# Patient Record
Sex: MALE | Race: WHITE | Employment: FULL TIME | URBAN - NONMETROPOLITAN AREA
[De-identification: names, ages, dates, MRNs, and addresses within clinical notes are randomized per-mention and may not be internally consistent; named-entity substitution may affect disease eponyms.]

---

## 2017-01-03 ENCOUNTER — OFFICE VISIT (OUTPATIENT)
Dept: CARDIOLOGY | Age: 57
End: 2017-01-03
Payer: COMMERCIAL

## 2017-01-03 VITALS
DIASTOLIC BLOOD PRESSURE: 82 MMHG | HEIGHT: 73 IN | RESPIRATION RATE: 20 BRPM | HEART RATE: 65 BPM | BODY MASS INDEX: 31.01 KG/M2 | SYSTOLIC BLOOD PRESSURE: 130 MMHG | WEIGHT: 234 LBS

## 2017-01-03 DIAGNOSIS — I10 ESSENTIAL HYPERTENSION: ICD-10-CM

## 2017-01-03 DIAGNOSIS — R07.9 CHEST PAIN, UNSPECIFIED TYPE: Primary | ICD-10-CM

## 2017-01-03 DIAGNOSIS — I48.0 PAF (PAROXYSMAL ATRIAL FIBRILLATION) (HCC): ICD-10-CM

## 2017-01-03 PROCEDURE — 99203 OFFICE O/P NEW LOW 30 MIN: CPT | Performed by: NURSE PRACTITIONER

## 2017-01-03 RX ORDER — FLUTICASONE PROPIONATE 50 MCG
2 SPRAY, SUSPENSION (ML) NASAL DAILY
COMMUNITY
Start: 2016-10-21 | End: 2018-04-09 | Stop reason: ALTCHOICE

## 2017-01-12 ENCOUNTER — HOSPITAL ENCOUNTER (OUTPATIENT)
Dept: NON INVASIVE DIAGNOSTICS | Age: 57
Discharge: HOME | End: 2017-01-12
Payer: COMMERCIAL

## 2017-01-12 DIAGNOSIS — R07.9 CHEST PAIN, UNSPECIFIED TYPE: ICD-10-CM

## 2017-01-12 DIAGNOSIS — I48.0 PAF (PAROXYSMAL ATRIAL FIBRILLATION) (HCC): ICD-10-CM

## 2017-01-12 DIAGNOSIS — I10 ESSENTIAL HYPERTENSION: ICD-10-CM

## 2017-01-12 LAB
LV EF: 50 %
LV EF: 58 %
LVEF MODALITY: NORMAL
LVEF MODALITY: NORMAL

## 2017-01-12 PROCEDURE — 93350 STRESS TTE ONLY: CPT

## 2017-01-12 PROCEDURE — 93306 TTE W/DOPPLER COMPLETE: CPT

## 2017-01-19 ENCOUNTER — TELEPHONE (OUTPATIENT)
Dept: CARDIOLOGY | Age: 57
End: 2017-01-19

## 2017-01-20 ENCOUNTER — TELEPHONE (OUTPATIENT)
Dept: CARDIOLOGY | Age: 57
End: 2017-01-20

## 2017-02-08 ENCOUNTER — OFFICE VISIT (OUTPATIENT)
Dept: CARDIOLOGY | Age: 57
End: 2017-02-08
Payer: COMMERCIAL

## 2017-02-08 VITALS
WEIGHT: 237 LBS | SYSTOLIC BLOOD PRESSURE: 110 MMHG | BODY MASS INDEX: 30.42 KG/M2 | HEIGHT: 74 IN | HEART RATE: 52 BPM | DIASTOLIC BLOOD PRESSURE: 80 MMHG

## 2017-02-08 DIAGNOSIS — I10 ESSENTIAL HYPERTENSION: ICD-10-CM

## 2017-02-08 DIAGNOSIS — I34.0 MILD MITRAL REGURGITATION BY PRIOR ECHOCARDIOGRAM: ICD-10-CM

## 2017-02-08 DIAGNOSIS — I48.91 LONE ATRIAL FIBRILLATION (HCC): Primary | ICD-10-CM

## 2017-02-08 PROCEDURE — 99213 OFFICE O/P EST LOW 20 MIN: CPT | Performed by: NURSE PRACTITIONER

## 2017-02-08 RX ORDER — OMEGA-3 FATTY ACIDS CAP DELAYED RELEASE 1000 MG 1000 MG
3000 CAPSULE DELAYED RELEASE ORAL DAILY
COMMUNITY
End: 2017-05-26

## 2017-05-16 ENCOUNTER — OFFICE VISIT (OUTPATIENT)
Dept: CARDIOLOGY | Age: 57
End: 2017-05-16
Payer: COMMERCIAL

## 2017-05-16 VITALS
WEIGHT: 242 LBS | BODY MASS INDEX: 31.06 KG/M2 | SYSTOLIC BLOOD PRESSURE: 132 MMHG | HEIGHT: 74 IN | HEART RATE: 70 BPM | DIASTOLIC BLOOD PRESSURE: 76 MMHG

## 2017-05-16 DIAGNOSIS — I10 ESSENTIAL HYPERTENSION: Primary | ICD-10-CM

## 2017-05-16 DIAGNOSIS — I48.0 PAF (PAROXYSMAL ATRIAL FIBRILLATION) (HCC): ICD-10-CM

## 2017-05-16 PROCEDURE — 99212 OFFICE O/P EST SF 10 MIN: CPT | Performed by: INTERNAL MEDICINE

## 2017-05-16 RX ORDER — OMEPRAZOLE 20 MG/1
20 CAPSULE, DELAYED RELEASE ORAL 2 TIMES DAILY
COMMUNITY
End: 2018-03-08 | Stop reason: SDUPTHER

## 2017-05-16 RX ORDER — VITAMIN B COMPLEX
1 CAPSULE ORAL DAILY
COMMUNITY
End: 2017-11-10 | Stop reason: CLARIF

## 2017-05-26 ENCOUNTER — OFFICE VISIT (OUTPATIENT)
Dept: NEUROLOGY | Age: 57
End: 2017-05-26
Payer: COMMERCIAL

## 2017-05-26 VITALS
OXYGEN SATURATION: 96 % | BODY MASS INDEX: 31.32 KG/M2 | HEIGHT: 74 IN | SYSTOLIC BLOOD PRESSURE: 128 MMHG | WEIGHT: 244 LBS | HEART RATE: 64 BPM | DIASTOLIC BLOOD PRESSURE: 77 MMHG

## 2017-05-26 DIAGNOSIS — G47.33 OBSTRUCTIVE SLEEP APNEA: Primary | ICD-10-CM

## 2017-05-26 DIAGNOSIS — Z99.89 BIPAP (BIPHASIC POSITIVE AIRWAY PRESSURE) DEPENDENCE: ICD-10-CM

## 2017-05-26 PROCEDURE — 99213 OFFICE O/P EST LOW 20 MIN: CPT | Performed by: PHYSICIAN ASSISTANT

## 2017-06-26 ENCOUNTER — APPOINTMENT (OUTPATIENT)
Dept: CT IMAGING | Age: 57
DRG: 309 | End: 2017-06-26
Payer: COMMERCIAL

## 2017-06-26 ENCOUNTER — HOSPITAL ENCOUNTER (INPATIENT)
Age: 57
LOS: 2 days | Discharge: HOME | DRG: 309 | End: 2017-06-28
Attending: EMERGENCY MEDICINE | Admitting: INTERNAL MEDICINE
Payer: COMMERCIAL

## 2017-06-26 ENCOUNTER — TELEPHONE (OUTPATIENT)
Dept: CARDIOLOGY | Age: 57
End: 2017-06-26

## 2017-06-26 ENCOUNTER — APPOINTMENT (OUTPATIENT)
Dept: GENERAL RADIOLOGY | Age: 57
DRG: 309 | End: 2017-06-26
Payer: COMMERCIAL

## 2017-06-26 DIAGNOSIS — J18.9 PNEUMONIA DUE TO ORGANISM: Primary | ICD-10-CM

## 2017-06-26 DIAGNOSIS — R00.0 TACHYCARDIA: ICD-10-CM

## 2017-06-26 PROBLEM — Z86.711 HX PULMONARY EMBOLISM: Status: ACTIVE | Noted: 2017-06-26

## 2017-06-26 PROBLEM — J98.11 ATELECTASIS OF RIGHT LUNG: Status: ACTIVE | Noted: 2017-06-26

## 2017-06-26 PROBLEM — I48.0 PAROXYSMAL ATRIAL FIBRILLATION WITH RVR (HCC): Status: ACTIVE | Noted: 2017-06-26

## 2017-06-26 PROBLEM — N40.1 BENIGN PROSTATIC HYPERPLASIA WITH LOWER URINARY TRACT SYMPTOMS: Status: ACTIVE | Noted: 2017-06-26

## 2017-06-26 PROBLEM — E78.2 MIXED HYPERLIPIDEMIA: Status: ACTIVE | Noted: 2017-06-26

## 2017-06-26 LAB
ALBUMIN SERPL-MCNC: 3.7 G/DL (ref 3.5–5.2)
ALP BLD-CCNC: 57 U/L (ref 40–130)
ALT SERPL-CCNC: 14 U/L (ref 5–41)
ANION GAP SERPL CALCULATED.3IONS-SCNC: 13 MMOL/L (ref 7–19)
AST SERPL-CCNC: 38 U/L (ref 5–40)
BASOPHILS ABSOLUTE: 0.1 K/UL (ref 0–0.2)
BASOPHILS RELATIVE PERCENT: 0.9 % (ref 0–1)
BILIRUB SERPL-MCNC: 0.7 MG/DL (ref 0.2–1.2)
BILIRUBIN URINE: NEGATIVE
BLOOD, URINE: NEGATIVE
BUN BLDV-MCNC: 14 MG/DL (ref 6–20)
CALCIUM SERPL-MCNC: 9 MG/DL (ref 8.6–10)
CHLORIDE BLD-SCNC: 106 MMOL/L (ref 98–111)
CLARITY: CLEAR
CO2: 23 MMOL/L (ref 22–29)
COLOR: YELLOW
CREAT SERPL-MCNC: 0.7 MG/DL (ref 0.5–1.2)
D DIMER: 1.53 NG/ML DDU (ref 0–0.48)
EOSINOPHILS ABSOLUTE: 0.4 K/UL (ref 0–0.6)
EOSINOPHILS RELATIVE PERCENT: 5.1 % (ref 0–5)
GFR NON-AFRICAN AMERICAN: >60
GLUCOSE BLD-MCNC: 92 MG/DL (ref 74–109)
GLUCOSE URINE: NEGATIVE MG/DL
HCT VFR BLD CALC: 42.8 % (ref 42–52)
HEMOGLOBIN: 14.4 G/DL (ref 14–18)
KETONES, URINE: ABNORMAL MG/DL
LEUKOCYTE ESTERASE, URINE: NEGATIVE
LYMPHOCYTES ABSOLUTE: 1.7 K/UL (ref 1.1–4.5)
LYMPHOCYTES RELATIVE PERCENT: 24.2 % (ref 20–40)
MAGNESIUM: 1.9 MG/DL (ref 1.6–2.6)
MCH RBC QN AUTO: 32.7 PG (ref 27–31)
MCHC RBC AUTO-ENTMCNC: 33.6 G/DL (ref 33–37)
MCV RBC AUTO: 97.3 FL (ref 80–94)
MONOCYTES ABSOLUTE: 0.6 K/UL (ref 0–0.9)
MONOCYTES RELATIVE PERCENT: 9.1 % (ref 0–10)
NEUTROPHILS ABSOLUTE: 4.2 K/UL (ref 1.5–7.5)
NEUTROPHILS RELATIVE PERCENT: 60.6 % (ref 50–65)
NITRITE, URINE: NEGATIVE
PDW BLD-RTO: 13.4 % (ref 11.5–14.5)
PERFORMED ON: NORMAL
PH UA: 6.5
PLATELET # BLD: 199 K/UL (ref 130–400)
PMV BLD AUTO: 9.2 FL (ref 9.4–12.4)
POC TROPONIN I: 0.01 NG/ML (ref 0–0.08)
POTASSIUM SERPL-SCNC: 4.5 MMOL/L (ref 3.5–5)
PROTEIN UA: NEGATIVE MG/DL
RBC # BLD: 4.4 M/UL (ref 4.7–6.1)
SODIUM BLD-SCNC: 142 MMOL/L (ref 136–145)
SPECIFIC GRAVITY UA: 1.04
TOTAL PROTEIN: 6.7 G/DL (ref 6.6–8.7)
TSH SERPL DL<=0.05 MIU/L-ACNC: 2.98 UIU/ML (ref 0.27–4.2)
UROBILINOGEN, URINE: 0.2 E.U./DL
WBC # BLD: 6.9 K/UL (ref 4.8–10.8)

## 2017-06-26 PROCEDURE — 2580000003 HC RX 258: Performed by: EMERGENCY MEDICINE

## 2017-06-26 PROCEDURE — 71010 XR CHEST PORTABLE: CPT

## 2017-06-26 PROCEDURE — 80053 COMPREHEN METABOLIC PANEL: CPT

## 2017-06-26 PROCEDURE — 99254 IP/OBS CNSLTJ NEW/EST MOD 60: CPT | Performed by: INTERNAL MEDICINE

## 2017-06-26 PROCEDURE — 84484 ASSAY OF TROPONIN QUANT: CPT

## 2017-06-26 PROCEDURE — 99285 EMERGENCY DEPT VISIT HI MDM: CPT

## 2017-06-26 PROCEDURE — 6370000000 HC RX 637 (ALT 250 FOR IP): Performed by: INTERNAL MEDICINE

## 2017-06-26 PROCEDURE — 85379 FIBRIN DEGRADATION QUANT: CPT

## 2017-06-26 PROCEDURE — 2140000000 HC CCU INTERMEDIATE R&B

## 2017-06-26 PROCEDURE — 6360000004 HC RX CONTRAST MEDICATION: Performed by: EMERGENCY MEDICINE

## 2017-06-26 PROCEDURE — 85025 COMPLETE CBC W/AUTO DIFF WBC: CPT

## 2017-06-26 PROCEDURE — 6360000002 HC RX W HCPCS: Performed by: NURSE PRACTITIONER

## 2017-06-26 PROCEDURE — 36415 COLL VENOUS BLD VENIPUNCTURE: CPT

## 2017-06-26 PROCEDURE — 93005 ELECTROCARDIOGRAM TRACING: CPT

## 2017-06-26 PROCEDURE — 99223 1ST HOSP IP/OBS HIGH 75: CPT | Performed by: INTERNAL MEDICINE

## 2017-06-26 PROCEDURE — 6360000002 HC RX W HCPCS: Performed by: EMERGENCY MEDICINE

## 2017-06-26 PROCEDURE — 99285 EMERGENCY DEPT VISIT HI MDM: CPT | Performed by: EMERGENCY MEDICINE

## 2017-06-26 PROCEDURE — 84443 ASSAY THYROID STIM HORMONE: CPT

## 2017-06-26 PROCEDURE — 71275 CT ANGIOGRAPHY CHEST: CPT

## 2017-06-26 PROCEDURE — 87040 BLOOD CULTURE FOR BACTERIA: CPT

## 2017-06-26 PROCEDURE — 83735 ASSAY OF MAGNESIUM: CPT

## 2017-06-26 PROCEDURE — 2500000003 HC RX 250 WO HCPCS: Performed by: EMERGENCY MEDICINE

## 2017-06-26 PROCEDURE — 81003 URINALYSIS AUTO W/O SCOPE: CPT

## 2017-06-26 PROCEDURE — 6370000000 HC RX 637 (ALT 250 FOR IP): Performed by: EMERGENCY MEDICINE

## 2017-06-26 RX ORDER — METOPROLOL TARTRATE 5 MG/5ML
5 INJECTION INTRAVENOUS ONCE
Status: COMPLETED | OUTPATIENT
Start: 2017-06-26 | End: 2017-06-26

## 2017-06-26 RX ORDER — VITAMIN C
1 TAB ORAL DAILY
Status: DISCONTINUED | OUTPATIENT
Start: 2017-06-27 | End: 2017-06-28 | Stop reason: HOSPADM

## 2017-06-26 RX ORDER — SODIUM CHLORIDE 0.9 % (FLUSH) 0.9 %
10 SYRINGE (ML) INJECTION EVERY 12 HOURS SCHEDULED
Status: DISCONTINUED | OUTPATIENT
Start: 2017-06-26 | End: 2017-06-28 | Stop reason: HOSPADM

## 2017-06-26 RX ORDER — ACETAMINOPHEN 325 MG/1
650 TABLET ORAL EVERY 4 HOURS PRN
Status: DISCONTINUED | OUTPATIENT
Start: 2017-06-26 | End: 2017-06-28 | Stop reason: HOSPADM

## 2017-06-26 RX ORDER — ASPIRIN 81 MG/1
324 TABLET, CHEWABLE ORAL ONCE
Status: COMPLETED | OUTPATIENT
Start: 2017-06-26 | End: 2017-06-26

## 2017-06-26 RX ORDER — SODIUM CHLORIDE 0.9 % (FLUSH) 0.9 %
10 SYRINGE (ML) INJECTION PRN
Status: DISCONTINUED | OUTPATIENT
Start: 2017-06-26 | End: 2017-06-28 | Stop reason: HOSPADM

## 2017-06-26 RX ORDER — PANTOPRAZOLE SODIUM 40 MG/1
40 TABLET, DELAYED RELEASE ORAL
Status: DISCONTINUED | OUTPATIENT
Start: 2017-06-27 | End: 2017-06-28 | Stop reason: HOSPADM

## 2017-06-26 RX ORDER — ASCORBIC ACID 500 MG
500 TABLET ORAL 2 TIMES DAILY
Status: DISCONTINUED | OUTPATIENT
Start: 2017-06-26 | End: 2017-06-28 | Stop reason: HOSPADM

## 2017-06-26 RX ORDER — VITAMIN E 268 MG
400 CAPSULE ORAL DAILY
Status: DISCONTINUED | OUTPATIENT
Start: 2017-06-27 | End: 2017-06-28 | Stop reason: HOSPADM

## 2017-06-26 RX ORDER — 0.9 % SODIUM CHLORIDE 0.9 %
1000 INTRAVENOUS SOLUTION INTRAVENOUS ONCE
Status: COMPLETED | OUTPATIENT
Start: 2017-06-26 | End: 2017-06-26

## 2017-06-26 RX ORDER — DOCUSATE SODIUM 100 MG/1
100 CAPSULE, LIQUID FILLED ORAL DAILY
Status: DISCONTINUED | OUTPATIENT
Start: 2017-06-26 | End: 2017-06-28 | Stop reason: HOSPADM

## 2017-06-26 RX ORDER — METOPROLOL SUCCINATE 50 MG/1
50 TABLET, EXTENDED RELEASE ORAL 2 TIMES DAILY
Status: DISCONTINUED | OUTPATIENT
Start: 2017-06-26 | End: 2017-06-26 | Stop reason: ALTCHOICE

## 2017-06-26 RX ORDER — ADENOSINE 3 MG/ML
6 INJECTION, SOLUTION INTRAVENOUS ONCE
Status: COMPLETED | OUTPATIENT
Start: 2017-06-26 | End: 2017-06-26

## 2017-06-26 RX ORDER — ONDANSETRON 2 MG/ML
4 INJECTION INTRAMUSCULAR; INTRAVENOUS EVERY 6 HOURS PRN
Status: DISCONTINUED | OUTPATIENT
Start: 2017-06-26 | End: 2017-06-26 | Stop reason: SDUPTHER

## 2017-06-26 RX ORDER — LISINOPRIL 20 MG/1
20 TABLET ORAL DAILY
Status: DISCONTINUED | OUTPATIENT
Start: 2017-06-27 | End: 2017-06-28 | Stop reason: HOSPADM

## 2017-06-26 RX ORDER — OMEGA-3S/DHA/EPA/FISH OIL/D3 300MG-1000
400 CAPSULE ORAL DAILY
Status: DISCONTINUED | OUTPATIENT
Start: 2017-06-27 | End: 2017-06-28 | Stop reason: HOSPADM

## 2017-06-26 RX ORDER — DIAZEPAM 5 MG/1
5 TABLET ORAL NIGHTLY PRN
Status: DISCONTINUED | OUTPATIENT
Start: 2017-06-26 | End: 2017-06-28 | Stop reason: HOSPADM

## 2017-06-26 RX ORDER — ACETAMINOPHEN 500 MG
500 TABLET ORAL EVERY 4 HOURS PRN
Status: DISCONTINUED | OUTPATIENT
Start: 2017-06-26 | End: 2017-06-28 | Stop reason: HOSPADM

## 2017-06-26 RX ORDER — ONDANSETRON 2 MG/ML
4 INJECTION INTRAMUSCULAR; INTRAVENOUS EVERY 6 HOURS PRN
Status: DISCONTINUED | OUTPATIENT
Start: 2017-06-26 | End: 2017-06-28 | Stop reason: HOSPADM

## 2017-06-26 RX ORDER — NIACIN 100 MG
1000 TABLET ORAL 4 TIMES DAILY
Status: DISCONTINUED | OUTPATIENT
Start: 2017-06-26 | End: 2017-06-28 | Stop reason: HOSPADM

## 2017-06-26 RX ORDER — TAMSULOSIN HYDROCHLORIDE 0.4 MG/1
0.4 CAPSULE ORAL DAILY
COMMUNITY
End: 2017-11-30 | Stop reason: ALTCHOICE

## 2017-06-26 RX ORDER — FLUTICASONE PROPIONATE 50 MCG
1 SPRAY, SUSPENSION (ML) NASAL DAILY
Status: DISCONTINUED | OUTPATIENT
Start: 2017-06-27 | End: 2017-06-28 | Stop reason: HOSPADM

## 2017-06-26 RX ORDER — SOTALOL HYDROCHLORIDE 80 MG/1
80 TABLET ORAL 2 TIMES DAILY
Status: DISCONTINUED | OUTPATIENT
Start: 2017-06-26 | End: 2017-06-28 | Stop reason: HOSPADM

## 2017-06-26 RX ORDER — NIACIN 1000 MG
1000 TABLET, EXTENDED RELEASE ORAL 4 TIMES DAILY
Status: DISCONTINUED | OUTPATIENT
Start: 2017-06-26 | End: 2017-06-26

## 2017-06-26 RX ORDER — NIACIN 500 MG
1000 TABLET ORAL 4 TIMES DAILY
COMMUNITY
End: 2018-05-11 | Stop reason: DRUGHIGH

## 2017-06-26 RX ORDER — OMEGA-3 FATTY ACIDS CAP DELAYED RELEASE 1000 MG 1000 MG
2150 CAPSULE DELAYED RELEASE ORAL 2 TIMES DAILY
Status: DISCONTINUED | OUTPATIENT
Start: 2017-06-26 | End: 2017-06-28 | Stop reason: SDUPTHER

## 2017-06-26 RX ORDER — ASPIRIN 81 MG/1
81 TABLET, CHEWABLE ORAL DAILY
Status: DISCONTINUED | OUTPATIENT
Start: 2017-06-27 | End: 2017-06-28 | Stop reason: HOSPADM

## 2017-06-26 RX ADMIN — METOPROLOL TARTRATE 5 MG: 5 INJECTION INTRAVENOUS at 15:05

## 2017-06-26 RX ADMIN — OXYCODONE HYDROCHLORIDE AND ACETAMINOPHEN 500 MG: 500 TABLET ORAL at 21:40

## 2017-06-26 RX ADMIN — SODIUM CHLORIDE 1000 ML: 9 INJECTION, SOLUTION INTRAVENOUS at 15:06

## 2017-06-26 RX ADMIN — SOTALOL HYDROCHLORIDE 80 MG: 80 TABLET ORAL at 21:40

## 2017-06-26 RX ADMIN — DILTIAZEM HYDROCHLORIDE 5 MG/HR: 5 INJECTION INTRAVENOUS at 18:15

## 2017-06-26 RX ADMIN — OMEGA-3 FATTY ACIDS CAP DELAYED RELEASE 1000 MG 2000 MG: 1000 CAPSULE DELAYED RELEASE at 21:39

## 2017-06-26 RX ADMIN — ADENOSINE 6 MG: 3 INJECTION, SOLUTION INTRAVENOUS at 15:57

## 2017-06-26 RX ADMIN — METOPROLOL TARTRATE 5 MG: 5 INJECTION INTRAVENOUS at 16:24

## 2017-06-26 RX ADMIN — ENOXAPARIN SODIUM 105 MG: 120 INJECTION SUBCUTANEOUS at 21:40

## 2017-06-26 RX ADMIN — IOVERSOL 90 ML: 741 INJECTION INTRA-ARTERIAL; INTRAVENOUS at 16:15

## 2017-06-26 RX ADMIN — AZITHROMYCIN MONOHYDRATE 500 MG: 500 INJECTION, POWDER, LYOPHILIZED, FOR SOLUTION INTRAVENOUS at 17:44

## 2017-06-26 RX ADMIN — DOCUSATE SODIUM 100 MG: 100 CAPSULE, LIQUID FILLED ORAL at 21:39

## 2017-06-26 RX ADMIN — CEFTRIAXONE 1 G: 1 INJECTION, POWDER, FOR SOLUTION INTRAMUSCULAR; INTRAVENOUS at 17:15

## 2017-06-26 RX ADMIN — ASPIRIN 81 MG CHEWABLE TABLET 324 MG: 81 TABLET CHEWABLE at 14:49

## 2017-06-26 RX ADMIN — Medication 1000 MG: at 21:41

## 2017-06-26 ASSESSMENT — ENCOUNTER SYMPTOMS
DIARRHEA: 0
TROUBLE SWALLOWING: 0
BACK PAIN: 0
BLOOD IN STOOL: 0
COUGH: 0
ABDOMINAL PAIN: 0
ABDOMINAL DISTENTION: 0
SHORTNESS OF BREATH: 0
NAUSEA: 0
CHEST TIGHTNESS: 0
VOMITING: 0
CONSTIPATION: 0
SORE THROAT: 0
RHINORRHEA: 0

## 2017-06-26 ASSESSMENT — PAIN SCALES - GENERAL: PAINLEVEL_OUTOF10: 0

## 2017-06-27 ENCOUNTER — APPOINTMENT (OUTPATIENT)
Dept: GENERAL RADIOLOGY | Age: 57
DRG: 309 | End: 2017-06-27
Payer: COMMERCIAL

## 2017-06-27 LAB
ANION GAP SERPL CALCULATED.3IONS-SCNC: 13 MMOL/L (ref 7–19)
BUN BLDV-MCNC: 15 MG/DL (ref 6–20)
CALCIUM SERPL-MCNC: 8.4 MG/DL (ref 8.6–10)
CHLORIDE BLD-SCNC: 106 MMOL/L (ref 98–111)
CHOLESTEROL, TOTAL: 168 MG/DL (ref 160–199)
CO2: 25 MMOL/L (ref 22–29)
CREAT SERPL-MCNC: 0.9 MG/DL (ref 0.5–1.2)
GFR NON-AFRICAN AMERICAN: >60
GLUCOSE BLD-MCNC: 121 MG/DL (ref 74–109)
HCT VFR BLD CALC: 38.8 % (ref 42–52)
HDLC SERPL-MCNC: 41 MG/DL (ref 55–121)
HEMOGLOBIN: 13 G/DL (ref 14–18)
LDL CHOLESTEROL CALCULATED: 108 MG/DL
LV EF: 58 %
LVEF MODALITY: NORMAL
MCH RBC QN AUTO: 33.1 PG (ref 27–31)
MCHC RBC AUTO-ENTMCNC: 33.5 G/DL (ref 33–37)
MCV RBC AUTO: 98.7 FL (ref 80–94)
PDW BLD-RTO: 13.3 % (ref 11.5–14.5)
PLATELET # BLD: 201 K/UL (ref 130–400)
PMV BLD AUTO: 9.7 FL (ref 9.4–12.4)
POTASSIUM SERPL-SCNC: 3.5 MMOL/L (ref 3.5–5)
RBC # BLD: 3.93 M/UL (ref 4.7–6.1)
SODIUM BLD-SCNC: 144 MMOL/L (ref 136–145)
TRIGL SERPL-MCNC: 94 MG/DL (ref 150–199)
TROPONIN: <0.01 NG/ML (ref 0–0.03)
WBC # BLD: 5.7 K/UL (ref 4.8–10.8)

## 2017-06-27 PROCEDURE — 6370000000 HC RX 637 (ALT 250 FOR IP): Performed by: INTERNAL MEDICINE

## 2017-06-27 PROCEDURE — 84484 ASSAY OF TROPONIN QUANT: CPT

## 2017-06-27 PROCEDURE — 2580000003 HC RX 258: Performed by: NURSE PRACTITIONER

## 2017-06-27 PROCEDURE — 93005 ELECTROCARDIOGRAM TRACING: CPT

## 2017-06-27 PROCEDURE — 99232 SBSQ HOSP IP/OBS MODERATE 35: CPT | Performed by: INTERNAL MEDICINE

## 2017-06-27 PROCEDURE — 36415 COLL VENOUS BLD VENIPUNCTURE: CPT

## 2017-06-27 PROCEDURE — 2140000000 HC CCU INTERMEDIATE R&B

## 2017-06-27 PROCEDURE — 85027 COMPLETE CBC AUTOMATED: CPT

## 2017-06-27 PROCEDURE — 6360000002 HC RX W HCPCS: Performed by: NURSE PRACTITIONER

## 2017-06-27 PROCEDURE — 80061 LIPID PANEL: CPT

## 2017-06-27 PROCEDURE — 99233 SBSQ HOSP IP/OBS HIGH 50: CPT | Performed by: HOSPITALIST

## 2017-06-27 PROCEDURE — 71020 XR CHEST STANDARD TWO VW: CPT

## 2017-06-27 PROCEDURE — 6370000000 HC RX 637 (ALT 250 FOR IP): Performed by: HOSPITALIST

## 2017-06-27 PROCEDURE — 80048 BASIC METABOLIC PNL TOTAL CA: CPT

## 2017-06-27 PROCEDURE — 93306 TTE W/DOPPLER COMPLETE: CPT

## 2017-06-27 RX ORDER — TAMSULOSIN HYDROCHLORIDE 0.4 MG/1
0.4 CAPSULE ORAL NIGHTLY
Status: DISCONTINUED | OUTPATIENT
Start: 2017-06-27 | End: 2017-06-28 | Stop reason: HOSPADM

## 2017-06-27 RX ORDER — ASPIRIN 81 MG/1
81 TABLET ORAL DAILY
COMMUNITY

## 2017-06-27 RX ADMIN — OXYCODONE HYDROCHLORIDE AND ACETAMINOPHEN 500 MG: 500 TABLET ORAL at 08:30

## 2017-06-27 RX ADMIN — ENOXAPARIN SODIUM 105 MG: 120 INJECTION SUBCUTANEOUS at 08:30

## 2017-06-27 RX ADMIN — SOTALOL HYDROCHLORIDE 80 MG: 80 TABLET ORAL at 08:30

## 2017-06-27 RX ADMIN — OXYCODONE HYDROCHLORIDE AND ACETAMINOPHEN 500 MG: 500 TABLET ORAL at 21:35

## 2017-06-27 RX ADMIN — ASPIRIN 81 MG CHEWABLE TABLET 81 MG: 81 TABLET CHEWABLE at 08:30

## 2017-06-27 RX ADMIN — OMEGA-3 FATTY ACIDS CAP DELAYED RELEASE 1000 MG 2000 MG: 1000 CAPSULE DELAYED RELEASE at 08:29

## 2017-06-27 RX ADMIN — TAMSULOSIN HYDROCHLORIDE 0.4 MG: 0.4 CAPSULE ORAL at 21:35

## 2017-06-27 RX ADMIN — Medication 10 ML: at 08:31

## 2017-06-27 RX ADMIN — LISINOPRIL 20 MG: 20 TABLET ORAL at 08:30

## 2017-06-27 RX ADMIN — VITAMIN C 1 TABLET: TAB at 08:30

## 2017-06-27 RX ADMIN — PANTOPRAZOLE SODIUM 40 MG: 40 TABLET, DELAYED RELEASE ORAL at 06:00

## 2017-06-27 RX ADMIN — Medication 1000 MG: at 14:18

## 2017-06-27 RX ADMIN — Medication 1000 MG: at 08:28

## 2017-06-27 RX ADMIN — OMEGA-3 FATTY ACIDS CAP DELAYED RELEASE 1000 MG 2000 MG: 1000 CAPSULE DELAYED RELEASE at 21:35

## 2017-06-27 RX ADMIN — Medication 1000 MG: at 21:35

## 2017-06-27 RX ADMIN — FLUTICASONE PROPIONATE 1 SPRAY: 50 SPRAY, METERED NASAL at 14:18

## 2017-06-27 RX ADMIN — Medication 1000 MG: at 17:16

## 2017-06-27 RX ADMIN — SOTALOL HYDROCHLORIDE 80 MG: 80 TABLET ORAL at 21:34

## 2017-06-27 RX ADMIN — Medication 10 ML: at 21:39

## 2017-06-27 RX ADMIN — DOCUSATE SODIUM 100 MG: 100 CAPSULE, LIQUID FILLED ORAL at 08:29

## 2017-06-27 RX ADMIN — VITAMIN E CAP 400 UNIT 400 UNITS: 400 CAP at 08:30

## 2017-06-27 RX ADMIN — CHOLECALCIFEROL TAB 10 MCG (400 UNIT) 400 UNITS: 10 TAB at 08:30

## 2017-06-27 ASSESSMENT — PAIN SCALES - GENERAL
PAINLEVEL_OUTOF10: 0

## 2017-06-28 VITALS
RESPIRATION RATE: 18 BRPM | TEMPERATURE: 96.8 F | HEART RATE: 57 BPM | BODY MASS INDEX: 31.16 KG/M2 | DIASTOLIC BLOOD PRESSURE: 72 MMHG | SYSTOLIC BLOOD PRESSURE: 124 MMHG | OXYGEN SATURATION: 95 % | HEIGHT: 74 IN | WEIGHT: 242.8 LBS

## 2017-06-28 LAB
EKG P AXIS: 67 DEGREES
EKG P AXIS: 73 DEGREES
EKG P-R INTERVAL: 148 MS
EKG P-R INTERVAL: 162 MS
EKG Q-T INTERVAL: 298 MS
EKG Q-T INTERVAL: 476 MS
EKG QRS DURATION: 86 MS
EKG QRS DURATION: 88 MS
EKG QTC CALCULATION (BAZETT): 431 MS
EKG QTC CALCULATION (BAZETT): 455 MS
EKG T AXIS: 40 DEGREES
EKG T AXIS: 46 DEGREES

## 2017-06-28 PROCEDURE — 93005 ELECTROCARDIOGRAM TRACING: CPT

## 2017-06-28 PROCEDURE — 99239 HOSP IP/OBS DSCHRG MGMT >30: CPT | Performed by: HOSPITALIST

## 2017-06-28 PROCEDURE — 6360000002 HC RX W HCPCS: Performed by: HOSPITALIST

## 2017-06-28 PROCEDURE — 6370000000 HC RX 637 (ALT 250 FOR IP): Performed by: INTERNAL MEDICINE

## 2017-06-28 PROCEDURE — 94762 N-INVAS EAR/PLS OXIMTRY CONT: CPT

## 2017-06-28 PROCEDURE — 99231 SBSQ HOSP IP/OBS SF/LOW 25: CPT | Performed by: INTERNAL MEDICINE

## 2017-06-28 PROCEDURE — 2580000003 HC RX 258: Performed by: NURSE PRACTITIONER

## 2017-06-28 RX ORDER — OMEGA-3 FATTY ACIDS CAP DELAYED RELEASE 1000 MG 1000 MG
2000 CAPSULE DELAYED RELEASE ORAL 2 TIMES DAILY
Status: DISCONTINUED | OUTPATIENT
Start: 2017-06-28 | End: 2017-06-28 | Stop reason: HOSPADM

## 2017-06-28 RX ORDER — SOTALOL HYDROCHLORIDE 80 MG/1
80 TABLET ORAL 2 TIMES DAILY
Qty: 60 TABLET | Refills: 0 | Status: SHIPPED | OUTPATIENT
Start: 2017-06-28 | End: 2017-06-28

## 2017-06-28 RX ORDER — SOTALOL HYDROCHLORIDE 80 MG/1
80 TABLET ORAL 2 TIMES DAILY
Qty: 60 TABLET | Refills: 3 | Status: CANCELLED | OUTPATIENT
Start: 2017-06-28

## 2017-06-28 RX ORDER — SOTALOL HYDROCHLORIDE 80 MG/1
80 TABLET ORAL 2 TIMES DAILY
Qty: 180 TABLET | Refills: 0 | Status: SHIPPED | OUTPATIENT
Start: 2017-06-28 | End: 2017-06-28

## 2017-06-28 RX ORDER — SOTALOL HYDROCHLORIDE 80 MG/1
80 TABLET ORAL 2 TIMES DAILY
Qty: 60 TABLET | Refills: 0
Start: 2017-06-28 | End: 2017-07-18 | Stop reason: SDUPTHER

## 2017-06-28 RX ADMIN — Medication 1000 MG: at 09:09

## 2017-06-28 RX ADMIN — Medication 10 ML: at 09:08

## 2017-06-28 RX ADMIN — LISINOPRIL 20 MG: 20 TABLET ORAL at 09:09

## 2017-06-28 RX ADMIN — CHOLECALCIFEROL TAB 10 MCG (400 UNIT) 400 UNITS: 10 TAB at 09:09

## 2017-06-28 RX ADMIN — FLUTICASONE PROPIONATE 1 SPRAY: 50 SPRAY, METERED NASAL at 09:08

## 2017-06-28 RX ADMIN — OXYCODONE HYDROCHLORIDE AND ACETAMINOPHEN 500 MG: 500 TABLET ORAL at 09:09

## 2017-06-28 RX ADMIN — ASPIRIN 81 MG CHEWABLE TABLET 81 MG: 81 TABLET CHEWABLE at 09:08

## 2017-06-28 RX ADMIN — ENOXAPARIN SODIUM 40 MG: 40 INJECTION SUBCUTANEOUS at 09:08

## 2017-06-28 RX ADMIN — VITAMIN C 1 TABLET: TAB at 09:09

## 2017-06-28 RX ADMIN — PANTOPRAZOLE SODIUM 40 MG: 40 TABLET, DELAYED RELEASE ORAL at 05:30

## 2017-06-28 RX ADMIN — SOTALOL HYDROCHLORIDE 80 MG: 80 TABLET ORAL at 09:09

## 2017-06-28 RX ADMIN — VITAMIN E CAP 400 UNIT 400 UNITS: 400 CAP at 09:08

## 2017-06-28 RX ADMIN — DOCUSATE SODIUM 100 MG: 100 CAPSULE, LIQUID FILLED ORAL at 09:09

## 2017-06-28 RX ADMIN — OMEGA-3 FATTY ACIDS CAP DELAYED RELEASE 1000 MG 2000 MG: 1000 CAPSULE DELAYED RELEASE at 09:09

## 2017-06-28 ASSESSMENT — PAIN SCALES - GENERAL
PAINLEVEL_OUTOF10: 0
PAINLEVEL_OUTOF10: 0

## 2017-06-29 ENCOUNTER — TELEPHONE (OUTPATIENT)
Dept: INTERNAL MEDICINE | Age: 57
End: 2017-06-29

## 2017-06-29 DIAGNOSIS — I48.0 PAROXYSMAL ATRIAL FIBRILLATION (HCC): Primary | ICD-10-CM

## 2017-06-29 DIAGNOSIS — J18.9 PNEUMONIA DUE TO ORGANISM: ICD-10-CM

## 2017-06-29 DIAGNOSIS — Z86.711 HX PULMONARY EMBOLISM: ICD-10-CM

## 2017-06-29 DIAGNOSIS — I48.0 PAROXYSMAL ATRIAL FIBRILLATION WITH RVR (HCC): ICD-10-CM

## 2017-06-29 DIAGNOSIS — J98.11 ATELECTASIS OF RIGHT LUNG: ICD-10-CM

## 2017-06-29 DIAGNOSIS — N40.1 BENIGN NON-NODULAR PROSTATIC HYPERPLASIA WITH LOWER URINARY TRACT SYMPTOMS: ICD-10-CM

## 2017-06-29 DIAGNOSIS — G47.33 OBSTRUCTIVE SLEEP APNEA: ICD-10-CM

## 2017-06-29 DIAGNOSIS — I77.810 AORTIC ROOT DILATION (HCC): ICD-10-CM

## 2017-06-29 DIAGNOSIS — I34.0 MILD MITRAL REGURGITATION BY PRIOR ECHOCARDIOGRAM: ICD-10-CM

## 2017-06-29 DIAGNOSIS — E78.2 MIXED HYPERLIPIDEMIA: ICD-10-CM

## 2017-07-01 LAB
BLOOD CULTURE, ROUTINE: NORMAL
CULTURE, BLOOD 2: NORMAL

## 2017-07-05 ENCOUNTER — OFFICE VISIT (OUTPATIENT)
Dept: INTERNAL MEDICINE | Age: 57
End: 2017-07-05
Payer: COMMERCIAL

## 2017-07-05 VITALS
RESPIRATION RATE: 18 BRPM | DIASTOLIC BLOOD PRESSURE: 78 MMHG | BODY MASS INDEX: 31.32 KG/M2 | SYSTOLIC BLOOD PRESSURE: 132 MMHG | HEART RATE: 57 BPM | TEMPERATURE: 97.5 F | OXYGEN SATURATION: 95 % | HEIGHT: 74 IN | WEIGHT: 244 LBS

## 2017-07-05 DIAGNOSIS — Z86.711 HX PULMONARY EMBOLISM: Primary | ICD-10-CM

## 2017-07-05 DIAGNOSIS — I10 ESSENTIAL HYPERTENSION: ICD-10-CM

## 2017-07-05 DIAGNOSIS — I48.0 PAF (PAROXYSMAL ATRIAL FIBRILLATION) (HCC): ICD-10-CM

## 2017-07-05 DIAGNOSIS — Z00.00 HEALTHCARE MAINTENANCE: ICD-10-CM

## 2017-07-05 PROCEDURE — 99214 OFFICE O/P EST MOD 30 MIN: CPT | Performed by: NURSE PRACTITIONER

## 2017-07-05 RX ORDER — NAPROXEN 500 MG/1
TABLET ORAL PRN
Refills: 2 | COMMUNITY
Start: 2017-04-28 | End: 2018-05-11

## 2017-07-18 DIAGNOSIS — I48.91 LONE ATRIAL FIBRILLATION (HCC): Primary | ICD-10-CM

## 2017-07-18 DIAGNOSIS — I48.0 PAROXYSMAL ATRIAL FIBRILLATION WITH RVR (HCC): ICD-10-CM

## 2017-07-18 RX ORDER — SOTALOL HYDROCHLORIDE 80 MG/1
80 TABLET ORAL 2 TIMES DAILY
Qty: 60 TABLET | Refills: 1 | Status: SHIPPED | OUTPATIENT
Start: 2017-07-18 | End: 2017-12-11 | Stop reason: SDUPTHER

## 2017-08-09 ENCOUNTER — OFFICE VISIT (OUTPATIENT)
Dept: CARDIOLOGY | Age: 57
End: 2017-08-09
Payer: COMMERCIAL

## 2017-08-09 VITALS
DIASTOLIC BLOOD PRESSURE: 80 MMHG | HEIGHT: 74 IN | SYSTOLIC BLOOD PRESSURE: 138 MMHG | BODY MASS INDEX: 31.44 KG/M2 | HEART RATE: 56 BPM | WEIGHT: 245 LBS

## 2017-08-09 DIAGNOSIS — G47.33 OBSTRUCTIVE SLEEP APNEA: ICD-10-CM

## 2017-08-09 DIAGNOSIS — Z86.711 HX PULMONARY EMBOLISM: ICD-10-CM

## 2017-08-09 DIAGNOSIS — I34.0 MILD MITRAL REGURGITATION BY PRIOR ECHOCARDIOGRAM: ICD-10-CM

## 2017-08-09 DIAGNOSIS — I48.0 PAF (PAROXYSMAL ATRIAL FIBRILLATION) (HCC): Primary | ICD-10-CM

## 2017-08-09 PROBLEM — I48.91 LONE ATRIAL FIBRILLATION (HCC): Status: RESOLVED | Noted: 2017-02-08 | Resolved: 2017-08-09

## 2017-08-09 PROCEDURE — 99213 OFFICE O/P EST LOW 20 MIN: CPT | Performed by: CLINICAL NURSE SPECIALIST

## 2017-08-09 PROCEDURE — 93000 ELECTROCARDIOGRAM COMPLETE: CPT | Performed by: CLINICAL NURSE SPECIALIST

## 2017-08-09 ASSESSMENT — ENCOUNTER SYMPTOMS
COUGH: 0
BLURRED VISION: 0
HEARTBURN: 0
ORTHOPNEA: 0
NAUSEA: 0
VOMITING: 0
SHORTNESS OF BREATH: 0

## 2017-10-26 PROBLEM — Z12.11 SCREENING FOR COLORECTAL CANCER: Status: ACTIVE | Noted: 2017-10-26

## 2017-10-26 PROBLEM — F51.01 PRIMARY INSOMNIA: Status: ACTIVE | Noted: 2017-10-26

## 2017-10-26 PROBLEM — J98.11 ATELECTASIS OF RIGHT LUNG: Status: RESOLVED | Noted: 2017-06-26 | Resolved: 2017-10-26

## 2017-10-26 PROBLEM — M51.36 DEGENERATIVE LUMBAR DISC: Status: ACTIVE | Noted: 2017-10-26

## 2017-10-26 PROBLEM — Z12.12 SCREENING FOR COLORECTAL CANCER: Status: ACTIVE | Noted: 2017-10-26

## 2017-10-26 PROBLEM — I48.0 PAROXYSMAL ATRIAL FIBRILLATION WITH RVR (HCC): Status: RESOLVED | Noted: 2017-06-26 | Resolved: 2017-10-26

## 2017-10-26 PROBLEM — K21.9 GASTROESOPHAGEAL REFLUX DISEASE WITHOUT ESOPHAGITIS: Status: ACTIVE | Noted: 2017-10-26

## 2017-10-26 PROBLEM — M51.369 DEGENERATIVE LUMBAR DISC: Status: ACTIVE | Noted: 2017-10-26

## 2017-11-03 DIAGNOSIS — Z00.00 HEALTHCARE MAINTENANCE: ICD-10-CM

## 2017-11-03 LAB
ALBUMIN SERPL-MCNC: 4 G/DL (ref 3.5–5.2)
ALP BLD-CCNC: 66 U/L (ref 40–130)
ALT SERPL-CCNC: 14 U/L (ref 5–41)
ANION GAP SERPL CALCULATED.3IONS-SCNC: 15 MMOL/L (ref 7–19)
AST SERPL-CCNC: 22 U/L (ref 5–40)
BASOPHILS ABSOLUTE: 0.1 K/UL (ref 0–0.2)
BASOPHILS RELATIVE PERCENT: 1 % (ref 0–1)
BILIRUB SERPL-MCNC: 0.7 MG/DL (ref 0.2–1.2)
BUN BLDV-MCNC: 8 MG/DL (ref 6–20)
CALCIUM SERPL-MCNC: 9.5 MG/DL (ref 8.6–10)
CHLORIDE BLD-SCNC: 104 MMOL/L (ref 98–111)
CHOLESTEROL, TOTAL: 238 MG/DL (ref 160–199)
CO2: 25 MMOL/L (ref 22–29)
CREAT SERPL-MCNC: 0.7 MG/DL (ref 0.5–1.2)
EOSINOPHILS ABSOLUTE: 0.4 K/UL (ref 0–0.6)
EOSINOPHILS RELATIVE PERCENT: 6.5 % (ref 0–5)
GFR NON-AFRICAN AMERICAN: >60
GLUCOSE BLD-MCNC: 95 MG/DL (ref 74–109)
HCT VFR BLD CALC: 44.1 % (ref 42–52)
HDLC SERPL-MCNC: 47 MG/DL (ref 55–121)
HEMOGLOBIN: 15 G/DL (ref 14–18)
LDL CHOLESTEROL CALCULATED: 174 MG/DL
LYMPHOCYTES ABSOLUTE: 1.8 K/UL (ref 1.1–4.5)
LYMPHOCYTES RELATIVE PERCENT: 28.7 % (ref 20–40)
MCH RBC QN AUTO: 32.6 PG (ref 27–31)
MCHC RBC AUTO-ENTMCNC: 34 G/DL (ref 33–37)
MCV RBC AUTO: 95.9 FL (ref 80–94)
MONOCYTES ABSOLUTE: 0.6 K/UL (ref 0–0.9)
MONOCYTES RELATIVE PERCENT: 9.8 % (ref 0–10)
NEUTROPHILS ABSOLUTE: 3.4 K/UL (ref 1.5–7.5)
NEUTROPHILS RELATIVE PERCENT: 53.8 % (ref 50–65)
PDW BLD-RTO: 12.7 % (ref 11.5–14.5)
PLATELET # BLD: 230 K/UL (ref 130–400)
PMV BLD AUTO: 8.9 FL (ref 9.4–12.4)
POTASSIUM SERPL-SCNC: 4.1 MMOL/L (ref 3.5–5)
PROSTATE SPECIFIC ANTIGEN: 4.63 NG/ML (ref 0–4)
RBC # BLD: 4.6 M/UL (ref 4.7–6.1)
SODIUM BLD-SCNC: 144 MMOL/L (ref 136–145)
TOTAL PROTEIN: 7 G/DL (ref 6.6–8.7)
TRIGL SERPL-MCNC: 83 MG/DL (ref 0–149)
WBC # BLD: 6.3 K/UL (ref 4.8–10.8)

## 2017-11-08 DIAGNOSIS — E78.2 MIXED HYPERLIPIDEMIA: ICD-10-CM

## 2017-11-08 DIAGNOSIS — I10 ESSENTIAL HYPERTENSION: Primary | ICD-10-CM

## 2017-11-10 ENCOUNTER — OFFICE VISIT (OUTPATIENT)
Dept: INTERNAL MEDICINE | Age: 57
End: 2017-11-10
Payer: COMMERCIAL

## 2017-11-10 VITALS
HEIGHT: 74 IN | DIASTOLIC BLOOD PRESSURE: 88 MMHG | SYSTOLIC BLOOD PRESSURE: 122 MMHG | OXYGEN SATURATION: 94 % | BODY MASS INDEX: 32.47 KG/M2 | RESPIRATION RATE: 18 BRPM | HEART RATE: 132 BPM | WEIGHT: 253 LBS

## 2017-11-10 DIAGNOSIS — I48.0 PAF (PAROXYSMAL ATRIAL FIBRILLATION) (HCC): ICD-10-CM

## 2017-11-10 DIAGNOSIS — I10 ESSENTIAL HYPERTENSION: Primary | ICD-10-CM

## 2017-11-10 DIAGNOSIS — E78.2 MIXED HYPERLIPIDEMIA: ICD-10-CM

## 2017-11-10 DIAGNOSIS — G47.33 OBSTRUCTIVE SLEEP APNEA: ICD-10-CM

## 2017-11-10 DIAGNOSIS — R97.20 ELEVATED PSA: ICD-10-CM

## 2017-11-10 DIAGNOSIS — R11.0 POSTPRANDIAL NAUSEA: ICD-10-CM

## 2017-11-10 PROCEDURE — 99215 OFFICE O/P EST HI 40 MIN: CPT | Performed by: INTERNAL MEDICINE

## 2017-11-10 RX ORDER — ROSUVASTATIN CALCIUM 10 MG/1
10 TABLET, COATED ORAL NIGHTLY
Qty: 30 TABLET | Refills: 5 | Status: SHIPPED | OUTPATIENT
Start: 2017-11-10 | End: 2018-03-12 | Stop reason: SDUPTHER

## 2017-11-10 ASSESSMENT — ENCOUNTER SYMPTOMS
CONSTIPATION: 0
COUGH: 0
WHEEZING: 0
NAUSEA: 1
SORE THROAT: 0
ABDOMINAL PAIN: 1
CHEST TIGHTNESS: 0

## 2017-11-10 NOTE — PROGRESS NOTES
Chief Complaint   Patient presents with    Follow-up     4 month     History of presenting illness:  Yeni Rhodes is a 59 y.o. male who presents today for follow up on his chronic medical conditions as noted below. Essential hypertension-Patient reports her Bp has been well controlled ( systolic below 827; diastolic below 90) at home when checked with home/ store equipment.  No side effects related to blood pressure medications were reported by patient    Obstructive sleep apnea- using CPAP/ Sleeping better    Mixed hyperlipidemia- admits that has not been on strict diet/ gained 10 lbs    PAF (paroxysmal atrial fibrillation) ([facility] [address]- he follows with dr alexander/ no further episdoes    New issues  Postprandial stomach dyscomfort and nausea, it lasts about 1-2 hours and progressively more frequent over the last 3-4 months  Flat raised skin area left posterior upper arm      Patient Active Problem List    Diagnosis Date Noted    Primary insomnia 10/26/2017    Gastroesophageal reflux disease without esophagitis 10/26/2017    Degenerative lumbar disc 10/26/2017    Screening for colorectal cancer 10/26/2017     Overview Note:     2012 nl/ 10 yr      PAF (paroxysmal atrial fibrillation) ([facility] [address]     Hx pulmonary embolism 06/26/2017    Benign prostatic hyperplasia with lower urinary tract symptoms 06/26/2017    Mixed hyperlipidemia 06/26/2017    BiPAP (biphasic positive airway pressure) dependence      Overview Note:     12cm to 22cm      Mild mitral regurgitation by prior echocardiogram 02/08/2017    Essential hypertension 02/08/2017    Obstructive sleep apnea      Past Medical History:   Diagnosis Date    A-fib [facility])     Atelectasis of right lung 6/26/2017    BiPAP (biphasic positive airway pressure) dependence     12cm to 22cm    Clotting disorder (HCC)     Heart murmur     Hyperlipidemia     Hypertension     Obstructive sleep apnea     AHI:  36.5 per PSG, 9/2015    Paroxysmal atrial fibrillation with RVR ([facility] 6/26/2017    Pneumonia due to organism     Pulmonary embolism (HCC)       Past Surgical History:   Procedure Laterality Date    ANKLE FRACTURE SURGERY      left ankle    TESTICLE SURGERY      undecended testicle done when 1years old     Current Outpatient Prescriptions   Medication Sig Dispense Refill    rosuvastatin (CRESTOR) 10 MG tablet Take 1 tablet by mouth nightly 30 tablet 5    sotalol (BETAPACE) 80 MG tablet Take 1 tablet by mouth 2 times daily 60 tablet 1    naproxen (NAPROSYN) 500 MG tablet   2    aspirin 81 MG EC tablet Take 81 mg by mouth daily      Omega-3 Fatty Acids (FISH OIL PO) Take 2,150 mg by mouth 2 times daily      tamsulosin (FLOMAX) 0.4 MG capsule Take 0.4 mg by mouth daily      niacin 500 MG tablet Take 1,000 mg by mouth 4 times daily      omeprazole (PRILOSEC) 20 MG delayed release capsule Take 20 mg by mouth 2 times daily      fluticasone (FLONASE) 50 MCG/ACT nasal spray 2 sprays by Nasal route daily       lisinopril (PRINIVIL;ZESTRIL) 20 MG tablet Take 20 mg by mouth daily      Ascorbic Acid (VITAMIN C) 500 MG tablet Take 500 mg by mouth 2 times daily       vitamin E 400 UNIT capsule Take 400 Units by mouth daily       No current facility-administered medications for this visit. No Known Allergies  Social History   Substance Use Topics    Smoking status: Never Smoker    Smokeless tobacco: Never Used    Alcohol use 0.6 - 1.2 oz/week     1 - 2 Cans of beer per week      Comment: few times a week      Family History   Problem Relation Age of Onset    Heart Disease Mother     High Blood Pressure Mother     Heart Disease Father 54     mi    High Blood Pressure Father     High Cholesterol Father     Lung Cancer Father     Diabetes Paternal Aunt        Review of Systems   Constitutional: Positive for fatigue. Negative for chills and fever. HENT: Negative for congestion, ear pain, nosebleeds, postnasal drip and sore throat.     Respiratory: Negative for cough, chest tightness and wheezing. Cardiovascular: Negative for chest pain, palpitations and leg swelling. Gastrointestinal: Positive for abdominal pain and nausea. Negative for constipation. Genitourinary: Negative for dysuria and urgency. Musculoskeletal: Negative. Negative for arthralgias. Skin: Negative for rash. Neurological: Negative for dizziness and headaches. Psychiatric/Behavioral: Negative. Vitals:    11/10/17 1312   BP: 122/88   Site: Left Arm   Position: Sitting   Cuff Size: Medium Adult   Pulse: 132   Resp: 18   SpO2: 94%   Weight: 253 lb (114.8 kg)   Height: 6' 2\" (1.88 m)     Body mass index is 32.48 kg/m². Physical Exam   Constitutional: He is oriented to person, place, and time. He appears well-developed and well-nourished. HENT:   Head: Normocephalic and atraumatic. Right Ear: External ear normal.   Left Ear: External ear normal.   Mouth/Throat: Oropharynx is clear and moist.   Eyes: Conjunctivae are normal. Pupils are equal, round, and reactive to light. No scleral icterus. Neck: Normal range of motion. Neck supple. No JVD present. No thyromegaly present. Cardiovascular: Normal rate, regular rhythm and normal heart sounds. No murmur heard. Pulmonary/Chest: Effort normal and breath sounds normal. No respiratory distress. He has no wheezes. He exhibits no tenderness. Abdominal: Soft. Bowel sounds are normal. He exhibits no mass. There is no tenderness. Musculoskeletal: Normal range of motion. He exhibits no edema or tenderness. Lymphadenopathy:     He has no cervical adenopathy. Neurological: He is alert and oriented to person, place, and time. He has normal reflexes. No cranial nerve deficit. Coordination normal.   Skin: Skin is warm and dry. No rash noted. No erythema. Left posterior upper arm tan-appearing slightly raised flat lesion consistent with seborrheic keratosis   Psychiatric: He has a normal mood and affect.  His behavior is normal. Lab Review   Orders Only on 11/03/2017   Component Date Value    WBC 11/03/2017 6.3     RBC 11/03/2017 4.60*    Hemoglobin 11/03/2017 15.0     Hematocrit 11/03/2017 44.1     MCV 11/03/2017 95.9*    MCH 11/03/2017 32.6*    MCHC 11/03/2017 34.0     RDW 11/03/2017 12.7     Platelets 86/76/4356 230     MPV 11/03/2017 8.9*    Neutrophils % 11/03/2017 53.8     Lymphocytes % 11/03/2017 28.7     Monocytes % 11/03/2017 9.8     Eosinophils % 11/03/2017 6.5*    Basophils % 11/03/2017 1.0     Neutrophils # 11/03/2017 3.4     Lymphocytes # 11/03/2017 1.8     Monocytes # 11/03/2017 0.60     Eosinophils # 11/03/2017 0.40     Basophils # 11/03/2017 0.10     Sodium 11/03/2017 144     Potassium 11/03/2017 4.1     Chloride 11/03/2017 104     CO2 11/03/2017 25     Anion Gap 11/03/2017 15     Glucose 11/03/2017 95     BUN 11/03/2017 8     CREATININE 11/03/2017 0.7     GFR Non- 11/03/2017 >60     Calcium 11/03/2017 9.5     Total Protein 11/03/2017 7.0     Alb 11/03/2017 4.0     Total Bilirubin 11/03/2017 0.7     Alkaline Phosphatase 11/03/2017 66     ALT 11/03/2017 14     AST 11/03/2017 22     Cholesterol, Total 11/03/2017 238*    Triglycerides 11/03/2017 83     HDL 11/03/2017 47*    LDL Calculated 11/03/2017 174     PSA 11/03/2017 4.63*           ASSESSMENT/PLAN:  Essential hypertension= the pressure gradient is well controlled, they shouldn't will continue his lisinopril 20 mg daily    Obstructive sleep apnea- doing better, sleeping better continue current CPAP settings    Mixed hyperlipidemia  LDL elevated 172 ( 154, 152)  Trial small dose crestor 10 mg daily  Repeat testing in 6 months    PAF (paroxysmal atrial fibrillation) (HCC)= patient will continue sotalol 80 mg twice daily, plans as per cardiology    Elevated psa- it is mildly elevated 4.63/previous reading from this spring was 3.3. Patient is taking Flomax, previously was taking Cialis daily 5 mg.  Since insurance healthcare professional. [address] any warranty or liability for your use of this information. EMR Dragon/transcription disclaimer:Significant part of this  encounter note is electronic transcription/translation of spoken language to printed text. The electronic translation of spoken language may be erroneous, or at times, nonsensical words or phrases may be inadvertently transcribed.  Although I have reviewed the note for such errors, some may still exist.

## 2017-11-10 NOTE — PATIENT INSTRUCTIONS
Patient Education        Learning About High Cholesterol  What is high cholesterol? Cholesterol is a type of fat in your blood. It is needed for many body functions, such as making new cells. Cholesterol is made by your body. It also comes from food you eat. If you have too much cholesterol, it starts to build up in your arteries. This is called hardening of the arteries, or atherosclerosis. High cholesterol raises your risk of a heart attack and stroke. There are different types of cholesterol. LDL is the \"bad\" cholesterol. High LDL can raise your risk for heart disease, heart attack, and stroke. HDL is the \"good\" cholesterol. High HDL is linked with a lower risk for heart disease, heart attack, and stroke. Your cholesterol levels help your doctor find out your risk for having a heart attack or stroke. How can you prevent high cholesterol? A heart-healthy lifestyle can help you prevent high cholesterol. This lifestyle helps lower your risk for a heart attack and stroke. · Eat heart-healthy foods. ¨ Eat fruits, vegetables, whole grains (like oatmeal), dried beans and peas, nuts and seeds, soy products (like tofu), and fat-free or low-fat dairy products. ¨ Replace butter, margarine, and hydrogenated or partially hydrogenated oils with olive and canola oils. (Canola oil margarine without trans fat is fine.)  ¨ Replace red meat with fish, poultry, and soy protein (like tofu). ¨ Limit processed and packaged foods like chips, crackers, and cookies. · Be active. Exercise can improve your cholesterol level. Get at least 30 minutes of exercise on most days of the week. Walking is a good choice. You also may want to do other activities, such as running, swimming, cycling, or playing tennis or team sports. · Stay at a healthy weight. Lose weight if you need to. · Don't smoke. If you need help quitting, talk to your doctor about stop-smoking programs and medicines.  These can increase your chances of quitting for

## 2017-11-14 ENCOUNTER — OFFICE VISIT (OUTPATIENT)
Dept: CARDIOLOGY | Age: 57
End: 2017-11-14
Payer: COMMERCIAL

## 2017-11-14 VITALS
BODY MASS INDEX: 32.08 KG/M2 | WEIGHT: 250 LBS | HEIGHT: 74 IN | DIASTOLIC BLOOD PRESSURE: 88 MMHG | SYSTOLIC BLOOD PRESSURE: 130 MMHG | HEART RATE: 60 BPM

## 2017-11-14 DIAGNOSIS — E78.2 MIXED HYPERLIPIDEMIA: ICD-10-CM

## 2017-11-14 DIAGNOSIS — I48.0 PAF (PAROXYSMAL ATRIAL FIBRILLATION) (HCC): Primary | ICD-10-CM

## 2017-11-14 DIAGNOSIS — I10 ESSENTIAL HYPERTENSION: ICD-10-CM

## 2017-11-14 PROCEDURE — 99213 OFFICE O/P EST LOW 20 MIN: CPT | Performed by: INTERNAL MEDICINE

## 2017-11-14 NOTE — PROGRESS NOTES
[facility] Patient Office Visit    [address] [id number]  Phone: [phone]  Fax: [phone]        11/14/2017    Chief Complaint / Reason for the Visit   Follow up of:  Atrial Fibrillation and HTN and Hyperlipidemia      Specialty Problems        Cardiology Problems    Essential hypertension        Mild mitral regurgitation by prior echocardiogram        PAF (paroxysmal atrial fibrillation) ([address]              Current Status Today According to the patient:  \"I\"m doing great, have had no problems\"    Subjective:  Mr. Robin Khan is generally feeling stable. Mr. Robin Khan has the following cardiac complaints / symptoms today: 1. Atrial Fibrillation, Is stable on current medications    1. HTN, Is stable on current medications    2.  Hyperlipidemia, Is stable on current medications        Robin Khan is a 59 y.o. male with the following history as recorded in [facility]:    Patient Active Problem List    Diagnosis Date Noted    Primary insomnia 10/26/2017    Gastroesophageal reflux disease without esophagitis 10/26/2017    Degenerative lumbar disc 10/26/2017    Screening for colorectal cancer 10/26/2017    PAF (paroxysmal atrial fibrillation) (HCC)     Hx pulmonary embolism 06/26/2017    Benign prostatic hyperplasia with lower urinary tract symptoms 06/26/2017    Mixed hyperlipidemia 06/26/2017    BiPAP (biphasic positive airway pressure) dependence     Mild mitral regurgitation by prior echocardiogram 02/08/2017    Essential hypertension 02/08/2017    Obstructive sleep apnea      Current Outpatient Prescriptions   Medication Sig Dispense Refill    rosuvastatin (CRESTOR) 10 MG tablet Take 1 tablet by mouth nightly 30 tablet 5    sotalol (BETAPACE) 80 MG tablet Take 1 tablet by mouth 2 times daily 60 tablet 1    naproxen (NAPROSYN) 500 MG tablet   2    aspirin 81 MG EC tablet Take 81 mg by mouth daily      Omega-3 Fatty Acids Complaint / Symptom Yes / No / Description if Yes       Cough No   Horseness No       Cardiovascular:    Complaint / Symptom Yes / No / Description if Yes       Chest Pain No   Shortness of Air / Orthopnea No   Presyncope / Syncope No   Palpitations No         Objective:    /88   Pulse 60   Ht 6' 2\" (1.88 m)   Wt 250 lb (113.4 kg)   BMI 32.10 kg/m²     GENERAL - well developed and well nourished, conversant  HEENT -  PERRLA, Hearing appears normal  NECK - no thyromegaly, no JVD, trachea is in the midline  CARDIOVASCULAR - PMI is in the mid line clavicular position, Normal S1 and S2 with a grade 1/6 systolic murmur. No S3 or S4    PULMONARY - no respiratory distress. No wheezes or rales. Lungs are clear to ausculation   ABDOMEN  - soft, non tender, no rebound  MUSCULOSKELETAL  - range of motion of the upper and lower extermites appears normal and equal and is without pain   EXTREMITIES - no significant edema   NEUROLOGIC - gait and station are normal  SKIN - turgor is normal  PSYCHIATRIC - normal mood and affect, alert and orientated x 3,      ASSESSMENT:    ALL THE CARDIOLOGY PROBLEMS ARE LISTED ABOVE; HOWEVER, THE FOLLOWING SPECIFIC CARDIAC PROBLEMS / CONDITIONS WERE ADDRESSED AND TREATED DURING THE OFFICE VISIT TODAY:                                                                                            MEDICAL DECISION MAKING             Cardiac Specific Problem / Diagnosis  Discussion and Data Reviewed Diagnostic Procedures Ordered Management Options Selected           1. Atrial Fibrillation  show no change   Review and summation of old records:    Patient has a history of these risk factors, which are managed medically, and are on current oral therapy I personally addressed, counselled and educated the patient on this problem / risk factor. I will personally continue and manage prescribed medications and monitor the course of the therapy. No Continue current medications:      Yes 2. HTN  show no change   Patient has a history of these risk factors, which are managed medically, and are on current oral therapy I personally addressed, counselled and educated the patient on this problem / risk factor. I will personally continue and manage prescribed medications and monitor the course of the therapy. No Continue current medications:    {Yes           3. Hyperlipidemia  show no change   Patient has a history of these risk factors, which are managed medically, and are on current oral therapy I personally addressed, counselled and educated the patient on this problem / risk factor. I will personally continue and manage prescribed medications and monitor the course of the therapy. No Continue current medications:       Yes         Discussed with patient. Follow Up Visit Scheduled for:  6 month(s)    I greatly appreciate the opportunity to meet Macy Joseph and your confidence in allowing me to participate in his cardiovascular care. [facility] [address] [place] [address] am scribing for and in the presence of J. Sherryle Bame, MD,[facility]. Eleni Walls MA     3:42 PM  I, J. Sherryle Bame, MD, [facility], personally performed the services described in this documentation as scribed by Eleni Walls in my presence, and it is both accurate and complete. Electronically signed by Rebecca Nazario.  Sherryle Bame, MD, [facility]    11/14/17 3:51 PM

## 2017-11-17 ENCOUNTER — HOSPITAL ENCOUNTER (OUTPATIENT)
Dept: ULTRASOUND IMAGING | Age: 57
Discharge: HOME | End: 2017-11-17
Payer: COMMERCIAL

## 2017-11-17 DIAGNOSIS — R11.0 POSTPRANDIAL NAUSEA: ICD-10-CM

## 2017-11-29 ENCOUNTER — HOSPITAL ENCOUNTER (OUTPATIENT)
Dept: ULTRASOUND IMAGING | Age: 57
Discharge: HOME | End: 2017-11-29
Payer: COMMERCIAL

## 2017-11-29 ENCOUNTER — TELEPHONE (OUTPATIENT)
Dept: INTERNAL MEDICINE | Age: 57
End: 2017-11-29

## 2017-11-29 DIAGNOSIS — R11.0 POSTPRANDIAL NAUSEA: ICD-10-CM

## 2017-11-29 DIAGNOSIS — K80.20 GALL STONES: Primary | ICD-10-CM

## 2017-11-29 PROCEDURE — 76705 ECHO EXAM OF ABDOMEN: CPT

## 2017-11-29 NOTE — TELEPHONE ENCOUNTER
----- Message from Gilma Bustillo MD sent at 11/29/2017 12:10 PM CST -----  History gallbladder ultrasound shows stone is about 2 cm  A she was complaining symptoms that could be related to gallbladder stones  Suggested referral to surgeon for opinion- Dr Melanie Wolfe?

## 2017-11-30 ENCOUNTER — OFFICE VISIT (OUTPATIENT)
Dept: UROLOGY | Age: 57
End: 2017-11-30
Payer: COMMERCIAL

## 2017-11-30 VITALS
SYSTOLIC BLOOD PRESSURE: 132 MMHG | HEIGHT: 74 IN | HEART RATE: 68 BPM | DIASTOLIC BLOOD PRESSURE: 78 MMHG | TEMPERATURE: 98 F | BODY MASS INDEX: 33.5 KG/M2 | WEIGHT: 261 LBS

## 2017-11-30 DIAGNOSIS — N40.1 BENIGN PROSTATIC HYPERPLASIA WITH URINARY FREQUENCY: ICD-10-CM

## 2017-11-30 DIAGNOSIS — R35.0 BENIGN PROSTATIC HYPERPLASIA WITH URINARY FREQUENCY: ICD-10-CM

## 2017-11-30 DIAGNOSIS — R97.20 ELEVATED PSA: Primary | ICD-10-CM

## 2017-11-30 LAB
APPEARANCE FLUID: NORMAL
BILIRUBIN, POC: NORMAL
BLOOD URINE, POC: NORMAL
CLARITY, POC: CLEAR
COLOR, POC: YELLOW
GLUCOSE URINE, POC: NORMAL
KETONES, POC: NORMAL
LEUKOCYTE EST, POC: NORMAL
NITRITE, POC: NORMAL
PH, POC: 7.5
PROTEIN, POC: NORMAL
SPECIFIC GRAVITY, POC: 1.01
UROBILINOGEN, POC: 1

## 2017-11-30 PROCEDURE — 99244 OFF/OP CNSLTJ NEW/EST MOD 40: CPT | Performed by: UROLOGY

## 2017-11-30 RX ORDER — TADALAFIL 5 MG/1
5 TABLET ORAL PRN
Qty: 30 TABLET | Refills: 11 | Status: SHIPPED | OUTPATIENT
Start: 2017-11-30 | End: 2018-01-26 | Stop reason: SDUPTHER

## 2017-11-30 ASSESSMENT — ENCOUNTER SYMPTOMS
HEARTBURN: 0
NAUSEA: 0
EYE DISCHARGE: 0
EYE REDNESS: 0
WHEEZING: 0
SHORTNESS OF BREATH: 0

## 2017-11-30 NOTE — LETTER
[facility]  [address] [place] [address] Drive, [address] [address]  Phone: [phone]  Fax: [phone]    Amie Gonzalez MD        December 3, 2017     Brennon Gabriel MD  [facility] [address] [facility] [address]      Patient: Janell Hansen   MR Number: [id number]   YOB: 1960   Date of Visit: 11/30/2017       Dear Provider: Thank you for referring Janell Hansen to me for evaluation. Below are the relevant portions of my assessment and plan of care. If you have questions, please do not hesitate to call me. I look forward to following Perfecto Moore along with you.     Sincerely,        Amie Gonzalez MD

## 2017-11-30 NOTE — PROGRESS NOTES
Mother     High Blood Pressure Mother     Heart Disease Father 54     mi    High Blood Pressure Father     High Cholesterol Father     Lung Cancer Father     Diabetes Paternal Aunt        REVIEW OF SYSTEMS:  Review of Systems   Constitutional: Negative for chills and fever. HENT: Negative for hearing loss. Eyes: Negative for discharge and redness. Respiratory: Negative for shortness of breath and wheezing. Cardiovascular: Negative for leg swelling and PND. Gastrointestinal: Negative for heartburn and nausea. Genitourinary: Negative for dysuria, flank pain, frequency, hematuria and urgency. Musculoskeletal: Negative for myalgias and neck pain. Skin: Negative for itching and rash. Neurological: Negative for seizures, loss of consciousness and headaches. Endo/Heme/Allergies: Negative for environmental allergies and polydipsia. Psychiatric/Behavioral: Negative for depression and suicidal ideas. PHYSICAL EXAM:  /78   Pulse 68   Temp 98 °F (36.7 °C) (Temporal)   Ht 6' 2\" (1.88 m)   Wt 261 lb (118.4 kg)   BMI 33.51 kg/m²   Physical Exam   Constitutional: He is oriented to person, place, and time. He appears well-developed and well-nourished. HENT:   Head: Normocephalic and atraumatic. Eyes: Conjunctivae are normal. No scleral icterus. Neck: Normal range of motion. Cardiovascular: Normal rate, regular rhythm and intact distal pulses. Pulmonary/Chest: Effort normal and breath sounds normal. No respiratory distress. Abdominal: Soft. Bowel sounds are normal. He exhibits no distension and no mass. There is no tenderness. Hernia confirmed negative in the right inguinal area and confirmed negative in the left inguinal area. Genitourinary: Rectum normal, testes normal and penis normal. Prostate is enlarged (35 g smooth without nodularity). Prostate is not tender. Right testis shows no mass, no swelling and no tenderness.  Left testis shows no mass, no swelling and no tenderness. Circumcised. No phimosis. No discharge found. Musculoskeletal: Normal range of motion. He exhibits no edema or tenderness. Lymphadenopathy:     He has no cervical adenopathy. Right: No inguinal adenopathy present. Left: No inguinal adenopathy present. Neurological: He is alert and oriented to person, place, and time. Skin: Skin is warm and dry. Psychiatric: He has a normal mood and affect. His behavior is normal.   Nursing note and vitals reviewed. DATA:  CBC:   Lab Results   Component Value Date    WBC 6.3 11/03/2017    RBC 4.60 11/03/2017    HGB 15.0 11/03/2017    HCT 44.1 11/03/2017    MCV 95.9 11/03/2017    MCH 32.6 11/03/2017    MCHC 34.0 11/03/2017    RDW 12.7 11/03/2017     11/03/2017    MPV 8.9 11/03/2017     CMP:    Lab Results   Component Value Date     11/03/2017    K 4.1 11/03/2017     11/03/2017    CO2 25 11/03/2017    BUN 8 11/03/2017    CREATININE 0.7 11/03/2017    LABGLOM >60 11/03/2017    GLUCOSE 95 11/03/2017    PROT 7.0 11/03/2017    LABALBU 4.0 11/03/2017    CALCIUM 9.5 11/03/2017    BILITOT 0.7 11/03/2017    ALKPHOS 66 11/03/2017    AST 22 11/03/2017    ALT 14 11/03/2017     Results for orders placed or performed in visit on 11/30/17   POCT Urinalysis no Micro   Result Value Ref Range    Color, UA yellow     Clarity, UA clear     Glucose, UA POC neg     Bilirubin, UA neg     Ketones, UA neg     Spec Grav, UA 1.015     Blood, UA POC neg     pH, UA 7.5     Protein, UA POC neg     Urobilinogen, UA 1.0     Leukocytes, UA neg     Nitrite, UA neg     Appearance, Fluid  Clear, Slightly Cloudy     Lab Results   Component Value Date    PSA 4.63 (H) 11/03/2017     1. Elevated PSA  Benign JULIO C we'll repeat the PSA still elevated he'll need a biopsy.  - POCT Urinalysis no Micro  - PSA, Diagnostic; Future    2. Benign prostatic hyperplasia with urinary frequency  He could not tolerate alpha blockers and had side effects.  I feel that since he did respond previously to daily Cialis, and his symptoms are mostly irritative in nature think he would benefit and is medically indicated to take daily Cialis for BPH symptoms. - tadalafil (CIALIS) 5 MG tablet; Take 1 tablet by mouth as needed for Erectile Dysfunction  Dispense: 30 tablet; Refill: 11      Orders Placed This Encounter   Procedures    PSA, Diagnostic     PSA prior to next visit in 6 wks     Standing Status:   Future     Standing Expiration Date:   11/30/2018    POCT Urinalysis no Micro        Return in about 6 weeks (around 1/11/2018) for PSA prior to vext visit.

## 2017-12-07 ENCOUNTER — OFFICE VISIT (OUTPATIENT)
Dept: SURGERY | Age: 57
End: 2017-12-07
Payer: COMMERCIAL

## 2017-12-07 VITALS
SYSTOLIC BLOOD PRESSURE: 130 MMHG | BODY MASS INDEX: 32.08 KG/M2 | WEIGHT: 250 LBS | TEMPERATURE: 97.1 F | DIASTOLIC BLOOD PRESSURE: 82 MMHG | HEIGHT: 74 IN

## 2017-12-07 DIAGNOSIS — K80.10 CHRONIC CHOLECYSTITIS WITH CALCULUS: Primary | ICD-10-CM

## 2017-12-07 PROCEDURE — 99203 OFFICE O/P NEW LOW 30 MIN: CPT | Performed by: PHYSICIAN ASSISTANT

## 2017-12-07 ASSESSMENT — ENCOUNTER SYMPTOMS
ANAL BLEEDING: 0
FACIAL SWELLING: 0
SHORTNESS OF BREATH: 0
SORE THROAT: 0
RECTAL PAIN: 0
TROUBLE SWALLOWING: 0
APNEA: 1
ABDOMINAL DISTENTION: 0
BACK PAIN: 1
COUGH: 0
SINUS PAIN: 0
WHEEZING: 0
EYE PAIN: 0
BLOOD IN STOOL: 0
EYE DISCHARGE: 0
ABDOMINAL PAIN: 1
COLOR CHANGE: 0
CONSTIPATION: 0
DIARRHEA: 1
NAUSEA: 1
VOMITING: 0

## 2017-12-07 NOTE — PROGRESS NOTES
Hyperlipidemia     Hypertension     Obstructive sleep apnea     AHI:  36.5 per PSG, 9/2015 - BiPAP    Paroxysmal atrial fibrillation with RVR ([facility] [address] 6/26/2017    Pneumonia due to organism     Pulmonary embolism (HCC)        Family History   Problem Relation Age of Onset    Heart Disease Mother     High Blood Pressure Mother     Heart Disease Father 54     mi    High Blood Pressure Father     High Cholesterol Father     Lung Cancer Father     Cancer Father      Lung CA - Oat cell    Stroke Father     Diabetes Paternal Aunt        Social History   Substance Use Topics    Smoking status: Never Smoker    Smokeless tobacco: Never Used      Comment:  at [employer] Alcohol use 0.6 - 1.2 oz/week     1 - 2 Cans of beer per week      Comment: few times a week        Review of Systems   Constitutional: Positive for fatigue. Negative for activity change, appetite change, chills, diaphoresis, fever and unexpected weight change. HENT: Negative for congestion, facial swelling, hearing loss, sinus pain, sore throat and trouble swallowing. Eyes: Negative for pain, discharge and visual disturbance. Respiratory: Positive for apnea (Wears Bi-pap). Negative for cough, shortness of breath and wheezing. Cardiovascular: Positive for palpitations (H/o Afib; last epsiode in June2017). Negative for chest pain and leg swelling. Gastrointestinal: Positive for abdominal pain, diarrhea (soft, nonbloody) and nausea. Negative for abdominal distention, anal bleeding, blood in stool, constipation, rectal pain and vomiting. Endocrine: Negative for cold intolerance, heat intolerance, polydipsia, polyphagia and polyuria. Genitourinary: Positive for frequency and urgency. Negative for difficulty urinating, dysuria, enuresis, flank pain and hematuria. Musculoskeletal: Positive for back pain. Negative for joint swelling, neck pain and neck stiffness. Skin: Negative for color change, pallor, rash and wound. Allergic/Immunologic: Negative for environmental allergies and food allergies. Neurological: Positive for dizziness (Dizziness with quick position change +room spinning). Negative for tremors, seizures, facial asymmetry, speech difficulty, weakness and headaches. Hematological: Negative for adenopathy. Does not bruise/bleed easily. Psychiatric/Behavioral: Negative for agitation, behavioral problems, confusion and suicidal ideas. Objective:   Physical Exam   Constitutional: He is oriented to person, place, and time. He appears well-developed and well-nourished. No distress. HENT:   Head: Normocephalic and atraumatic. Mouth/Throat: No oropharyngeal exudate. Eyes: Conjunctivae are normal. Pupils are equal, round, and reactive to light. Right eye exhibits no discharge. No scleral icterus. Neck: Normal range of motion. No JVD present. No tracheal deviation present. No thyromegaly present. Cardiovascular: Normal rate, regular rhythm, normal heart sounds and intact distal pulses. Exam reveals no gallop and no friction rub. No murmur heard. Pulmonary/Chest: Effort normal and breath sounds normal. No respiratory distress. He has no wheezes. He has no rales. He exhibits no tenderness. Abdominal: Soft. Bowel sounds are normal. He exhibits no distension and no mass. There is no tenderness. There is no rebound and no guarding. Musculoskeletal: He exhibits no edema or tenderness. Neurological: He is alert and oriented to person, place, and time. Skin: Skin is warm and dry. No rash noted. He is not diaphoretic. No erythema. No pallor. Psychiatric: He has a normal mood and affect. His behavior is normal.       Assessment:    1. Cholelithiasis without cholecystitis or obstruction      Plan:       The risks, benefits, and options were discussed with the pt. Heis willing to accept all risks and proceed with a laparoscopic Cholecystectomy.  The surgical risks included but not limited to bleeding,

## 2017-12-07 NOTE — PATIENT INSTRUCTIONS
LOW FAT DIET: Regular    FOODS ALLOWED FOODS TO AVOID   BEVERAGES: skim milk, buttermilk made from skim   milk, coffee, tea, carbonated beverages BEVERAGES:  Whole milk, evaporated milk, buttermilk made from whole milk, chocolate beverages. BREAD: White, whole wheat, rye, Icelandic or [place], rolls BREAD:  Biscuits, muffins, cornbread, waffles, pancakes, sweet rolls - any made with large quantities of fat. CEREAL: All except those containing chocolate CEREAL:  None except those containing chocolate. DESSERTS: Fruits allowed, sherbets and puddings   made with, skim milk, gelatin desserts, chinyere food   cake, sugar wafers, popsicles, hard candy DESSERTS:  Ice cream, pies, butter cakes, sponge cakes, rich cookies, doughnuts, any others not listed under foods allowed. FATS:  Little or none FATS:  More than days allowance, others not listed, all fried foods. FRUIT: All except avocado and coconut, all fruit juices FRUIT:  Avocado and coconut; melons if not tolerated by patient, pineapple if not tolerated by patient. MEAT & MEAT SUBSTITUTE:  Lean meat, fish &   fowl, dry cottage cheese, 3 egg yolks a week. MEAT AND MEAT SUBSTITUTE:  All meat high in fat such as pork, diaz, ham, goose, duck, fatty fish, sausage, bologna, ham-cheese loaf, etc.  cream and hard cheese, peanut butter, more egg yolks than allowance. POTATO OR SUBSTITUTE:  Baked, mashed or   boiled white potato, macaroni, rice, spaghetti, noodles. POTATO OR SUBSTITUTE:  Fried potatoes, creamed or escalloped potatoes, potato chips, corn chips; sweet potatoes if not tolerated by patient. SOUPS:  Fat free broths, cream soups made with   skim milk, vegetable soup made with vegetables   tolerated by patient. SOUPS:  Fatty broths, cream soups made with whole milk. VEGETABLES:  All vegetables, raw or cooked,   except gas forming vegetables not tolerated by patient   listed under Foods to Avoid.   (Green beans, green peas,  carrots, beets, squash, spinach, asparagus, tomatoes, lettuce) VEGETABLES:  Gas forming vegetables - if not tolerated by patient - such as broccoli, brussels sprouts, cabbage, cauliflower, green peppers, dried beans, dried peas, lima beans, turnips, radishes, onions, cucumbers, etc.    MISCELLANEOUS:  Sugar, jelly, honey, syrup,   mild tomato catsup, herbs, salt, vinegar-if tolerated. MISCELLANEOUS:  Pepper, spices, horseradish, mustard, hot catsup, olives, nuts, gravies, chocolate; highly seasoned foods (pizza and chili).

## 2017-12-11 ENCOUNTER — TELEPHONE (OUTPATIENT)
Dept: SURGERY | Age: 57
End: 2017-12-11

## 2017-12-11 DIAGNOSIS — I48.0 PAROXYSMAL ATRIAL FIBRILLATION WITH RVR (HCC): ICD-10-CM

## 2017-12-11 RX ORDER — SOTALOL HYDROCHLORIDE 80 MG/1
80 TABLET ORAL 2 TIMES DAILY
Qty: 60 TABLET | Refills: 5 | Status: SHIPPED | OUTPATIENT
Start: 2017-12-11 | End: 2018-03-08 | Stop reason: SDUPTHER

## 2017-12-11 NOTE — TELEPHONE ENCOUNTER
Patient has changed his mind and wants to schedule surgery now instead of January - Need an order - Thanks    Cc: Ania Haskins PA-C

## 2017-12-14 ENCOUNTER — PREP FOR PROCEDURE (OUTPATIENT)
Dept: SURGERY | Age: 57
End: 2017-12-14

## 2017-12-14 ENCOUNTER — HOSPITAL ENCOUNTER (OUTPATIENT)
Dept: PREADMISSION TESTING | Age: 57
Discharge: HOME | End: 2017-12-14
Payer: COMMERCIAL

## 2017-12-14 VITALS — HEIGHT: 74 IN | BODY MASS INDEX: 31.95 KG/M2 | WEIGHT: 249 LBS

## 2017-12-14 RX ORDER — SODIUM CHLORIDE, SODIUM LACTATE, POTASSIUM CHLORIDE, CALCIUM CHLORIDE 600; 310; 30; 20 MG/100ML; MG/100ML; MG/100ML; MG/100ML
INJECTION, SOLUTION INTRAVENOUS CONTINUOUS
Status: CANCELLED | OUTPATIENT
Start: 2017-12-14

## 2017-12-14 RX ORDER — SODIUM CHLORIDE 0.9 % (FLUSH) 0.9 %
10 SYRINGE (ML) INJECTION EVERY 12 HOURS SCHEDULED
Status: CANCELLED | OUTPATIENT
Start: 2017-12-14

## 2017-12-14 RX ORDER — SODIUM CHLORIDE 0.9 % (FLUSH) 0.9 %
10 SYRINGE (ML) INJECTION PRN
Status: CANCELLED | OUTPATIENT
Start: 2017-12-14

## 2017-12-15 ENCOUNTER — ANESTHESIA EVENT (OUTPATIENT)
Dept: OPERATING ROOM | Age: 57
End: 2017-12-15
Payer: COMMERCIAL

## 2017-12-15 ENCOUNTER — ANESTHESIA (OUTPATIENT)
Dept: OPERATING ROOM | Age: 57
End: 2017-12-15
Payer: COMMERCIAL

## 2017-12-15 ENCOUNTER — HOSPITAL ENCOUNTER (OUTPATIENT)
Age: 57
Setting detail: OUTPATIENT SURGERY
Discharge: HOME | End: 2017-12-15
Attending: SURGERY | Admitting: SURGERY
Payer: COMMERCIAL

## 2017-12-15 VITALS
RESPIRATION RATE: 16 BRPM | HEIGHT: 74 IN | BODY MASS INDEX: 31.95 KG/M2 | OXYGEN SATURATION: 92 % | HEART RATE: 72 BPM | WEIGHT: 249 LBS | DIASTOLIC BLOOD PRESSURE: 88 MMHG | TEMPERATURE: 98 F | SYSTOLIC BLOOD PRESSURE: 138 MMHG

## 2017-12-15 VITALS
TEMPERATURE: 96.6 F | OXYGEN SATURATION: 79 % | RESPIRATION RATE: 1 BRPM | DIASTOLIC BLOOD PRESSURE: 82 MMHG | SYSTOLIC BLOOD PRESSURE: 136 MMHG

## 2017-12-15 PROCEDURE — 2580000003 HC RX 258: Performed by: PHYSICIAN ASSISTANT

## 2017-12-15 PROCEDURE — 2720000010 HC SURG SUPPLY STERILE: Performed by: SURGERY

## 2017-12-15 PROCEDURE — 7100000001 HC PACU RECOVERY - ADDTL 15 MIN: Performed by: SURGERY

## 2017-12-15 PROCEDURE — 7100000010 HC PHASE II RECOVERY - FIRST 15 MIN: Performed by: SURGERY

## 2017-12-15 PROCEDURE — 3600000004 HC SURGERY LEVEL 4 BASE: Performed by: SURGERY

## 2017-12-15 PROCEDURE — 7100000000 HC PACU RECOVERY - FIRST 15 MIN: Performed by: SURGERY

## 2017-12-15 PROCEDURE — 2720000001 HC MISC SURG SUPPLY STERILE $51-500: Performed by: SURGERY

## 2017-12-15 PROCEDURE — 6360000002 HC RX W HCPCS: Performed by: PHYSICIAN ASSISTANT

## 2017-12-15 PROCEDURE — 93005 ELECTROCARDIOGRAM TRACING: CPT

## 2017-12-15 PROCEDURE — 47562 LAPAROSCOPIC CHOLECYSTECTOMY: CPT | Performed by: SURGERY

## 2017-12-15 PROCEDURE — 2500000003 HC RX 250 WO HCPCS: Performed by: SURGERY

## 2017-12-15 PROCEDURE — 3700000000 HC ANESTHESIA ATTENDED CARE: Performed by: SURGERY

## 2017-12-15 PROCEDURE — 88304 TISSUE EXAM BY PATHOLOGIST: CPT

## 2017-12-15 PROCEDURE — 3600000014 HC SURGERY LEVEL 4 ADDTL 15MIN: Performed by: SURGERY

## 2017-12-15 PROCEDURE — 3700000001 HC ADD 15 MINUTES (ANESTHESIA): Performed by: SURGERY

## 2017-12-15 PROCEDURE — 7100000011 HC PHASE II RECOVERY - ADDTL 15 MIN: Performed by: SURGERY

## 2017-12-15 PROCEDURE — A4364 ADHESIVE, LIQUID OR EQUAL: HCPCS | Performed by: SURGERY

## 2017-12-15 PROCEDURE — 2500000003 HC RX 250 WO HCPCS

## 2017-12-15 PROCEDURE — 6360000002 HC RX W HCPCS

## 2017-12-15 RX ORDER — SODIUM CHLORIDE 0.9 % (FLUSH) 0.9 %
10 SYRINGE (ML) INJECTION PRN
Status: DISCONTINUED | OUTPATIENT
Start: 2017-12-15 | End: 2017-12-15 | Stop reason: HOSPADM

## 2017-12-15 RX ORDER — ACETAMINOPHEN 325 MG/1
650 TABLET ORAL EVERY 4 HOURS PRN
Status: DISCONTINUED | OUTPATIENT
Start: 2017-12-15 | End: 2017-12-15 | Stop reason: HOSPADM

## 2017-12-15 RX ORDER — MIDAZOLAM HYDROCHLORIDE 1 MG/ML
2 INJECTION INTRAMUSCULAR; INTRAVENOUS
Status: DISCONTINUED | OUTPATIENT
Start: 2017-12-15 | End: 2017-12-15 | Stop reason: HOSPADM

## 2017-12-15 RX ORDER — SUCCINYLCHOLINE CHLORIDE 20 MG/ML
INJECTION INTRAMUSCULAR; INTRAVENOUS PRN
Status: DISCONTINUED | OUTPATIENT
Start: 2017-12-15 | End: 2017-12-15 | Stop reason: SDUPTHER

## 2017-12-15 RX ORDER — LIDOCAINE HYDROCHLORIDE 10 MG/ML
INJECTION, SOLUTION EPIDURAL; INFILTRATION; INTRACAUDAL; PERINEURAL PRN
Status: DISCONTINUED | OUTPATIENT
Start: 2017-12-15 | End: 2017-12-15 | Stop reason: SDUPTHER

## 2017-12-15 RX ORDER — FENTANYL CITRATE 50 UG/ML
25 INJECTION, SOLUTION INTRAMUSCULAR; INTRAVENOUS
Status: DISCONTINUED | OUTPATIENT
Start: 2017-12-15 | End: 2017-12-15 | Stop reason: HOSPADM

## 2017-12-15 RX ORDER — LABETALOL HYDROCHLORIDE 5 MG/ML
5 INJECTION, SOLUTION INTRAVENOUS EVERY 10 MIN PRN
Status: DISCONTINUED | OUTPATIENT
Start: 2017-12-15 | End: 2017-12-15 | Stop reason: HOSPADM

## 2017-12-15 RX ORDER — HYDRALAZINE HYDROCHLORIDE 20 MG/ML
5 INJECTION INTRAMUSCULAR; INTRAVENOUS EVERY 10 MIN PRN
Status: DISCONTINUED | OUTPATIENT
Start: 2017-12-15 | End: 2017-12-15 | Stop reason: HOSPADM

## 2017-12-15 RX ORDER — SODIUM CHLORIDE, SODIUM LACTATE, POTASSIUM CHLORIDE, CALCIUM CHLORIDE 600; 310; 30; 20 MG/100ML; MG/100ML; MG/100ML; MG/100ML
INJECTION, SOLUTION INTRAVENOUS CONTINUOUS
Status: DISCONTINUED | OUTPATIENT
Start: 2017-12-15 | End: 2017-12-15 | Stop reason: HOSPADM

## 2017-12-15 RX ORDER — SCOLOPAMINE TRANSDERMAL SYSTEM 1 MG/1
1 PATCH, EXTENDED RELEASE TRANSDERMAL ONCE
Status: DISCONTINUED | OUTPATIENT
Start: 2017-12-15 | End: 2017-12-15 | Stop reason: HOSPADM

## 2017-12-15 RX ORDER — MORPHINE SULFATE 4 MG/ML
4 INJECTION, SOLUTION INTRAMUSCULAR; INTRAVENOUS
Status: DISCONTINUED | OUTPATIENT
Start: 2017-12-15 | End: 2017-12-15 | Stop reason: HOSPADM

## 2017-12-15 RX ORDER — PROPOFOL 10 MG/ML
INJECTION, EMULSION INTRAVENOUS PRN
Status: DISCONTINUED | OUTPATIENT
Start: 2017-12-15 | End: 2017-12-15 | Stop reason: SDUPTHER

## 2017-12-15 RX ORDER — DIPHENHYDRAMINE HYDROCHLORIDE 50 MG/ML
12.5 INJECTION INTRAMUSCULAR; INTRAVENOUS
Status: DISCONTINUED | OUTPATIENT
Start: 2017-12-15 | End: 2017-12-15 | Stop reason: HOSPADM

## 2017-12-15 RX ORDER — ENALAPRILAT 2.5 MG/2ML
1.25 INJECTION INTRAVENOUS
Status: DISCONTINUED | OUTPATIENT
Start: 2017-12-15 | End: 2017-12-15 | Stop reason: HOSPADM

## 2017-12-15 RX ORDER — MIDAZOLAM HYDROCHLORIDE 1 MG/ML
INJECTION INTRAMUSCULAR; INTRAVENOUS PRN
Status: DISCONTINUED | OUTPATIENT
Start: 2017-12-15 | End: 2017-12-15 | Stop reason: SDUPTHER

## 2017-12-15 RX ORDER — APREPITANT 40 MG/1
40 CAPSULE ORAL ONCE
Status: DISCONTINUED | OUTPATIENT
Start: 2017-12-15 | End: 2017-12-15 | Stop reason: HOSPADM

## 2017-12-15 RX ORDER — LIDOCAINE HYDROCHLORIDE 10 MG/ML
1 INJECTION, SOLUTION EPIDURAL; INFILTRATION; INTRACAUDAL; PERINEURAL
Status: DISCONTINUED | OUTPATIENT
Start: 2017-12-15 | End: 2017-12-15 | Stop reason: HOSPADM

## 2017-12-15 RX ORDER — GLYCOPYRROLATE 0.2 MG/ML
INJECTION INTRAMUSCULAR; INTRAVENOUS PRN
Status: DISCONTINUED | OUTPATIENT
Start: 2017-12-15 | End: 2017-12-15 | Stop reason: SDUPTHER

## 2017-12-15 RX ORDER — DEXAMETHASONE SODIUM PHOSPHATE 10 MG/ML
INJECTION INTRAMUSCULAR; INTRAVENOUS PRN
Status: DISCONTINUED | OUTPATIENT
Start: 2017-12-15 | End: 2017-12-15 | Stop reason: SDUPTHER

## 2017-12-15 RX ORDER — SODIUM CHLORIDE 9 MG/ML
INJECTION, SOLUTION INTRAVENOUS CONTINUOUS
Status: DISCONTINUED | OUTPATIENT
Start: 2017-12-15 | End: 2017-12-15 | Stop reason: HOSPADM

## 2017-12-15 RX ORDER — HYDROCODONE BITARTRATE AND ACETAMINOPHEN 5; 325 MG/1; MG/1
TABLET ORAL
Qty: 30 TABLET | Refills: 0 | Status: SHIPPED | OUTPATIENT
Start: 2017-12-15 | End: 2017-12-18 | Stop reason: ALTCHOICE

## 2017-12-15 RX ORDER — FENTANYL CITRATE 50 UG/ML
50 INJECTION, SOLUTION INTRAMUSCULAR; INTRAVENOUS
Status: DISCONTINUED | OUTPATIENT
Start: 2017-12-15 | End: 2017-12-15 | Stop reason: HOSPADM

## 2017-12-15 RX ORDER — MEPERIDINE HYDROCHLORIDE 50 MG/ML
12.5 INJECTION INTRAMUSCULAR; INTRAVENOUS; SUBCUTANEOUS EVERY 5 MIN PRN
Status: DISCONTINUED | OUTPATIENT
Start: 2017-12-15 | End: 2017-12-15 | Stop reason: HOSPADM

## 2017-12-15 RX ORDER — MORPHINE SULFATE 4 MG/ML
2 INJECTION, SOLUTION INTRAMUSCULAR; INTRAVENOUS
Status: DISCONTINUED | OUTPATIENT
Start: 2017-12-15 | End: 2017-12-15 | Stop reason: HOSPADM

## 2017-12-15 RX ORDER — PROMETHAZINE HYDROCHLORIDE 25 MG/ML
6.25 INJECTION, SOLUTION INTRAMUSCULAR; INTRAVENOUS
Status: DISCONTINUED | OUTPATIENT
Start: 2017-12-15 | End: 2017-12-15 | Stop reason: HOSPADM

## 2017-12-15 RX ORDER — ROCURONIUM BROMIDE 10 MG/ML
INJECTION, SOLUTION INTRAVENOUS PRN
Status: DISCONTINUED | OUTPATIENT
Start: 2017-12-15 | End: 2017-12-15 | Stop reason: SDUPTHER

## 2017-12-15 RX ORDER — LABETALOL HYDROCHLORIDE 5 MG/ML
INJECTION, SOLUTION INTRAVENOUS PRN
Status: DISCONTINUED | OUTPATIENT
Start: 2017-12-15 | End: 2017-12-15 | Stop reason: SDUPTHER

## 2017-12-15 RX ORDER — SODIUM CHLORIDE 0.9 % (FLUSH) 0.9 %
10 SYRINGE (ML) INJECTION EVERY 12 HOURS SCHEDULED
Status: DISCONTINUED | OUTPATIENT
Start: 2017-12-15 | End: 2017-12-15 | Stop reason: HOSPADM

## 2017-12-15 RX ORDER — METOCLOPRAMIDE HYDROCHLORIDE 5 MG/ML
10 INJECTION INTRAMUSCULAR; INTRAVENOUS
Status: DISCONTINUED | OUTPATIENT
Start: 2017-12-15 | End: 2017-12-15 | Stop reason: HOSPADM

## 2017-12-15 RX ORDER — ONDANSETRON 2 MG/ML
4 INJECTION INTRAMUSCULAR; INTRAVENOUS EVERY 6 HOURS PRN
Status: DISCONTINUED | OUTPATIENT
Start: 2017-12-15 | End: 2017-12-15 | Stop reason: HOSPADM

## 2017-12-15 RX ORDER — HYDROCODONE BITARTRATE AND ACETAMINOPHEN 5; 325 MG/1; MG/1
1 TABLET ORAL EVERY 4 HOURS PRN
Status: DISCONTINUED | OUTPATIENT
Start: 2017-12-15 | End: 2017-12-15 | Stop reason: HOSPADM

## 2017-12-15 RX ORDER — BUPIVACAINE HYDROCHLORIDE AND EPINEPHRINE 2.5; 5 MG/ML; UG/ML
INJECTION, SOLUTION INFILTRATION; PERINEURAL PRN
Status: DISCONTINUED | OUTPATIENT
Start: 2017-12-15 | End: 2017-12-15 | Stop reason: HOSPADM

## 2017-12-15 RX ORDER — FENTANYL CITRATE 50 UG/ML
INJECTION, SOLUTION INTRAMUSCULAR; INTRAVENOUS PRN
Status: DISCONTINUED | OUTPATIENT
Start: 2017-12-15 | End: 2017-12-15 | Stop reason: SDUPTHER

## 2017-12-15 RX ORDER — MORPHINE SULFATE 1 MG/ML
4 INJECTION, SOLUTION EPIDURAL; INTRATHECAL; INTRAVENOUS EVERY 5 MIN PRN
Status: DISCONTINUED | OUTPATIENT
Start: 2017-12-15 | End: 2017-12-15 | Stop reason: HOSPADM

## 2017-12-15 RX ORDER — HYDROCODONE BITARTRATE AND ACETAMINOPHEN 5; 325 MG/1; MG/1
2 TABLET ORAL EVERY 4 HOURS PRN
Status: DISCONTINUED | OUTPATIENT
Start: 2017-12-15 | End: 2017-12-15 | Stop reason: HOSPADM

## 2017-12-15 RX ORDER — MORPHINE SULFATE 1 MG/ML
2 INJECTION, SOLUTION EPIDURAL; INTRATHECAL; INTRAVENOUS EVERY 5 MIN PRN
Status: DISCONTINUED | OUTPATIENT
Start: 2017-12-15 | End: 2017-12-15 | Stop reason: HOSPADM

## 2017-12-15 RX ORDER — ONDANSETRON 2 MG/ML
INJECTION INTRAMUSCULAR; INTRAVENOUS PRN
Status: DISCONTINUED | OUTPATIENT
Start: 2017-12-15 | End: 2017-12-15 | Stop reason: SDUPTHER

## 2017-12-15 RX ADMIN — GLYCOPYRROLATE 0.3 MG: 0.2 INJECTION, SOLUTION INTRAMUSCULAR; INTRAVENOUS at 13:25

## 2017-12-15 RX ADMIN — ROCURONIUM BROMIDE 40 MG: 10 INJECTION INTRAVENOUS at 13:01

## 2017-12-15 RX ADMIN — LABETALOL HYDROCHLORIDE 10 MG: 5 INJECTION, SOLUTION INTRAVENOUS at 13:45

## 2017-12-15 RX ADMIN — MIDAZOLAM HYDROCHLORIDE 2 MG: 1 INJECTION, SOLUTION INTRAMUSCULAR; INTRAVENOUS at 12:38

## 2017-12-15 RX ADMIN — SODIUM CHLORIDE, SODIUM LACTATE, POTASSIUM CHLORIDE, AND CALCIUM CHLORIDE: 600; 310; 30; 20 INJECTION, SOLUTION INTRAVENOUS at 13:12

## 2017-12-15 RX ADMIN — SUGAMMADEX 500 MG: 100 INJECTION, SOLUTION INTRAVENOUS at 13:48

## 2017-12-15 RX ADMIN — SUCCINYLCHOLINE CHLORIDE 140 MG: 20 INJECTION, SOLUTION INTRAMUSCULAR; INTRAVENOUS; PARENTERAL at 12:50

## 2017-12-15 RX ADMIN — SODIUM CHLORIDE, SODIUM LACTATE, POTASSIUM CHLORIDE, AND CALCIUM CHLORIDE: 600; 310; 30; 20 INJECTION, SOLUTION INTRAVENOUS at 11:08

## 2017-12-15 RX ADMIN — FENTANYL CITRATE 50 MCG: 50 INJECTION, SOLUTION INTRAMUSCULAR; INTRAVENOUS at 14:07

## 2017-12-15 RX ADMIN — FENTANYL CITRATE 50 MCG: 50 INJECTION, SOLUTION INTRAMUSCULAR; INTRAVENOUS at 13:39

## 2017-12-15 RX ADMIN — LIDOCAINE HYDROCHLORIDE 50 MG: 10 INJECTION, SOLUTION EPIDURAL; INFILTRATION; INTRACAUDAL; PERINEURAL at 12:50

## 2017-12-15 RX ADMIN — CEFAZOLIN 2 G: 1 INJECTION, POWDER, FOR SOLUTION INTRAMUSCULAR; INTRAVENOUS; PARENTERAL at 12:59

## 2017-12-15 RX ADMIN — ONDANSETRON HYDROCHLORIDE 4 MG: 2 SOLUTION INTRAMUSCULAR; INTRAVENOUS at 12:55

## 2017-12-15 RX ADMIN — PROPOFOL 160 MG: 10 INJECTION, EMULSION INTRAVENOUS at 12:50

## 2017-12-15 RX ADMIN — FENTANYL CITRATE 100 MCG: 50 INJECTION, SOLUTION INTRAMUSCULAR; INTRAVENOUS at 12:46

## 2017-12-15 RX ADMIN — ROCURONIUM BROMIDE 30 MG: 10 INJECTION INTRAVENOUS at 13:45

## 2017-12-15 RX ADMIN — FENTANYL CITRATE 50 MCG: 50 INJECTION, SOLUTION INTRAMUSCULAR; INTRAVENOUS at 13:30

## 2017-12-15 RX ADMIN — DEXAMETHASONE SODIUM PHOSPHATE 10 MG: 10 INJECTION INTRAMUSCULAR; INTRAVENOUS at 12:55

## 2017-12-15 RX ADMIN — GLYCOPYRROLATE 0.3 MG: 0.2 INJECTION, SOLUTION INTRAMUSCULAR; INTRAVENOUS at 13:23

## 2017-12-15 ASSESSMENT — ENCOUNTER SYMPTOMS: SHORTNESS OF BREATH: 0

## 2017-12-15 ASSESSMENT — PAIN DESCRIPTION - PAIN TYPE: TYPE: SURGICAL PAIN

## 2017-12-15 ASSESSMENT — PAIN DESCRIPTION - DESCRIPTORS: DESCRIPTORS: SORE

## 2017-12-15 ASSESSMENT — PAIN - FUNCTIONAL ASSESSMENT: PAIN_FUNCTIONAL_ASSESSMENT: 0-10

## 2017-12-15 ASSESSMENT — PAIN SCALES - GENERAL: PAINLEVEL_OUTOF10: 1

## 2017-12-15 ASSESSMENT — LIFESTYLE VARIABLES: SMOKING_STATUS: 0

## 2017-12-15 ASSESSMENT — PAIN DESCRIPTION - LOCATION: LOCATION: ABDOMEN

## 2017-12-15 NOTE — ANESTHESIA POSTPROCEDURE EVALUATION
Department of Anesthesiology  Postprocedure Note    Patient: Axel Olguin  MRN: [id number]  YOB: 1960  Date of evaluation: 12/15/2017  Time:  2:08 PM     Procedure Summary     Date:  12/15/17 Room / Location:  [facility] OR  / [facility] OR    Anesthesia Start:  1245 Anesthesia Stop:  5147    Procedure:  CHOLECYSTECTOMY LAPAROSCOPIC (N/A Abdomen) Diagnosis:  (CHRONIC CHOLECYSTITIS CHOLELITIAISIS)    Surgeon:  Maribell Tejeda MD Responsible Provider:  Megan Hennessy CRNA    Anesthesia Type:  general ASA Status:  3          Anesthesia Type: general    Sunil Phase I: Sunil Score: 6    Sunil Phase II:      Last vitals: Reviewed and per EMR flowsheets.        Anesthesia Post Evaluation

## 2017-12-15 NOTE — H&P
Subjective:      Patient ID: Meryle Loupe is a 59 y.o. male. HPI   Mr. Alix Hernandez is a 58 y/o [place] male who presents with 3-4 months of insidious RUQ \"rolling\" pain that is a 2/10, transient and worse with greasy foods. His pain does not radiate. He also admits some nausea and soft diarrhea that has also been present for about 3 months but denies visible blood or dark tarry stools. He also denies fever, chills, or jaundice. Allergies: Review of patient's allergies indicates no known allergies. Current Outpatient Prescriptions   Medication Sig Dispense Refill    rosuvastatin (CRESTOR) 10 MG tablet Take 1 tablet by mouth nightly 30 tablet 5    sotalol (BETAPACE) 80 MG tablet Take 1 tablet by mouth 2 times daily 60 tablet 1    aspirin 81 MG EC tablet Take 81 mg by mouth daily      Omega-3 Fatty Acids (FISH OIL PO) Take 2,150 mg by mouth 2 times daily      niacin 500 MG tablet Take 1,000 mg by mouth 4 times daily      omeprazole (PRILOSEC) 20 MG delayed release capsule Take 20 mg by mouth 2 times daily      fluticasone (FLONASE) 50 MCG/ACT nasal spray 2 sprays by Nasal route daily       lisinopril (PRINIVIL;ZESTRIL) 20 MG tablet Take 20 mg by mouth daily      Ascorbic Acid (VITAMIN C) 500 MG tablet Take 500 mg by mouth 2 times daily       vitamin E 400 UNIT capsule Take 400 Units by mouth daily      tadalafil (CIALIS) 5 MG tablet Take 1 tablet by mouth as needed for Erectile Dysfunction 30 tablet 11    naproxen (NAPROSYN) 500 MG tablet   2     No current facility-administered medications for this visit.         Past Surgical History:   Procedure Laterality Date    ANKLE FRACTURE SURGERY      left ankle    TESTICLE SURGERY      undecended testicle done when 1years old       Past Medical History:   Diagnosis Date    A-fib ([address]     Atelectasis of right lung 6/26/2017    BiPAP (biphasic positive airway pressure) dependence     12cm to 22cm    Clotting disorder (HCC)     Heart murmur     Hyperlipidemia     Hypertension     Obstructive sleep apnea     AHI:  36.5 per PSG, 9/2015 - BiPAP    Paroxysmal atrial fibrillation with RVR ([facility] [address] 6/26/2017    Pneumonia due to organism     Pulmonary embolism (HCC)        Family History   Problem Relation Age of Onset    Heart Disease Mother     High Blood Pressure Mother     Heart Disease Father 54     mi    High Blood Pressure Father     High Cholesterol Father     Lung Cancer Father     Cancer Father      Lung CA - Oat cell    Stroke Father     Diabetes Paternal Aunt        Social History   Substance Use Topics    Smoking status: Never Smoker    Smokeless tobacco: Never Used      Comment:  at [employer] Alcohol use 0.6 - 1.2 oz/week     1 - 2 Cans of beer per week      Comment: few times a week        Review of Systems   Constitutional: Positive for fatigue. Negative for activity change, appetite change, chills, diaphoresis, fever and unexpected weight change. HENT: Negative for congestion, facial swelling, hearing loss, sinus pain, sore throat and trouble swallowing. Eyes: Negative for pain, discharge and visual disturbance. Respiratory: Positive for apnea (Wears Bi-pap). Negative for cough, shortness of breath and wheezing. Cardiovascular: Positive for palpitations (H/o Afib; last epsiode in June2017). Negative for chest pain and leg swelling. Gastrointestinal: Positive for abdominal pain, diarrhea (soft, nonbloody) and nausea. Negative for abdominal distention, anal bleeding, blood in stool, constipation, rectal pain and vomiting. Endocrine: Negative for cold intolerance, heat intolerance, polydipsia, polyphagia and polyuria. Genitourinary: Positive for frequency and urgency. Negative for difficulty urinating, dysuria, enuresis, flank pain and hematuria. Musculoskeletal: Positive for back pain. Negative for joint swelling, neck pain and neck stiffness. Skin: Negative for color change, pallor, rash and wound. infection, perforation, risk of common duct injury, risk of needing an open surgery, and etc. were explained to the patient as well as anesthetic risks included heart attacks, strokes, pneumonia, phlebitis. He is willing to accept all risks and proceed with surgery. No guarantees have been given.     Susan ALBERT      Electronically signed by Medardo Rangel PA-C on 12/14/17 at 6:15 PM

## 2017-12-15 NOTE — H&P (VIEW-ONLY)
Subjective:      Patient ID: Nimisha Curry is a 59 y.o. male. HPI   Mr. Claus Lomeli is a 58 y/o [place] male who presents with 3-4 months of insidious RUQ \"rolling\" pain that is a 2/10, transient and worse with greasy foods. His pain does not radiate. He also admits some nausea and soft diarrhea that has also been present for about 3 months but denies visible blood or dark tarry stools. He also denies fever, chills, or jaundice. Allergies: Review of patient's allergies indicates no known allergies. Current Outpatient Prescriptions   Medication Sig Dispense Refill    rosuvastatin (CRESTOR) 10 MG tablet Take 1 tablet by mouth nightly 30 tablet 5    sotalol (BETAPACE) 80 MG tablet Take 1 tablet by mouth 2 times daily 60 tablet 1    aspirin 81 MG EC tablet Take 81 mg by mouth daily      Omega-3 Fatty Acids (FISH OIL PO) Take 2,150 mg by mouth 2 times daily      niacin 500 MG tablet Take 1,000 mg by mouth 4 times daily      omeprazole (PRILOSEC) 20 MG delayed release capsule Take 20 mg by mouth 2 times daily      fluticasone (FLONASE) 50 MCG/ACT nasal spray 2 sprays by Nasal route daily       lisinopril (PRINIVIL;ZESTRIL) 20 MG tablet Take 20 mg by mouth daily      Ascorbic Acid (VITAMIN C) 500 MG tablet Take 500 mg by mouth 2 times daily       vitamin E 400 UNIT capsule Take 400 Units by mouth daily      tadalafil (CIALIS) 5 MG tablet Take 1 tablet by mouth as needed for Erectile Dysfunction 30 tablet 11    naproxen (NAPROSYN) 500 MG tablet   2     No current facility-administered medications for this visit.         Past Surgical History:   Procedure Laterality Date    ANKLE FRACTURE SURGERY      left ankle    TESTICLE SURGERY      undecended testicle done when 1years old       Past Medical History:   Diagnosis Date    A-fib ([facility] [address]     Atelectasis of right lung 6/26/2017    BiPAP (biphasic positive airway pressure) dependence     12cm to 22cm    Clotting disorder (HCC)     Heart murmur     Hyperlipidemia     Hypertension     Obstructive sleep apnea     AHI:  36.5 per PSG, 9/2015 - BiPAP    Paroxysmal atrial fibrillation with RVR ([facility] [address] 6/26/2017    Pneumonia due to organism     Pulmonary embolism (HCC)        Family History   Problem Relation Age of Onset    Heart Disease Mother     High Blood Pressure Mother     Heart Disease Father 54     mi    High Blood Pressure Father     High Cholesterol Father     Lung Cancer Father     Cancer Father      Lung CA - Oat cell    Stroke Father     Diabetes Paternal Aunt        Social History   Substance Use Topics    Smoking status: Never Smoker    Smokeless tobacco: Never Used      Comment:  at [employer] Alcohol use 0.6 - 1.2 oz/week     1 - 2 Cans of beer per week      Comment: few times a week        Review of Systems   Constitutional: Positive for fatigue. Negative for activity change, appetite change, chills, diaphoresis, fever and unexpected weight change. HENT: Negative for congestion, facial swelling, hearing loss, sinus pain, sore throat and trouble swallowing. Eyes: Negative for pain, discharge and visual disturbance. Respiratory: Positive for apnea (Wears Bi-pap). Negative for cough, shortness of breath and wheezing. Cardiovascular: Positive for palpitations (H/o Afib; last epsiode in June2017). Negative for chest pain and leg swelling. Gastrointestinal: Positive for abdominal pain, diarrhea (soft, nonbloody) and nausea. Negative for abdominal distention, anal bleeding, blood in stool, constipation, rectal pain and vomiting. Endocrine: Negative for cold intolerance, heat intolerance, polydipsia, polyphagia and polyuria. Genitourinary: Positive for frequency and urgency. Negative for difficulty urinating, dysuria, enuresis, flank pain and hematuria. Musculoskeletal: Positive for back pain. Negative for joint swelling, neck pain and neck stiffness. Skin: Negative for color change, pallor, rash and wound. Allergic/Immunologic: Negative for environmental allergies and food allergies. Neurological: Positive for dizziness (Dizziness with quick position change +room spinning). Negative for tremors, seizures, facial asymmetry, speech difficulty, weakness and headaches. Hematological: Negative for adenopathy. Does not bruise/bleed easily. Psychiatric/Behavioral: Negative for agitation, behavioral problems, confusion and suicidal ideas. Objective:   Physical Exam   Constitutional: He is oriented to person, place, and time. He appears well-developed and well-nourished. No distress. HENT:   Head: Normocephalic and atraumatic. Mouth/Throat: No oropharyngeal exudate. Eyes: Conjunctivae are normal. Pupils are equal, round, and reactive to light. Right eye exhibits no discharge. No scleral icterus. Neck: Normal range of motion. No JVD present. No tracheal deviation present. No thyromegaly present. Cardiovascular: Normal rate, regular rhythm, normal heart sounds and intact distal pulses. Exam reveals no gallop and no friction rub. No murmur heard. Pulmonary/Chest: Effort normal and breath sounds normal. No respiratory distress. He has no wheezes. He has no rales. He exhibits no tenderness. Abdominal: Soft. Bowel sounds are normal. He exhibits no distension and no mass. There is no tenderness. There is no rebound and no guarding. Musculoskeletal: He exhibits no edema or tenderness. Neurological: He is alert and oriented to person, place, and time. Skin: Skin is warm and dry. No rash noted. He is not diaphoretic. No erythema. No pallor. Psychiatric: He has a normal mood and affect. His behavior is normal.       Assessment:    1. Cholelithiasis without cholecystitis or obstruction      Plan:       The risks, benefits, and options were discussed with the pt. Heis willing to accept all risks and proceed with a laparoscopic Cholecystectomy.  The surgical risks included but not limited to bleeding,

## 2017-12-15 NOTE — DISCHARGE INSTR - DIET

## 2017-12-15 NOTE — ANESTHESIA PRE PROCEDURE
Department of Anesthesiology  Preprocedure Note       Name:  Robin Khan   Age:  59 y.o.  :  1960                                          MRN:  [id number]         Date:  12/15/2017      Surgeon: August Larson):  Tim Albrecht MD    Procedure: Procedure(s):  CHOLECYSTECTOMY LAPAROSCOPIC    Medications prior to admission:   Prior to Admission medications    Medication Sig Start Date End Date Taking?  Authorizing Provider   tadalafil (CIALIS) 5 MG tablet Take 1 tablet by mouth as needed for Erectile Dysfunction 17  Yes Dari Mccarthy MD   rosuvastatin (CRESTOR) 10 MG tablet Take 1 tablet by mouth nightly 11/10/17  Yes Manette Habermann, MD   Omega-3 Fatty Acids (FISH OIL PO) Take 2,150 mg by mouth 2 times daily   Yes Historical Provider, MD   niacin 500 MG tablet Take 1,000 mg by mouth 4 times daily   Yes Historical Provider, MD   omeprazole (PRILOSEC) 20 MG delayed release capsule Take 20 mg by mouth 2 times daily   Yes Historical Provider, MD   fluticasone (FLONASE) 50 MCG/ACT nasal spray 2 sprays by Nasal route daily  10/21/16  Yes Historical Provider, MD   lisinopril (PRINIVIL;ZESTRIL) 20 MG tablet Take 20 mg by mouth daily   Yes Historical Provider, MD   Ascorbic Acid (VITAMIN C) 500 MG tablet Take 500 mg by mouth 2 times daily    Yes Historical Provider, MD   vitamin E 400 UNIT capsule Take 400 Units by mouth daily   Yes Historical Provider, MD   sotalol (BETAPACE) 80 MG tablet Take 1 tablet by mouth 2 times daily 17   Sandra Chino MD   naproxen (NAPROSYN) 500 MG tablet as needed  17   Historical Provider, MD   aspirin 81 MG EC tablet Take 81 mg by mouth daily    Historical Provider, MD       Current medications:    Current Facility-Administered Medications   Medication Dose Route Frequency Provider Last Rate Last Dose    HYDROmorphone (DILAUDID) injection 0.25 mg  0.25 mg Intravenous Q5 Min PRN Ksenia Ramirez CRNA        HYDROmorphone (DILAUDID) injection 0.5 mg  0.5 mg Intravenous Q5 Min PRN 11/03/2017     11/03/2017       CMP:   Lab Results   Component Value Date     11/03/2017    K 4.1 11/03/2017     11/03/2017    CO2 25 11/03/2017    BUN 8 11/03/2017    CREATININE 0.7 11/03/2017    LABGLOM >60 11/03/2017    GLUCOSE 95 11/03/2017    PROT 7.0 11/03/2017    CALCIUM 9.5 11/03/2017    BILITOT 0.7 11/03/2017    ALKPHOS 66 11/03/2017    AST 22 11/03/2017    ALT 14 11/03/2017       POC Tests: No results for input(s): POCGLU, POCNA, POCK, POCCL, POCBUN, POCHEMO, POCHCT in the last 72 hours.     Coags:   Lab Results   Component Value Date    PROTIME 12.8 12/22/2016    INR 0.96 12/22/2016    APTT 24.1 12/22/2016       HCG (If Applicable): No results found for: PREGTESTUR, PREGSERUM, HCG, HCGQUANT     ABGs: No results found for: PHART, PO2ART, HIX5YAP, FNF2KSA, BEART, X1ZRPAIO     Type & Screen (If Applicable):  No results found for: LABABO, [address]    Anesthesia Evaluation  Patient summary reviewed and Nursing notes reviewed no history of anesthetic complications:   Airway: Mallampati: I  TM distance: >3 FB   Neck ROM: full  Mouth opening: > = 3 FB Dental:          Pulmonary: breath sounds clear to auscultation  (+) sleep apnea (wears bipap at HS):      (-) shortness of breath and not a current smoker                           Cardiovascular:  Exercise tolerance: good (>4 METS),   (+) hypertension:, dysrhythmias (rate controlled with betapace, last episode of  being in A-fib was 6/2017, since stable on betapace): atrial fibrillation,     (-) pacemaker, valvular problems/murmurs, past MI, CAD, CABG/stent and  angina    ECG reviewed  Rhythm: regular    Echocardiogram reviewed         Beta Blocker:  Dose within 24 Hrs      ROS comment: Summary   1. Mildly dilated left atrium   2. Mild concentric LVH   3. Normal LV systolic function (estimated EF 55 -60 %)   4. Grade I diastolic dysfunction (impaired relaxation) with normal left   atrial pressure   5. Mild mitral regurgitation   6. Mitral annular

## 2017-12-17 LAB
EKG P AXIS: 54 DEGREES
EKG P-R INTERVAL: 164 MS
EKG Q-T INTERVAL: 490 MS
EKG QRS DURATION: 88 MS
EKG QTC CALCULATION (BAZETT): 466 MS
EKG T AXIS: 28 DEGREES

## 2017-12-18 ENCOUNTER — OFFICE VISIT (OUTPATIENT)
Dept: URGENT CARE | Age: 57
End: 2017-12-18
Payer: COMMERCIAL

## 2017-12-18 ENCOUNTER — HOSPITAL ENCOUNTER (OUTPATIENT)
Dept: GENERAL RADIOLOGY | Age: 57
Discharge: HOME | End: 2017-12-18
Payer: COMMERCIAL

## 2017-12-18 VITALS
RESPIRATION RATE: 20 BRPM | WEIGHT: 260.38 LBS | OXYGEN SATURATION: 96 % | TEMPERATURE: 99 F | HEART RATE: 74 BPM | BODY MASS INDEX: 33.42 KG/M2 | SYSTOLIC BLOOD PRESSURE: 175 MMHG | DIASTOLIC BLOOD PRESSURE: 80 MMHG | HEIGHT: 74 IN

## 2017-12-18 DIAGNOSIS — S62.102A CLOSED FRACTURE OF LEFT WRIST, INITIAL ENCOUNTER: Primary | ICD-10-CM

## 2017-12-18 DIAGNOSIS — M25.532 LEFT WRIST PAIN: ICD-10-CM

## 2017-12-18 PROCEDURE — 29075 APPL CST ELBW FNGR SHORT ARM: CPT | Performed by: NURSE PRACTITIONER

## 2017-12-18 PROCEDURE — 73110 X-RAY EXAM OF WRIST: CPT

## 2017-12-18 PROCEDURE — 99213 OFFICE O/P EST LOW 20 MIN: CPT | Performed by: NURSE PRACTITIONER

## 2017-12-18 RX ORDER — HYDROCODONE BITARTRATE AND ACETAMINOPHEN 7.5; 325 MG/1; MG/1
1 TABLET ORAL EVERY 6 HOURS PRN
Qty: 15 TABLET | Refills: 0 | Status: SHIPPED | OUTPATIENT
Start: 2017-12-18 | End: 2017-12-25

## 2017-12-18 ASSESSMENT — ENCOUNTER SYMPTOMS: RESPIRATORY NEGATIVE: 1

## 2017-12-18 NOTE — PROGRESS NOTES
APPLIED SUGAR TONG SPLINT TO LEFT FOREARM. MEASURED,PREPARED AND APPLIED ORTHO GLASS TO LEFT FOREARM AND WRAPPED WITH 2 LARGE KERLEX ROLLS. APPLIED AN ARM SLING TO HELP WITH PROPER HARDENING OF SPLINT. CAPILLARY REFILL WAS WNL AFTER APPLICATION.  PT GIVEN THE X RAY DISC AND CALLED AND MADE APPT TO SEE  [facility] 12/28/17 FOR FOLLOW UP.

## 2017-12-18 NOTE — PATIENT INSTRUCTIONS
Patient Education        Broken Wrist: Care Instructions  Your Care Instructions    Your wrist can break, or fracture, during sports, a fall, or other accidents. The break may happen when your wrist is hit or is used to protect you in a fall. Fractures can range from a small, hairline crack, to a bone or bones broken into two or more pieces. Your treatment depends on how bad the break is. Your doctor may have put your wrist in a cast or splint. This will help keep your wrist stable until your follow-up appointment. It may take weeks or months for your wrist to heal. You can help it heal with care at home. You heal best when you take good care of yourself. Eat a variety of healthy foods, and don't smoke. Follow-up care is a key part of your treatment and safety. Be sure to make and go to all appointments, and call your doctor if you are having problems. It's also a good idea to know your test results and keep a list of the medicines you take. How can you care for yourself at home? · Put ice or a cold pack on your wrist for 10 to 20 minutes at a time. Try to do this every 1 to 2 hours for the next 3 days (when you are awake). Put a thin cloth between the ice and your cast or splint. Keep your cast or splint dry. · Follow the splint or cast care instructions your doctor gives you. If you have a splint, do not take it off unless your doctor tells you to. Be careful not to put the splint on too tight. · Be safe with medicines. Take pain medicines exactly as directed. ¨ If the doctor gave you a prescription medicine for pain, take it as prescribed. ¨ If you are not taking a prescription pain medicine, ask your doctor if you can take an over-the-counter medicine. · Prop up your wrist on pillows when you sit or lie down in the first few days after the injury. Keep your wrist higher than the level of your heart. This will help reduce swelling.   · Move your fingers often to reduce swelling and stiffness, but do

## 2017-12-20 ENCOUNTER — TELEPHONE (OUTPATIENT)
Dept: SURGERY | Age: 57
End: 2017-12-20

## 2018-01-04 ENCOUNTER — APPOINTMENT (OUTPATIENT)
Dept: PREADMISSION TESTING | Facility: HOSPITAL | Age: 58
End: 2018-01-04

## 2018-01-04 VITALS
WEIGHT: 256 LBS | DIASTOLIC BLOOD PRESSURE: 86 MMHG | BODY MASS INDEX: 32.85 KG/M2 | RESPIRATION RATE: 18 BRPM | HEART RATE: 55 BPM | OXYGEN SATURATION: 99 % | SYSTOLIC BLOOD PRESSURE: 144 MMHG | HEIGHT: 74 IN

## 2018-01-04 LAB
ANION GAP SERPL CALCULATED.3IONS-SCNC: 12 MMOL/L (ref 4–13)
BUN BLD-MCNC: 11 MG/DL (ref 5–21)
BUN/CREAT SERPL: 14.7 (ref 7–25)
CALCIUM SPEC-SCNC: 9.5 MG/DL (ref 8.4–10.4)
CHLORIDE SERPL-SCNC: 103 MMOL/L (ref 98–110)
CO2 SERPL-SCNC: 28 MMOL/L (ref 24–31)
CREAT BLD-MCNC: 0.75 MG/DL (ref 0.5–1.4)
DEPRECATED RDW RBC AUTO: 41.5 FL (ref 40–54)
ERYTHROCYTE [DISTWIDTH] IN BLOOD BY AUTOMATED COUNT: 12.4 % (ref 12–15)
GFR SERPL CREATININE-BSD FRML MDRD: 107 ML/MIN/1.73
GLUCOSE BLD-MCNC: 83 MG/DL (ref 70–100)
HCT VFR BLD AUTO: 38.7 % (ref 40–52)
HGB BLD-MCNC: 13.5 G/DL (ref 14–18)
MCH RBC QN AUTO: 32 PG (ref 28–32)
MCHC RBC AUTO-ENTMCNC: 34.9 G/DL (ref 33–36)
MCV RBC AUTO: 91.7 FL (ref 82–95)
PLATELET # BLD AUTO: 220 10*3/MM3 (ref 130–400)
PMV BLD AUTO: 9.1 FL (ref 6–12)
POTASSIUM BLD-SCNC: 3.7 MMOL/L (ref 3.5–5.3)
RBC # BLD AUTO: 4.22 10*6/MM3 (ref 4.8–5.9)
SODIUM BLD-SCNC: 143 MMOL/L (ref 135–145)
WBC NRBC COR # BLD: 5.34 10*3/MM3 (ref 4.8–10.8)

## 2018-01-04 PROCEDURE — 93010 ELECTROCARDIOGRAM REPORT: CPT | Performed by: INTERNAL MEDICINE

## 2018-01-04 PROCEDURE — 85027 COMPLETE CBC AUTOMATED: CPT | Performed by: ORTHOPAEDIC SURGERY

## 2018-01-04 PROCEDURE — 80048 BASIC METABOLIC PNL TOTAL CA: CPT | Performed by: ORTHOPAEDIC SURGERY

## 2018-01-04 PROCEDURE — 93005 ELECTROCARDIOGRAM TRACING: CPT

## 2018-01-04 RX ORDER — LISINOPRIL 20 MG/1
20 TABLET ORAL DAILY
COMMUNITY

## 2018-01-04 RX ORDER — NIACIN 1000 MG/1
1000 TABLET, EXTENDED RELEASE ORAL 4 TIMES DAILY
COMMUNITY

## 2018-01-04 RX ORDER — ROSUVASTATIN CALCIUM 10 MG/1
10 TABLET, COATED ORAL NIGHTLY
COMMUNITY

## 2018-01-04 RX ORDER — NAPROXEN 500 MG/1
500 TABLET ORAL 2 TIMES DAILY PRN
COMMUNITY
End: 2018-01-05 | Stop reason: HOSPADM

## 2018-01-04 RX ORDER — OMEPRAZOLE 20 MG/1
20 CAPSULE, DELAYED RELEASE ORAL 2 TIMES DAILY
COMMUNITY

## 2018-01-04 RX ORDER — CHLORAL HYDRATE 500 MG
2000 CAPSULE ORAL 2 TIMES DAILY
COMMUNITY

## 2018-01-04 RX ORDER — SOTALOL HYDROCHLORIDE 80 MG/1
80 TABLET ORAL 2 TIMES DAILY
COMMUNITY

## 2018-01-04 RX ORDER — ASPIRIN 81 MG/1
81 TABLET ORAL DAILY
COMMUNITY

## 2018-01-04 RX ORDER — ASCORBIC ACID 500 MG
500 TABLET ORAL 2 TIMES DAILY
COMMUNITY

## 2018-01-04 RX ORDER — VITAMIN E 268 MG
400 CAPSULE ORAL DAILY
COMMUNITY

## 2018-01-04 NOTE — DISCHARGE INSTRUCTIONS
DAY OF SURGERY INSTRUCTIONS        YOUR SURGEON: dr flowers    PROCEDURE: ***OPEN REDUCTION INTERNAL FIXATION LEFT DISTAL RADIUS     DATE OF SURGERY: ***1/5/2018    ARRIVAL TIME: AS DIRECTED BY OFFICE    DAY OF SURGERY TAKE ONLY THESE MEDICATIONS: ***betapace            BEFORE YOU COME TO THE HOSPITAL  (Pre-op instructions)  • Do not eat, drink, smoke or chew gum after midnight the night before surgery.  This also includes no mints.  • Morning of surgery take only the medicines you have been instructed with a sip of water unless otherwise instructed  by your physician.  • Do not shave, wear makeup or dark nail polish.  • Remove all jewelry including rings.  • Leave anything you consider valuable at home.  • Leave your suitcase in the car until after your surgery.  • Bring the following with you if applicable:  o Picture ID and insurance, Medicare or Medicaid cards  o Co-pay/deductible required by insurance (cash, check, credit card)  o Copy of advance directive, living will or power-of- documents if not brought to PAT  o CPAP or BIPAP mask and tubing  o Relaxation aids (MP3 player, book, magazine)  • On the day of surgery check in at registration located at the main entrance of the hospital.       Outpatient Surgery Guidelines, Adult  Outpatient procedures are those for which the person having the procedure is allowed to go home the same day as the procedure. Various procedures are done on an outpatient basis. You should follow some general guidelines if you will be having an outpatient procedure.  LET YOUR HEALTH CARE PROVIDER KNOW ABOUT:  · Any allergies you have.  · All medicines you are taking, including vitamins, herbs, eye drops, creams, and over-the-counter medicines.  · Previous problems you or members of your family have had with the use of anesthetics.  · Any blood disorders you have.  · Previous surgeries you have had.  · Medical conditions you have.  RISKS AND COMPLICATIONS  Your health care  provider will discuss possible risks and complications with you before surgery. Common risks and complications include:    · Problems due to the use of anesthetics.  · Blood loss and replacement (does not apply to minor surgical procedures).  · Temporary increase in pain due to surgery.  · Uncorrected pain or problems that the surgery was meant to correct.  · Infection.  · New damage.  BEFORE THE PROCEDURE  · Ask your health care provider about changing or stopping your regular medicines. You may need to stop taking certain medicines in the days or weeks before the procedure.  · Stop smoking at least 2 weeks before surgery. This lowers your risk for complications during and after surgery. Ask your health care provider for help with this if needed.  · Eat your usual meals and a light supper the day before surgery. Continue fluid intake. Do not drink alcohol.  · Do not eat or drink after midnight the night before your surgery.   · Arrange for someone to take you home and to stay with you for 24 hours after the procedure. Medicine given for your procedure may affect your ability to drive or to care for yourself.  · Call your health care provider's office if you develop an illness or problem that may prevent you from safely having your procedure.  AFTER THE PROCEDURE  After surgery, you will be taken to a recovery area, where your progress will be monitored. If there are no complications, you will be allowed to go home when you are awake, stable, and taking fluids well. You may have numbness around the surgical site. Healing will take some time. You will have tenderness at the surgical site and may have some swelling and bruising. You may also have some nausea.  HOME CARE INSTRUCTIONS  · Do not drive for 24 hours, or as directed by your health care provider. Do not drive while taking prescription pain medicines.  · Do not drink alcohol for 24 hours.  · Do not make important decisions or sign legal documents for 24  hours.  · You may resume a normal diet and activities as directed.  · Do not lift anything heavier than 10 pounds (4.5 kg) or play contact sports until your health care provider says it is okay.  · Change your bandages (dressings) as directed.  · Only take over-the-counter or prescription medicines as directed by your health care provider.  · Follow up with your health care provider as directed.  SEEK MEDICAL CARE IF:  · You have increased bleeding (more than a small spot) from the surgical site.  · You have redness, swelling, or increasing pain in the wound.  · You see pus coming from the wound.  · You have a fever.  · You notice a bad smell coming from the wound or dressing.  · You feel lightheaded or faint.  · You develop a rash.  · You have trouble breathing.  · You develop allergies.  MAKE SURE YOU:  · Understand these instructions.  · Will watch your condition.  · Will get help right away if you are not doing well or get worse.     This information is not intended to replace advice given to you by your health care provider. Make sure you discuss any questions you have with your health care provider.     Document Released: 09/12/2002 Document Revised: 05/03/2016 Document Reviewed: 05/22/2014  [employer] Interactive Patient Education ©2016 [employer]       Fall Prevention in Hospitals, Adult  As a hospital patient, your condition and the treatments you receive can increase your risk for falls. Some additional risk factors for falls in a hospital include:  · Being in an unfamiliar environment.  · Being on bed rest.  · Your surgery.  · Taking certain medicines.  · Your tubing requirements, such as intravenous (IV) therapy or catheters.  It is important that you learn how to decrease fall risks while at the hospital. Below are important tips that can help prevent falls.  SAFETY TIPS FOR PREVENTING FALLS  Talk about your risk of falling.  · Ask your health care provider why you are at risk for falling. Is it your  medicine, illness, tubing placement, or something else?  · Make a plan with your health care provider to keep you safe from falls.  · Ask your health care provider or pharmacist about side effects of your medicines. Some medicines can make you dizzy or affect your coordination.  Ask for help.  · Ask for help before getting out of bed. You may need to press your call button.  · Ask for assistance in getting safely to the toilet.  · Ask for a walker or cane to be put at your bedside. Ask that most of the side rails on your bed be placed up before your health care provider leaves the room.  · Ask family or friends to sit with you.  · Ask for things that are out of your reach, such as your glasses, hearing aids, telephone, bedside table, or call button.  Follow these tips to avoid falling:  · Stay lying or seated, rather than standing, while waiting for help.  · Wear rubber-soled slippers or shoes whenever you walk in the hospital.  · Avoid quick, sudden movements.  ¨ Change positions slowly.  ¨ Sit on the side of your bed before standing.  ¨ Stand up slowly and wait before you start to walk.  · Let your health care provider know if there is a spill on the floor.  · Pay careful attention to the medical equipment, electrical cords, and tubes around you.  · When you need help, use your call button by your bed or in the bathroom. Wait for one of your health care providers to help you.  · If you feel dizzy or unsure of your footing, return to bed and wait for assistance.  · Avoid being distracted by the TV, telephone, or another person in your room.  · Do not lean or support yourself on rolling objects, such as IV poles or bedside tables.     This information is not intended to replace advice given to you by your health care provider. Make sure you discuss any questions you have with your health care provider.     Document Released: 12/15/2001 Document Revised: 01/08/2016 Document Reviewed: 08/25/2013  [employer]  Patient Education ©2016 [employer]       Surgical Site Infections FAQs  What is a Surgical Site Infection (SSI)?  A surgical site infection is an infection that occurs after surgery in the part of the body where the surgery took place. Most patients who have surgery do not develop an infection. However, infections develop in about 1 to 3 out of every 100 patients who have surgery.  Some of the common symptoms of a surgical site infection are:  · Redness and pain around the area where you had surgery  · Drainage of cloudy fluid from your surgical wound  · Fever  Can SSIs be treated?  Yes. Most surgical site infections can be treated with antibiotics. The antibiotic given to you depends on the bacteria (germs) causing the infection. Sometimes patients with SSIs also need another surgery to treat the infection.  What are some of the things that hospitals are doing to prevent SSIs?  To prevent SSIs, doctors, nurses, and other healthcare providers:  · Clean their hands and arms up to their elbows with an antiseptic agent just before the surgery.  · Clean their hands with soap and water or an alcohol-based hand rub before and after caring for each patient.  · May remove some of your hair immediately before your surgery using electric clippers if the hair is in the same area where the procedure will occur. They should not shave you with a razor.  · Wear special hair covers, masks, gowns, and gloves during surgery to keep the surgery area clean.  · Give you antibiotics before your surgery starts. In most cases, you should get antibiotics within 60 minutes before the surgery starts and the antibiotics should be stopped within 24 hours after surgery.  · Clean the skin at the site of your surgery with a special soap that kills germs.  What can I do to help prevent SSIs?  Before your surgery:  · Tell your doctor about other medical problems you may have. Health problems such as allergies, diabetes, and obesity could affect  your surgery and your treatment.  · Quit smoking. Patients who smoke get more infections. Talk to your doctor about how you can quit before your surgery.  · Do not shave near where you will have surgery. Shaving with a razor can irritate your skin and make it easier to develop an infection.  At the time of your surgery:  · Speak up if someone tries to shave you with a razor before surgery. Ask why you need to be shaved and talk with your surgeon if you have any concerns.  · Ask if you will get antibiotics before surgery.  After your surgery:  · Make sure that your healthcare providers clean their hands before examining you, either with soap and water or an alcohol-based hand rub.  · If you do not see your providers clean their hands, please ask them to do so.  · Family and friends who visit you should not touch the surgical wound or dressings.  · Family and friends should clean their hands with soap and water or an alcohol-based hand rub before and after visiting you. If you do not see them clean their hands, ask them to clean their hands.  What do I need to do when I go home from the hospital?  · Before you go home, your doctor or nurse should explain everything you need to know about taking care of your wound. Make sure you understand how to care for your wound before you leave the hospital.  · Always clean your hands before and after caring for your wound.  · Before you go home, make sure you know who to contact if you have questions or problems after you get home.  · If you have any symptoms of an infection, such as redness and pain at the surgery site, drainage, or fever, call your doctor immediately.  If you have additional questions, please ask your doctor or nurse.  Developed and co-sponsored by The Society for Healthcare Epidemiology of [place] (SHEA); Infectious Diseases Society of [place] (IDSA); American Hospital Association; Association for Professionals in Infection Control and Epidemiology (APIC);  Centers for Disease Control and Prevention (CDC); and The Joint Commission.     This information is not intended to replace advice given to you by your health care provider. Make sure you discuss any questions you have with your health care provider.     Document Released: 12/23/2014 Document Revised: 01/08/2016 Document Reviewed: 03/02/2016  [employer] Interactive Patient Education ©2016 [employer]     PATIENT/FAMILY/RESPONSIBLE PARTY VERBALIZES UNDERSTANDING OF ABOVE EDUCATION.  COPY OF PAIN SCALE GIVEN AND REVIEWED WITH VERBALIZED UNDERSTANDING.

## 2018-01-05 ENCOUNTER — HOSPITAL ENCOUNTER (OUTPATIENT)
Facility: HOSPITAL | Age: 58
Setting detail: HOSPITAL OUTPATIENT SURGERY
Discharge: HOME | End: 2018-01-05
Attending: ORTHOPAEDIC SURGERY | Admitting: ORTHOPAEDIC SURGERY

## 2018-01-05 ENCOUNTER — ANESTHESIA EVENT (OUTPATIENT)
Dept: PERIOP | Facility: HOSPITAL | Age: 58
End: 2018-01-05

## 2018-01-05 ENCOUNTER — APPOINTMENT (OUTPATIENT)
Dept: GENERAL RADIOLOGY | Facility: HOSPITAL | Age: 58
End: 2018-01-05

## 2018-01-05 ENCOUNTER — ANESTHESIA (OUTPATIENT)
Dept: PERIOP | Facility: HOSPITAL | Age: 58
End: 2018-01-05

## 2018-01-05 VITALS
SYSTOLIC BLOOD PRESSURE: 135 MMHG | RESPIRATION RATE: 16 BRPM | HEART RATE: 76 BPM | DIASTOLIC BLOOD PRESSURE: 91 MMHG | OXYGEN SATURATION: 98 % | TEMPERATURE: 97.6 F

## 2018-01-05 PROBLEM — S52.572A OTHER INTRAARTICULAR FRACTURE OF LOWER END OF LEFT RADIUS, INITIAL ENCOUNTER FOR CLOSED FRACTURE: Status: ACTIVE | Noted: 2018-01-05

## 2018-01-05 PROCEDURE — C1713 ANCHOR/SCREW BN/BN,TIS/BN: HCPCS | Performed by: ORTHOPAEDIC SURGERY

## 2018-01-05 PROCEDURE — 76000 FLUOROSCOPY <1 HR PHYS/QHP: CPT

## 2018-01-05 PROCEDURE — 25010000002 FENTANYL CITRATE (PF) 100 MCG/2ML SOLUTION: Performed by: NURSE ANESTHETIST, CERTIFIED REGISTERED

## 2018-01-05 PROCEDURE — 25010000003 CEFAZOLIN PER 500 MG: Performed by: ORTHOPAEDIC SURGERY

## 2018-01-05 PROCEDURE — 25010000002 PROPOFOL 10 MG/ML EMULSION: Performed by: NURSE ANESTHETIST, CERTIFIED REGISTERED

## 2018-01-05 PROCEDURE — 25010000002 ROPIVACAINE PER 1 MG: Performed by: ANESTHESIOLOGY

## 2018-01-05 PROCEDURE — 25010000002 MIDAZOLAM PER 1 MG: Performed by: ANESTHESIOLOGY

## 2018-01-05 PROCEDURE — 25010000002 ONDANSETRON PER 1 MG: Performed by: NURSE ANESTHETIST, CERTIFIED REGISTERED

## 2018-01-05 PROCEDURE — 25010000002 DEXAMETHASONE PER 1 MG: Performed by: ANESTHESIOLOGY

## 2018-01-05 PROCEDURE — 73100 X-RAY EXAM OF WRIST: CPT

## 2018-01-05 DEVICE — SCRW LK VA W STRDRV 2.4X16MM: Type: IMPLANTABLE DEVICE | Status: FUNCTIONAL

## 2018-01-05 DEVICE — SCRW CORT S/TAP STRDRV 2.4X14MM: Type: IMPLANTABLE DEVICE | Status: FUNCTIONAL

## 2018-01-05 DEVICE — SCRW LK VA W STRDRV 2.4X18MM: Type: IMPLANTABLE DEVICE | Status: FUNCTIONAL

## 2018-01-05 DEVICE — SCRW LK VA W STRDRV 2.4X12MM: Type: IMPLANTABLE DEVICE | Status: FUNCTIONAL

## 2018-01-05 DEVICE — IMPLANTABLE DEVICE: Type: IMPLANTABLE DEVICE | Status: FUNCTIONAL

## 2018-01-05 RX ORDER — MEPERIDINE HYDROCHLORIDE 25 MG/ML
12.5 INJECTION INTRAMUSCULAR; INTRAVENOUS; SUBCUTANEOUS
Status: DISCONTINUED | OUTPATIENT
Start: 2018-01-05 | End: 2018-01-05 | Stop reason: HOSPADM

## 2018-01-05 RX ORDER — LABETALOL HYDROCHLORIDE 5 MG/ML
5 INJECTION, SOLUTION INTRAVENOUS
Status: DISCONTINUED | OUTPATIENT
Start: 2018-01-05 | End: 2018-01-05 | Stop reason: HOSPADM

## 2018-01-05 RX ORDER — MIDAZOLAM HYDROCHLORIDE 1 MG/ML
1 INJECTION INTRAMUSCULAR; INTRAVENOUS
Status: DISCONTINUED | OUTPATIENT
Start: 2018-01-05 | End: 2018-01-05 | Stop reason: HOSPADM

## 2018-01-05 RX ORDER — FLUMAZENIL 0.1 MG/ML
0.2 INJECTION INTRAVENOUS AS NEEDED
Status: DISCONTINUED | OUTPATIENT
Start: 2018-01-05 | End: 2018-01-05 | Stop reason: HOSPADM

## 2018-01-05 RX ORDER — MIDAZOLAM HYDROCHLORIDE 1 MG/ML
2 INJECTION INTRAMUSCULAR; INTRAVENOUS
Status: DISCONTINUED | OUTPATIENT
Start: 2018-01-05 | End: 2018-01-05 | Stop reason: HOSPADM

## 2018-01-05 RX ORDER — NALOXONE HCL 0.4 MG/ML
0.04 VIAL (ML) INJECTION AS NEEDED
Status: DISCONTINUED | OUTPATIENT
Start: 2018-01-05 | End: 2018-01-05 | Stop reason: HOSPADM

## 2018-01-05 RX ORDER — LIDOCAINE HYDROCHLORIDE 20 MG/ML
INJECTION, SOLUTION INFILTRATION; PERINEURAL AS NEEDED
Status: DISCONTINUED | OUTPATIENT
Start: 2018-01-05 | End: 2018-01-05 | Stop reason: SURG

## 2018-01-05 RX ORDER — MAGNESIUM HYDROXIDE 1200 MG/15ML
LIQUID ORAL AS NEEDED
Status: DISCONTINUED | OUTPATIENT
Start: 2018-01-05 | End: 2018-01-05 | Stop reason: HOSPADM

## 2018-01-05 RX ORDER — FENTANYL CITRATE 50 UG/ML
INJECTION, SOLUTION INTRAMUSCULAR; INTRAVENOUS AS NEEDED
Status: DISCONTINUED | OUTPATIENT
Start: 2018-01-05 | End: 2018-01-05 | Stop reason: SURG

## 2018-01-05 RX ORDER — FAMOTIDINE 10 MG/ML
20 INJECTION, SOLUTION INTRAVENOUS
Status: DISCONTINUED | OUTPATIENT
Start: 2018-01-05 | End: 2018-01-05 | Stop reason: HOSPADM

## 2018-01-05 RX ORDER — SODIUM CHLORIDE, SODIUM LACTATE, POTASSIUM CHLORIDE, CALCIUM CHLORIDE 600; 310; 30; 20 MG/100ML; MG/100ML; MG/100ML; MG/100ML
100 INJECTION, SOLUTION INTRAVENOUS CONTINUOUS
Status: DISCONTINUED | OUTPATIENT
Start: 2018-01-05 | End: 2018-01-05 | Stop reason: HOSPADM

## 2018-01-05 RX ORDER — HYDRALAZINE HYDROCHLORIDE 20 MG/ML
5 INJECTION INTRAMUSCULAR; INTRAVENOUS
Status: DISCONTINUED | OUTPATIENT
Start: 2018-01-05 | End: 2018-01-05 | Stop reason: HOSPADM

## 2018-01-05 RX ORDER — DEXAMETHASONE SODIUM PHOSPHATE 4 MG/ML
4 INJECTION, SOLUTION INTRA-ARTICULAR; INTRALESIONAL; INTRAMUSCULAR; INTRAVENOUS; SOFT TISSUE ONCE AS NEEDED
Status: COMPLETED | OUTPATIENT
Start: 2018-01-05 | End: 2018-01-05

## 2018-01-05 RX ORDER — IPRATROPIUM BROMIDE AND ALBUTEROL SULFATE 2.5; .5 MG/3ML; MG/3ML
3 SOLUTION RESPIRATORY (INHALATION) ONCE AS NEEDED
Status: DISCONTINUED | OUTPATIENT
Start: 2018-01-05 | End: 2018-01-05 | Stop reason: HOSPADM

## 2018-01-05 RX ORDER — OXYCODONE AND ACETAMINOPHEN 7.5; 325 MG/1; MG/1
TABLET ORAL
Qty: 35 TABLET | Refills: 0 | Status: SHIPPED | OUTPATIENT
Start: 2018-01-05

## 2018-01-05 RX ORDER — ONDANSETRON 2 MG/ML
4 INJECTION INTRAMUSCULAR; INTRAVENOUS ONCE AS NEEDED
Status: DISCONTINUED | OUTPATIENT
Start: 2018-01-05 | End: 2018-01-05 | Stop reason: HOSPADM

## 2018-01-05 RX ORDER — SODIUM CHLORIDE 0.9 % (FLUSH) 0.9 %
1-10 SYRINGE (ML) INJECTION AS NEEDED
Status: DISCONTINUED | OUTPATIENT
Start: 2018-01-05 | End: 2018-01-05 | Stop reason: HOSPADM

## 2018-01-05 RX ORDER — HYDROCODONE BITARTRATE AND ACETAMINOPHEN 5; 325 MG/1; MG/1
1 TABLET ORAL ONCE AS NEEDED
Status: DISCONTINUED | OUTPATIENT
Start: 2018-01-05 | End: 2018-01-05 | Stop reason: HOSPADM

## 2018-01-05 RX ORDER — SODIUM CHLORIDE, SODIUM LACTATE, POTASSIUM CHLORIDE, CALCIUM CHLORIDE 600; 310; 30; 20 MG/100ML; MG/100ML; MG/100ML; MG/100ML
1000 INJECTION, SOLUTION INTRAVENOUS CONTINUOUS
Status: DISCONTINUED | OUTPATIENT
Start: 2018-01-05 | End: 2018-01-05 | Stop reason: HOSPADM

## 2018-01-05 RX ORDER — MORPHINE SULFATE 2 MG/ML
2 INJECTION, SOLUTION INTRAMUSCULAR; INTRAVENOUS AS NEEDED
Status: DISCONTINUED | OUTPATIENT
Start: 2018-01-05 | End: 2018-01-05 | Stop reason: HOSPADM

## 2018-01-05 RX ORDER — PROPOFOL 10 MG/ML
VIAL (ML) INTRAVENOUS AS NEEDED
Status: DISCONTINUED | OUTPATIENT
Start: 2018-01-05 | End: 2018-01-05 | Stop reason: SURG

## 2018-01-05 RX ORDER — SODIUM CHLORIDE 0.9 % (FLUSH) 0.9 %
3 SYRINGE (ML) INJECTION AS NEEDED
Status: DISCONTINUED | OUTPATIENT
Start: 2018-01-05 | End: 2018-01-05 | Stop reason: HOSPADM

## 2018-01-05 RX ORDER — ROPIVACAINE HYDROCHLORIDE 5 MG/ML
INJECTION, SOLUTION EPIDURAL; INFILTRATION; PERINEURAL AS NEEDED
Status: DISCONTINUED | OUTPATIENT
Start: 2018-01-05 | End: 2018-01-05 | Stop reason: SURG

## 2018-01-05 RX ORDER — ONDANSETRON 2 MG/ML
INJECTION INTRAMUSCULAR; INTRAVENOUS AS NEEDED
Status: DISCONTINUED | OUTPATIENT
Start: 2018-01-05 | End: 2018-01-05 | Stop reason: SURG

## 2018-01-05 RX ORDER — ONDANSETRON 2 MG/ML
4 INJECTION INTRAMUSCULAR; INTRAVENOUS AS NEEDED
Status: DISCONTINUED | OUTPATIENT
Start: 2018-01-05 | End: 2018-01-05 | Stop reason: HOSPADM

## 2018-01-05 RX ORDER — METOCLOPRAMIDE HYDROCHLORIDE 5 MG/ML
5 INJECTION INTRAMUSCULAR; INTRAVENOUS
Status: DISCONTINUED | OUTPATIENT
Start: 2018-01-05 | End: 2018-01-05 | Stop reason: HOSPADM

## 2018-01-05 RX ORDER — ONDANSETRON 4 MG/1
4 TABLET, FILM COATED ORAL EVERY 8 HOURS PRN
Qty: 10 TABLET | Refills: 0 | Status: SHIPPED | OUTPATIENT
Start: 2018-01-05

## 2018-01-05 RX ADMIN — LIDOCAINE HYDROCHLORIDE 60 MG: 20 INJECTION, SOLUTION INFILTRATION; PERINEURAL at 14:49

## 2018-01-05 RX ADMIN — ROPIVACAINE HYDROCHLORIDE 30 ML: 5 INJECTION, SOLUTION EPIDURAL; INFILTRATION; PERINEURAL at 13:30

## 2018-01-05 RX ADMIN — LIDOCAINE HYDROCHLORIDE 0.5 ML: 10 INJECTION, SOLUTION EPIDURAL; INFILTRATION; INTRACAUDAL; PERINEURAL at 12:36

## 2018-01-05 RX ADMIN — ONDANSETRON HYDROCHLORIDE 4 MG: 2 SOLUTION INTRAMUSCULAR; INTRAVENOUS at 15:13

## 2018-01-05 RX ADMIN — MIDAZOLAM HYDROCHLORIDE 2 MG: 1 INJECTION, SOLUTION INTRAMUSCULAR; INTRAVENOUS at 13:29

## 2018-01-05 RX ADMIN — FENTANYL CITRATE 50 MCG: 50 INJECTION, SOLUTION INTRAMUSCULAR; INTRAVENOUS at 14:55

## 2018-01-05 RX ADMIN — FENTANYL CITRATE 50 MCG: 50 INJECTION, SOLUTION INTRAMUSCULAR; INTRAVENOUS at 14:49

## 2018-01-05 RX ADMIN — PROPOFOL 200 MG: 10 INJECTION, EMULSION INTRAVENOUS at 14:49

## 2018-01-05 RX ADMIN — CEFAZOLIN 3 G: 1 INJECTION, POWDER, FOR SOLUTION INTRAVENOUS at 14:55

## 2018-01-05 RX ADMIN — FAMOTIDINE 20 MG: 10 INJECTION, SOLUTION INTRAVENOUS at 13:29

## 2018-01-05 RX ADMIN — DEXAMETHASONE SODIUM PHOSPHATE 4 MG: 4 INJECTION, SOLUTION INTRAMUSCULAR; INTRAVENOUS at 13:29

## 2018-01-05 RX ADMIN — SODIUM CHLORIDE, POTASSIUM CHLORIDE, SODIUM LACTATE AND CALCIUM CHLORIDE 1000 ML: 600; 310; 30; 20 INJECTION, SOLUTION INTRAVENOUS at 12:36

## 2018-01-05 NOTE — BRIEF OP NOTE
ULNA/RADIUS OPEN REDUCTION INTERNAL FIXATION  Progress Note    Gabriel Borjagabriela  1/5/2018    Pre-op Diagnosis:   INTRAARTICULAR FRACTURE LEFT DISTAL RADIUS        Post-Op Diagnosis Codes:     * Other intraarticular fracture of lower end of left radius, initial encounter for closed fracture [S52.572A]    Procedure/CPT® Codes:  TX OPEN RX DISTAL RADIUS FX, INTRA-ARTICULAR, 2 FRAG [18120]    Procedure(s):  OPEN REDUCTION INTERNAL FIXATION LEFT DISTAL RADIUS     Surgeon(s):  Kanu Fitzgerald MD    Anesthesia: General with Block    Staff:   Circulator: Lacy Ignacio RN  Scrub Person: Joel Guevara  Other: Marlen Patten    Estimated Blood Loss: minimal    Urine Voided: * No values recorded between 1/5/2018 12:00 AM and 1/5/2018  4:04 PM *    Specimens:                None      Drains:           Findings: see op note    Complications: none      Kanu Fitzgerald MD     Date: 1/5/2018  Time: 4:09 PM

## 2018-01-05 NOTE — OP NOTE
Patient Name: Syed  MRN: [id number]  : 1960    DATE of SURGERY: 2018    SURGEON: Kanu Fitzgerald MD    ASSISTANT: NONE    PREOPERATIVE DIAGNOSIS:    Acute traumatic displaced intraarticular fracture of the lower end of the left radius (with 2 fragments), initial encounter for closed fracture    POSTOPERATIVE DIAGNOSIS:  Acute traumatic displaced intraarticular fracture of the lower end of the left radius (with 2 fragments), initial encounter for closed fracture    PROCEDURE PERFORMED: Open reduction internal fixation of left intraarticular distal radius fracture (with 2 fragments)    IMPLANTS: Synthes distal radial locking plate    ANESTHESIA USED: General endotrachial anesthesia    OPERATIVE INDICATIONS: 57 y.o. YO male status post fall at home over 2 weeks ago with a displaced distal radius fracture.  Surgical indications include fracture displacement and stabilization of fracture to prevent future deformity, pain, and loss of function.  Risks include, but are not limited to, anesthesia, bleeding, infection, pain, damage to local structures (radial artery), need for further surgery, malunion, nonunion, fracture displacement, failure of hardware, intraoperative death.  Risks, benefits, and alternative were discussed and the patient wishes to proceed with surgery.    ESTIMATED BLOOD LOSS: <10 mL    DRAINS: none     COMPLICATIONS: none    SPECIMENS: none    FINDINGS: see op note    PROCEDURE in DETAIL:  The patient was seen in the preoperative holding room, the informed consent was reviewed and signed, and the correct operative extremity marked with the patient’s agreement.  The patient was transported to the operating room, where a timeout was performed identifying the correct patient and operative site.  Perioperative antibiotics were administered prior to incision.    The upper extremity was prepped and draped in the usual sterile fashion after a tourniquet was placed on the upper brachium and  inflated to 200 mm of mercury.  Total tourniquet time was <30 minutes.    A modified Akash approach to the distal radius was utilized as an incision was made in line with the FCR tendon.  The palmar and dorsal sheaths were open, the tendon was retracted ulnarly along with the FPL musculature, revealing the underlying pronator quadratus which was incised along the radial border of the distal radius in an L-shaped fashion while protecting the radial artery.      The fracture site was identified, clear of soft tissue interposition.  There was a significant amount of callus noted.  The lunate fossa fragment was difficult to mobilize but was adequately reduced.  A Synthes distal radial locking plate was then applied to the volar surface, its position maintained with a cortical screw in the oblong hole of the plate.  Several locking screws were placed distally and the construct was finalized with additional cortical screws proximally.  C-arm images were obtained in multiple planes showing adequate alignment without hardware complication.    The tourniquet was let down, hemostasis achieved and the incision closed in layers.  The skin was closed with 3-0 nylon, a sterile dressing applied followed by a well-padded short arm splint.    The patient was awakened from anesthesia, transported to the recovery room in stable condition.    POSTOPERATIVE PLAN:  1) d/c home with family  2) RTC 2 wks for wound check    Electronically signed by Kanu Fitzgerald MD on 1/5/2018 at 4:10 PM

## 2018-01-05 NOTE — ANESTHESIA PROCEDURE NOTES
Peripheral Block    Patient location during procedure: holding area  Start time: 1/5/2018 1:29 PM  Stop time: 1/5/2018 1:32 PM  Reason for block: post-op pain management  Performed by  Anesthesiologist: DAWIT NOBLE  Preanesthetic Checklist  Completed: patient identified, site marked, surgical consent, pre-op evaluation, timeout performed, IV checked, risks and benefits discussed and monitors and equipment checked  Prep:  Pt Position: supine  Sterile barriers:gloves  Prep: ChloraPrep  Patient monitoring: blood pressure monitoring, continuous pulse oximetry and EKG  Procedure  Sedation:yes  Performed under: local infiltration  Guidance:ultrasound guided  ULTRASOUND INTERPRETATION. Using ultrasound guidance a 20 G gauge needle was placed in close proximity to the nerve, at which point, under ultrasound guidance anesthetic was injected in the area of the nerve and spread of the anesthesia was seen on ultrasound in close proximity thereto.  There were no abnormalities seen on ultrasound; a digital image was taken; and the patient tolerated the procedure with no complications. Images:still images obtained    Laterality:left  Block Type:axillary  Injection Technique:single-shot  Needle Type:echogenic  Needle Gauge:20 G  Resistance on Injection: none  Medications  Local Injected:ropivacaine 0.5% Local Amount Injected:30mL  Post Assessment  Injection Assessment: negative aspiration for heme, no paresthesia on injection and incremental injection  Patient Tolerance:comfortable throughout block  Complications:no

## 2018-01-05 NOTE — PLAN OF CARE
Problem: Patient Care Overview (Adult)  Goal: Plan of Care Review  Outcome: Ongoing (interventions implemented as appropriate)   01/05/18 1311   Coping/Psychosocial Response Interventions   Plan Of Care Reviewed With patient   Patient Care Overview   Progress no change       Problem: Perioperative Period (Adult)  Goal: Signs and Symptoms of Listed Potential Problems Will be Absent or Manageable (Perioperative Period)  Outcome: Ongoing (interventions implemented as appropriate)   01/05/18 1311   Perioperative Period   Problems Assessed (Perioperative Period) pain;infection   Problems Present (Perioperative Period) none

## 2018-01-05 NOTE — ANESTHESIA PROCEDURE NOTES
Airway  Urgency: elective    Airway not difficult    General Information and Staff    Patient location during procedure: OR  CRNA: AYANA VERDE    Indications and Patient Condition  Indications for airway management: airway protection    Preoxygenated: yes  Mask difficulty assessment: 0 - not attempted    Final Airway Details  Final airway type: supraglottic airway      Successful airway: I-gel  Size 5    Number of attempts at approach: 1    Additional Comments  Easy, atraumatic LMA placement.

## 2018-01-05 NOTE — DISCHARGE INSTRUCTIONS
UPPER EXTREMITY POST-OP INSTRUCTIONS - DR. CASTELAN    IMPORTANT PHONE NUMBERS:  • For emergencies, please call [phone]  • You may reach Dr. Castelan and clinical staff at [phone] M-F 8:00 am-5:00 pm  • After 5pm or on the weekends, please call [phone]  • Call immediately if you have any of the following symptoms:     Elevated temperature above 101.5 degrees for more than 48 hours after surgery     Persistent drainage from wound     Severe pain around surgical site    Sling use: The sling is provided for your comfort and to ensure proper healing of your repair following surgery. Please place the abduction pillow with the curved side against your side and the sling on the side of the pillow. Your surgery requires that you wear the sling if noted below.  ____ For comfort. Remove sling 24 hours and begin range of motion exercises  ____ At all times except bathing, dressing, and therapy. Also wear the sling during sleep.  _X___ No sling required    Bathing:  ___No bandages, no restrictions!!  ___You may remove you dressing and shower on the 3rd day after surgery (Ex. Tues surgery, shower on Friday)  ** if you are told to it is ok to remove your dressing and shower, DO NOT SOAK your incisions in a tub.  _X__Keep splint clean, dry, and intact. DO NOT place foreign objects into your splint.      Dressings: Keep dressing/splint intact unless instructed otherwise below. SOME DRAINAGE IS NORMAL!    • DO NOT touch or apply ointment to the incision.    • DO NOT remove the steri-strips over the incisions (if you have steri-strips). They will         generally fall off on their own or can be removed 1 weeksafter surgery.    • If you have yellow gauze and it comes off, do not worry about it. Leave them off.   • Signs of infection that warrant a phone call to our clinical line:     o Excessive drainage or redness     o Red streaking coming away from the incision  o Increased pain  o Increased temperature  above 101 degrees      Physical Therapy:        *  Your physical therapy status will be discussed with you postoperatively and at your first post-op appointment. Some injuries will not require physical therapy.      *  If you have a shoulder manipulation, please schedule therapy for the next day      Medications: You will be discharged with the appropriate medications following your surgery. Fill these at the pharmacy and take them as directed on the label. Not all of the medications below may be prescribed. Occasionally, other medications may be prescribed with specific instructions.    Percocet/Lortab (oxycodone/hydrocodone with tylenol) - Pain Medication, will cause drowsiness, possibly itchiness (this is NOT an allergy - use benadryl or an over the counter allergy medication such as Claritin or Zyrtec)     o Take 1-2 tablets every 4-6 hours. DO NOT EXCEED 4,000mg of Tylenol in 24 hours.  **DO NOT MIX WITH ALCOHOL, DRIVE WHILE TAKING, OR TAKE with extra TYLENOL**    Colace (Docusate) - stool softener, used for constipation. Take this only if you feel constipated.      Zofran (Ondansetron) or Phenergan - Anti-nausea medication, will cause drowsiness      **If you are running low on pain medications, please notify us if you need a refill 24-48 hours prior to when you run out, so we can make arrangements to refill the prescription for you if we determine is necessary      General Anesthesia, Adult, Care After  Refer to this sheet in the next few weeks. These instructions provide you with information on caring for yourself after your procedure. Your health care provider may also give you more specific instructions. Your treatment has been planned according to current medical practices, but problems sometimes occur. Call your health care provider if you have any problems or questions after your procedure.  WHAT TO EXPECT AFTER THE PROCEDURE  After the procedure, it is typical to experience:  · Sleepiness.  · Nausea  and vomiting.  HOME CARE INSTRUCTIONS  · For the first 24 hours after general anesthesia:  ¨ Have a responsible person with you.  ¨ Do not drive a car. If you are alone, do not take public transportation.  ¨ Do not drink alcohol.  ¨ Do not take medicine that has not been prescribed by your health care provider.  ¨ Do not sign important papers or make important decisions.  ¨ You may resume a normal diet and activities as directed by your health care provider.  · Change bandages (dressings) as directed.  · If you have questions or problems that seem related to general anesthesia, call the hospital and ask for the anesthetist or anesthesiologist on call.  SEEK MEDICAL CARE IF:  · You have nausea and vomiting that continue the day after anesthesia.  · You develop a rash.  SEEK IMMEDIATE MEDICAL CARE IF:    · You have difficulty breathing.  · You have chest pain.  · You have any allergic problems.     This information is not intended to replace advice given to you by your health care provider. Make sure you discuss any questions you have with your health care provider.     Document Released: 03/26/2002 Document Revised: 01/08/2016 Document Reviewed: 07/03/2014  [employer] Interactive Patient Education ©2016 [employer]    CALL YOUR PHYSICIAN IF YOU EXPERIENCE  INCREASED PAIN NOT HELPED BY YOUR PAIN MEDICATION.    .                                              Fall Prevention in the Home      Falls can cause injuries. They can happen to people of all ages. There are many things you can do to make your home safe and to help prevent falls.    WHAT CAN I DO ON THE OUTSIDE OF MY HOME?  · Regularly fix the edges of walkways and driveways and fix any cracks.  · Remove anything that might make you trip as you walk through a door, such as a raised step or threshold.  · Trim any bushes or trees on the path to your home.  · Use bright outdoor lighting.  · Clear any walking paths of anything that might make someone trip, such as  rocks or tools.  · Regularly check to see if handrails are loose or broken. Make sure that both sides of any steps have handrails.  · Any raised decks and porches should have guardrails on the edges.  · Have any leaves, snow, or ice cleared regularly.  · Use sand or salt on walking paths during winter.  · Clean up any spills in your garage right away. This includes oil or grease spills.  WHAT CAN I DO IN THE BATHROOM?    · Use night lights.  · Install grab bars by the toilet and in the tub and shower. Do not use towel bars as grab bars.  · Use non-skid mats or decals in the tub or shower.  · If you need to sit down in the shower, use a plastic, non-slip stool.  · Keep the floor dry. Clean up any water that spills on the floor as soon as it happens.  · Remove soap buildup in the tub or shower regularly.  · Attach bath mats securely with double-sided non-slip rug tape.  · Do not have throw rugs and other things on the floor that can make you trip.  WHAT CAN I DO IN THE BEDROOM?  · Use night lights.  · Make sure that you have a light by your bed that is easy to reach.  · Do not use any sheets or blankets that are too big for your bed. They should not hang down onto the floor.  · Have a firm chair that has side arms. You can use this for support while you get dressed.  · Do not have throw rugs and other things on the floor that can make you trip.  WHAT CAN I DO IN THE KITCHEN?  · Clean up any spills right away.  · Avoid walking on wet floors.  · Keep items that you use a lot in easy-to-reach places.  · If you need to reach something above you, use a strong step stool that has a grab bar.  · Keep electrical cords out of the way.  · Do not use floor polish or wax that makes floors slippery. If you must use wax, use non-skid floor wax.  · Do not have throw rugs and other things on the floor that can make you trip.  WHAT CAN I DO WITH MY STAIRS?  · Do not leave any items on the stairs.  · Make sure that there are handrails  on both sides of the stairs and use them. Fix handrails that are broken or loose. Make sure that handrails are as long as the stairways.  · Check any carpeting to make sure that it is firmly attached to the stairs. Fix any carpet that is loose or worn.  · Avoid having throw rugs at the top or bottom of the stairs. If you do have throw rugs, attach them to the floor with carpet tape.  · Make sure that you have a light switch at the top of the stairs and the bottom of the stairs. If you do not have them, ask someone to add them for you.  WHAT ELSE CAN I DO TO HELP PREVENT FALLS?  · Wear shoes that:  ¨ Do not have high heels.  ¨ Have rubber bottoms.  ¨ Are comfortable and fit you well.  ¨ Are closed at the toe. Do not wear sandals.  · If you use a stepladder:  ¨ Make sure that it is fully opened. Do not climb a closed stepladder.  ¨ Make sure that both sides of the stepladder are locked into place.  ¨ Ask someone to hold it for you, if possible.  · Clearly geni and make sure that you can see:  ¨ Any grab bars or handrails.  ¨ First and last steps.  ¨ Where the edge of each step is.  · Use tools that help you move around (mobility aids) if they are needed. These include:  ¨ Canes.  ¨ Walkers.  ¨ Scooters.  ¨ Crutches.  · Turn on the lights when you go into a dark area. Replace any light bulbs as soon as they burn out.  · Set up your furniture so you have a clear path. Avoid moving your furniture around.  · If any of your floors are uneven, fix them.  · If there are any pets around you, be aware of where they are.  · Review your medicines with your doctor. Some medicines can make you feel dizzy. This can increase your chance of falling.  Ask your doctor what other things that you can do to help prevent falls.     This information is not intended to replace advice given to you by your health care provider. Make sure you discuss any questions you have with your health care provider.     Document Released: 10/14/2010  Document Revised: 05/03/2016 Document Reviewed: 01/22/2016  [employer] Interactive Patient Education ©2016 [employer]     What to expect after a Nerve Block  Nerve blocks administered to block pain affect many types of nerves, including those nerves that control movement, pain, and normal sensation.  Following a nerve block, you may notice some bruising at the site where the block was given.  You may experience sensations such as:  numbness of the affected area or limb, tingling, heaviness (that is the limb feels heavy to you), weakness or inability to move the affected arm or leg, or a feeling as if your arm or leg has “fallen asleep”.    A nerve block can last from 9-18 hours depending on the medications used.  Usually the weakness wears off first followed by the tingling and heaviness.  As the block wears off, you may begin to notice pain; however, this sequence of events may occur in any order.  Typically, you will be able to move your limb before you will feel it.  Once a nerve block begins to wear off, the effects are usually completely gone within 60 minutes.    If you experience continued side effects that you believe are block related for longer than 48 hours, please call your healthcare provider.  Please see block-specific instructions below.    Instructions for any block involving the shoulder or arm  • If you have had any kind of shoulder/arm block, you will go home with your arm in a sling.  Wear the sling until the block has completely worn off.  You may be required to wear it for a longer period of time per your surgeon’s recommendations.  • I you have had a shoulder/arm block; it is a good idea to sleep on a recliner with pillows under your arm.    Note:  If you have severe or prolonged shortness of breath, please seek medical assistance as soon as possible.    Protection of a “blocked” arm (limb)  • After a nerve block, you cannot feel pain, pressure, or extremes of temperature in the affected  limb.  And because of this, your blocked limb is at more risk for injury.  For example, it is possible to burn your limb on an extremely hot surface without feeling it.  • When resting, it is important to reposition your limb periodically to avoid prolonged pressure on it.  This may require the use of pillows and padding.  • While sleeping, you should avoid rolling onto the affected limb or putting too much pressure on it.  • If you have a cast or tight dressing, check the color of your fingers of the affected limb.  Call your surgeon if they look discolored (that is, dusky, dark colored)  • Use caution in cold weather.  Cover your limb appropriately to protect it from the cold.  Pain Management  Your surgeon will give you a prescription for pain medication.  Begin taking this before the nerve block wears off.  Bear in mind that sometimes the block can wear off in the middle of the night.    PATIENT/FAMILY/RESPONSIBLE PARTY VERBALIZES UNDERSTANDING OF ABOVE EDUCATION.  COPY OF PAIN SCALE GIVEN AND REVIEWED WITH VERBALIZED UNDERSTANDING.

## 2018-01-05 NOTE — H&P
Pt Name: Gabriel Solano  MRN: [id number]  YOB: 1960  Date of evaluation: 1/5/2018    H&P including current review of systems was updated in the paper chart and/or the document previously scanned into the record.  There have been no significant changes or new problems since the original evaluation.  The patient's problems continue and indications for contemplated procedure have not changed.    Electronically signed by Kanu Fitzgerald MD on 1/5/2018 at 4:12 PM

## 2018-01-05 NOTE — PLAN OF CARE
Problem: Patient Care Overview (Adult)  Goal: Plan of Care Review  Outcome: Ongoing (interventions implemented as appropriate)   01/05/18 3868   Coping/Psychosocial Response Interventions   Plan Of Care Reviewed With patient   Patient Care Overview   Progress improving   Outcome Evaluation   Outcome Summary/Follow up Plan denies pain, meets criteria for discharge from PACU       Problem: Perioperative Period (Adult)  Goal: Signs and Symptoms of Listed Potential Problems Will be Absent or Manageable (Perioperative Period)  Outcome: Ongoing (interventions implemented as appropriate)

## 2018-01-05 NOTE — PLAN OF CARE
Problem: Patient Care Overview (Adult)  Goal: Plan of Care Review  Outcome: Outcome(s) achieved Date Met: 01/05/18 01/05/18 1734   Coping/Psychosocial Response Interventions   Plan Of Care Reviewed With patient;spouse   Patient Care Overview   Progress improving   Outcome Evaluation   Outcome Summary/Follow up Plan pt meets d/c criteria       Problem: Perioperative Period (Adult)  Goal: Signs and Symptoms of Listed Potential Problems Will be Absent or Manageable (Perioperative Period)  Outcome: Outcome(s) achieved Date Met: 01/05/18

## 2018-01-05 NOTE — ANESTHESIA PREPROCEDURE EVALUATION
Anesthesia Evaluation     Patient summary reviewed and Nursing notes reviewed   no history of anesthetic complications:  NPO Solid Status: > 8 hours  NPO Liquid Status: > 8 hours     Airway   Mallampati: I  TM distance: <3 FB  Neck ROM: full  no difficulty expected  Dental - normal exam     Pulmonary - normal exam   (+) pulmonary embolism (1984), sleep apnea,   (-) asthma, not a smoker  Cardiovascular - normal exam  Exercise tolerance: good (4-7 METS)    ECG reviewed  Patient on routine beta blocker and Beta blocker given within 24 hours of surgery    (+) hypertension, dysrhythmias Atrial Fib,   (-) CAD, angina      Neuro/Psych  (-) seizures, TIA, CVA  GI/Hepatic/Renal/Endo    (+) obesity,  GERD,   (-) liver disease, no renal disease, diabetes    Musculoskeletal     Abdominal  - normal exam    Bowel sounds: normal.   Substance History      OB/GYN          Other                                                Anesthesia Plan    ASA 2     general     intravenous induction   Anesthetic plan and risks discussed with patient.

## 2018-01-06 NOTE — ANESTHESIA POSTPROCEDURE EVALUATION
Patient: Gabriel Solano    Procedure Summary     Date Anesthesia Start Anesthesia Stop Room / Location    01/05/18 9865 1607 [facility] / [facility]       Procedure Diagnosis Surgeon Provider    OPEN REDUCTION INTERNAL FIXATION LEFT DISTAL RADIUS  (Left Hand) Other intraarticular fracture of lower end of left radius, initial encounter for closed fracture  (INTRAARTICULAR FRACTURE LEFT DISTAL RADIUS ) MD Jody Perez CRNA          Anesthesia Type: general  Last vitals  BP   135/91 (01/05/18 1730)   Temp   97.6 °F (36.4 °C) (01/05/18 1646)   Pulse   76 (01/05/18 1730)   Resp   16 (01/05/18 1730)     SpO2   98 % (01/05/18 1730)     Post Anesthesia Care and Evaluation    Anesthetic complications: No anesthetic complications    Comments: Pt discharged per PACU protocol

## 2018-01-11 ENCOUNTER — OFFICE VISIT (OUTPATIENT)
Dept: SURGERY | Age: 58
End: 2018-01-11

## 2018-01-11 VITALS — WEIGHT: 262 LBS | HEIGHT: 74 IN | BODY MASS INDEX: 33.62 KG/M2 | TEMPERATURE: 98 F

## 2018-01-11 DIAGNOSIS — Z90.49 S/P LAPAROSCOPIC CHOLECYSTECTOMY: Primary | ICD-10-CM

## 2018-01-11 DIAGNOSIS — R97.20 ELEVATED PSA: ICD-10-CM

## 2018-01-11 LAB — PROSTATE SPECIFIC ANTIGEN: 3.57 NG/ML (ref 0–4)

## 2018-01-11 PROCEDURE — 99024 POSTOP FOLLOW-UP VISIT: CPT | Performed by: PHYSICIAN ASSISTANT

## 2018-01-11 RX ORDER — OXYCODONE AND ACETAMINOPHEN 7.5; 325 MG/1; MG/1
TABLET ORAL
COMMUNITY
Start: 2018-01-05 | End: 2018-04-09 | Stop reason: ALTCHOICE

## 2018-01-26 ENCOUNTER — OFFICE VISIT (OUTPATIENT)
Dept: UROLOGY | Age: 58
End: 2018-01-26
Payer: COMMERCIAL

## 2018-01-26 VITALS
DIASTOLIC BLOOD PRESSURE: 84 MMHG | BODY MASS INDEX: 39.71 KG/M2 | TEMPERATURE: 97.2 F | SYSTOLIC BLOOD PRESSURE: 132 MMHG | WEIGHT: 262 LBS | HEART RATE: 96 BPM | HEIGHT: 68 IN

## 2018-01-26 DIAGNOSIS — R35.0 BENIGN PROSTATIC HYPERPLASIA WITH URINARY FREQUENCY: ICD-10-CM

## 2018-01-26 DIAGNOSIS — N40.1 BENIGN PROSTATIC HYPERPLASIA WITH URINARY FREQUENCY: ICD-10-CM

## 2018-01-26 DIAGNOSIS — R97.20 ELEVATED PSA: Primary | ICD-10-CM

## 2018-01-26 LAB
APPEARANCE FLUID: CLEAR
BILIRUBIN, POC: NORMAL
BLOOD URINE, POC: NORMAL
CLARITY, POC: NORMAL
COLOR, POC: NORMAL
GLUCOSE URINE, POC: NORMAL
KETONES, POC: NORMAL
LEUKOCYTE EST, POC: NORMAL
NITRITE, POC: NORMAL
PH, POC: 8
PROTEIN, POC: 30
SPECIFIC GRAVITY, POC: 1.01
UROBILINOGEN, POC: 1

## 2018-01-26 PROCEDURE — 81003 URINALYSIS AUTO W/O SCOPE: CPT | Performed by: UROLOGY

## 2018-01-26 PROCEDURE — 99214 OFFICE O/P EST MOD 30 MIN: CPT | Performed by: UROLOGY

## 2018-01-26 RX ORDER — TADALAFIL 5 MG/1
5 TABLET ORAL PRN
Qty: 90 TABLET | Refills: 3 | Status: SHIPPED | OUTPATIENT
Start: 2018-01-26 | End: 2018-08-10 | Stop reason: SDUPTHER

## 2018-01-26 ASSESSMENT — ENCOUNTER SYMPTOMS
NAUSEA: 0
EYE DISCHARGE: 0
HEARTBURN: 0
WHEEZING: 0
EYE REDNESS: 0
SHORTNESS OF BREATH: 0

## 2018-01-26 NOTE — PROGRESS NOTES
Genevieve Joe is a 59 y.o. male who presents today   Chief Complaint   Patient presents with    Elevated PSA     I am here today for my follow up on elevated PSA level.  Benign Prostatic Hypertrophy     Elevated PSA:  Patient is here today for history of an elevated PSA. His last several PSA values are as follows:  Lab Results   Component Value Date    PSA 3.57 01/11/2018    PSA 4.63 (H) 11/03/2017     Overall the PSA level is: decreased  Recent history of urinary tract infection/prostatitis? no  Previous prostate biopsy? no  Lower urinary tract symptoms: urgency and frequency      Benign Prostatic Hypertrophy  Patient complains of lower urinary tract symptoms. He reports frequency, incomplete emptying, nocturia two times a night, urgency and weak stream. He denies intermittency and straining. Patient states symptoms are of moderate severity. Onset of symptoms was a few years ago and was gradual in onset. His AUA Symptom Score is, 15/35 manifested as irritative symptoms including frequency, urgency, nocturia and obstructive symptoms including weak stream. He has no personal history and no family history of prostate cancer. He reports a history of no complicating symptoms. He denies flank pain, gross hematuria, kidney stones and recurrent UTI he is currently on no medications. He did tell me that he took daily Cialis in the past and this actually helped his BPH symptoms significantly. I gave him prescription last time that he is not at this field because of change in his insurance. He has tried alpha blockers in the past that were not effective and caused dizziness.         Past Medical History:   Diagnosis Date    A-fib [facility])     Atelectasis of right lung 6/26/2017    BiPAP (biphasic positive airway pressure) dependence     12cm to 22cm    Clotting disorder (HCC)     GERD (gastroesophageal reflux disease)     Heart murmur     Hx of blood clots     Hyperlipidemia     Hypertension     Obstructive sleep

## 2018-03-08 DIAGNOSIS — I48.0 PAROXYSMAL ATRIAL FIBRILLATION WITH RVR (HCC): ICD-10-CM

## 2018-03-08 RX ORDER — SOTALOL HYDROCHLORIDE 80 MG/1
80 TABLET ORAL 2 TIMES DAILY
Qty: 180 TABLET | Refills: 3 | Status: SHIPPED | OUTPATIENT
Start: 2018-03-08 | End: 2018-11-14 | Stop reason: SDUPTHER

## 2018-03-09 RX ORDER — OMEPRAZOLE 20 MG/1
20 CAPSULE, DELAYED RELEASE ORAL 2 TIMES DAILY
Qty: 180 CAPSULE | Refills: 2 | Status: SHIPPED | OUTPATIENT
Start: 2018-03-09 | End: 2018-08-19 | Stop reason: SDUPTHER

## 2018-03-12 RX ORDER — ROSUVASTATIN CALCIUM 10 MG/1
10 TABLET, COATED ORAL NIGHTLY
Qty: 90 TABLET | Refills: 3 | Status: SHIPPED | OUTPATIENT
Start: 2018-03-12 | End: 2018-11-14 | Stop reason: SDUPTHER

## 2018-04-09 ENCOUNTER — APPOINTMENT (OUTPATIENT)
Dept: CT IMAGING | Age: 58
End: 2018-04-09
Payer: COMMERCIAL

## 2018-04-09 ENCOUNTER — HOSPITAL ENCOUNTER (EMERGENCY)
Age: 58
Discharge: HOME | End: 2018-04-09
Payer: COMMERCIAL

## 2018-04-09 ENCOUNTER — APPOINTMENT (OUTPATIENT)
Dept: GENERAL RADIOLOGY | Age: 58
End: 2018-04-09
Payer: COMMERCIAL

## 2018-04-09 ENCOUNTER — OFFICE VISIT (OUTPATIENT)
Dept: URGENT CARE | Age: 58
End: 2018-04-09

## 2018-04-09 VITALS
SYSTOLIC BLOOD PRESSURE: 147 MMHG | TEMPERATURE: 97.5 F | OXYGEN SATURATION: 92 % | WEIGHT: 250 LBS | BODY MASS INDEX: 32.08 KG/M2 | RESPIRATION RATE: 16 BRPM | HEIGHT: 74 IN | DIASTOLIC BLOOD PRESSURE: 99 MMHG | HEART RATE: 60 BPM

## 2018-04-09 VITALS
HEIGHT: 74 IN | SYSTOLIC BLOOD PRESSURE: 210 MMHG | BODY MASS INDEX: 32.47 KG/M2 | WEIGHT: 253 LBS | DIASTOLIC BLOOD PRESSURE: 120 MMHG

## 2018-04-09 DIAGNOSIS — R03.0 ELEVATED BLOOD PRESSURE READING: Primary | ICD-10-CM

## 2018-04-09 DIAGNOSIS — I10 HYPERTENSION, UNSPECIFIED TYPE: Primary | ICD-10-CM

## 2018-04-09 LAB
ALBUMIN SERPL-MCNC: 4.6 G/DL (ref 3.5–5.2)
ALP BLD-CCNC: 63 U/L (ref 40–130)
ALT SERPL-CCNC: 17 U/L (ref 5–41)
ANION GAP SERPL CALCULATED.3IONS-SCNC: 10 MMOL/L (ref 7–19)
AST SERPL-CCNC: 22 U/L (ref 5–40)
BASOPHILS ABSOLUTE: 0 K/UL (ref 0–0.2)
BASOPHILS RELATIVE PERCENT: 0.6 % (ref 0–1)
BILIRUB SERPL-MCNC: 0.7 MG/DL (ref 0.2–1.2)
BUN BLDV-MCNC: 9 MG/DL (ref 6–20)
CALCIUM SERPL-MCNC: 9.6 MG/DL (ref 8.6–10)
CHLORIDE BLD-SCNC: 101 MMOL/L (ref 98–111)
CO2: 30 MMOL/L (ref 22–29)
CREAT SERPL-MCNC: 0.6 MG/DL (ref 0.5–1.2)
EOSINOPHILS ABSOLUTE: 0.3 K/UL (ref 0–0.6)
EOSINOPHILS RELATIVE PERCENT: 5.3 % (ref 0–5)
GFR NON-AFRICAN AMERICAN: >60
GLUCOSE BLD-MCNC: 83 MG/DL (ref 74–109)
HCT VFR BLD CALC: 45.3 % (ref 42–52)
HEMOGLOBIN: 14.9 G/DL (ref 14–18)
INR BLD: 1.01 (ref 0.88–1.18)
LYMPHOCYTES ABSOLUTE: 1.6 K/UL (ref 1.1–4.5)
LYMPHOCYTES RELATIVE PERCENT: 29.1 % (ref 20–40)
MCH RBC QN AUTO: 31.6 PG (ref 27–31)
MCHC RBC AUTO-ENTMCNC: 32.9 G/DL (ref 33–37)
MCV RBC AUTO: 96 FL (ref 80–94)
MONOCYTES ABSOLUTE: 0.4 K/UL (ref 0–0.9)
MONOCYTES RELATIVE PERCENT: 8.3 % (ref 0–10)
NEUTROPHILS ABSOLUTE: 3 K/UL (ref 1.5–7.5)
NEUTROPHILS RELATIVE PERCENT: 56.5 % (ref 50–65)
PDW BLD-RTO: 13.6 % (ref 11.5–14.5)
PERFORMED ON: NORMAL
PLATELET # BLD: 205 K/UL (ref 130–400)
PMV BLD AUTO: 9.4 FL (ref 9.4–12.4)
POC TROPONIN I: 0 NG/ML (ref 0–0.08)
POTASSIUM SERPL-SCNC: 3.8 MMOL/L (ref 3.5–5)
PROTHROMBIN TIME: 13.2 SEC (ref 12–14.6)
RBC # BLD: 4.72 M/UL (ref 4.7–6.1)
SODIUM BLD-SCNC: 141 MMOL/L (ref 136–145)
TOTAL PROTEIN: 7.4 G/DL (ref 6.6–8.7)
WBC # BLD: 5.3 K/UL (ref 4.8–10.8)

## 2018-04-09 PROCEDURE — 99284 EMERGENCY DEPT VISIT MOD MDM: CPT | Performed by: NURSE PRACTITIONER

## 2018-04-09 PROCEDURE — 84484 ASSAY OF TROPONIN QUANT: CPT

## 2018-04-09 PROCEDURE — 85610 PROTHROMBIN TIME: CPT

## 2018-04-09 PROCEDURE — 36415 COLL VENOUS BLD VENIPUNCTURE: CPT

## 2018-04-09 PROCEDURE — 93005 ELECTROCARDIOGRAM TRACING: CPT

## 2018-04-09 PROCEDURE — 85025 COMPLETE CBC W/AUTO DIFF WBC: CPT

## 2018-04-09 PROCEDURE — 71046 X-RAY EXAM CHEST 2 VIEWS: CPT

## 2018-04-09 PROCEDURE — 99999 PR OFFICE/OUTPT VISIT,PROCEDURE ONLY: CPT | Performed by: NURSE PRACTITIONER

## 2018-04-09 PROCEDURE — 70450 CT HEAD/BRAIN W/O DYE: CPT

## 2018-04-09 PROCEDURE — 80053 COMPREHEN METABOLIC PANEL: CPT

## 2018-04-09 PROCEDURE — 99284 EMERGENCY DEPT VISIT MOD MDM: CPT

## 2018-04-09 RX ORDER — CLONIDINE HYDROCHLORIDE 0.1 MG/1
0.1 TABLET ORAL ONCE
Status: DISCONTINUED | OUTPATIENT
Start: 2018-04-09 | End: 2018-04-09

## 2018-04-09 ASSESSMENT — ENCOUNTER SYMPTOMS
COUGH: 0
DIARRHEA: 0
NAUSEA: 0
VOMITING: 0
SHORTNESS OF BREATH: 0
ABDOMINAL PAIN: 0
CONSTIPATION: 0
WHEEZING: 0
PHOTOPHOBIA: 0
CHEST TIGHTNESS: 0
EYE PAIN: 0

## 2018-04-11 LAB
EKG P AXIS: 61 DEGREES
EKG P-R INTERVAL: 164 MS
EKG Q-T INTERVAL: 464 MS
EKG QRS DURATION: 88 MS
EKG QTC CALCULATION (BAZETT): 443 MS
EKG T AXIS: 26 DEGREES

## 2018-04-12 PROBLEM — Z12.12 SCREENING FOR COLORECTAL CANCER: Status: RESOLVED | Noted: 2017-10-26 | Resolved: 2018-04-12

## 2018-04-12 PROBLEM — Z12.11 SCREENING FOR COLORECTAL CANCER: Status: RESOLVED | Noted: 2017-10-26 | Resolved: 2018-04-12

## 2018-04-13 ENCOUNTER — OFFICE VISIT (OUTPATIENT)
Dept: INTERNAL MEDICINE | Age: 58
End: 2018-04-13
Payer: COMMERCIAL

## 2018-04-13 VITALS
WEIGHT: 241 LBS | OXYGEN SATURATION: 96 % | BODY MASS INDEX: 30.93 KG/M2 | DIASTOLIC BLOOD PRESSURE: 80 MMHG | HEIGHT: 74 IN | HEART RATE: 76 BPM | RESPIRATION RATE: 18 BRPM | SYSTOLIC BLOOD PRESSURE: 121 MMHG

## 2018-04-13 DIAGNOSIS — R03.0 ELEVATED BLOOD PRESSURE READING: ICD-10-CM

## 2018-04-13 DIAGNOSIS — R42 VERTIGO: ICD-10-CM

## 2018-04-13 DIAGNOSIS — I10 ESSENTIAL HYPERTENSION: Primary | ICD-10-CM

## 2018-04-13 DIAGNOSIS — Z23 NEED FOR SHINGLES VACCINE: ICD-10-CM

## 2018-04-13 DIAGNOSIS — I48.0 PAF (PAROXYSMAL ATRIAL FIBRILLATION) (HCC): ICD-10-CM

## 2018-04-13 PROCEDURE — 99214 OFFICE O/P EST MOD 30 MIN: CPT | Performed by: INTERNAL MEDICINE

## 2018-04-13 RX ORDER — MECLIZINE HYDROCHLORIDE 25 MG/1
25 TABLET ORAL 3 TIMES DAILY PRN
Qty: 30 TABLET | Refills: 0 | Status: SHIPPED | OUTPATIENT
Start: 2018-04-13 | End: 2018-04-23

## 2018-04-13 RX ORDER — MECLIZINE HYDROCHLORIDE 25 MG/1
25 TABLET ORAL 3 TIMES DAILY PRN
Qty: 30 TABLET | Refills: 0 | Status: SHIPPED | OUTPATIENT
Start: 2018-04-13 | End: 2018-04-13 | Stop reason: SDUPTHER

## 2018-04-13 RX ORDER — LISINOPRIL 40 MG/1
40 TABLET ORAL DAILY
Qty: 30 TABLET | Refills: 3 | Status: SHIPPED | OUTPATIENT
Start: 2018-04-13 | End: 2018-05-11 | Stop reason: DRUGHIGH

## 2018-04-13 RX ORDER — FLUTICASONE PROPIONATE 50 MCG
1 SPRAY, SUSPENSION (ML) NASAL DAILY
Qty: 3 BOTTLE | Refills: 3 | Status: SHIPPED | OUTPATIENT
Start: 2018-04-13 | End: 2019-01-03 | Stop reason: SDUPTHER

## 2018-04-13 RX ORDER — FLUTICASONE PROPIONATE 50 MCG
1 SPRAY, SUSPENSION (ML) NASAL DAILY
COMMUNITY
End: 2018-04-13 | Stop reason: SDUPTHER

## 2018-04-13 ASSESSMENT — ENCOUNTER SYMPTOMS
ABDOMINAL PAIN: 0
WHEEZING: 0
SORE THROAT: 0
COUGH: 0
CHEST TIGHTNESS: 0
CONSTIPATION: 0

## 2018-04-13 ASSESSMENT — PATIENT HEALTH QUESTIONNAIRE - PHQ9
2. FEELING DOWN, DEPRESSED OR HOPELESS: 0
SUM OF ALL RESPONSES TO PHQ QUESTIONS 1-9: 0
1. LITTLE INTEREST OR PLEASURE IN DOING THINGS: 0
SUM OF ALL RESPONSES TO PHQ9 QUESTIONS 1 & 2: 0

## 2018-04-20 ENCOUNTER — TELEPHONE (OUTPATIENT)
Dept: INTERNAL MEDICINE | Age: 58
End: 2018-04-20

## 2018-04-27 ENCOUNTER — TELEPHONE (OUTPATIENT)
Dept: CARDIOLOGY | Age: 58
End: 2018-04-27

## 2018-04-27 ENCOUNTER — OFFICE VISIT (OUTPATIENT)
Dept: INTERNAL MEDICINE | Age: 58
End: 2018-04-27
Payer: COMMERCIAL

## 2018-04-27 VITALS
DIASTOLIC BLOOD PRESSURE: 100 MMHG | HEIGHT: 74 IN | OXYGEN SATURATION: 99 % | SYSTOLIC BLOOD PRESSURE: 162 MMHG | HEART RATE: 61 BPM

## 2018-04-27 DIAGNOSIS — I10 ESSENTIAL HYPERTENSION: Primary | ICD-10-CM

## 2018-04-27 DIAGNOSIS — R03.0 ELEVATED BLOOD PRESSURE READING: ICD-10-CM

## 2018-04-27 PROCEDURE — 99213 OFFICE O/P EST LOW 20 MIN: CPT | Performed by: INTERNAL MEDICINE

## 2018-04-27 RX ORDER — HYDROCHLOROTHIAZIDE 25 MG/1
TABLET ORAL
Qty: 30 TABLET | Refills: 1 | Status: SHIPPED | OUTPATIENT
Start: 2018-04-27 | End: 2018-05-11 | Stop reason: SDUPTHER

## 2018-04-27 RX ORDER — AMLODIPINE BESYLATE 5 MG/1
TABLET ORAL
Qty: 30 TABLET | Refills: 0 | Status: SHIPPED | OUTPATIENT
Start: 2018-04-27 | End: 2018-05-11 | Stop reason: SDUPTHER

## 2018-04-27 ASSESSMENT — ENCOUNTER SYMPTOMS
BACK PAIN: 0
EYE DISCHARGE: 0
SHORTNESS OF BREATH: 0
WHEEZING: 0
COUGH: 0
DIARRHEA: 0
ABDOMINAL PAIN: 0
SPUTUM PRODUCTION: 0
EYE PAIN: 0
NAUSEA: 0
EYES NEGATIVE: 1
HEMOPTYSIS: 0
VOMITING: 0

## 2018-05-10 DIAGNOSIS — I48.0 PAF (PAROXYSMAL ATRIAL FIBRILLATION) (HCC): ICD-10-CM

## 2018-05-10 DIAGNOSIS — E78.2 MIXED HYPERLIPIDEMIA: ICD-10-CM

## 2018-05-10 DIAGNOSIS — I10 ESSENTIAL HYPERTENSION: ICD-10-CM

## 2018-05-10 LAB
ALBUMIN SERPL-MCNC: 4.2 G/DL (ref 3.5–5.2)
ALP BLD-CCNC: 60 U/L (ref 40–130)
ALT SERPL-CCNC: 17 U/L (ref 5–41)
ANION GAP SERPL CALCULATED.3IONS-SCNC: 14 MMOL/L (ref 7–19)
AST SERPL-CCNC: 20 U/L (ref 5–40)
BASOPHILS ABSOLUTE: 0 K/UL (ref 0–0.2)
BASOPHILS RELATIVE PERCENT: 0.8 % (ref 0–1)
BILIRUB SERPL-MCNC: 0.8 MG/DL (ref 0.2–1.2)
BILIRUBIN URINE: NEGATIVE
BLOOD, URINE: NEGATIVE
BUN BLDV-MCNC: 14 MG/DL (ref 8–23)
CALCIUM SERPL-MCNC: 9.5 MG/DL (ref 8.8–10.2)
CHLORIDE BLD-SCNC: 101 MMOL/L (ref 98–111)
CHOLESTEROL, TOTAL: 143 MG/DL (ref 160–199)
CLARITY: CLEAR
CO2: 26 MMOL/L (ref 22–29)
COLOR: NORMAL
CREAT SERPL-MCNC: 0.6 MG/DL (ref 0.5–1.2)
EOSINOPHILS ABSOLUTE: 0.2 K/UL (ref 0–0.6)
EOSINOPHILS RELATIVE PERCENT: 4 % (ref 0–5)
GFR NON-AFRICAN AMERICAN: >60
GLUCOSE BLD-MCNC: 105 MG/DL (ref 74–109)
GLUCOSE URINE: NEGATIVE MG/DL
HCT VFR BLD CALC: 43.1 % (ref 42–52)
HDLC SERPL-MCNC: 48 MG/DL (ref 55–121)
HEMOGLOBIN: 14.6 G/DL (ref 14–18)
KETONES, URINE: NEGATIVE MG/DL
LDL CHOLESTEROL CALCULATED: 73 MG/DL
LEUKOCYTE ESTERASE, URINE: NEGATIVE
LYMPHOCYTES ABSOLUTE: 1.6 K/UL (ref 1.1–4.5)
LYMPHOCYTES RELATIVE PERCENT: 32.1 % (ref 20–40)
MCH RBC QN AUTO: 32.2 PG (ref 27–31)
MCHC RBC AUTO-ENTMCNC: 33.9 G/DL (ref 33–37)
MCV RBC AUTO: 95.1 FL (ref 80–94)
MONOCYTES ABSOLUTE: 0.5 K/UL (ref 0–0.9)
MONOCYTES RELATIVE PERCENT: 9 % (ref 0–10)
NEUTROPHILS ABSOLUTE: 2.7 K/UL (ref 1.5–7.5)
NEUTROPHILS RELATIVE PERCENT: 53.9 % (ref 50–65)
NITRITE, URINE: NEGATIVE
PDW BLD-RTO: 13.2 % (ref 11.5–14.5)
PH UA: 6.5
PLATELET # BLD: 225 K/UL (ref 130–400)
PMV BLD AUTO: 9.7 FL (ref 9.4–12.4)
POTASSIUM SERPL-SCNC: 3.6 MMOL/L (ref 3.5–5)
PROTEIN UA: NEGATIVE MG/DL
RBC # BLD: 4.53 M/UL (ref 4.7–6.1)
SODIUM BLD-SCNC: 141 MMOL/L (ref 136–145)
SPECIFIC GRAVITY UA: 1.02
TOTAL PROTEIN: 6.7 G/DL (ref 6.6–8.7)
TRIGL SERPL-MCNC: 108 MG/DL (ref 0–149)
TSH SERPL DL<=0.05 MIU/L-ACNC: 3.02 UIU/ML (ref 0.27–4.2)
UROBILINOGEN, URINE: 1 E.U./DL
WBC # BLD: 5 K/UL (ref 4.8–10.8)

## 2018-05-11 ENCOUNTER — OFFICE VISIT (OUTPATIENT)
Dept: INTERNAL MEDICINE | Age: 58
End: 2018-05-11
Payer: COMMERCIAL

## 2018-05-11 ENCOUNTER — OFFICE VISIT (OUTPATIENT)
Dept: CARDIOLOGY | Age: 58
End: 2018-05-11
Payer: COMMERCIAL

## 2018-05-11 VITALS
DIASTOLIC BLOOD PRESSURE: 94 MMHG | HEIGHT: 74 IN | BODY MASS INDEX: 32.21 KG/M2 | SYSTOLIC BLOOD PRESSURE: 132 MMHG | WEIGHT: 251 LBS | HEART RATE: 54 BPM

## 2018-05-11 VITALS
HEART RATE: 84 BPM | HEIGHT: 74 IN | BODY MASS INDEX: 32.08 KG/M2 | DIASTOLIC BLOOD PRESSURE: 88 MMHG | SYSTOLIC BLOOD PRESSURE: 138 MMHG | WEIGHT: 250 LBS | OXYGEN SATURATION: 98 %

## 2018-05-11 DIAGNOSIS — E78.2 MIXED HYPERLIPIDEMIA: ICD-10-CM

## 2018-05-11 DIAGNOSIS — I10 ESSENTIAL HYPERTENSION: ICD-10-CM

## 2018-05-11 DIAGNOSIS — I48.0 PAF (PAROXYSMAL ATRIAL FIBRILLATION) (HCC): ICD-10-CM

## 2018-05-11 DIAGNOSIS — R07.89 OTHER CHEST PAIN: ICD-10-CM

## 2018-05-11 DIAGNOSIS — R73.01 IFG (IMPAIRED FASTING GLUCOSE): ICD-10-CM

## 2018-05-11 DIAGNOSIS — I48.0 PAF (PAROXYSMAL ATRIAL FIBRILLATION) (HCC): Primary | ICD-10-CM

## 2018-05-11 DIAGNOSIS — G47.33 OBSTRUCTIVE SLEEP APNEA: ICD-10-CM

## 2018-05-11 DIAGNOSIS — Z00.00 ANNUAL PHYSICAL EXAM: Primary | ICD-10-CM

## 2018-05-11 DIAGNOSIS — R00.2 PALPITATIONS: ICD-10-CM

## 2018-05-11 PROCEDURE — 99214 OFFICE O/P EST MOD 30 MIN: CPT | Performed by: CLINICAL NURSE SPECIALIST

## 2018-05-11 PROCEDURE — 99396 PREV VISIT EST AGE 40-64: CPT | Performed by: INTERNAL MEDICINE

## 2018-05-11 PROCEDURE — 93000 ELECTROCARDIOGRAM COMPLETE: CPT | Performed by: CLINICAL NURSE SPECIALIST

## 2018-05-11 RX ORDER — AMLODIPINE BESYLATE 5 MG/1
TABLET ORAL
Qty: 30 TABLET | Refills: 0
Start: 2018-05-11 | End: 2018-05-11 | Stop reason: SDUPTHER

## 2018-05-11 RX ORDER — NIACIN 1000 MG/1
1000 TABLET, EXTENDED RELEASE ORAL 4 TIMES DAILY
COMMUNITY

## 2018-05-11 RX ORDER — AMLODIPINE BESYLATE 5 MG/1
5 TABLET ORAL DAILY
Qty: 90 TABLET | Refills: 3 | Status: SHIPPED | OUTPATIENT
Start: 2018-05-11 | End: 2018-05-18 | Stop reason: SDUPTHER

## 2018-05-11 RX ORDER — HYDROCHLOROTHIAZIDE 25 MG/1
TABLET ORAL
Qty: 45 TABLET | Refills: 3 | Status: SHIPPED | OUTPATIENT
Start: 2018-05-11 | End: 2019-04-28 | Stop reason: SDUPTHER

## 2018-05-11 RX ORDER — LISINOPRIL 40 MG/1
40 TABLET ORAL DAILY
COMMUNITY
End: 2018-05-14 | Stop reason: SDUPTHER

## 2018-05-11 ASSESSMENT — ENCOUNTER SYMPTOMS
EYE PAIN: 0
VOMITING: 0
BACK PAIN: 1
BLOOD IN STOOL: 0
SHORTNESS OF BREATH: 0
CHEST TIGHTNESS: 0
SINUS PRESSURE: 0
VOICE CHANGE: 0
WHEEZING: 0
CONSTIPATION: 0
COLOR CHANGE: 0
EYE REDNESS: 0
COUGH: 0
DIARRHEA: 0
SORE THROAT: 0
NAUSEA: 0
ABDOMINAL PAIN: 0
TROUBLE SWALLOWING: 0

## 2018-05-13 ENCOUNTER — PATIENT MESSAGE (OUTPATIENT)
Dept: INTERNAL MEDICINE | Age: 58
End: 2018-05-13

## 2018-05-14 RX ORDER — LISINOPRIL 40 MG/1
40 TABLET ORAL DAILY
Qty: 90 TABLET | Refills: 3 | Status: SHIPPED | OUTPATIENT
Start: 2018-05-14 | End: 2019-04-28 | Stop reason: SDUPTHER

## 2018-05-14 ASSESSMENT — ENCOUNTER SYMPTOMS
ORTHOPNEA: 0
HEARTBURN: 0
BLURRED VISION: 0
BLOOD IN STOOL: 0
NAUSEA: 0
COUGH: 0
VOMITING: 0

## 2018-05-15 ENCOUNTER — TELEPHONE (OUTPATIENT)
Dept: CARDIOLOGY | Age: 58
End: 2018-05-15

## 2018-05-18 RX ORDER — AMLODIPINE BESYLATE 5 MG/1
5 TABLET ORAL DAILY
Qty: 30 TABLET | Refills: 0 | Status: SHIPPED | OUTPATIENT
Start: 2018-05-18 | End: 2018-06-14 | Stop reason: SDUPTHER

## 2018-05-18 RX ORDER — AMLODIPINE BESYLATE 5 MG/1
5 TABLET ORAL DAILY
Qty: 90 TABLET | Refills: 3 | Status: SHIPPED | OUTPATIENT
Start: 2018-05-18 | End: 2018-05-18 | Stop reason: SDUPTHER

## 2018-05-24 ENCOUNTER — TELEPHONE (OUTPATIENT)
Dept: NEUROLOGY | Age: 58
End: 2018-05-24

## 2018-06-01 ENCOUNTER — TELEPHONE (OUTPATIENT)
Dept: CARDIOLOGY | Age: 58
End: 2018-06-01

## 2018-06-06 ENCOUNTER — HOSPITAL ENCOUNTER (OUTPATIENT)
Dept: NUCLEAR MEDICINE | Age: 58
Discharge: HOME | End: 2018-06-08
Payer: COMMERCIAL

## 2018-06-06 ENCOUNTER — HOSPITAL ENCOUNTER (OUTPATIENT)
Dept: NON INVASIVE DIAGNOSTICS | Age: 58
Discharge: HOME | End: 2018-06-06
Payer: COMMERCIAL

## 2018-06-06 DIAGNOSIS — I48.0 PAF (PAROXYSMAL ATRIAL FIBRILLATION) (HCC): ICD-10-CM

## 2018-06-06 DIAGNOSIS — R07.89 OTHER CHEST PAIN: ICD-10-CM

## 2018-06-06 PROCEDURE — 3430000000 HC RX DIAGNOSTIC RADIOPHARMACEUTICAL: Performed by: INTERNAL MEDICINE

## 2018-06-06 PROCEDURE — 6360000002 HC RX W HCPCS: Performed by: INTERNAL MEDICINE

## 2018-06-06 PROCEDURE — 93017 CV STRESS TEST TRACING ONLY: CPT

## 2018-06-06 PROCEDURE — 78452 HT MUSCLE IMAGE SPECT MULT: CPT

## 2018-06-06 PROCEDURE — A9500 TC99M SESTAMIBI: HCPCS | Performed by: INTERNAL MEDICINE

## 2018-06-06 RX ADMIN — TETRAKIS(2-METHOXYISOBUTYLISOCYANIDE)COPPER(I) TETRAFLUOROBORATE 30 MILLICURIE: 1 INJECTION, POWDER, LYOPHILIZED, FOR SOLUTION INTRAVENOUS at 13:45

## 2018-06-06 RX ADMIN — REGADENOSON 0.4 MG: 0.08 INJECTION, SOLUTION INTRAVENOUS at 13:54

## 2018-06-06 RX ADMIN — TETRAKIS(2-METHOXYISOBUTYLISOCYANIDE)COPPER(I) TETRAFLUOROBORATE 10 MILLICURIE: 1 INJECTION, POWDER, LYOPHILIZED, FOR SOLUTION INTRAVENOUS at 13:46

## 2018-06-07 ENCOUNTER — TELEPHONE (OUTPATIENT)
Dept: CARDIOLOGY | Age: 58
End: 2018-06-07

## 2018-06-07 LAB
LV EF: 58 %
LVEF MODALITY: NORMAL

## 2018-06-08 ENCOUNTER — OFFICE VISIT (OUTPATIENT)
Dept: CARDIOLOGY | Age: 58
End: 2018-06-08
Payer: COMMERCIAL

## 2018-06-08 VITALS
DIASTOLIC BLOOD PRESSURE: 80 MMHG | BODY MASS INDEX: 32.34 KG/M2 | SYSTOLIC BLOOD PRESSURE: 124 MMHG | WEIGHT: 252 LBS | HEART RATE: 62 BPM | HEIGHT: 74 IN

## 2018-06-08 DIAGNOSIS — R07.89 OTHER CHEST PAIN: ICD-10-CM

## 2018-06-08 DIAGNOSIS — I48.0 PAF (PAROXYSMAL ATRIAL FIBRILLATION) (HCC): Primary | ICD-10-CM

## 2018-06-08 PROCEDURE — 99213 OFFICE O/P EST LOW 20 MIN: CPT | Performed by: CLINICAL NURSE SPECIALIST

## 2018-06-08 ASSESSMENT — ENCOUNTER SYMPTOMS
VOMITING: 0
SHORTNESS OF BREATH: 0
BLURRED VISION: 0
NAUSEA: 0
HEARTBURN: 0
ORTHOPNEA: 0
COUGH: 0
BLOOD IN STOOL: 0

## 2018-06-10 PROBLEM — Z00.00 ANNUAL PHYSICAL EXAM: Status: RESOLVED | Noted: 2018-05-11 | Resolved: 2018-06-10

## 2018-06-11 ENCOUNTER — TELEPHONE (OUTPATIENT)
Dept: CARDIOLOGY | Age: 58
End: 2018-06-11

## 2018-06-12 ENCOUNTER — TELEPHONE (OUTPATIENT)
Dept: CARDIOLOGY | Age: 58
End: 2018-06-12

## 2018-06-14 RX ORDER — AMLODIPINE BESYLATE 5 MG/1
TABLET ORAL
Qty: 30 TABLET | Refills: 3 | Status: SHIPPED | OUTPATIENT
Start: 2018-06-14 | End: 2019-05-26 | Stop reason: SDUPTHER

## 2018-06-15 ENCOUNTER — APPOINTMENT (OUTPATIENT)
Dept: GENERAL RADIOLOGY | Age: 58
End: 2018-06-15
Attending: INTERNAL MEDICINE
Payer: COMMERCIAL

## 2018-06-15 ENCOUNTER — HOSPITAL ENCOUNTER (OUTPATIENT)
Dept: CARDIAC CATH/INVASIVE PROCEDURES | Age: 58
Setting detail: OUTPATIENT SURGERY
Discharge: HOME | End: 2018-06-15
Attending: INTERNAL MEDICINE | Admitting: INTERNAL MEDICINE
Payer: COMMERCIAL

## 2018-06-15 VITALS
WEIGHT: 250 LBS | HEIGHT: 74 IN | HEART RATE: 74 BPM | DIASTOLIC BLOOD PRESSURE: 47 MMHG | SYSTOLIC BLOOD PRESSURE: 102 MMHG | TEMPERATURE: 98.7 F | OXYGEN SATURATION: 95 % | BODY MASS INDEX: 32.08 KG/M2 | RESPIRATION RATE: 18 BRPM

## 2018-06-15 PROBLEM — R07.89 CHEST PRESSURE: Status: ACTIVE | Noted: 2018-06-15

## 2018-06-15 LAB
ANION GAP SERPL CALCULATED.3IONS-SCNC: 11 MMOL/L (ref 7–19)
BUN BLDV-MCNC: 11 MG/DL (ref 8–23)
CALCIUM SERPL-MCNC: 9.3 MG/DL (ref 8.8–10.2)
CHLORIDE BLD-SCNC: 103 MMOL/L (ref 98–111)
CO2: 28 MMOL/L (ref 22–29)
CREAT SERPL-MCNC: 0.7 MG/DL (ref 0.5–1.2)
GFR NON-AFRICAN AMERICAN: >60
GLUCOSE BLD-MCNC: 100 MG/DL (ref 74–109)
HCT VFR BLD CALC: 42.2 % (ref 42–52)
HEMOGLOBIN: 14 G/DL (ref 14–18)
MCH RBC QN AUTO: 32 PG (ref 27–31)
MCHC RBC AUTO-ENTMCNC: 33.2 G/DL (ref 33–37)
MCV RBC AUTO: 96.6 FL (ref 80–94)
PDW BLD-RTO: 12.7 % (ref 11.5–14.5)
PLATELET # BLD: 174 K/UL (ref 130–400)
PMV BLD AUTO: 8.7 FL (ref 9.4–12.4)
POTASSIUM SERPL-SCNC: 4 MMOL/L (ref 3.5–5)
RBC # BLD: 4.37 M/UL (ref 4.7–6.1)
SODIUM BLD-SCNC: 142 MMOL/L (ref 136–145)
WBC # BLD: 5.1 K/UL (ref 4.8–10.8)

## 2018-06-15 PROCEDURE — 99024 POSTOP FOLLOW-UP VISIT: CPT | Performed by: INTERNAL MEDICINE

## 2018-06-15 PROCEDURE — 2580000003 HC RX 258: Performed by: INTERNAL MEDICINE

## 2018-06-15 PROCEDURE — 93005 ELECTROCARDIOGRAM TRACING: CPT

## 2018-06-15 PROCEDURE — C1760 CLOSURE DEV, VASC: HCPCS

## 2018-06-15 PROCEDURE — 80048 BASIC METABOLIC PNL TOTAL CA: CPT

## 2018-06-15 PROCEDURE — 2500000003 HC RX 250 WO HCPCS

## 2018-06-15 PROCEDURE — 71046 X-RAY EXAM CHEST 2 VIEWS: CPT

## 2018-06-15 PROCEDURE — 2709999900 HC NON-CHARGEABLE SUPPLY

## 2018-06-15 PROCEDURE — 36415 COLL VENOUS BLD VENIPUNCTURE: CPT

## 2018-06-15 PROCEDURE — 93458 L HRT ARTERY/VENTRICLE ANGIO: CPT | Performed by: INTERNAL MEDICINE

## 2018-06-15 PROCEDURE — C1894 INTRO/SHEATH, NON-LASER: HCPCS

## 2018-06-15 PROCEDURE — 85027 COMPLETE CBC AUTOMATED: CPT

## 2018-06-15 PROCEDURE — 6360000002 HC RX W HCPCS

## 2018-06-15 RX ORDER — SODIUM CHLORIDE 0.9 % (FLUSH) 0.9 %
10 SYRINGE (ML) INJECTION EVERY 12 HOURS SCHEDULED
Status: DISCONTINUED | OUTPATIENT
Start: 2018-06-15 | End: 2018-06-15 | Stop reason: HOSPADM

## 2018-06-15 RX ORDER — SODIUM CHLORIDE 0.9 % (FLUSH) 0.9 %
10 SYRINGE (ML) INJECTION PRN
Status: DISCONTINUED | OUTPATIENT
Start: 2018-06-15 | End: 2018-06-15 | Stop reason: HOSPADM

## 2018-06-15 RX ORDER — SODIUM CHLORIDE 9 MG/ML
INJECTION, SOLUTION INTRAVENOUS CONTINUOUS
Status: DISCONTINUED | OUTPATIENT
Start: 2018-06-15 | End: 2018-06-15 | Stop reason: HOSPADM

## 2018-06-15 RX ADMIN — SODIUM CHLORIDE: 9 INJECTION, SOLUTION INTRAVENOUS at 11:35

## 2018-06-17 LAB
EKG P AXIS: 68 DEGREES
EKG P-R INTERVAL: 168 MS
EKG Q-T INTERVAL: 476 MS
EKG QRS DURATION: 94 MS
EKG QTC CALCULATION (BAZETT): 459 MS
EKG T AXIS: 22 DEGREES

## 2018-06-18 LAB
LV EF: 58 %
LVEF MODALITY: NORMAL

## 2018-06-29 ENCOUNTER — OFFICE VISIT (OUTPATIENT)
Dept: NEUROLOGY | Age: 58
End: 2018-06-29
Payer: COMMERCIAL

## 2018-06-29 VITALS
OXYGEN SATURATION: 96 % | HEIGHT: 74 IN | DIASTOLIC BLOOD PRESSURE: 71 MMHG | HEART RATE: 67 BPM | WEIGHT: 250 LBS | SYSTOLIC BLOOD PRESSURE: 116 MMHG | BODY MASS INDEX: 32.08 KG/M2

## 2018-06-29 DIAGNOSIS — Z99.89 BIPAP (BIPHASIC POSITIVE AIRWAY PRESSURE) DEPENDENCE: ICD-10-CM

## 2018-06-29 DIAGNOSIS — G47.33 OBSTRUCTIVE SLEEP APNEA: Primary | ICD-10-CM

## 2018-06-29 PROCEDURE — 99213 OFFICE O/P EST LOW 20 MIN: CPT | Performed by: PHYSICIAN ASSISTANT

## 2018-08-03 DIAGNOSIS — R97.20 ELEVATED PSA: ICD-10-CM

## 2018-08-03 LAB — PROSTATE SPECIFIC ANTIGEN: 6.61 NG/ML (ref 0–4)

## 2018-08-10 ENCOUNTER — OFFICE VISIT (OUTPATIENT)
Dept: UROLOGY | Age: 58
End: 2018-08-10
Payer: COMMERCIAL

## 2018-08-10 VITALS
SYSTOLIC BLOOD PRESSURE: 104 MMHG | BODY MASS INDEX: 32.08 KG/M2 | HEIGHT: 74 IN | TEMPERATURE: 98.2 F | HEART RATE: 77 BPM | DIASTOLIC BLOOD PRESSURE: 64 MMHG | WEIGHT: 250 LBS

## 2018-08-10 DIAGNOSIS — N40.1 BENIGN PROSTATIC HYPERPLASIA WITH URINARY FREQUENCY: ICD-10-CM

## 2018-08-10 DIAGNOSIS — R35.0 BENIGN PROSTATIC HYPERPLASIA WITH URINARY FREQUENCY: ICD-10-CM

## 2018-08-10 DIAGNOSIS — R97.20 ELEVATED PSA: Primary | ICD-10-CM

## 2018-08-10 LAB
BILIRUBIN, POC: NORMAL
BLOOD URINE, POC: NORMAL
CLARITY, POC: CLEAR
COLOR, POC: YELLOW
GLUCOSE URINE, POC: NORMAL
KETONES, POC: NORMAL
LEUKOCYTE EST, POC: NORMAL
NITRITE, POC: NORMAL
PH, POC: 8.5
PROTEIN, POC: NORMAL
SPECIFIC GRAVITY, POC: 1.01
UROBILINOGEN, POC: 2

## 2018-08-10 PROCEDURE — 99214 OFFICE O/P EST MOD 30 MIN: CPT | Performed by: UROLOGY

## 2018-08-10 PROCEDURE — 81003 URINALYSIS AUTO W/O SCOPE: CPT | Performed by: UROLOGY

## 2018-08-10 RX ORDER — TADALAFIL 5 MG/1
5 TABLET ORAL DAILY
Qty: 90 TABLET | Refills: 3 | Status: SHIPPED | OUTPATIENT
Start: 2018-08-10 | End: 2019-01-21 | Stop reason: SDUPTHER

## 2018-08-10 ASSESSMENT — ENCOUNTER SYMPTOMS
SHORTNESS OF BREATH: 0
NAUSEA: 0
BACK PAIN: 1
DOUBLE VISION: 0
WHEEZING: 0
SORE THROAT: 0
BLURRED VISION: 0
HEARTBURN: 0

## 2018-08-10 NOTE — PROGRESS NOTES
Sriram Cook is a 59 y.o. male who presents today   Chief Complaint   Patient presents with    6 Month Follow-Up     I'm here for elevated PSA with lab drawn    Elevated PSA     Elevated PSA:  Patient is here today for history of an elevated PSA. His last several PSA values are as follows:  Lab Results   Component Value Date    PSA 6.61 (H) 08/03/2018    PSA 3.57 01/11/2018    PSA 4.63 (H) 11/03/2017     Overall the PSA level is: increased  Recent history of urinary tract infection/prostatitis? no  Previous prostate biopsy? no  Lower urinary tract symptoms: urgency and frequency    Benign Prostatic Hypertrophy  Patient complains of lower urinary tract symptoms. He reports frequency and urgency. He denies straining and weak stream. Patient states symptoms are of mild severity. Onset of symptoms was a few years ago and was gradual in onset. His AUA Symptom Score is, 6/35 manifested as irritative symptoms including frequency, urgency. He has no personal history and no family history of prostate cancer. He reports a history of no complicating symptoms and Elevated PSA. He denies flank pain, gross hematuria, kidney stones and recurrent UTI.   He takes daily Cialis 5 mg for his irritative symptoms secondary to BPH with improvement    Past Medical History:   Diagnosis Date    [facility]fib [facility]     Atelectasis of right lung 6/26/2017    Back pain, lumbosacral     BiPAP (biphasic positive airway pressure) dependence     12cm to 22cm    Clotting disorder (HCC)     Cyst near coccyx     removed    GERD (gastroesophageal reflux disease)     Heart murmur     Hx of blood clots     Hyperlipidemia     Hypertension     Obstructive sleep apnea     AHI:  36.5 per PSG, 9/2015 - BiPAP    Osteoarthritis     Paroxysmal atrial fibrillation with RVR ([facility] [address] 6/26/2017    Pneumonia due to organism     Pulmonary embolism [facility])        Past Surgical History:   Procedure Laterality Date    ANKLE FRACTURE SURGERY      left ankle    ARM SURGERY Left 01/04/2018    fracture     CARDIAC CATHETERIZATION      CHOLECYSTECTOMY, LAPAROSCOPIC N/A 12/15/2017    CHOLECYSTECTOMY LAPAROSCOPIC performed by Joseph Rao MD at [facility]      undecended testicle done when 1years old       Current Outpatient Prescriptions   Medication Sig Dispense Refill    tadalafil (CIALIS) 5 MG tablet Take 1 tablet by mouth daily 90 tablet 3    Probiotic Product (PROBIOTIC ADVANCED PO) Take 1 tablet by mouth nightly      amLODIPine (NORVASC) 5 MG tablet TAKE 1 TABLET BY MOUTH EVERY DAY 30 tablet 3    Potassium 99 MG TABS Take 99 mg by mouth daily       lisinopril (PRINIVIL;ZESTRIL) 40 MG tablet Take 1 tablet by mouth daily 90 tablet 3    niacin (NIASPAN) 1000 MG extended release tablet Take 1,000 mg by mouth 4 times daily      hydrochlorothiazide (HYDRODIURIL) 25 MG tablet Take 1/2 tablet in am 45 tablet 3    fluticasone (FLONASE) 50 MCG/ACT nasal spray 1 spray by Nasal route daily 3 Bottle 3    rosuvastatin (CRESTOR) 10 MG tablet Take 1 tablet by mouth nightly 90 tablet 3    omeprazole (PRILOSEC) 20 MG delayed release capsule Take 1 capsule by mouth 2 times daily 180 capsule 2    sotalol (BETAPACE) 80 MG tablet Take 1 tablet by mouth 2 times daily 180 tablet 3    aspirin 81 MG EC tablet Take 81 mg by mouth daily      Omega-3 Fatty Acids (FISH OIL PO) Take 2,150 mg by mouth 2 times daily      Ascorbic Acid (VITAMIN C) 500 MG tablet Take 500 mg by mouth 2 times daily       vitamin E 400 UNIT capsule Take 400 Units by mouth daily       No current facility-administered medications for this visit.         No Known Allergies    Social History     Social History    Marital status:      Spouse name: N/A    Number of children: N/A    Years of education: N/A     Social History Main Topics    Smoking status: Never Smoker    Smokeless tobacco: Former User     Types: Chew    Alcohol use Yes      Comment: occ    Drug use: No    Sexual activity: Yes     Partners: Female     Other Topics Concern    None     Social History Narrative    None       Family History   Problem Relation Age of Onset    Heart Disease Mother     High Blood Pressure Mother     Heart Disease Father 54        mi    High Blood Pressure Father     High Cholesterol Father     Lung Cancer Father     Cancer Father         Lung CA - Oat cell    Stroke Father     Diabetes Paternal Aunt        REVIEW OF SYSTEMS:  Review of Systems   Constitutional: Negative for chills and fever. HENT: Negative for congestion and sore throat. Eyes: Negative for blurred vision and double vision. Respiratory: Negative for shortness of breath and wheezing. Cardiovascular: Negative for chest pain and palpitations. Gastrointestinal: Negative for heartburn and nausea. Genitourinary: Negative for dysuria, flank pain, frequency, hematuria and urgency. Musculoskeletal: Positive for back pain. Negative for falls and neck pain. Skin: Negative for itching and rash. Neurological: Negative for dizziness and tingling. Endo/Heme/Allergies: Does not bruise/bleed easily. Psychiatric/Behavioral: The patient does not have insomnia. PHYSICAL EXAM:  /64   Pulse 77   Temp 98.2 °F (36.8 °C) (Temporal)   Ht 6' 2\" (1.88 m)   Wt 250 lb (113.4 kg)   BMI 32.10 kg/m²   Physical Exam   Constitutional: He is oriented to person, place, and time. He appears well-developed and well-nourished. HENT:   Head: Normocephalic and atraumatic. Eyes: Conjunctivae are normal. No scleral icterus. Neck: Normal range of motion. Cardiovascular: Normal rate, regular rhythm and intact distal pulses. Pulmonary/Chest: Effort normal and breath sounds normal. No respiratory distress. Abdominal: Soft. Bowel sounds are normal. He exhibits no distension and no mass. There is no tenderness.  Hernia confirmed negative in the right inguinal area and confirmed negative in the

## 2018-08-20 RX ORDER — OMEPRAZOLE 20 MG/1
CAPSULE, DELAYED RELEASE ORAL
Qty: 180 CAPSULE | Refills: 2 | Status: SHIPPED | OUTPATIENT
Start: 2018-08-20 | End: 2019-09-08 | Stop reason: SDUPTHER

## 2018-08-20 NOTE — TELEPHONE ENCOUNTER
Requested Prescriptions     Pending Prescriptions Disp Refills    omeprazole (PRILOSEC) 20 MG delayed release capsule [Pharmacy Med Name: OMEPRAZOLE DR CAPS 20MG] 180 capsule 2     Sig: TAKE 1 CAPSULE TWICE A DAY

## 2018-09-20 DIAGNOSIS — R97.20 ELEVATED PSA: ICD-10-CM

## 2018-09-20 LAB — PROSTATE SPECIFIC ANTIGEN: 8.49 NG/ML (ref 0–4)

## 2018-09-24 ENCOUNTER — OFFICE VISIT (OUTPATIENT)
Dept: UROLOGY | Age: 58
End: 2018-09-24
Payer: COMMERCIAL

## 2018-09-24 VITALS — HEIGHT: 74 IN | WEIGHT: 260 LBS | BODY MASS INDEX: 33.37 KG/M2 | TEMPERATURE: 97.4 F

## 2018-09-24 DIAGNOSIS — R97.20 ELEVATED PSA: Primary | ICD-10-CM

## 2018-09-24 LAB
BILIRUBIN, POC: 0
BLOOD URINE, POC: NORMAL
CLARITY, POC: CLEAR
COLOR, POC: YELLOW
GLUCOSE URINE, POC: NORMAL
KETONES, POC: NORMAL
LEUKOCYTE EST, POC: NORMAL
NITRITE, POC: NORMAL
PH, POC: 7.5
PROTEIN, POC: NORMAL
SPECIFIC GRAVITY, POC: 1.01
UROBILINOGEN, POC: 0.2

## 2018-09-24 PROCEDURE — 81003 URINALYSIS AUTO W/O SCOPE: CPT | Performed by: UROLOGY

## 2018-09-24 PROCEDURE — 99214 OFFICE O/P EST MOD 30 MIN: CPT | Performed by: UROLOGY

## 2018-09-24 PROCEDURE — 99999 PR ECHO,TRANSRECTAL: CPT | Performed by: UROLOGY

## 2018-09-24 RX ORDER — SULFAMETHOXAZOLE AND TRIMETHOPRIM 800; 160 MG/1; MG/1
1 TABLET ORAL 2 TIMES DAILY
Qty: 8 TABLET | Refills: 0 | Status: SHIPPED | OUTPATIENT
Start: 2018-09-24 | End: 2018-09-28

## 2018-09-24 ASSESSMENT — ENCOUNTER SYMPTOMS
SINUS PAIN: 0
ABDOMINAL PAIN: 0
BACK PAIN: 1
BLURRED VISION: 0
VOMITING: 0
SHORTNESS OF BREATH: 0
EYE PAIN: 0
WHEEZING: 0

## 2018-09-24 NOTE — LETTER
[facility]  [address] [place] [address] Drive, [address]  [address] [address]  Phone: [phone]  Fax: [phone]    Ebonie Govea MD        October 1, 2018     Sharlene Mallory MD  [address] [place] [address] Dr Xavier [address] [place] [address]      Patient: Cricket Welch   MR Number: [id number]   YOB: 1960   Date of Visit: 9/24/2018       Dear Provider: Thank you for referring Cricket Welch to me for evaluation. Below are the relevant portions of my assessment and plan of care. If you have questions, please do not hesitate to call me. I look forward to following Jessica Lee along with you.     Sincerely,        Ebonie Govea MD

## 2018-10-05 ENCOUNTER — PROCEDURE VISIT (OUTPATIENT)
Dept: UROLOGY | Age: 58
End: 2018-10-05
Payer: COMMERCIAL

## 2018-10-05 ENCOUNTER — HOSPITAL ENCOUNTER (OUTPATIENT)
Age: 58
Setting detail: SPECIMEN
Discharge: HOME | End: 2018-10-05
Payer: COMMERCIAL

## 2018-10-05 VITALS — BODY MASS INDEX: 33.37 KG/M2 | WEIGHT: 260 LBS | TEMPERATURE: 96.3 F | HEIGHT: 74 IN

## 2018-10-05 DIAGNOSIS — R97.20 ELEVATED PSA: Primary | ICD-10-CM

## 2018-10-05 PROCEDURE — 88305 TISSUE EXAM BY PATHOLOGIST: CPT

## 2018-10-05 PROCEDURE — 96372 THER/PROPH/DIAG INJ SC/IM: CPT | Performed by: UROLOGY

## 2018-10-05 PROCEDURE — 76872 US TRANSRECTAL: CPT | Performed by: UROLOGY

## 2018-10-05 PROCEDURE — 99999 PR OFFICE/OUTPT VISIT,PROCEDURE ONLY: CPT | Performed by: UROLOGY

## 2018-10-05 PROCEDURE — 99999 PR SONO GUIDE NEEDLE BIOPSY: CPT | Performed by: UROLOGY

## 2018-10-05 PROCEDURE — 55700 PR BIOPSY OF PROSTATE,NEEDLE/PUNCH: CPT | Performed by: UROLOGY

## 2018-10-05 RX ORDER — GENTAMICIN SULFATE 40 MG/ML
80 INJECTION, SOLUTION INTRAMUSCULAR; INTRAVENOUS ONCE
Status: COMPLETED | OUTPATIENT
Start: 2018-10-05 | End: 2018-10-05

## 2018-10-05 RX ADMIN — GENTAMICIN SULFATE 80 MG: 40 INJECTION, SOLUTION INTRAMUSCULAR; INTRAVENOUS at 07:58

## 2018-10-05 NOTE — PROGRESS NOTES
Shawna Mancini is a 59 y.o. male who presents today   Chief Complaint   Patient presents with    Elevated PSA     im here today for my prostate bx elevated psa          Elevated PSA:  Patient is here today for history of an elevated PSA. HPI    Elevated PSA:  Patient is here today for history of an elevated PSA. His last several PSA values are as follows:  Lab Results   Component Value Date    PSA 8.49 (H) 09/20/2018    PSA 6.61 (H) 08/03/2018    PSA 3.57 01/11/2018     Overall the PSA level is: increased  Recent history of urinary tract infection/prostatitis? no  Previous prostate biopsy? no  Lower urinary tract symptoms: urgency and frequency      PROSTATE U/S AND BIOPSY  As per my instructions, the patient took an antibiotic Beginning 1 day prior to this procedure. To be continued daily until 2 days post biopsy. Gentamicin 80mg IM was given today. He also had a Fleets enema. Surgeon: Dilcia Brown MD  10/5/18  Indications for exam: Elevated PSA  Anesthesia: 1% Lidocaine periprostatic block bilaterally at junction of base of prostate and seminal vesicle, and apex   Ultrasound Findings:  Seminal Vesicles: intact bilaterally  Prostate Measurement: 53.7 grams  Transitional Zone: Normal echogenic structure  Peripheral Zone: Normal echogenic structure  There appeared to be a prostatic cyst in the peripheral zone left apex  Bladder: Minimal postvoid residual  Biopsy: Trans Rectal Ultra Sound was used for Needle Guidance to direct the location of the needle for biopsy. Biopsy cores were taken from the left and right  lobe in a sequential fashion using the standard 12 core template (lateral base; base; lateral mid; mid; lateral apex; apex). Six Biopsy were taken from the right and 6  were taken from the left. All specimens were sent for pathological examination. Biopsy done transrectal w/ transrectal U/S done. Postop medications: Cipro 500 mg po bid for 2 more days. Recommendations:  Will call patient

## 2018-10-12 ENCOUNTER — TELEPHONE (OUTPATIENT)
Dept: UROLOGY | Age: 58
End: 2018-10-12

## 2018-10-12 DIAGNOSIS — R97.20 ELEVATED PSA: Primary | ICD-10-CM

## 2018-10-12 NOTE — TELEPHONE ENCOUNTER
Patient was called with the results of his prostate biopsy pathology report which showed no evidence of malignancy.  He'll need a follow-up to see me in 3 months with a PSA prior

## 2018-11-09 DIAGNOSIS — R73.01 IFG (IMPAIRED FASTING GLUCOSE): ICD-10-CM

## 2018-11-09 DIAGNOSIS — I48.0 PAF (PAROXYSMAL ATRIAL FIBRILLATION) (HCC): ICD-10-CM

## 2018-11-09 DIAGNOSIS — I10 ESSENTIAL HYPERTENSION: ICD-10-CM

## 2018-11-09 LAB
ALBUMIN SERPL-MCNC: 3.9 G/DL (ref 3.5–5.2)
ALP BLD-CCNC: 55 U/L (ref 40–130)
ALT SERPL-CCNC: 18 U/L (ref 5–41)
ANION GAP SERPL CALCULATED.3IONS-SCNC: 12 MMOL/L (ref 7–19)
AST SERPL-CCNC: 22 U/L (ref 5–40)
BILIRUB SERPL-MCNC: 0.8 MG/DL (ref 0.2–1.2)
BUN BLDV-MCNC: 12 MG/DL (ref 8–23)
CALCIUM SERPL-MCNC: 9.6 MG/DL (ref 8.8–10.2)
CHLORIDE BLD-SCNC: 104 MMOL/L (ref 98–111)
CHOLESTEROL, TOTAL: 142 MG/DL (ref 160–199)
CO2: 28 MMOL/L (ref 22–29)
CREAT SERPL-MCNC: 0.7 MG/DL (ref 0.5–1.2)
GFR NON-AFRICAN AMERICAN: >60
GLUCOSE BLD-MCNC: 95 MG/DL (ref 74–109)
HBA1C MFR BLD: 5.2 % (ref 4–6)
HDLC SERPL-MCNC: 44 MG/DL (ref 55–121)
LDL CHOLESTEROL CALCULATED: 79 MG/DL
POTASSIUM SERPL-SCNC: 4.2 MMOL/L (ref 3.5–5)
SODIUM BLD-SCNC: 144 MMOL/L (ref 136–145)
TOTAL PROTEIN: 6.8 G/DL (ref 6.6–8.7)
TRIGL SERPL-MCNC: 95 MG/DL (ref 0–149)

## 2018-11-12 ENCOUNTER — OFFICE VISIT (OUTPATIENT)
Dept: INTERNAL MEDICINE | Age: 58
End: 2018-11-12
Payer: COMMERCIAL

## 2018-11-12 VITALS
OXYGEN SATURATION: 98 % | SYSTOLIC BLOOD PRESSURE: 120 MMHG | BODY MASS INDEX: 33.11 KG/M2 | DIASTOLIC BLOOD PRESSURE: 82 MMHG | HEIGHT: 74 IN | HEART RATE: 58 BPM | WEIGHT: 258 LBS

## 2018-11-12 DIAGNOSIS — R61 NIGHT SWEATS: ICD-10-CM

## 2018-11-12 DIAGNOSIS — G47.33 OBSTRUCTIVE SLEEP APNEA: Primary | ICD-10-CM

## 2018-11-12 DIAGNOSIS — I48.0 PAF (PAROXYSMAL ATRIAL FIBRILLATION) (HCC): ICD-10-CM

## 2018-11-12 DIAGNOSIS — R73.01 IFG (IMPAIRED FASTING GLUCOSE): ICD-10-CM

## 2018-11-12 DIAGNOSIS — R10.84 GENERALIZED POSTPRANDIAL ABDOMINAL PAIN: ICD-10-CM

## 2018-11-12 DIAGNOSIS — R11.0 NAUSEA: ICD-10-CM

## 2018-11-12 DIAGNOSIS — I10 ESSENTIAL HYPERTENSION: ICD-10-CM

## 2018-11-12 DIAGNOSIS — E78.2 MIXED HYPERLIPIDEMIA: ICD-10-CM

## 2018-11-12 PROBLEM — R07.89 CHEST PRESSURE: Status: RESOLVED | Noted: 2018-06-15 | Resolved: 2018-11-12

## 2018-11-12 LAB
BASOPHILS ABSOLUTE: 0 K/UL (ref 0–0.2)
BASOPHILS RELATIVE PERCENT: 0.6 % (ref 0–1)
EOSINOPHILS ABSOLUTE: 0.3 K/UL (ref 0–0.6)
EOSINOPHILS RELATIVE PERCENT: 4 % (ref 0–5)
HCT VFR BLD CALC: 43 % (ref 42–52)
HEMOGLOBIN: 13.9 G/DL (ref 14–18)
LYMPHOCYTES ABSOLUTE: 2 K/UL (ref 1.1–4.5)
LYMPHOCYTES RELATIVE PERCENT: 31.1 % (ref 20–40)
MCH RBC QN AUTO: 32.2 PG (ref 27–31)
MCHC RBC AUTO-ENTMCNC: 32.3 G/DL (ref 33–37)
MCV RBC AUTO: 99.5 FL (ref 80–94)
MONOCYTES ABSOLUTE: 0.6 K/UL (ref 0–0.9)
MONOCYTES RELATIVE PERCENT: 10 % (ref 0–10)
NEUTROPHILS ABSOLUTE: 3.5 K/UL (ref 1.5–7.5)
NEUTROPHILS RELATIVE PERCENT: 54.1 % (ref 50–65)
PDW BLD-RTO: 12.9 % (ref 11.5–14.5)
PLATELET # BLD: 223 K/UL (ref 130–400)
PMV BLD AUTO: 9.7 FL (ref 9.4–12.4)
RBC # BLD: 4.32 M/UL (ref 4.7–6.1)
TSH SERPL DL<=0.05 MIU/L-ACNC: 3.96 UIU/ML (ref 0.27–4.2)
WBC # BLD: 6.4 K/UL (ref 4.8–10.8)

## 2018-11-12 PROCEDURE — 99214 OFFICE O/P EST MOD 30 MIN: CPT | Performed by: INTERNAL MEDICINE

## 2018-11-12 ASSESSMENT — ENCOUNTER SYMPTOMS
CONSTIPATION: 0
SORE THROAT: 0
NAUSEA: 1
ABDOMINAL PAIN: 1
CHEST TIGHTNESS: 0
WHEEZING: 0
COUGH: 0

## 2018-11-12 NOTE — PROGRESS NOTES
wheezes. He has no rales. He exhibits no tenderness. Abdominal: Soft. Bowel sounds are normal.   Musculoskeletal: Normal range of motion. Lymphadenopathy:     He has no cervical adenopathy. Neurological: He is oriented to person, place, and time. Skin: Skin is warm. No rash noted.        Lab Review   Orders Only on 11/09/2018   Component Date Value    Cholesterol, Total 11/09/2018 142*    Triglycerides 11/09/2018 95     HDL 11/09/2018 44*    LDL Calculated 11/09/2018 79     Hemoglobin A1C 11/09/2018 5.2     Sodium 11/09/2018 144     Potassium 11/09/2018 4.2     Chloride 11/09/2018 104     CO2 11/09/2018 28     Anion Gap 11/09/2018 12     Glucose 11/09/2018 95     BUN 11/09/2018 12     CREATININE 11/09/2018 0.7     GFR Non- 11/09/2018 >60     Calcium 11/09/2018 9.6     Total Protein 11/09/2018 6.8     Alb 11/09/2018 3.9     Total Bilirubin 11/09/2018 0.8     Alkaline Phosphatase 11/09/2018 55     ALT 11/09/2018 18     AST 11/09/2018 22    Office Visit on 09/24/2018   Component Date Value    Color, UA 09/24/2018 yellow     Clarity, UA 09/24/2018 clear     Glucose, UA POC 09/24/2018 neg     Bilirubin, UA 09/24/2018 0     Ketones, UA 09/24/2018 neg     Spec Grav, UA 09/24/2018 1.015     Blood, UA POC 09/24/2018 neg     pH, UA 09/24/2018 7.5     Protein, UA POC 09/24/2018 neg     Urobilinogen, UA 09/24/2018 0.2     Leukocytes, UA 09/24/2018 neg     Nitrite, UA 09/24/2018 neg    Orders Only on 09/20/2018   Component Date Value    PSA 09/20/2018 8.49*           ASSESSMENT/PLAN:    Obstructive sleep apnea- doing better, sleeping better continue current CPAP settings     Mixed hyperlipidemia  LDL now good 79 (73 )( was  elevated (172 )( 154)  Cont small dose crestor 10 mg daily   continue niacin 3 times a day versus 4 times a day ( DC hs dose)  Repeat testing in 6 months     PAF (paroxysmal atrial fibrillation) (HCC)= patient will continue sotalol 80 mg twice daily,

## 2018-11-13 ENCOUNTER — OFFICE VISIT (OUTPATIENT)
Dept: CARDIOLOGY | Age: 58
End: 2018-11-13
Payer: COMMERCIAL

## 2018-11-13 VITALS
WEIGHT: 261 LBS | DIASTOLIC BLOOD PRESSURE: 76 MMHG | BODY MASS INDEX: 33.5 KG/M2 | HEIGHT: 74 IN | SYSTOLIC BLOOD PRESSURE: 120 MMHG | HEART RATE: 67 BPM

## 2018-11-13 DIAGNOSIS — I10 ESSENTIAL HYPERTENSION: ICD-10-CM

## 2018-11-13 DIAGNOSIS — I34.0 MILD MITRAL REGURGITATION BY PRIOR ECHOCARDIOGRAM: ICD-10-CM

## 2018-11-13 DIAGNOSIS — I48.0 PAF (PAROXYSMAL ATRIAL FIBRILLATION) (HCC): Primary | ICD-10-CM

## 2018-11-13 DIAGNOSIS — I25.10 MILD CAD: ICD-10-CM

## 2018-11-13 DIAGNOSIS — G47.33 OBSTRUCTIVE SLEEP APNEA: ICD-10-CM

## 2018-11-13 PROCEDURE — 99213 OFFICE O/P EST LOW 20 MIN: CPT | Performed by: CLINICAL NURSE SPECIALIST

## 2018-11-13 PROCEDURE — 93000 ELECTROCARDIOGRAM COMPLETE: CPT | Performed by: CLINICAL NURSE SPECIALIST

## 2018-11-13 ASSESSMENT — ENCOUNTER SYMPTOMS
BLOOD IN STOOL: 0
VOMITING: 0
HEARTBURN: 0
COUGH: 0
NAUSEA: 0

## 2018-11-13 NOTE — PATIENT INSTRUCTIONS
Seek medical attention for A fib lasting 12hrs or more. Call for increasing frequency      Patient Education        Atrial Fibrillation: Care Instructions  Your Care Instructions    Atrial fibrillation is an irregular and often fast heartbeat. Treating this condition is important for several reasons. It can cause blood clots, which can travel from your heart to your brain and cause a stroke. If you have a fast heartbeat, you may feel lightheaded, dizzy, and weak. An irregular heartbeat can also increase your risk for heart failure. Atrial fibrillation is often the result of another heart condition, such as high blood pressure or coronary artery disease. Making changes to improve your heart condition will help you stay healthy and active. Follow-up care is a key part of your treatment and safety. Be sure to make and go to all appointments, and call your doctor if you are having problems. It's also a good idea to know your test results and keep a list of the medicines you take. How can you care for yourself at home? Medicines    · Take your medicines exactly as prescribed. Call your doctor if you think you are having a problem with your medicine. You will get more details on the specific medicines your doctor prescribes.     · If your doctor has given you a blood thinner to prevent a stroke, be sure you get instructions about how to take your medicine safely. Blood thinners can cause serious bleeding problems.     · Do not take any vitamins, over-the-counter drugs, or herbal products without talking to your doctor first.    Lifestyle changes    · Do not smoke. Smoking can increase your chance of a stroke and heart attack. If you need help quitting, talk to your doctor about stop-smoking programs and medicines. These can increase your chances of quitting for good.     · Eat a heart-healthy diet.     · Stay at a healthy weight.  Lose weight if you need to.     · Limit alcohol to 2 drinks a day for men and 1 drink a day for women. Too much alcohol can cause health problems.     · Avoid colds and flu. Get a pneumococcal vaccine shot. If you have had one before, ask your doctor whether you need another dose. Get a flu shot every year. If you must be around people with colds or flu, wash your hands often. Activity    · If your doctor recommends it, get more exercise. Walking is a good choice. Bit by bit, increase the amount you walk every day. Try for at least 30 minutes on most days of the week. You also may want to swim, bike, or do other activities. Your doctor may suggest that you join a cardiac rehabilitation program so that you can have help increasing your physical activity safely.     · Start light exercise if your doctor says it is okay. Even a small amount will help you get stronger, have more energy, and manage stress. Walking is an easy way to get exercise. Start out by walking a little more than you did in the hospital. Gradually increase the amount you walk.     · When you exercise, watch for signs that your heart is working too hard. You are pushing too hard if you cannot talk while you are exercising. If you become short of breath or dizzy or have chest pain, sit down and rest immediately.     · Check your pulse regularly. Place two fingers on the artery at the palm side of your wrist, in line with your thumb. If your heartbeat seems uneven or fast, talk to your doctor. When should you call for help? Call 911 anytime you think you may need emergency care. For example, call if:    · You have symptoms of a heart attack. These may include:  ? Chest pain or pressure, or a strange feeling in the chest.  ? Sweating. ? Shortness of breath. ? Nausea or vomiting. ? Pain, pressure, or a strange feeling in the back, neck, jaw, or upper belly or in one or both shoulders or arms. ? Lightheadedness or sudden weakness. ? A fast or irregular heartbeat.   After you call 911, the  may tell you to chew 1

## 2018-11-13 NOTE — PROGRESS NOTES
apnea     AHI:  36.5 per PSG, 9/2015 - BiPAP    Osteoarthritis     Paroxysmal atrial fibrillation with RVR ([address] 6/26/2017    Pneumonia due to organism     Pulmonary embolism [facility])      Past Surgical History:   Procedure Laterality Date    ANKLE FRACTURE SURGERY      left ankle    ARM SURGERY Left 01/04/2018    fracture     CARDIAC CATHETERIZATION  06/2018    Mild CAD    CHOLECYSTECTOMY, LAPAROSCOPIC N/A 12/15/2017    CHOLECYSTECTOMY LAPAROSCOPIC performed by Billy Otto MD at [facility]      undecended testicle done when 1years old     Family History   Problem Relation Age of Onset    Heart Disease Mother     High Blood Pressure Mother     Heart Disease Father 54        mi    High Blood Pressure Father     High Cholesterol Father     Lung Cancer Father     Cancer Father         Lung CA - Oat cell    Stroke Father     Diabetes Paternal Aunt      Social History   Substance Use Topics    Smoking status: Never Smoker    Smokeless tobacco: Former User     Types: Chew    Alcohol use Yes      Comment: occ      Current Outpatient Prescriptions   Medication Sig Dispense Refill    omeprazole (PRILOSEC) 20 MG delayed release capsule TAKE 1 CAPSULE TWICE A  capsule 2    tadalafil (CIALIS) 5 MG tablet Take 1 tablet by mouth daily 90 tablet 3    Probiotic Product (PROBIOTIC ADVANCED PO) Take 1 tablet by mouth nightly      amLODIPine (NORVASC) 5 MG tablet TAKE 1 TABLET BY MOUTH EVERY DAY 30 tablet 3    Potassium 99 MG TABS Take 99 mg by mouth daily       lisinopril (PRINIVIL;ZESTRIL) 40 MG tablet Take 1 tablet by mouth daily 90 tablet 3    niacin (NIASPAN) 1000 MG extended release tablet Take 1,000 mg by mouth 4 times daily      hydrochlorothiazide (HYDRODIURIL) 25 MG tablet Take 1/2 tablet in am 45 tablet 3    fluticasone (FLONASE) 50 MCG/ACT nasal spray 1 spray by Nasal route daily 3 Bottle 3    rosuvastatin (CRESTOR) 10 MG tablet Take 1 tablet

## 2018-11-14 ENCOUNTER — OFFICE VISIT (OUTPATIENT)
Dept: GASTROENTEROLOGY | Age: 58
End: 2018-11-14
Payer: COMMERCIAL

## 2018-11-14 VITALS
OXYGEN SATURATION: 98 % | WEIGHT: 265.4 LBS | HEART RATE: 53 BPM | BODY MASS INDEX: 34.06 KG/M2 | HEIGHT: 74 IN | SYSTOLIC BLOOD PRESSURE: 120 MMHG | DIASTOLIC BLOOD PRESSURE: 70 MMHG

## 2018-11-14 DIAGNOSIS — K21.9 CHRONIC GERD: ICD-10-CM

## 2018-11-14 DIAGNOSIS — R10.13 EPIGASTRIC DISCOMFORT: Primary | ICD-10-CM

## 2018-11-14 DIAGNOSIS — I48.0 PAROXYSMAL ATRIAL FIBRILLATION WITH RVR (HCC): ICD-10-CM

## 2018-11-14 DIAGNOSIS — R11.0 CHRONIC NAUSEA: ICD-10-CM

## 2018-11-14 PROCEDURE — 99204 OFFICE O/P NEW MOD 45 MIN: CPT | Performed by: NURSE PRACTITIONER

## 2018-11-14 RX ORDER — ROSUVASTATIN CALCIUM 10 MG/1
10 TABLET, COATED ORAL NIGHTLY
Qty: 90 TABLET | Refills: 3 | Status: SHIPPED | OUTPATIENT
Start: 2018-11-14 | End: 2019-12-01 | Stop reason: SDUPTHER

## 2018-11-14 RX ORDER — SOTALOL HYDROCHLORIDE 80 MG/1
80 TABLET ORAL 2 TIMES DAILY
Qty: 180 TABLET | Refills: 3 | Status: SHIPPED | OUTPATIENT
Start: 2018-11-14 | End: 2019-12-01 | Stop reason: SDUPTHER

## 2018-11-14 ASSESSMENT — ENCOUNTER SYMPTOMS
CONSTIPATION: 0
DIARRHEA: 0
NAUSEA: 1
CHEST TIGHTNESS: 0
SHORTNESS OF BREATH: 0
BACK PAIN: 0
VOMITING: 1
RECTAL PAIN: 0
VOICE CHANGE: 0
COUGH: 0
ABDOMINAL DISTENTION: 0
ABDOMINAL PAIN: 1
SORE THROAT: 0
BLOOD IN STOOL: 0

## 2018-11-14 NOTE — PROGRESS NOTES
Psychiatric: He has a normal mood and affect. His behavior is normal. Thought content normal. Cognition and memory are normal.       Assessment:      1. Epigastric discomfort    2. Chronic nausea    3. Chronic GERD          Plan:      Continue omeprazole    Schedule endoscopy  Nothing to eat or drink after midnight. You will not be able to drive for 24 hours after the procedure due to sedation. Bring a  with you the day of the procedure. No aspirin, ibuprofen, naproxen, fish oil or vitamin E for 5 days before procedure. Continue current medications. If you are on blood thinners, clearance from the prescribing physician will be obtained before your procedure is scheduled. If biopsies are taken during the procedure they will be sent to a pathologist for analysis. You will be notified by mail of the pathology results in 2-3 weeks. Your physician may also schedule a follow up appointment with the nurse practitioner to discuss pathology, symptoms or to check if you have had any problems related to your procedure. If you prefer not to return to the office after your procedure please discuss this with your physician on the day of your procedure. Will place in colon recall for 2022.

## 2018-11-15 ENCOUNTER — HOSPITAL ENCOUNTER (OUTPATIENT)
Age: 58
Setting detail: OUTPATIENT SURGERY
Discharge: HOME | End: 2018-11-15
Attending: INTERNAL MEDICINE | Admitting: INTERNAL MEDICINE
Payer: COMMERCIAL

## 2018-11-15 ENCOUNTER — HOSPITAL ENCOUNTER (OUTPATIENT)
Age: 58
Setting detail: SPECIMEN
Discharge: HOME | End: 2018-11-15
Payer: COMMERCIAL

## 2018-11-15 ENCOUNTER — ANESTHESIA EVENT (OUTPATIENT)
Dept: OPERATING ROOM | Age: 58
End: 2018-11-15

## 2018-11-15 ENCOUNTER — ANESTHESIA (OUTPATIENT)
Dept: OPERATING ROOM | Age: 58
End: 2018-11-15

## 2018-11-15 VITALS — DIASTOLIC BLOOD PRESSURE: 73 MMHG | SYSTOLIC BLOOD PRESSURE: 99 MMHG | OXYGEN SATURATION: 97 %

## 2018-11-15 VITALS
WEIGHT: 260 LBS | OXYGEN SATURATION: 98 % | BODY MASS INDEX: 33.37 KG/M2 | DIASTOLIC BLOOD PRESSURE: 79 MMHG | HEART RATE: 50 BPM | SYSTOLIC BLOOD PRESSURE: 117 MMHG | HEIGHT: 74 IN | TEMPERATURE: 98 F | RESPIRATION RATE: 18 BRPM

## 2018-11-15 PROCEDURE — 43239 EGD BIOPSY SINGLE/MULTIPLE: CPT | Performed by: INTERNAL MEDICINE

## 2018-11-15 PROCEDURE — 43239 EGD BIOPSY SINGLE/MULTIPLE: CPT

## 2018-11-15 PROCEDURE — G8918 PT W/O PREOP ORDER IV AB PRO: HCPCS

## 2018-11-15 PROCEDURE — 88312 SPECIAL STAINS GROUP 1: CPT

## 2018-11-15 PROCEDURE — 88305 TISSUE EXAM BY PATHOLOGIST: CPT

## 2018-11-15 PROCEDURE — G8907 PT DOC NO EVENTS ON DISCHARG: HCPCS

## 2018-11-15 RX ORDER — LIDOCAINE HYDROCHLORIDE 10 MG/ML
INJECTION, SOLUTION INFILTRATION; PERINEURAL PRN
Status: DISCONTINUED | OUTPATIENT
Start: 2018-11-15 | End: 2018-11-15 | Stop reason: SDUPTHER

## 2018-11-15 RX ORDER — PROPOFOL 10 MG/ML
INJECTION, EMULSION INTRAVENOUS PRN
Status: DISCONTINUED | OUTPATIENT
Start: 2018-11-15 | End: 2018-11-15 | Stop reason: SDUPTHER

## 2018-11-15 RX ORDER — SODIUM CHLORIDE 9 MG/ML
INJECTION, SOLUTION INTRAVENOUS CONTINUOUS
Status: DISCONTINUED | OUTPATIENT
Start: 2018-11-15 | End: 2018-11-15 | Stop reason: HOSPADM

## 2018-11-15 RX ORDER — SODIUM CHLORIDE, SODIUM LACTATE, POTASSIUM CHLORIDE, CALCIUM CHLORIDE 600; 310; 30; 20 MG/100ML; MG/100ML; MG/100ML; MG/100ML
INJECTION, SOLUTION INTRAVENOUS CONTINUOUS
Status: DISCONTINUED | OUTPATIENT
Start: 2018-11-15 | End: 2018-11-15

## 2018-11-15 RX ADMIN — PROPOFOL 150 MG: 10 INJECTION, EMULSION INTRAVENOUS at 09:25

## 2018-11-15 RX ADMIN — LIDOCAINE HYDROCHLORIDE 40 MG: 10 INJECTION, SOLUTION INFILTRATION; PERINEURAL at 09:25

## 2018-11-15 RX ADMIN — SODIUM CHLORIDE: 9 INJECTION, SOLUTION INTRAVENOUS at 08:07

## 2018-11-15 ASSESSMENT — PAIN - FUNCTIONAL ASSESSMENT: PAIN_FUNCTIONAL_ASSESSMENT: 0-10

## 2018-11-15 NOTE — ANESTHESIA PRE PROCEDURE
Facility-Administered Medications   Medication Dose Route Frequency Provider Last Rate Last Dose    0.9 % sodium chloride infusion   Intravenous Continuous Leobardo Zhang MD           Allergies:  No Known Allergies    Problem List:    Patient Active Problem List   Diagnosis Code    Obstructive sleep apnea G47.33    Mild mitral regurgitation by prior echocardiogram I34.0    Essential hypertension I10    BiPAP (biphasic positive airway pressure) dependence Z99.89    Hx pulmonary embolism Z86.711    Benign prostatic hyperplasia with lower urinary tract symptoms N40.1    Mixed hyperlipidemia E78.2    PAF (paroxysmal atrial fibrillation) (HCC) I48.0    Primary insomnia F51.01    Chronic GERD K21.9    Degenerative lumbar disc M51.36    IFG (impaired fasting glucose) R73.01    Mild CAD I25.10    Epigastric discomfort R10.13    Chronic nausea R11.0       Past Medical History:        Diagnosis Date    A-fib ([facility] [address]     Atelectasis of right lung 6/26/2017    Back pain, lumbosacral     BiPAP (biphasic positive airway pressure) dependence     12cm to 22cm    Clotting disorder (HCC)     Cyst near coccyx     removed    GERD (gastroesophageal reflux disease)     Heart murmur     Hx of blood clots     Hyperlipidemia     Hypertension     Mild CAD 11/13/2018    Obstructive sleep apnea     AHI:  36.5 per PSG, 9/2015 - BiPAP    Osteoarthritis     Paroxysmal atrial fibrillation with RVR ([facility] [address] 6/26/2017    Pneumonia due to organism     Pulmonary embolism [facility])        Past Surgical History:        Procedure Laterality Date    ANKLE FRACTURE SURGERY      left ankle    ARM SURGERY Left 01/04/2018    fracture     CARDIAC CATHETERIZATION  06/2018    Mild CAD    CHOLECYSTECTOMY, LAPAROSCOPIC N/A 12/15/2017    CHOLECYSTECTOMY LAPAROSCOPIC performed by Kristyn Peres MD at [address] [place] [address] COLONOSCOPY  2012    GALLBLADDER SURGERY      TESTICLE SURGERY      undecended testicle done when 1years old       Social for: PHART, PO2ART, XLS8CDI, LTZ0PPV, BEART, [id number]     Type & Screen (If Applicable):  No results found for: LABABO, [address]    Anesthesia Evaluation  Patient summary reviewed and Nursing notes reviewed no history of anesthetic complications:   Airway: Mallampati: II  TM distance: >3 FB   Neck ROM: full  Mouth opening: > = 3 FB Dental: normal exam         Pulmonary:normal exam  breath sounds clear to auscultation  (+) sleep apnea (BiPAP 12cm to 22cm):                             Cardiovascular:  Exercise tolerance: good (>4 METS),   (+) hypertension:, valvular problems/murmurs: MR, CAD:, dysrhythmias (PAF): atrial fibrillation,       NYHA Classification: II  ECG reviewed  Rhythm: regular  Rate: normal           Beta Blocker:  Dose within 24 Hrs         Neuro/Psych:                ROS comment: Back pain, lumbosacral GI/Hepatic/Renal:   (+) GERD:,           Endo/Other:    (+) Diabetes (IFG (impaired fasting glucose)Type II DM, , .                 Abdominal:   (+) obese,         Vascular:   + DVT, PE.        ROS comment: Clotting disorder. Anesthesia Plan      MAC     ASA 3       Induction: intravenous. Anesthetic plan and risks discussed with patient.                       Tadeo Galvan, DL - CRNA   11/15/2018

## 2018-11-15 NOTE — OP NOTE
Endoscopic Procedure Note    Patient: Evelyn Lobo : 1960  Med Rec#: [id number] Acc#: [id number]     Primary Care Provider Donny Trinh MD  Referring Provider:     Endoscopist: Leora Bolden MD    Date of Procedure:  11/15/2018    Procedure:   1. EGD with biopsy    Indications:   1. dyspepsia  2. Abdominal pain    Anesthesia:  Sedation was administered by anesthesia who monitored the patient during the procedure. Estimated Blood Loss: minimal    Procedure:   After reviewing the patient's chart and obtaining informed consent, the patient was placed in the left lateral decubitus position. A forward-viewing Olympus endoscope was lubricated and inserted through the mouth into the oropharynx. Under direct visualization, the upper esophagus was intubated. The scope was advanced to the level of the third portion of duodenum. Scope was slowly withdrawn with careful inspection of the mucosal surfaces. The scope was retroflexed for inspection of the gastric fundus and incisura. Findings and maneuvers are listed in impression below. The patient tolerated the procedure well. The scope was removed. There were no immediate complications. Findings:   Esophagus: normal  There is no hiatal hernia present. Stomach:  abnormal: mild mucosal changes suggestive of gastritis noted -  Gastric biopsies were taken from the antrum and body to rule out Helicobacter pylori infection. Duodenum: normal      IMPRESSION:  1. S/p gastric biopsies as above       RECOMMENDATIONS:    1. Await path results, the patient will be contacted in 7-10 days with biopsy results. 2.  Continue current meds  3. Schedule f/u OV with GI APRN as needed. The results were discussed with the patient and family. A copy of the images obtained were given to the patient.      Tae Ferrara MD  11/15/2018  9:41 AM

## 2018-12-26 ENCOUNTER — OFFICE VISIT (OUTPATIENT)
Dept: URGENT CARE | Age: 58
End: 2018-12-26
Payer: COMMERCIAL

## 2018-12-26 ENCOUNTER — HOSPITAL ENCOUNTER (OUTPATIENT)
Dept: GENERAL RADIOLOGY | Age: 58
Discharge: HOME | End: 2018-12-26
Payer: COMMERCIAL

## 2018-12-26 VITALS
HEART RATE: 53 BPM | RESPIRATION RATE: 20 BRPM | TEMPERATURE: 97.3 F | WEIGHT: 257.2 LBS | OXYGEN SATURATION: 98 % | HEIGHT: 74 IN | SYSTOLIC BLOOD PRESSURE: 122 MMHG | DIASTOLIC BLOOD PRESSURE: 79 MMHG | BODY MASS INDEX: 33.01 KG/M2

## 2018-12-26 DIAGNOSIS — R07.81 RIB PAIN ON LEFT SIDE: ICD-10-CM

## 2018-12-26 DIAGNOSIS — R07.81 RIB PAIN ON LEFT SIDE: Primary | ICD-10-CM

## 2018-12-26 PROCEDURE — 71100 X-RAY EXAM RIBS UNI 2 VIEWS: CPT

## 2018-12-26 PROCEDURE — 99213 OFFICE O/P EST LOW 20 MIN: CPT | Performed by: NURSE PRACTITIONER

## 2018-12-26 ASSESSMENT — ENCOUNTER SYMPTOMS
COUGH: 0
CHEST TIGHTNESS: 0
SHORTNESS OF BREATH: 0

## 2018-12-26 NOTE — PROGRESS NOTES
performed by Khoi Rose MD at [address] [facility] COLONOSCOPY  2012    GALLBLADDER SURGERY      MA EGD TRANSORAL BIOPSY SINGLE/MULTIPLE N/A 11/15/2018    Dr Karyna Hope-Gastropathy    TESTICLE SURGERY      undecended testicle done when 1years old       Family History   Problem Relation Age of Onset    Heart Disease Mother     High Blood Pressure Mother     Heart Disease Father 54        mi    High Blood Pressure Father     High Cholesterol Father     Lung Cancer Father     Cancer Father         Lung CA - Oat cell    Stroke Father     Diabetes Paternal Aunt     Colon Cancer Neg Hx     Colon Polyps Neg Hx     Esophageal Cancer Neg Hx     Liver Cancer Neg Hx     Liver Disease Neg Hx     Rectal Cancer Neg Hx     Stomach Cancer Neg Hx        Social History   Substance Use Topics    Smoking status: Never Smoker    Smokeless tobacco: Former User     Types: Chew    Alcohol use Yes      Comment: occ      Current Outpatient Prescriptions   Medication Sig Dispense Refill    rosuvastatin (CRESTOR) 10 MG tablet Take 1 tablet by mouth nightly 90 tablet 3    sotalol (BETAPACE) 80 MG tablet Take 1 tablet by mouth 2 times daily 180 tablet 3    omeprazole (PRILOSEC) 20 MG delayed release capsule TAKE 1 CAPSULE TWICE A  capsule 2    tadalafil (CIALIS) 5 MG tablet Take 1 tablet by mouth daily 90 tablet 3    Probiotic Product (PROBIOTIC ADVANCED PO) Take 1 tablet by mouth nightly      amLODIPine (NORVASC) 5 MG tablet TAKE 1 TABLET BY MOUTH EVERY DAY 30 tablet 3    Potassium 99 MG TABS Take 99 mg by mouth daily       lisinopril (PRINIVIL;ZESTRIL) 40 MG tablet Take 1 tablet by mouth daily 90 tablet 3    niacin (NIASPAN) 1000 MG extended release tablet Take 1,000 mg by mouth 4 times daily      hydrochlorothiazide (HYDRODIURIL) 25 MG tablet Take 1/2 tablet in am 45 tablet 3    fluticasone (FLONASE) 50 MCG/ACT nasal spray 1 spray by Nasal route daily 3 Bottle 3    aspirin 81 MG EC tablet Take 81 mg by mouth

## 2019-01-03 RX ORDER — FLUTICASONE PROPIONATE 50 MCG
SPRAY, SUSPENSION (ML) NASAL
Qty: 48 G | Refills: 3 | Status: SHIPPED | OUTPATIENT
Start: 2019-01-03 | End: 2019-01-04 | Stop reason: SDUPTHER

## 2019-01-04 RX ORDER — FLUTICASONE PROPIONATE 50 MCG
1 SPRAY, SUSPENSION (ML) NASAL DAILY
Qty: 48 G | Refills: 3 | Status: SHIPPED | OUTPATIENT
Start: 2019-01-04 | End: 2019-11-10 | Stop reason: SDUPTHER

## 2019-01-11 DIAGNOSIS — R97.20 ELEVATED PSA: ICD-10-CM

## 2019-01-11 LAB — PROSTATE SPECIFIC ANTIGEN: 8.14 NG/ML (ref 0–4)

## 2019-01-21 ENCOUNTER — OFFICE VISIT (OUTPATIENT)
Dept: UROLOGY | Age: 59
End: 2019-01-21
Payer: COMMERCIAL

## 2019-01-21 VITALS — WEIGHT: 255 LBS | BODY MASS INDEX: 32.73 KG/M2 | HEIGHT: 74 IN | TEMPERATURE: 97.2 F

## 2019-01-21 DIAGNOSIS — R35.0 BENIGN PROSTATIC HYPERPLASIA WITH URINARY FREQUENCY: ICD-10-CM

## 2019-01-21 DIAGNOSIS — R97.20 ELEVATED PSA: Primary | ICD-10-CM

## 2019-01-21 DIAGNOSIS — N40.1 BENIGN PROSTATIC HYPERPLASIA WITH URINARY FREQUENCY: ICD-10-CM

## 2019-01-21 LAB
BILIRUBIN, POC: 0
BLOOD URINE, POC: NORMAL
CLARITY, POC: CLEAR
COLOR, POC: YELLOW
GLUCOSE URINE, POC: NORMAL
KETONES, POC: NORMAL
LEUKOCYTE EST, POC: NORMAL
NITRITE, POC: NORMAL
PH, POC: 7
PROTEIN, POC: NORMAL
SPECIFIC GRAVITY, POC: 1.02
UROBILINOGEN, POC: 0.2

## 2019-01-21 PROCEDURE — 81003 URINALYSIS AUTO W/O SCOPE: CPT | Performed by: UROLOGY

## 2019-01-21 PROCEDURE — 99214 OFFICE O/P EST MOD 30 MIN: CPT | Performed by: UROLOGY

## 2019-01-21 RX ORDER — TADALAFIL 5 MG/1
5 TABLET ORAL DAILY
Qty: 90 TABLET | Refills: 3 | Status: SHIPPED | OUTPATIENT
Start: 2019-01-21 | End: 2020-01-27

## 2019-01-21 ASSESSMENT — ENCOUNTER SYMPTOMS
BACK PAIN: 0
SINUS PRESSURE: 0
SORE THROAT: 0
SHORTNESS OF BREATH: 0
WHEEZING: 0
CONSTIPATION: 0
EYE DISCHARGE: 0
ABDOMINAL PAIN: 0
EYE REDNESS: 0
ABDOMINAL DISTENTION: 0
DIARRHEA: 0
BLOOD IN STOOL: 0
COUGH: 0
EYE PAIN: 0
NAUSEA: 0
VOMITING: 0
FACIAL SWELLING: 0
RHINORRHEA: 0
COLOR CHANGE: 0

## 2019-03-07 ENCOUNTER — TELEPHONE (OUTPATIENT)
Dept: UROLOGY | Age: 59
End: 2019-03-07

## 2019-04-29 RX ORDER — LISINOPRIL 40 MG/1
TABLET ORAL
Qty: 90 TABLET | Refills: 3 | Status: SHIPPED | OUTPATIENT
Start: 2019-04-29 | End: 2020-03-11

## 2019-04-29 RX ORDER — HYDROCHLOROTHIAZIDE 25 MG/1
TABLET ORAL
Qty: 45 TABLET | Refills: 3 | Status: SHIPPED | OUTPATIENT
Start: 2019-04-29 | End: 2020-03-11

## 2019-04-29 NOTE — TELEPHONE ENCOUNTER
Katie Benoit called requesting a refill of the below medication which has been pended for you:     Requested Prescriptions     Pending Prescriptions Disp Refills    hydrochlorothiazide (HYDRODIURIL) 25 MG tablet [Pharmacy Med Name: HYDROCHLOROTHIAZIDE TAB 25MG] 45 tablet 3     Sig: TAKE ONE-HALF (1/2) TABLET IN THE MORNING    lisinopril (PRINIVIL;ZESTRIL) 40 MG tablet [Pharmacy Med Name: LISINOPRIL TABS 40MG] 90 tablet 3     Sig: TAKE 1 TABLET DAILY       Last Appointment Date: 11/12/2018  Next Appointment Date: 5/17/2019    No Known Allergies

## 2019-05-10 DIAGNOSIS — E78.2 MIXED HYPERLIPIDEMIA: ICD-10-CM

## 2019-05-10 DIAGNOSIS — I10 ESSENTIAL HYPERTENSION: ICD-10-CM

## 2019-05-10 DIAGNOSIS — R73.01 IFG (IMPAIRED FASTING GLUCOSE): ICD-10-CM

## 2019-05-10 DIAGNOSIS — G47.33 OBSTRUCTIVE SLEEP APNEA: ICD-10-CM

## 2019-05-10 DIAGNOSIS — I48.0 PAF (PAROXYSMAL ATRIAL FIBRILLATION) (HCC): ICD-10-CM

## 2019-05-10 LAB
ALBUMIN SERPL-MCNC: 4.3 G/DL (ref 3.5–5.2)
ALP BLD-CCNC: 61 U/L (ref 40–130)
ALT SERPL-CCNC: 19 U/L (ref 5–41)
ANION GAP SERPL CALCULATED.3IONS-SCNC: 13 MMOL/L (ref 7–19)
AST SERPL-CCNC: 20 U/L (ref 5–40)
BASOPHILS ABSOLUTE: 0 K/UL (ref 0–0.2)
BASOPHILS RELATIVE PERCENT: 0.8 % (ref 0–1)
BILIRUB SERPL-MCNC: 0.8 MG/DL (ref 0.2–1.2)
BILIRUBIN URINE: NEGATIVE
BLOOD, URINE: NEGATIVE
BUN BLDV-MCNC: 13 MG/DL (ref 8–23)
CALCIUM SERPL-MCNC: 9.6 MG/DL (ref 8.8–10.2)
CHLORIDE BLD-SCNC: 104 MMOL/L (ref 98–111)
CHOLESTEROL, TOTAL: 148 MG/DL (ref 160–199)
CLARITY: CLEAR
CO2: 27 MMOL/L (ref 22–29)
COLOR: YELLOW
CREAT SERPL-MCNC: 0.7 MG/DL (ref 0.5–1.2)
EOSINOPHILS ABSOLUTE: 0.2 K/UL (ref 0–0.6)
EOSINOPHILS RELATIVE PERCENT: 3.9 % (ref 0–5)
GFR NON-AFRICAN AMERICAN: >60
GLUCOSE BLD-MCNC: 102 MG/DL (ref 74–109)
GLUCOSE URINE: NEGATIVE MG/DL
HCT VFR BLD CALC: 44.1 % (ref 42–52)
HDLC SERPL-MCNC: 45 MG/DL (ref 55–121)
HEMOGLOBIN: 14.5 G/DL (ref 14–18)
KETONES, URINE: NEGATIVE MG/DL
LDL CHOLESTEROL CALCULATED: 80 MG/DL
LEUKOCYTE ESTERASE, URINE: NEGATIVE
LYMPHOCYTES ABSOLUTE: 1.6 K/UL (ref 1.1–4.5)
LYMPHOCYTES RELATIVE PERCENT: 33.3 % (ref 20–40)
MCH RBC QN AUTO: 32.4 PG (ref 27–31)
MCHC RBC AUTO-ENTMCNC: 32.9 G/DL (ref 33–37)
MCV RBC AUTO: 98.4 FL (ref 80–94)
MONOCYTES ABSOLUTE: 0.5 K/UL (ref 0–0.9)
MONOCYTES RELATIVE PERCENT: 9.3 % (ref 0–10)
NEUTROPHILS ABSOLUTE: 2.5 K/UL (ref 1.5–7.5)
NEUTROPHILS RELATIVE PERCENT: 52.5 % (ref 50–65)
NITRITE, URINE: NEGATIVE
PDW BLD-RTO: 13.2 % (ref 11.5–14.5)
PH UA: 7 (ref 5–8)
PLATELET # BLD: 208 K/UL (ref 130–400)
PMV BLD AUTO: 9.5 FL (ref 9.4–12.4)
POTASSIUM SERPL-SCNC: 4.1 MMOL/L (ref 3.5–5)
PROTEIN UA: NEGATIVE MG/DL
RBC # BLD: 4.48 M/UL (ref 4.7–6.1)
SODIUM BLD-SCNC: 144 MMOL/L (ref 136–145)
SPECIFIC GRAVITY UA: 1.02 (ref 1–1.03)
TOTAL PROTEIN: 7 G/DL (ref 6.6–8.7)
TRIGL SERPL-MCNC: 115 MG/DL (ref 0–149)
TSH SERPL DL<=0.05 MIU/L-ACNC: 3.18 UIU/ML (ref 0.27–4.2)
UROBILINOGEN, URINE: 1 E.U./DL
WBC # BLD: 4.8 K/UL (ref 4.8–10.8)

## 2019-05-17 ENCOUNTER — OFFICE VISIT (OUTPATIENT)
Dept: INTERNAL MEDICINE | Age: 59
End: 2019-05-17
Payer: COMMERCIAL

## 2019-05-17 VITALS
SYSTOLIC BLOOD PRESSURE: 110 MMHG | HEART RATE: 58 BPM | WEIGHT: 258.4 LBS | DIASTOLIC BLOOD PRESSURE: 70 MMHG | HEIGHT: 74 IN | OXYGEN SATURATION: 97 % | BODY MASS INDEX: 33.16 KG/M2

## 2019-05-17 DIAGNOSIS — I10 ESSENTIAL HYPERTENSION: ICD-10-CM

## 2019-05-17 DIAGNOSIS — Z00.00 ANNUAL PHYSICAL EXAM: Primary | ICD-10-CM

## 2019-05-17 DIAGNOSIS — Z23 NEED FOR PROPHYLACTIC VACCINATION AGAINST DIPHTHERIA-TETANUS-PERTUSSIS (DTP): ICD-10-CM

## 2019-05-17 DIAGNOSIS — F51.01 PRIMARY INSOMNIA: ICD-10-CM

## 2019-05-17 DIAGNOSIS — R73.01 IFG (IMPAIRED FASTING GLUCOSE): ICD-10-CM

## 2019-05-17 DIAGNOSIS — M70.62 TROCHANTERIC BURSITIS OF LEFT HIP: ICD-10-CM

## 2019-05-17 DIAGNOSIS — E78.2 MIXED HYPERLIPIDEMIA: ICD-10-CM

## 2019-05-17 PROCEDURE — 90471 IMMUNIZATION ADMIN: CPT | Performed by: INTERNAL MEDICINE

## 2019-05-17 PROCEDURE — 99396 PREV VISIT EST AGE 40-64: CPT | Performed by: INTERNAL MEDICINE

## 2019-05-17 PROCEDURE — 90715 TDAP VACCINE 7 YRS/> IM: CPT | Performed by: INTERNAL MEDICINE

## 2019-05-17 RX ORDER — HYOSCYAMINE SULFATE 0.125 MG
TABLET,DISINTEGRATING ORAL
Qty: 60 TABLET | Refills: 1 | Status: SHIPPED | OUTPATIENT
Start: 2019-05-17 | End: 2019-06-01 | Stop reason: SDUPTHER

## 2019-05-17 ASSESSMENT — ENCOUNTER SYMPTOMS
BLOOD IN STOOL: 0
VOMITING: 0
EYE PAIN: 0
ABDOMINAL PAIN: 0
CHEST TIGHTNESS: 0
DIARRHEA: 0
SORE THROAT: 0
TROUBLE SWALLOWING: 0
CONSTIPATION: 0
SINUS PRESSURE: 0
WHEEZING: 0
COLOR CHANGE: 0
EYE REDNESS: 0
VOICE CHANGE: 0
COUGH: 0
NAUSEA: 0

## 2019-05-17 ASSESSMENT — PATIENT HEALTH QUESTIONNAIRE - PHQ9
2. FEELING DOWN, DEPRESSED OR HOPELESS: 0
SUM OF ALL RESPONSES TO PHQ9 QUESTIONS 1 & 2: 0
SUM OF ALL RESPONSES TO PHQ QUESTIONS 1-9: 0
SUM OF ALL RESPONSES TO PHQ QUESTIONS 1-9: 0
1. LITTLE INTEREST OR PLEASURE IN DOING THINGS: 0

## 2019-05-17 NOTE — PROGRESS NOTES
Pt received an injection of TDAP in the left Deltoid in the office today. Pt has signed an Injection consent form for the injection. Pt does not show any signs of reaction to the injection. Pt tolerated the injection well, and is advised to call the office if he/she notices any kind of reaction to happen once the Pt leaves the office.

## 2019-05-17 NOTE — PATIENT INSTRUCTIONS
Patient Education        Trochanteric Bursitis: Exercises  Your Care Instructions  Here are some examples of typical rehabilitation exercises for your condition. Start each exercise slowly. Ease off the exercise if you start to have pain. Your doctor or physical therapist will tell you when you can start these exercises and which ones will work best for you. How to do the exercises  Hamstring wall stretch    1. Lie on your back in a doorway, with your good leg through the open door. 2. Slide your affected leg up the wall to straighten your knee. You should feel a gentle stretch down the back of your leg. 1. Do not arch your back. 2. Do not bend either knee. 3. Keep one heel touching the floor and the other heel touching the wall. Do not point your toes. 3. Hold the stretch for at least 1 minute to begin. Then try to lengthen the time you hold the stretch to as long as 6 minutes. 4. Repeat 2 to 4 times. 5. If you do not have a place to do this exercise in a doorway, there is another way to do it:  6. Lie on your back, and bend the knee of your affected leg. 7. Loop a towel under the ball and toes of that foot, and hold the ends of the towel in your hands. 8. Straighten your knee, and slowly pull back on the towel. You should feel a gentle stretch down the back of your leg. 9. Hold the stretch for 15 to 30 seconds. Or even better, hold the stretch for 1 minute if you can. 10. Repeat 2 to 4 times. Straight-leg raises to the outside    1. Lie on your side, with your affected leg on top. 2. Tighten the front thigh muscles of your top leg to keep your knee straight. 3. Keep your hip and your leg straight in line with the rest of your body, and keep your knee pointing forward. Do not drop your hip back. 4. Lift your top leg straight up toward the ceiling, about 12 inches off the floor. Hold for about 6 seconds, then slowly lower your leg. 5. Repeat 8 to 12 times. Clamshell    1.  Lie on your side, a list of the medicines you take. Where can you learn more? Go to https://chpepiceweb.[employer]. org and sign in to your [employer] account. Enter E261 in the [facility] box to learn more about \"Trochanteric Bursitis: Exercises. \"     If you do not have an account, please click on the \"Sign Up Now\" link. Current as of: September 20, 2018  Content Version: 12.0  © 1227-2222 [employer], [employer]. Care instructions adapted under license by [facility] [facility]). If you have questions about a medical condition or this instruction, always ask your healthcare professional. [address] any warranty or liability for your use of this information. Patient Education        Trochanteric Bursitis: Exercises  Your Care Instructions  Here are some examples of typical rehabilitation exercises for your condition. Start each exercise slowly. Ease off the exercise if you start to have pain. Your doctor or physical therapist will tell you when you can start these exercises and which ones will work best for you. How to do the exercises  Hamstring wall stretch    11. Lie on your back in a doorway, with your good leg through the open door. 12. Slide your affected leg up the wall to straighten your knee. You should feel a gentle stretch down the back of your leg. 1. Do not arch your back. 2. Do not bend either knee. 3. Keep one heel touching the floor and the other heel touching the wall. Do not point your toes. 13. Hold the stretch for at least 1 minute to begin. Then try to lengthen the time you hold the stretch to as long as 6 minutes. 14. Repeat 2 to 4 times. 15. If you do not have a place to do this exercise in a doorway, there is another way to do it:  16. Lie on your back, and bend the knee of your affected leg. 17. Loop a towel under the ball and toes of that foot, and hold the ends of the towel in your hands. 18. Straighten your knee, and slowly pull back on the towel.  You should feel a gentle stretch down the back of your leg. 19. Hold the stretch for 15 to 30 seconds. Or even better, hold the stretch for 1 minute if you can. 20. Repeat 2 to 4 times. Straight-leg raises to the outside    6. Lie on your side, with your affected leg on top. 7. Tighten the front thigh muscles of your top leg to keep your knee straight. 8. Keep your hip and your leg straight in line with the rest of your body, and keep your knee pointing forward. Do not drop your hip back. 9. Lift your top leg straight up toward the ceiling, about 12 inches off the floor. Hold for about 6 seconds, then slowly lower your leg. 10. Repeat 8 to 12 times. Clamshell    6. Lie on your side, with your affected leg on top and your head propped on a pillow. Keep your feet and knees together and your knees bent. 7. Raise your top knee, but keep your feet together. Do not let your hips roll back. Your legs should open up like a clamshell. 8. Hold for 6 seconds. 9. Slowly lower your knee back down. Rest for 10 seconds. 10. Repeat 8 to 12 times. Standing quadriceps stretch    6. If you are not steady on your feet, hold on to a chair, counter, or wall. You can also lie on your stomach or your side to do this exercise. 7. Bend the knee of the leg you want to stretch, and reach behind you to grab the front of your foot or ankle with the hand on the same side. For example, if you are stretching your right leg, use your right hand. 8. Keeping your knees next to each other, pull your foot toward your buttock until you feel a gentle stretch across the front of your hip and down the front of your thigh. Your knee should be pointed directly to the ground, and not out to the side. 9. Hold the stretch for 15 to 30 seconds. 10. Repeat 2 to 4 times. Piriformis stretch    5. Lie on your back with your legs straight. 6. Lift your affected leg and bend your knee.  With your opposite hand, reach across your body, and then

## 2019-05-17 NOTE — PROGRESS NOTES
Chief Complaint:   Kacy Mendes is a 62 y.o. male who presents forcomplete physical exam.    History of Present Illness:      Patient is here for  [facility] VISIT     CARE TEAM:    Dr Rodrigo Melvin urology  dr Gautam Marquez cardiology  _____________________________________________________________________    Pt presents today for follow-up and management of following chronic medical conditions:    Essential hypertension- BP has been now better with recent changes ( see my ov note 1 week ago)     Obstructive sleep apnea- using CPAP/ Sleeping better     Mixed hyperlipidemia- Patient Deena Gagnon tried to follow diet recommendations.  Has been taking  cholesterol lowering medication as prescribed and does not report any side effects.     PAF (paroxysmal atrial fibrillation) ([address]- he follows with dr alexander/ no further episdoes     Complains today  epigastric dyscomfort  He is post cholecystectomy  Seen gastro  Had EGD 60/6640- negative  Helicobacter negative          Left lateral hip area pain last 1-2 months  Travels a lot throughout the week and she feels this is triggering factor  He spends a lot of time traveling in the car or on the plane      Patient Active Problem List    Diagnosis Date Noted    Epigastric discomfort 11/14/2018    Chronic nausea 11/14/2018    Mild CAD 11/13/2018    IFG (impaired fasting glucose) 05/11/2018    Primary insomnia 10/26/2017    Chronic GERD 10/26/2017    Degenerative lumbar disc 10/26/2017    PAF ([facility]oxysmal [facility] fibrillation) [facility]     Hx pulmonary embolism 06/26/2017    Benign prostatic hyperplasia with lower urinary tract symptoms 06/26/2017    Mixed hyperlipidemia 06/26/2017    BiPAP (biphasic positive airway pressure) dependence      12cm to 22cm      Mild mitral regurgitation by prior echocardiogram 02/08/2017    Essential hypertension 02/08/2017    Obstructive sleep apnea        Past Medical History:   Diagnosis Date    A-fib [facility])     Atelectasis of right lung 6/26/2017    Back pain, lumbosacral     BiPAP (biphasic positive airway pressure) dependence     12cm to 22cm    Clotting disorder (HCC)     Cyst near coccyx     removed    GERD (gastroesophageal reflux disease)     Heart murmur     Hx of blood clots     Hyperlipidemia     Hypertension     Mild CAD 11/13/2018    Obstructive sleep apnea     AHI:  36.5 per PSG, 9/2015 - BiPAP    Osteoarthritis     Paroxysmal atrial fibrillation with RVR ([facility] [address] 6/26/2017    Pneumonia due to organism     Pulmonary embolism (HCC)          Current Outpatient Medications   Medication Sig Dispense Refill    hyoscyamine (NULEV) 125 MCG TBDP dispersible tablet Take 2 tablets every 12 hours as needed abdominal pain/ cramping 60 tablet 1    hydrochlorothiazide (HYDRODIURIL) 25 MG tablet TAKE ONE-HALF (1/2) TABLET IN THE MORNING 45 tablet 3    lisinopril (PRINIVIL;ZESTRIL) 40 MG tablet TAKE 1 TABLET DAILY 90 tablet 3    tadalafil (CIALIS) 5 MG tablet Take 1 tablet by mouth daily 90 tablet 3    fluticasone (FLONASE) 50 MCG/ACT nasal spray 1 spray by Nasal route daily 48 g 3    rosuvastatin (CRESTOR) 10 MG tablet Take 1 tablet by mouth nightly 90 tablet 3    sotalol (BETAPACE) 80 MG tablet Take 1 tablet by mouth 2 times daily 180 tablet 3    omeprazole (PRILOSEC) 20 MG delayed release capsule TAKE 1 CAPSULE TWICE A  capsule 2    amLODIPine (NORVASC) 5 MG tablet TAKE 1 TABLET BY MOUTH EVERY DAY 30 tablet 3    Potassium 99 MG TABS Take 99 mg by mouth daily       niacin (NIASPAN) 1000 MG extended release tablet Take 1,000 mg by mouth 4 times daily      aspirin 81 MG EC tablet Take 81 mg by mouth daily      Omega-3 Fatty Acids (FISH OIL PO) Take 2,150 mg by mouth 2 times daily      Ascorbic Acid (VITAMIN C) 500 MG tablet Take 500 mg by mouth 2 times daily       vitamin E 400 UNIT capsule Take 400 Units by mouth daily      Probiotic Product (PROBIOTIC ADVANCED PO) Take 1 tablet by mouth nightly       No current Stomach Cancer Neg Hx        Past Surgical History:   Procedure Laterality Date    ANKLE FRACTURE SURGERY      left ankle    ARM SURGERY Left 01/04/2018    fracture     CARDIAC CATHETERIZATION  06/2018    Mild CAD    CHOLECYSTECTOMY, LAPAROSCOPIC N/A 12/15/2017    CHOLECYSTECTOMY LAPAROSCOPIC performed by Mckayla Shelton MD at [address] [place] [address], [address]  2012    GALLBLADDER SURGERY      ND EGD TRANSORAL BIOPSY SINGLE/MULTIPLE N/A 11/15/2018    Dr Myranda Campbell      undecended testicle done when 1years old         Lab Review   Orders Only on 05/10/2019   Component Date Value    TSH 05/10/2019 3.180     Color, UA 05/10/2019 YELLOW     Clarity, UA 05/10/2019 Clear     Glucose, Ur 05/10/2019 Negative     Bilirubin Urine 05/10/2019 Negative     Ketones, Urine 05/10/2019 Negative     Specific Gravity, UA 05/10/2019 1.023     Blood, Urine 05/10/2019 Negative     pH, UA 05/10/2019 7.0     Protein, UA 05/10/2019 Negative     Urobilinogen, Urine 05/10/2019 1.0     Nitrite, Urine 05/10/2019 Negative     Leukocyte Esterase, Urine 05/10/2019 Negative     Cholesterol, Total 05/10/2019 148*    Triglycerides 05/10/2019 115     HDL 05/10/2019 45*    LDL Calculated 05/10/2019 80     WBC 05/10/2019 4.8     RBC 05/10/2019 4.48*    Hemoglobin 05/10/2019 14.5     Hematocrit 05/10/2019 44.1     MCV 05/10/2019 98.4*    MCH 05/10/2019 32.4*    MCHC 05/10/2019 32.9*    RDW 05/10/2019 13.2     Platelets 41/74/2010 208     MPV 05/10/2019 9.5     Neutrophils % 05/10/2019 52.5     Lymphocytes % 05/10/2019 33.3     Monocytes % 05/10/2019 9.3     Eosinophils % 05/10/2019 3.9     Basophils % 05/10/2019 0.8     Neutrophils # 05/10/2019 2.5     Lymphocytes # 05/10/2019 1.6     Monocytes # 05/10/2019 0.50     Eosinophils # 05/10/2019 0.20     Basophils # 05/10/2019 0.00     Sodium 05/10/2019 144     Potassium 05/10/2019 4.1     Chloride 05/10/2019 104     CO2 05/10/2019 27     Anion Gap 05/10/2019 13     Glucose 05/10/2019 102     BUN 05/10/2019 13     CREATININE 05/10/2019 0.7     GFR Non- 05/10/2019 >60     Calcium 05/10/2019 9.6     Total Protein 05/10/2019 7.0     Alb 05/10/2019 4.3     Total Bilirubin 05/10/2019 0.8     Alkaline Phosphatase 05/10/2019 61     ALT 05/10/2019 19     AST 05/10/2019 20          Review of Systems   Constitutional: Positive for fatigue. Negative for chills and fever. HENT: Positive for congestion. Negative for ear pain, postnasal drip, sinus pressure, sore throat, trouble swallowing and voice change. Eyes: Negative for pain, redness and visual disturbance. Respiratory: Negative for cough, chest tightness and wheezing. Cardiovascular: Negative for chest pain, palpitations and leg swelling. Gastrointestinal: Negative for abdominal pain, blood in stool, constipation, diarrhea, nausea and vomiting.         epigastric cramping/pain that has been present for last several years comes and goes about 3 times per week   Endocrine: Negative for polydipsia and polyuria. Genitourinary: Negative for dysuria, enuresis, flank pain, frequency and urgency. Musculoskeletal: Negative for arthralgias, gait problem and joint swelling. Left hip pain   Skin: Negative for color change and rash. Neurological: Negative for dizziness, tremors, syncope, facial asymmetry, speech difficulty, weakness, numbness and headaches. Psychiatric/Behavioral: Negative for agitation, behavioral problems, confusion, sleep disturbance and suicidal ideas. The patient is not nervous/anxious.            Vitals:    05/17/19 1041   BP: 110/70   Site: Left Upper Arm   Position: Sitting   Cuff Size: Medium Adult   Pulse: 58   SpO2: 97%   Weight: 258 lb 6.4 oz (117.2 kg)   Height: 6' 2\" (1.88 m)      Wt Readings from Last 3 Encounters:   05/17/19 258 lb 6.4 oz (117.2 kg)   01/21/19 255 lb (115.7 kg)   12/26/18 257 lb 3.2 oz (116.7 kg)   Body mass index is 33.18 kg/m². BP Readings from Last 3 Encounters:   05/17/19 110/70   12/26/18 122/79   11/15/18 117/79       Physical Exam   Constitutional: He is oriented to person, place, and time. He appears well-developed and well-nourished. HENT:   Head: Normocephalic and atraumatic. Right Ear: External ear normal.   Left Ear: External ear normal.   Mouth/Throat: Oropharynx is clear and moist.   Eyes: Pupils are equal, round, and reactive to light. Conjunctivae are normal. No scleral icterus. Neck: Normal range of motion. Neck supple. No JVD present. No thyromegaly present. Cardiovascular: Normal rate, regular rhythm and normal heart sounds. No murmur heard. Pulmonary/Chest: Effort normal and breath sounds normal. No respiratory distress. He has no wheezes. He exhibits no tenderness. Abdominal: Soft. Bowel sounds are normal. He exhibits no mass. There is no tenderness. Musculoskeletal: Normal range of motion. He exhibits no edema or tenderness. Pain on palpation over left trochanteric bursa area   Lymphadenopathy:     He has no cervical adenopathy. Neurological: He is alert and oriented to person, place, and time. He has normal reflexes. No cranial nerve deficit. Coordination normal.   Skin: Skin is warm and dry. No rash noted. No erythema. Psychiatric: He has a normal mood and affect. His behavior is normal.         ASSESSMENT/PLAN:    ANNUAL WELLNESS SCREENING/ MAINTENANCE:  1:PSA/ PROSTATE CANCER SCREEN per dr [facility]  2. SCREENING CSCOPE 2016 repeat 2026  3.  Need for prophylactic vaccination against diphtheria-tetanus-pertussis (DTP)  -     Tdap (age 6y and older) IM (Boostrix)    ______________________________________________________________________    Management plan for chronic medical conditions:    Obstructive sleep apnea- doing better, sleeping better continue current CPAP settings     Mixed hyperlipidemia  LDL now good 80(79) (73 )( was  elevated (172 )( 154)  Cont small dose crestor 10 mg daily   continue niacin 3 times a day versus 4 times a day ( taking this for low HDL)  Repeat testing in 6 months     PAF (paroxysmal atrial fibrillation) (HCC)= patient will continue sotalol 80 mg twice daily, plans as per cardiology        Hypertension-   Blood pressure readings now good  *continue lisinopril 40 mg daily  *cont hctz 12.5 ( 1/2 of 25)  * cont amlodipine to  5 daily   Will continue to monitor his blood pressure closely at home     IFG= bs mild elevated 102  a1c with next lab 6 mo      Epigastric abd pain / midabd pains  Dyspepsia  He is post cholecystectomy- this actually made his symptoms worse  Seen gastro  Had EGD 56/8518- negative  Helicobacter negative 06/97  * trial of hyocyamine PRN  * if this above is not helpful he may try lactose-free and gluten-free diet for 2-3 weeks at a time for each to see if that may be helpful  If symptoms get worse he would need to call [facility] to that she saw last fall for further opinion    The lateral hip area pain on palpation over trochanteric bursa  Exercise instructions given to the patient             Orders Placed This Encounter   Procedures    Tdap (age 6y and older) IM (Boostrix)    Hemoglobin A1C    Comprehensive Metabolic Panel    Lipid Panel     New Prescriptions    HYOSCYAMINE (NULEV) 125 MCG TBDP DISPERSIBLE TABLET    Take 2 tablets every 12 hours as needed abdominal pain/ cramping      Patient Instructions     Patient Education        Trochanteric Bursitis: Exercises  Your Care Instructions  Here are some examples of typical rehabilitation exercises for your condition. Start each exercise slowly. Ease off the exercise if you start to have pain. Your doctor or physical therapist will tell you when you can start these exercises and which ones will work best for you. How to do the exercises  Hamstring wall stretch    1. Lie on your back in a doorway, with your good leg through the open door.   2. Slide your affected leg up the wall to straighten your knee. You should feel a gentle stretch down the back of your leg. 1. Do not arch your back. 2. Do not bend either knee. 3. Keep one heel touching the floor and the other heel touching the wall. Do not point your toes. 3. Hold the stretch for at least 1 minute to begin. Then try to lengthen the time you hold the stretch to as long as 6 minutes. 4. Repeat 2 to 4 times. 5. If you do not have a place to do this exercise in a doorway, there is another way to do it:  6. Lie on your back, and bend the knee of your affected leg. 7. Loop a towel under the ball and toes of that foot, and hold the ends of the towel in your hands. 8. Straighten your knee, and slowly pull back on the towel. You should feel a gentle stretch down the back of your leg. 9. Hold the stretch for 15 to 30 seconds. Or even better, hold the stretch for 1 minute if you can. 10. Repeat 2 to 4 times. Straight-leg raises to the outside    1. Lie on your side, with your affected leg on top. 2. Tighten the front thigh muscles of your top leg to keep your knee straight. 3. Keep your hip and your leg straight in line with the rest of your body, and keep your knee pointing forward. Do not drop your hip back. 4. Lift your top leg straight up toward the ceiling, about 12 inches off the floor. Hold for about 6 seconds, then slowly lower your leg. 5. Repeat 8 to 12 times. Clamshell    1. Lie on your side, with your affected leg on top and your head propped on a pillow. Keep your feet and knees together and your knees bent. 2. Raise your top knee, but keep your feet together. Do not let your hips roll back. Your legs should open up like a clamshell. 3. Hold for 6 seconds. 4. Slowly lower your knee back down. Rest for 10 seconds. 5. Repeat 8 to 12 times. Standing quadriceps stretch    1. If you are not steady on your feet, hold on to a chair, counter, or wall.  You can also lie on your stomach or your side to do this exercise. 2. Bend the knee of the leg you want to stretch, and reach behind you to grab the front of your foot or ankle with the hand on the same side. For example, if you are stretching your right leg, use your right hand. 3. Keeping your knees next to each other, pull your foot toward your buttock until you feel a gentle stretch across the front of your hip and down the front of your thigh. Your knee should be pointed directly to the ground, and not out to the side. 4. Hold the stretch for 15 to 30 seconds. 5. Repeat 2 to 4 times. Piriformis stretch    1. Lie on your back with your legs straight. 2. Lift your affected leg and bend your knee. With your opposite hand, reach across your body, and then gently pull your knee toward your opposite shoulder. 3. Hold the stretch for 15 to 30 seconds. 4. Repeat 2 to 4 times. Double knee-to-chest    1. Lie on your back with your knees bent and your feet flat on the floor. You can put a small pillow under your head and neck if it is more comfortable. 2. Bring both knees to your chest.  3. Keep your lower back pressed to the floor. Hold for 15 to 30 seconds. 4. Relax, and lower your knees to the starting position. 5. Repeat 2 to 4 times. Follow-up care is a key part of your treatment and safety. Be sure to make and go to all appointments, and call your doctor if you are having problems. It's also a good idea to know your test results and keep a list of the medicines you take. Where can you learn more? Go to https://Interventional SpinenimaFilepicker.io.Intentio. org and sign in to your [employer] account. Enter L107 in the [employer] box to learn more about \"Trochanteric Bursitis: Exercises. \"     If you do not have an account, please click on the \"Sign Up Now\" link. Current as of: September 20, 2018  Content Version: 12.0  © 3926-2805 Healthwise, [employer]. Care instructions adapted under license by [facility] ([facility]).  If you have questions about a medical condition or this instruction, always ask your healthcare professional. [address] any warranty or liability for your use of this information. Patient Education        Trochanteric Bursitis: Exercises  Your Care Instructions  Here are some examples of typical rehabilitation exercises for your condition. Start each exercise slowly. Ease off the exercise if you start to have pain. Your doctor or physical therapist will tell you when you can start these exercises and which ones will work best for you. How to do the exercises  Hamstring wall stretch    11. Lie on your back in a doorway, with your good leg through the open door. 12. Slide your affected leg up the wall to straighten your knee. You should feel a gentle stretch down the back of your leg. 1. Do not arch your back. 2. Do not bend either knee. 3. Keep one heel touching the floor and the other heel touching the wall. Do not point your toes. 13. Hold the stretch for at least 1 minute to begin. Then try to lengthen the time you hold the stretch to as long as 6 minutes. 14. Repeat 2 to 4 times. 15. If you do not have a place to do this exercise in a doorway, there is another way to do it:  16. Lie on your back, and bend the knee of your affected leg. 17. Loop a towel under the ball and toes of that foot, and hold the ends of the towel in your hands. 18. Straighten your knee, and slowly pull back on the towel. You should feel a gentle stretch down the back of your leg. 19. Hold the stretch for 15 to 30 seconds. Or even better, hold the stretch for 1 minute if you can. 20. Repeat 2 to 4 times. Straight-leg raises to the outside    6. Lie on your side, with your affected leg on top. 7. Tighten the front thigh muscles of your top leg to keep your knee straight. 8. Keep your hip and your leg straight in line with the rest of your body, and keep your knee pointing forward. Do not drop your hip back.   9. Lift your top leg straight up toward the ceiling, about 12 inches off the floor. Hold for about 6 seconds, then slowly lower your leg. 10. Repeat 8 to 12 times. Clamshell    6. Lie on your side, with your affected leg on top and your head propped on a pillow. Keep your feet and knees together and your knees bent. 7. Raise your top knee, but keep your feet together. Do not let your hips roll back. Your legs should open up like a clamshell. 8. Hold for 6 seconds. 9. Slowly lower your knee back down. Rest for 10 seconds. 10. Repeat 8 to 12 times. Standing quadriceps stretch    6. If you are not steady on your feet, hold on to a chair, counter, or wall. You can also lie on your stomach or your side to do this exercise. 7. Bend the knee of the leg you want to stretch, and reach behind you to grab the front of your foot or ankle with the hand on the same side. For example, if you are stretching your right leg, use your right hand. 8. Keeping your knees next to each other, pull your foot toward your buttock until you feel a gentle stretch across the front of your hip and down the front of your thigh. Your knee should be pointed directly to the ground, and not out to the side. 9. Hold the stretch for 15 to 30 seconds. 10. Repeat 2 to 4 times. Piriformis stretch    5. Lie on your back with your legs straight. 6. Lift your affected leg and bend your knee. With your opposite hand, reach across your body, and then gently pull your knee toward your opposite shoulder. 7. Hold the stretch for 15 to 30 seconds. 8. Repeat 2 to 4 times. Double knee-to-chest    6. Lie on your back with your knees bent and your feet flat on the floor. You can put a small pillow under your head and neck if it is more comfortable. 7. Bring both knees to your chest.  8. Keep your lower back pressed to the floor. Hold for 15 to 30 seconds. 9. Relax, and lower your knees to the starting position. 10. Repeat 2 to 4 times.     Follow-up care is a key part of your treatment and safety. Be sure to make and go to all appointments, and call your doctor if you are having problems. It's also a good idea to know your test results and keep a list of the medicines you take. Where can you learn more? Go to https://chpeyenifereweb.SprainGo. org and sign in to your [employer] account. Enter D030 in the [employer] box to learn more about \"Trochanteric Bursitis: Exercises. \"     If you do not have an account, please click on the \"Sign Up Now\" link. Current as of: September 20, 2018  Content Version: 12.0  © 3250-1554 Healthwise, [employer]. Care instructions adapted under license by [facility] [place] ([facility]). If you have questions about a medical condition or this instruction, always ask your healthcare professional. [address] any warranty or liability for your use of this information. Return in about 6 months (around 11/17/2019) for Medication check. EMR Dragon/transcription disclaimer:Significant part of this  encounter note is electronic transcription/translation of spoken language to printed text. The electronic translation of spoken language maybe erroneous, or at times, nonsensical words or phrases may be inadvertently transcribed.  Although I have reviewed the note for such errors, some may still exist.

## 2019-05-24 NOTE — TELEPHONE ENCOUNTER
Maribeth Johnson called requesting a refill of the below medication which has been pended for you:     Requested Prescriptions     Pending Prescriptions Disp Refills    amLODIPine (NORVASC) 5 MG tablet [Pharmacy Med Name: AMLODIPINE BESYLATE TABS 5MG] 90 tablet 3     Sig: TAKE 1 TABLET DAILY       Last Appointment Date: 5/17/2019  Next Appointment Date: 11/15/2019    No Known Allergies

## 2019-05-26 RX ORDER — AMLODIPINE BESYLATE 5 MG/1
TABLET ORAL
Qty: 90 TABLET | Refills: 3 | Status: SHIPPED | OUTPATIENT
Start: 2019-05-26 | End: 2019-08-23

## 2019-05-28 RX ORDER — AMLODIPINE BESYLATE 5 MG/1
5 TABLET ORAL DAILY
Qty: 30 TABLET | Refills: 5 | Status: SHIPPED | OUTPATIENT
Start: 2019-05-28 | End: 2019-06-28 | Stop reason: SDUPTHER

## 2019-05-28 NOTE — TELEPHONE ENCOUNTER
Linda Hopkins called requesting a refill of the below medication which has been pended for you:     Requested Prescriptions     Pending Prescriptions Disp Refills    amLODIPine (NORVASC) 5 MG tablet 30 tablet 3     Sig: Take 1 tablet by mouth daily       Last Appointment Date: 5/17/2019  Next Appointment Date: 11/15/2019    No Known Allergies

## 2019-06-03 RX ORDER — HYOSCYAMINE SULFATE 0.125 MG
TABLET,DISINTEGRATING ORAL
Qty: 60 TABLET | Refills: 1 | Status: SHIPPED | OUTPATIENT
Start: 2019-06-03 | End: 2019-08-11 | Stop reason: SDUPTHER

## 2019-06-03 NOTE — TELEPHONE ENCOUNTER
Amando Colón called requesting a refill of the below medication which has been pended for you:     Requested Prescriptions     Pending Prescriptions Disp Refills    hyoscyamine (NULEV) 125 MCG TBDP dispersible tablet 60 tablet 1     Sig: Take 2 tablets every 12 hours as needed abdominal pain/ cramping       Last Appointment Date: 5/17/2019  Next Appointment Date: 11/15/2019    No Known Allergies

## 2019-06-26 ENCOUNTER — TELEPHONE (OUTPATIENT)
Dept: NEUROLOGY | Age: 59
End: 2019-06-26

## 2019-06-28 ENCOUNTER — OFFICE VISIT (OUTPATIENT)
Dept: NEUROLOGY | Age: 59
End: 2019-06-28
Payer: COMMERCIAL

## 2019-06-28 VITALS
HEART RATE: 53 BPM | OXYGEN SATURATION: 99 % | HEIGHT: 74 IN | SYSTOLIC BLOOD PRESSURE: 121 MMHG | DIASTOLIC BLOOD PRESSURE: 85 MMHG | BODY MASS INDEX: 33.62 KG/M2 | WEIGHT: 262 LBS

## 2019-06-28 DIAGNOSIS — G47.33 OBSTRUCTIVE SLEEP APNEA: Primary | ICD-10-CM

## 2019-06-28 DIAGNOSIS — Z99.89 BIPAP (BIPHASIC POSITIVE AIRWAY PRESSURE) DEPENDENCE: ICD-10-CM

## 2019-06-28 PROCEDURE — 99213 OFFICE O/P EST LOW 20 MIN: CPT | Performed by: PHYSICIAN ASSISTANT

## 2019-06-28 NOTE — PROGRESS NOTES
REVIEW OF SYSTEMS    Constitutional: []Fever []Sweats []Chills [] Recent Injury   [x] Denies all unless marked  HENT:[]Headache  [] Head Injury  [] Sore Throat  [] Ear Pain  [] Dizziness [] Hearing Loss   [x] Denies all unless marked  Spine:  [] Neck pain  [x] Back pain  [] Sciaticia  [x] Denies all unless marked  Cardiovascular:[]Chest Pain []Palpitations [] Heart Disease  [x] Denies all unless marked  Pulmonary: []Shortness of Breath []Cough   [x] Denies all unless marked  Gastrointestinal:  [x]Abdominal Pain  []Blood in Stool  [x]Diarrhea []Constipation []Nausea  []Vomiting  [x] Denies all unless marked  Genitourinary:  [] Dysuria [x] Frequency  [] Incontinence [] Urgency   [x] Denies all unless marked  Musculoskeletal: [] Arthralgia  [x] Myalgias [] Muscle cramps  [] Muscle twitches   [x] Denies all unless marked   Extremities:   [] Pain   [] Swelling   [x] Denies all unless marked  Skin:[] Rash  [] Color Change  [x] Denies all unless marked  Neurological:[] Visual Disturbance [] Double Vision [] Slurred Speech [] Trouble swallowing  [] Vertigo [] Tingling [] Numbness [] Weakness [x] Loss of Balance   [] Loss of Consciousness [] Memory Loss [] Seizures  [x] Denies all unless marked  Psychiatric/Behavioral:[] Depression [] Anxiety  [x] Denies all unless marked  Sleep: []  Insomnia [] Sleep Disturbance [] Snoring [] Restless Legs [] Daytime Sleepiness [x] Sleep Apnea  [x] Denies all unless marked

## 2019-06-28 NOTE — PATIENT INSTRUCTIONS
Patient education: Sleep apnea in adults       INTRODUCTION -- Normally during sleep, air moves through the throat and in and out of the lungs at a regular rhythm. In a person with sleep apnea, air movement is periodically diminished or stopped. There are two types of sleep apnea: obstructive sleep apnea and central sleep apnea. In obstructive sleep apnea, breathing is abnormal because of narrowing or closure of the throat. In central sleep apnea, breathing is abnormal because of a change in the breathing control and rhythm. Sleep apnea is a serious condition that can affect a person's ability to safely perform normal daily activities and can affect long term health. Approximately 25 percent of adults are at risk for sleep apnea of some degree. Men are more commonly affected than women. Other risk factors include middle and older age, being overweight or obese, and having a small mouth and throat. This topic review focuses on the most common type of sleep apnea in adults, obstructive sleep apnea (HNAANE). HOW SLEEP APNEA OCCURS -- The throat is surrounded by muscles that control the airway for speaking, swallowing, and breathing. During sleep, these muscles are less active, and this causes the throat to narrow. In most people, this narrowing does not affect breathing. In others, it can cause snoring, sometimes with reduced or completely blocked airflow. A completely blocked airway without airflow is called an obstructive apnea. Partial obstruction with diminished airflow is called a hypopnea. A person may have apnea and hypopnea during sleep. Insufficient breathing due to apnea or hypopnea causes oxygen levels to fall and carbon dioxide to rise. Because the airway is blocked, breathing faster or harder does not help to improve oxygen levels until the airway is reopened. Typically, the obstruction requires the person to awaken to activate the upper airway muscles.  Once the airway is opened, the person then takes several deep breaths to catch up on breathing. As the person awakens, he or she may move briefly, snort or snore, and take a deep breath. Less frequently, a person may awaken completely with a sensation of gasping, smothering, or choking. If the person falls back to sleep quickly, he or she will not remember the event. Many people with sleep apnea are unaware of their abnormal breathing in sleep, and all patients underestimate how often their sleep is interrupted. Awakening from sleep causes sleep to be unrefreshing and causes fatigue and daytime sleepiness. Anatomic causes of obstructive sleep apnea --  Most patients have HANANE because of a small upper airway. As the bones of the face and skull develop, some people develop a small lower face, a small mouth, and a tongue that seems too large for the mouth. These features are genetically determined, which explains why HANANE tends to cluster in families. Obesity is another major factor. Tonsil enlargement can be an important cause, especially in children. SLEEP APNEA SYMPTOMS -- The main symptoms of HANANE are loud snoring, fatigue, and daytime sleepiness. However, some people have no symptoms. For example, if the person does not have a bed partner, he or she may not be aware of the snoring. Fatigue and sleepiness have many causes and are often attributed to overwork and increasing age. As a result, a person may be slow to recognize that they have a problem. A bed partner or spouse often prompts the patient to seek medical care. Other symptoms may include one or more of the following:  ?Restless sleep  ? Awakening with choking, gasping, or smothering  ? Morning headaches, dry mouth, or sore throat  ? Waking frequently to urinate  ? Awakening unrested, groggy  ? Low energy, difficulty concentrating, memory impairment    Risk factors -- Certain factors increase the risk of sleep apnea.   ?Increasing age - HANANE occurs at all ages, but it is more common in middle and older age adults. ?Male sex - HANANE is two times more common in men, especially in middle age. ?Obesity - The more obese a person is, the more likely he or she is to have HANANE. ? Sedation from medication or alcohol - This interferes with the ability to awaken from sleep and can lengthen periods of apnea (no breathing), with potentially dangerous consequences. ? Abnormality of the airway. SLEEP APNEA CONSEQUENCES -- Complications of sleep apnea can include daytime sleepiness and difficulty concentrating. The consequence of this is an increased risk of accidents and errors in daily activities. Studies have shown that people with severe HANANE are more than twice as likely to be involved in a motor vehicle accident as people without these conditions. People with HANANE are encouraged to discuss options for driving, working, and performing other high-risk tasks with a healthcare provider. In addition, people with untreated HANANE may have an increased risk of cardiovascular problems such as high blood pressure, heart attack, abnormal heart rhythms, or stroke. This risk may be due to changes in the heart rate and blood pressure that occur during sleep. SLEEP APNEA DIAGNOSIS -- The diagnosis of HANANE is best made by a knowledgeable sleep medicine specialist who has an understanding of the individual's health issues. The diagnosis is usually based upon the person's medical history, physical examination, and testing, including:  ? A complaint of snoring and ineffective sleep  ? Neck size (greater than 16 inches in men or 14 inches in women) is associated with an increased risk of sleep apnea  ? A small upper airway: difficulty seeing the throat because of a tongue that is large for the mouth  ? High blood pressure, especially if it is resistant to treatment  ? If a bed partner has observed the patient during episodes of stopped breathing (apnea), choking, or gasping during sleep, there is a strong possibility of sleep another sleep test. While the treatment may seem uncomfortable, noisy, or bulky at first, most people accept the treatment after experiencing better sleep. However, difficulty with mask comfort and nasal congestion prevent up to 50 percent of people from using the treatment on a regular basis. Continued follow up with a healthcare provider helps to ensure that the treatment is effective and comfortable. Information from the CPAP machine is often used by physicians, therapists, and insurers to track the success of treatment. CPAP can be delivered with different features to improve comfort and solve problems that may come up during treatment. Changes in treatment may be needed if symptoms do not improve or if the persons condition changes, such as a gain or loss of weight. Adjust sleep position -- Adjusting sleep position (to stay off the back) may help improve sleep quality in people who have HANANE when sleeping on the back. However, this is difficult to maintain throughout the night and is rarely an adequate solution. Weight loss -- Weight loss may be helpful for obese or overweight patients. Weight loss may be accomplished with dietary changes, exercise, and/or surgical treatment. However, it can be difficult to maintain weight loss; the five-year success of non-surgical weight loss is only 5 percent, meaning that 95 percent of people regain lost weight. Avoid alcohol and other sedatives -- Alcohol can worsen sleepiness, potentially increasing the risk of accidents or injury. People with HANANE are often counseled to drink little to no alcohol, even during the daytime. Similarly, people who take anti-anxiety medications or sedatives to sleep should speak with their healthcare provider about the safety of these medications. People with HANANE must notify all healthcare providers, including surgeons, about their condition and the potential risks of being sedated.  People with HANANE who are given anesthesia and/or pain medications require special management and close monitoring to reduce the risk of a blocked airway. Dental devices -- A dental device, called an oral appliance or mandibular advancement device, can reposition the jaw (mandible), bringing the tongue and soft palate forward as well. This may relieve obstruction in some people. This treatment is excellent for reducing snoring, although the effect on HANANE is sometimes more limited. As a result, dental devices are best used for mild cases of HANANE when relief of snoring is the main goal. Failure to tolerate and accept CPAP is another indication for dental devices. While dental devices are not as effective as CPAP for HANANE, some patients prefer a dental device to CPAP. Side effects of dental devices are generally minor but may include changes to the bite with prolonged use. Surgical treatment -- Surgery is an alternative therapy for patients who cannot tolerate or do not improve with nonsurgical treatments such as CPAP or oral devices. Surgery can also be used in combination with other nonsurgical treatments. Surgical procedures reshape structures in the upper airways or surgically reposition bone or soft tissue. Uvulopalatopharyngoplasty (UPPP) removes the uvula and excessive tissue in the throat, including the tonsils, if present. Other procedures, such as maxillomandibular advancement (MMA), address both the upper and lower pharyngeal airway more globally. UPPP alone has limited success rates (less than 50 percent) and people can relapse (when HANANE symptoms return after surgery). As a result, this surgery is only recommended in a minority of people and should be considered with caution. MMA may have a higher success rate, particularly in people with abnormal jaw (maxilla and mandible) anatomy, but it is the most complicated procedure. A newer surgical approach, nerve stimulation to protrude the tongue, has promising success rates in very selected people.   Tracheostomy creates a permanent opening in the neck. It is reserved for people with severe disease in whom less drastic measures have failed or are inappropriate. Although it is always successful in eliminating obstructive sleep apnea, tracheostomy requires significant lifestyle changes and carries some serious risks (eg, infection, bleeding, blockage). All surgical treatments require discussions about the goals of treatment, the expected outcomes, and potential complications. Hypoglossal nerve stimulator- \"Inspire\" device    WHERE TO GET MORE INFORMATION -- Your healthcare provider is the best source of information for questions and concerns related to your medical problem. Organizations  American Sleep Apnea Association  Provides information about sleep apnea to the public, publishes a newsletter, and serves as an advocate for people with the disorder. Nathan[place], [address], [address] [place] [address]   [email or web address]. org   [email or web address]. org   Tel: [phone]   Fax: [facility] organization that works to [employer] and safety by promoting public understanding of sleep and sleep disorders. Supports sleep-related education, research, and advocacy; produces and distributes educational materials to the public and healthcare professionals; and offers postdoctoral fellowships and grants for sleep researchers. [address]   [email or web address] org   [email or web address]. org   Tel: [phone]   Fax: [phone]    Important information:  Medicare/private insurance CPAP/BiPAP/APAP requirements:  Medicare/private insurance has specific requirements for PAP compliance that must be met during the first 90 days of use to continue coverage for CPAP/BiPAP/APAP  from day 91 and beyond.  The policy requires that patients use a PAP device 4 hours per 24 hour period, at least 70% of the time over a 30 day period. This data must be downloaded as a report direct from the PAP devices. This is called a compliance download. Your PAP supplier will assist you in this matter. Reminder:  Please bring a copy of the compliance download to your next office visit or have your supplier fax it to our office prior to your office visit. Note:  Where applicable, we will utilize PAP device efficiency reports, additional testing, and face-to-face  clinical evaluation subsequent to any treatment, changes in treatment, and continued treatment. Caution:  Please abstain from driving or engaging in other activities which may be hazardous in the presence of diminished alertness or daytime drowsiness. And avoid the use of sedatives or alcohol, which can worsen sleep apnea and daytime drowsiness. Mask suggestions:  - Resmed Airfit N20 (Nasal) or F20 (Full face mask). They conform to your face, thus decreasing the potential for mask leakage. You might like the [employer] (full face mask). It has a \"memory foam\" like cushion. The AirFit F30 is a smaller style full face mask designed to sit low on and cover less of your face for fewer facial marks. Respironics: You might also like to try a nasal mask called a Dreamwear nasal mask or the Dreamwear nasal pillow. Another suggestion is the [facility], it is a minimal contact full face mask. The Kristi stevensdible under the nose design makes it the only full face mask that won't cause red marks on the bridge of your nose when compared to other full face masks. The Dreamwear full face mask has a  soft feel, unique in-frame air-flow, and innovative air tube connection at the top of the head for the ultimate in sleep comfort. As of 2/2019 there is 1 additional Resmed masks: The AirFit G17c-jc has a top of the head tube with a nasal mask.

## 2019-06-28 NOTE — PROGRESS NOTES
[facility] Sleep Follow Up Encounter      Information:   Patient Name: Dev Luna  :   1960  Age:   62 y.o. MRN:   [id number]  Account #:  [de-identified]  Today:                19    Provider:  Joy Soria PA-C    Chief Complaint   Patient presents with    Sleep Apnea     pt states everything is going ok        Subjective:   Dev Luna is a 62 y.o. male  with a history of severe HANANE who comes in for an annual sleep clinic follow up. The PSG, 2015 revealed an AHI of 36.5. He is prescribed auto BiPAP therapy at a pressure range of 12cm to 22cm. The compliance report indicates that he is using CPAP 7 hours per night. He sleeps 7 hours per night. He reports that consistent BiPAP use has alleviated the previous HANANE symptoms. Location or symptom:  HANANE  Onset:  PS  Timing:  q hs  Severity:  Severe  Associated:  Snoring and witnessed apneas  Alleviated:   BiPAP      Objective:     Past Medical History:   Diagnosis Date    [facility] [address]     Atelectasis of right lung 2017    Back pain, lumbosacral     BiPAP (biphasic positive airway pressure) dependence     12cm to 22cm    Clotting disorder (HCC)     Cyst near coccyx     removed    GERD (gastroesophageal reflux disease)     Heart murmur     Hx of blood clots     Hyperlipidemia     Hypertension     Mild CAD 2018    Obstructive sleep apnea     AHI:  36.5 per PSG, 2015 - BiPAP    Osteoarthritis     Paroxysmal atrial fibrillation with RVR ([facility] [address] 2017    Pneumonia due to organism     Pulmonary embolism [facility])        Past Surgical History:   Procedure Laterality Date    ANKLE FRACTURE SURGERY      left ankle    ARM SURGERY Left 2018    fracture     CARDIAC CATHETERIZATION  2018    Mild CAD    CHOLECYSTECTOMY, LAPAROSCOPIC N/A 12/15/2017    CHOLE[facility] [address]      GALLBLADDER SURGERY      NC EGD TRANSORAL BIOPSY SINGLE/MULTIPLE N/A 11/15/2018    Dr Levar Beck [place]-Gastropathy    TESTICLE SURGERY      undecended testicle done when 1years old       Recent Hospitalizations  ·     Significant Injuries  ·     Family History   Problem Relation Age of Onset    Heart Disease Mother     High Blood Pressure Mother     Heart Disease Father 54        mi    High Blood Pressure Father     High Cholesterol Father     Lung Cancer Father     Cancer Father         Lung CA - Oat cell    Stroke Father     Diabetes Paternal Aunt     Colon Cancer Neg Hx     Colon Polyps Neg Hx     Esophageal Cancer Neg Hx     Liver Cancer Neg Hx     Liver Disease Neg Hx     Rectal Cancer Neg Hx     Stomach Cancer Neg Hx        Social History  Social History     Tobacco Use   Smoking Status Never Smoker   Smokeless Tobacco Former User    Types: Chew     Social History     Substance and Sexual Activity   Alcohol Use Yes    Comment: occ     Social History     Substance and Sexual Activity   Drug Use No         Current Outpatient Medications   Medication Sig Dispense Refill    hyoscyamine (NULEV) 125 MCG TBDP dispersible tablet Take 2 tablets every 12 hours as needed abdominal pain/ cramping 60 tablet 1    amLODIPine (NORVASC) 5 MG tablet TAKE 1 TABLET DAILY 90 tablet 3    hydrochlorothiazide (HYDRODIURIL) 25 MG tablet TAKE ONE-HALF (1/2) TABLET IN THE MORNING 45 tablet 3    lisinopril (PRINIVIL;ZESTRIL) 40 MG tablet TAKE 1 TABLET DAILY 90 tablet 3    tadalafil (CIALIS) 5 MG tablet Take 1 tablet by mouth daily 90 tablet 3    fluticasone (FLONASE) 50 MCG/ACT nasal spray 1 spray by Nasal route daily 48 g 3    rosuvastatin (CRESTOR) 10 MG tablet Take 1 tablet by mouth nightly 90 tablet 3    sotalol (BETAPACE) 80 MG tablet Take 1 tablet by mouth 2 times daily 180 tablet 3    omeprazole (PRILOSEC) 20 MG delayed release capsule TAKE 1 CAPSULE TWICE A  capsule 2    Potassium 99 MG TABS Take 99 mg by mouth daily       niacin (NIASPAN) 1000 MG extended release tablet Take 1,000 mg by mouth 4 times daily      aspirin 81 MG EC tablet Take 81 mg by mouth daily      Omega-3 Fatty Acids (FISH OIL PO) Take 2,150 mg by mouth 2 times daily      Ascorbic Acid (VITAMIN C) 500 MG tablet Take 500 mg by mouth 2 times daily       vitamin E 400 UNIT capsule Take 400 Units by mouth daily      Probiotic Product (PROBIOTIC ADVANCED PO) Take 1 tablet by mouth nightly       No current facility-administered medications for this visit. Allergies:  Patient has no known allergies.     REVIEW OF SYSTEMS     Constitutional: []Fever []Sweats []Chills [] Recent Injury   [x] Denies all unless marked  HENT:[]Headache  [] Head Injury  [] Sore Throat  [] Ear Pain  [x] Dizziness [] Hearing Loss   [] Denies all unless marked  Spine:  [x] Neck pain  [x] Back pain  [] Sciaticia  [] Denies all unless marked  Cardiovascular:[x]Chest Pain []Palpitations [x] Heart Disease  [] Denies all unless marked  Pulmonary: []Shortness of Breath []Cough   [x] Denies all unless marked  Gastrointestinal:  []Abdominal Pain  []Blood in Stool  [x]Diarrhea [x]Constipation []Nausea  []Vomiting  [x] Denies all unless marked  Genitourinary:  [] Dysuria [] Frequency  [] Incontinence [] Urgency   [x] Denies all unless marked  Musculoskeletal: [] Arthralgia  [] Myalgias [] Muscle cramps  [] Muscle twitches   [x] Denies all unless marked   Extremities:   [] Pain   [] Swelling   [x] Denies all unless marked  Skin:[] Rash  [] Color Change  [x] Denies all unless marked  Neurological:[] Visual Disturbance [] Double Vision [] Slurred Speech [] Trouble swallowing  [] Vertigo [] Tingling [] Numbness [] Weakness [x] Loss of Balance   [] Loss of Consciousness [] Memory Loss [] Seizures  [] Denies all unless marked  Psychiatric/Behavioral:[] Depression [] Anxiety  [x] Denies all unless marked  Sleep: []  Insomnia [] Sleep Disturbance [] Snoring [] Restless Legs [x] Daytime Sleepiness [x] Sleep Apnea  [] Denies all unless marked    The MA has completed the ROS with the patient. I have reviewed it in its' entirety with the patient and agree with the documentation. PHYSICAL EXAM  /85   Pulse 53   Ht 6' 2\" (1.88 m)   Wt 262 lb (118.8 kg)   SpO2 99%   BMI 33.64 kg/m²      Constitutional - No acute distress    HEENT- Conjunctiva normal.  No scars, masses, or lesions over external nose or ears, no neck masses noted, no jugular vein distension, no bruit  Cardiac- Regular rate and rhythm with a grade I/VI murmur  Pulmonary- Clear to auscultation, good expansion, normal effort without use of accessory muscles  Musculoskeletal - No significant wasting of muscles noted, no bony deformities  Extremities - No clubbing, cyanosis or edema  Skin - Warm, dry, and intact. No rash, erythema, or pallor  Psychiatric - Mood, affect, and behavior appear normal      Neurologic:  Extraocular movements are intact without nystagmus. Visual fields are full to confrontation. Facial movements are symmetrical and normal.  Speech is precise. Extremity strength is normal in both uppers and lowers. Deep tendon reflexes are intact and symmetrical.  Rapid alternating movements are unimpaired. Finger-to-nose testing is performed well, without dysmetria. Gait is normal.    I reviewed the following studies:      []  :  Clinical laboratory test results    []  :  Radiology reports    []  :  Review and summarization of medical records and/or obtain medical records     []  :  Previous/recent polysomnogram report(s)    []  :  [place] Sleepiness Scale      [x]  :  Compliance download: The auto BiPAP is set at a pressure range of 12cm to 22cm. Compliance download shows that he uses device: 90% of the time;  percentage of days with usage >=4 hours: 87%. AHI: 4.8    Assessment:       ICD-10-CM    1. Obstructive sleep apnea G47.33    2.  BiPAP (biphasic positive airway pressure) dependence Z99.89           []  :  Stable     []  :  Improved                       [x]  :  Well controlled []  :  Resolving     []  :  Resolved     []  :  Inadequately controlled     []  :  Worsening     []  :  Additional workup planned    Patient is compliant and benefiting from therapy as indicated by compliance evaluation and patient report. Plan:     No orders of the defined types were placed in this encounter. 1.   Patient advised of the etiology,  pathophysiology, diagnosis, treatment options, and risks of untreated HANANE. Risks may include, but are not limited to  hypertension, coronary artery disease, diabetes, stroke, weight gain, impaired cognition, daytime somnolence,  and motor vehicle accidents. Advised to abstain from driving or operating heavy machinery when drowsy and the use of respiratory suppressants. 2.  The following educational material has been included in this visit after visit summary for your review: HANANE/PAP guidelines-Discussed with the patient and all questions fully answered. 3.  The current medical regimen is effective;  continue present plan. 4.  Continue BiPAP  5. Follow up in 1 year      Note:  A total of >50% (>8 minutes) of 15 minutes was spent discussing the pathophysiology and treatment and/or coordination of care of the above diagnoses.

## 2019-08-12 RX ORDER — HYOSCYAMINE SULFATE 0.125 MG
TABLET,DISINTEGRATING ORAL
Qty: 60 TABLET | Refills: 1 | Status: SHIPPED | OUTPATIENT
Start: 2019-08-12 | End: 2019-09-29 | Stop reason: SDUPTHER

## 2019-08-23 ENCOUNTER — OFFICE VISIT (OUTPATIENT)
Dept: CARDIOLOGY | Age: 59
End: 2019-08-23
Payer: COMMERCIAL

## 2019-08-23 VITALS
WEIGHT: 268 LBS | DIASTOLIC BLOOD PRESSURE: 80 MMHG | OXYGEN SATURATION: 97 % | SYSTOLIC BLOOD PRESSURE: 120 MMHG | BODY MASS INDEX: 34.39 KG/M2 | HEART RATE: 60 BPM | HEIGHT: 74 IN

## 2019-08-23 DIAGNOSIS — I48.91 LONE ATRIAL FIBRILLATION (HCC): ICD-10-CM

## 2019-08-23 DIAGNOSIS — I48.0 PAF (PAROXYSMAL ATRIAL FIBRILLATION) (HCC): Primary | ICD-10-CM

## 2019-08-23 DIAGNOSIS — I10 ESSENTIAL HYPERTENSION: ICD-10-CM

## 2019-08-23 DIAGNOSIS — G47.33 OBSTRUCTIVE SLEEP APNEA: ICD-10-CM

## 2019-08-23 PROCEDURE — 99213 OFFICE O/P EST LOW 20 MIN: CPT | Performed by: CLINICAL NURSE SPECIALIST

## 2019-08-23 PROCEDURE — 93000 ELECTROCARDIOGRAM COMPLETE: CPT | Performed by: CLINICAL NURSE SPECIALIST

## 2019-08-23 ASSESSMENT — ENCOUNTER SYMPTOMS
BLOOD IN STOOL: 0
NAUSEA: 0
VOMITING: 0
SHORTNESS OF BREATH: 0
COUGH: 0
ORTHOPNEA: 0
HEARTBURN: 0
BLURRED VISION: 0

## 2019-08-27 DIAGNOSIS — R97.20 ELEVATED PSA: ICD-10-CM

## 2019-08-29 LAB
PROSTATE SPECIFIC ANTIGEN FREE: 1.5 NG/ML
PROSTATE SPECIFIC ANTIGEN PERCENT FREE: 28 %
PROSTATE SPECIFIC ANTIGEN: 5.4 NG/ML (ref 0–4)

## 2019-09-09 ENCOUNTER — OFFICE VISIT (OUTPATIENT)
Dept: UROLOGY | Age: 59
End: 2019-09-09
Payer: COMMERCIAL

## 2019-09-09 VITALS — HEIGHT: 74 IN | TEMPERATURE: 97.7 F | WEIGHT: 266 LBS | BODY MASS INDEX: 34.14 KG/M2

## 2019-09-09 DIAGNOSIS — R97.20 ELEVATED PSA: Primary | ICD-10-CM

## 2019-09-09 DIAGNOSIS — N40.1 BENIGN PROSTATIC HYPERPLASIA WITH URINARY FREQUENCY: ICD-10-CM

## 2019-09-09 DIAGNOSIS — R35.0 BENIGN PROSTATIC HYPERPLASIA WITH URINARY FREQUENCY: ICD-10-CM

## 2019-09-09 LAB
APPEARANCE FLUID: NORMAL
BILIRUBIN, POC: 0
BLOOD URINE, POC: NORMAL
CLARITY, POC: CLEAR
COLOR, POC: YELLOW
GLUCOSE URINE, POC: NORMAL
KETONES, POC: NORMAL
LEUKOCYTE EST, POC: NORMAL
NITRITE, POC: NORMAL
PH, POC: 5.5
PROTEIN, POC: NORMAL
SPECIFIC GRAVITY, POC: 1.02
UROBILINOGEN, POC: 0.2

## 2019-09-09 PROCEDURE — 81002 URINALYSIS NONAUTO W/O SCOPE: CPT | Performed by: UROLOGY

## 2019-09-09 PROCEDURE — 99213 OFFICE O/P EST LOW 20 MIN: CPT | Performed by: UROLOGY

## 2019-09-09 RX ORDER — OMEPRAZOLE 20 MG/1
CAPSULE, DELAYED RELEASE ORAL
Qty: 180 CAPSULE | Refills: 3 | Status: SHIPPED | OUTPATIENT
Start: 2019-09-09 | End: 2020-06-18

## 2019-09-09 RX ORDER — AMLODIPINE BESYLATE 5 MG/1
5 TABLET ORAL DAILY
COMMUNITY
End: 2020-06-01

## 2019-09-09 ASSESSMENT — ENCOUNTER SYMPTOMS
SHORTNESS OF BREATH: 0
COLOR CHANGE: 0
WHEEZING: 0
ABDOMINAL PAIN: 0
EYE PAIN: 0
SINUS PRESSURE: 0
FACIAL SWELLING: 0
ABDOMINAL DISTENTION: 0
EYE DISCHARGE: 0
EYE REDNESS: 0
SORE THROAT: 0
BLOOD IN STOOL: 0
VOMITING: 0
CONSTIPATION: 0
BACK PAIN: 0
NAUSEA: 0
RHINORRHEA: 0
COUGH: 0
DIARRHEA: 0

## 2019-09-09 NOTE — TELEPHONE ENCOUNTER
Reyes Barnard called requesting a refill of the below medication which has been pended for you:     Requested Prescriptions     Pending Prescriptions Disp Refills    omeprazole (PRILOSEC) 20 MG delayed release capsule [Pharmacy Med Name: OMEPRAZOLE DR CAPS 20MG] 180 capsule 4     Sig: TAKE 1 CAPSULE TWICE A DAY       Last Appointment Date: 5/17/2019  Next Appointment Date: 11/15/2019    No Known Allergies

## 2019-09-09 NOTE — PROGRESS NOTES
for decreased urine volume, difficulty urinating, dysuria, enuresis, flank pain, frequency, genital sores, hematuria and urgency. Musculoskeletal: Negative for back pain, gait problem, joint swelling, neck pain and neck stiffness. Skin: Negative for color change, rash and wound. Allergic/Immunologic: Negative for environmental allergies and immunocompromised state. Neurological: Negative for dizziness, syncope, weakness, light-headedness, numbness and headaches. Psychiatric/Behavioral: Negative for agitation, confusion, dysphoric mood, self-injury, sleep disturbance and suicidal ideas. The patient is not hyperactive. PHYSICAL EXAM:  Temp 97.7 °F (36.5 °C) (Temporal)   Ht 6' 2\" (1.88 m)   Wt 266 lb (120.7 kg)   BMI 34.15 kg/m²   Physical Exam   Constitutional: He is oriented to person, place, and time. He appears well-developed and well-nourished. No distress. HENT:   Head: Normocephalic and atraumatic. Nose: Nose normal.   Eyes: Pupils are equal, round, and reactive to light. Conjunctivae and EOM are normal. No scleral icterus. Neck: Normal range of motion. Neck supple. No tracheal deviation present. Cardiovascular: Normal rate, regular rhythm and intact distal pulses. Pulmonary/Chest: Effort normal and breath sounds normal. No stridor. He exhibits no tenderness. Abdominal: Soft. Bowel sounds are normal. He exhibits no distension and no mass. There is no tenderness. Musculoskeletal: Normal range of motion. He exhibits no edema or tenderness. Lymphadenopathy:     He has no cervical adenopathy. Neurological: He is alert and oriented to person, place, and time. Skin: Skin is warm and dry. No erythema. Psychiatric: He has a normal mood and affect.  His behavior is normal. Judgment normal.           DATA:    Results for orders placed or performed in visit on 09/09/19   POCT Urinalysis no Micro   Result Value Ref Range    Color, UA YELLOW     Clarity, UA CLEAR     Glucose, UA POC

## 2019-09-30 RX ORDER — HYOSCYAMINE SULFATE 0.125 MG
TABLET,DISINTEGRATING ORAL
Qty: 60 TABLET | Refills: 1 | Status: SHIPPED | OUTPATIENT
Start: 2019-09-30 | End: 2019-12-13 | Stop reason: SDUPTHER

## 2019-09-30 RX ORDER — HYOSCYAMINE SULFATE 0.125 MG
TABLET,DISINTEGRATING ORAL
Qty: 60 TABLET | Refills: 3 | Status: SHIPPED | OUTPATIENT
Start: 2019-09-30 | End: 2019-11-15

## 2019-09-30 NOTE — TELEPHONE ENCOUNTER
Abdelrahman Stallings called requesting a refill of the below medication which has been pended for you:     Requested Prescriptions     Pending Prescriptions Disp Refills    hyoscyamine (NULEV) 125 MCG TBDP dispersible tablet 60 tablet 1     Sig: Take 2 tablets every 12 hours as needed abdominal pain/ cramping       Last Appointment Date: 5/17/2019  Next Appointment Date: 11/15/2019    No Known Allergies

## 2019-11-08 DIAGNOSIS — R73.01 IFG (IMPAIRED FASTING GLUCOSE): ICD-10-CM

## 2019-11-08 DIAGNOSIS — I10 ESSENTIAL HYPERTENSION: ICD-10-CM

## 2019-11-08 DIAGNOSIS — Z23 NEED FOR PROPHYLACTIC VACCINATION AGAINST DIPHTHERIA-TETANUS-PERTUSSIS (DTP): ICD-10-CM

## 2019-11-08 DIAGNOSIS — E78.2 MIXED HYPERLIPIDEMIA: ICD-10-CM

## 2019-11-08 LAB
ALBUMIN SERPL-MCNC: 4.4 G/DL (ref 3.5–5.2)
ALP BLD-CCNC: 60 U/L (ref 40–130)
ALT SERPL-CCNC: 22 U/L (ref 5–41)
ANION GAP SERPL CALCULATED.3IONS-SCNC: 12 MMOL/L (ref 7–19)
AST SERPL-CCNC: 29 U/L (ref 5–40)
BILIRUB SERPL-MCNC: 0.7 MG/DL (ref 0.2–1.2)
BUN BLDV-MCNC: 11 MG/DL (ref 8–23)
CALCIUM SERPL-MCNC: 9.2 MG/DL (ref 8.8–10.2)
CHLORIDE BLD-SCNC: 106 MMOL/L (ref 98–111)
CHOLESTEROL, TOTAL: 156 MG/DL (ref 160–199)
CO2: 29 MMOL/L (ref 22–29)
CREAT SERPL-MCNC: 0.7 MG/DL (ref 0.5–1.2)
GFR NON-AFRICAN AMERICAN: >60
GLUCOSE BLD-MCNC: 88 MG/DL (ref 74–109)
HBA1C MFR BLD: 5.3 % (ref 4–6)
HDLC SERPL-MCNC: 49 MG/DL (ref 55–121)
LDL CHOLESTEROL CALCULATED: 87 MG/DL
POTASSIUM SERPL-SCNC: 4.5 MMOL/L (ref 3.5–5)
SODIUM BLD-SCNC: 147 MMOL/L (ref 136–145)
TOTAL PROTEIN: 7.1 G/DL (ref 6.6–8.7)
TRIGL SERPL-MCNC: 100 MG/DL (ref 0–149)

## 2019-11-11 RX ORDER — FLUTICASONE PROPIONATE 50 MCG
SPRAY, SUSPENSION (ML) NASAL
Qty: 48 G | Refills: 3 | Status: SHIPPED | OUTPATIENT
Start: 2019-11-11 | End: 2020-08-10

## 2019-11-11 RX ORDER — FLUTICASONE PROPIONATE 50 MCG
1 SPRAY, SUSPENSION (ML) NASAL DAILY
Qty: 48 G | Refills: 3 | Status: SHIPPED | OUTPATIENT
Start: 2019-11-11 | End: 2019-11-15

## 2019-11-15 ENCOUNTER — OFFICE VISIT (OUTPATIENT)
Dept: INTERNAL MEDICINE | Age: 59
End: 2019-11-15
Payer: COMMERCIAL

## 2019-11-15 VITALS
SYSTOLIC BLOOD PRESSURE: 130 MMHG | HEIGHT: 74 IN | DIASTOLIC BLOOD PRESSURE: 88 MMHG | RESPIRATION RATE: 20 BRPM | WEIGHT: 270 LBS | OXYGEN SATURATION: 98 % | BODY MASS INDEX: 34.65 KG/M2 | HEART RATE: 64 BPM

## 2019-11-15 DIAGNOSIS — I10 ESSENTIAL HYPERTENSION: Primary | ICD-10-CM

## 2019-11-15 DIAGNOSIS — E78.2 MIXED HYPERLIPIDEMIA: ICD-10-CM

## 2019-11-15 DIAGNOSIS — Z23 NEED FOR VACCINATION: ICD-10-CM

## 2019-11-15 DIAGNOSIS — E66.09 EXOGENOUS OBESITY: ICD-10-CM

## 2019-11-15 DIAGNOSIS — F51.01 PRIMARY INSOMNIA: ICD-10-CM

## 2019-11-15 DIAGNOSIS — R73.01 IFG (IMPAIRED FASTING GLUCOSE): ICD-10-CM

## 2019-11-15 DIAGNOSIS — I48.0 PAF (PAROXYSMAL ATRIAL FIBRILLATION) (HCC): ICD-10-CM

## 2019-11-15 PROCEDURE — 90471 IMMUNIZATION ADMIN: CPT | Performed by: INTERNAL MEDICINE

## 2019-11-15 PROCEDURE — 99214 OFFICE O/P EST MOD 30 MIN: CPT | Performed by: INTERNAL MEDICINE

## 2019-11-15 PROCEDURE — 90686 IIV4 VACC NO PRSV 0.5 ML IM: CPT | Performed by: INTERNAL MEDICINE

## 2019-11-15 ASSESSMENT — ENCOUNTER SYMPTOMS
SORE THROAT: 0
ABDOMINAL PAIN: 1
COUGH: 0
CHEST TIGHTNESS: 0
WHEEZING: 0
CONSTIPATION: 0

## 2019-12-01 DIAGNOSIS — I48.0 PAROXYSMAL ATRIAL FIBRILLATION WITH RVR (HCC): ICD-10-CM

## 2019-12-02 RX ORDER — SOTALOL HYDROCHLORIDE 80 MG/1
TABLET ORAL
Qty: 180 TABLET | Refills: 3 | Status: SHIPPED | OUTPATIENT
Start: 2019-12-02 | End: 2020-08-10

## 2019-12-02 RX ORDER — SOTALOL HYDROCHLORIDE 80 MG/1
80 TABLET ORAL 2 TIMES DAILY
Qty: 180 TABLET | Refills: 3 | Status: SHIPPED | OUTPATIENT
Start: 2019-12-02 | End: 2020-02-28 | Stop reason: SDUPTHER

## 2019-12-02 RX ORDER — ROSUVASTATIN CALCIUM 10 MG/1
10 TABLET, COATED ORAL NIGHTLY
Qty: 90 TABLET | Refills: 3 | Status: SHIPPED | OUTPATIENT
Start: 2019-12-02 | End: 2020-10-05

## 2019-12-13 RX ORDER — HYOSCYAMINE SULFATE 0.125 MG
TABLET,DISINTEGRATING ORAL
Qty: 60 TABLET | Refills: 24 | Status: SHIPPED | OUTPATIENT
Start: 2019-12-13

## 2020-02-28 ENCOUNTER — OFFICE VISIT (OUTPATIENT)
Dept: CARDIOLOGY | Age: 60
End: 2020-02-28
Payer: COMMERCIAL

## 2020-02-28 VITALS
SYSTOLIC BLOOD PRESSURE: 130 MMHG | DIASTOLIC BLOOD PRESSURE: 82 MMHG | WEIGHT: 265 LBS | BODY MASS INDEX: 34.01 KG/M2 | HEART RATE: 64 BPM | HEIGHT: 74 IN

## 2020-02-28 PROCEDURE — 99213 OFFICE O/P EST LOW 20 MIN: CPT | Performed by: CLINICAL NURSE SPECIALIST

## 2020-02-28 PROCEDURE — 0296T PR EXT ECG > 48HR TO 21 DAY RCRD W/CONECT INTL RCRD: CPT | Performed by: CLINICAL NURSE SPECIALIST

## 2020-02-28 PROCEDURE — 93000 ELECTROCARDIOGRAM COMPLETE: CPT | Performed by: CLINICAL NURSE SPECIALIST

## 2020-02-28 ASSESSMENT — ENCOUNTER SYMPTOMS
ORTHOPNEA: 0
VOMITING: 0
NAUSEA: 0
COUGH: 0
BLOOD IN STOOL: 0
BLURRED VISION: 0
SHORTNESS OF BREATH: 0
HEARTBURN: 0

## 2020-02-28 NOTE — PATIENT INSTRUCTIONS
healthy weight. Lose weight if you need to.     · Limit alcohol to 2 drinks a day for men and 1 drink a day for women. Too much alcohol can cause health problems.     · Avoid colds and flu. Get a pneumococcal vaccine shot. If you have had one before, ask your doctor whether you need another dose. Get a flu shot every year. If you must be around people with colds or flu, wash your hands often. Activity    · If your doctor recommends it, get more exercise. Walking is a good choice. Bit by bit, increase the amount you walk every day. Try for at least 30 minutes on most days of the week. You also may want to swim, bike, or do other activities. Your doctor may suggest that you join a cardiac rehabilitation program so that you can have help increasing your physical activity safely.     · Start light exercise if your doctor says it is okay. Even a small amount will help you get stronger, have more energy, and manage stress. Walking is an easy way to get exercise. Start out by walking a little more than you did in the hospital. Gradually increase the amount you walk.     · When you exercise, watch for signs that your heart is working too hard. You are pushing too hard if you cannot talk while you are exercising. If you become short of breath or dizzy or have chest pain, sit down and rest immediately.     · Check your pulse regularly. Place two fingers on the artery at the palm side of your wrist, in line with your thumb. If your heartbeat seems uneven or fast, talk to your doctor. When should you call for help? Call 911 anytime you think you may need emergency care. For example, call if:    · You have symptoms of a heart attack. These may include:  ? Chest pain or pressure, or a strange feeling in the chest.  ? Sweating. ? Shortness of breath. ? Nausea or vomiting. ? Pain, pressure, or a strange feeling in the back, neck, jaw, or upper belly or in one or both shoulders or arms.   ? Lightheadedness or sudden weakness. ? A fast or irregular heartbeat. After you call  911, the  may tell you to chew 1 adult-strength or 2 to 4 low-dose aspirin. Wait for an ambulance. Do not try to drive yourself.     · You have symptoms of a stroke. These may include:  ? Sudden numbness, tingling, weakness, or loss of movement in your face, arm, or leg, especially on only one side of your body. ? Sudden vision changes. ? Sudden trouble speaking. ? Sudden confusion or trouble understanding simple statements. ? Sudden problems with walking or balance. ? A sudden, severe headache that is different from past headaches.     · You passed out (lost consciousness).    Call your doctor now or seek immediate medical care if:    · You have new or increased shortness of breath.     · You feel dizzy or lightheaded, or you feel like you may faint.     · Your heart rate becomes irregular.     · You can feel your heart flutter in your chest or skip heartbeats. Tell your doctor if these symptoms are new or worse.    Watch closely for changes in your health, and be sure to contact your doctor if you have any problems. Where can you learn more? Go to https://[employer]. org and sign in to your [employer] account. Enter U020 in the Story of My Life box to learn more about \"Atrial Fibrillation: Care Instructions. \"     If you do not have an account, please click on the \"Sign Up Now\" link. Current as of: April 9, 2019  Content Version: 12.3  © 3933-5353 Healthwise, Incorporated. Care instructions adapted under license by [facility] [employer] ([facility]). If you have questions about a medical condition or this instruction, always ask your healthcare professional. [place] [address] any warranty or liability for your use of this information.

## 2020-02-28 NOTE — PROGRESS NOTES
[facility], [address] [place] [address], [address] [id number]  Phone: [phone]  Fax: [phone]    OFFICE VISIT:  2020    Reshma Gimenez - : 1960    Reason For Visit:  Lauren Machado is a 62 y.o. male who is here for follow-up for paroxysmal atrial fibrillation    HPI   Patient is here for follow-up for paroxysmal atrial fibrillation, hypertention, mild CAD per heart cath, sleep apnea. He is noticing an increase in palpitations, occurring several days a week lasting about 20 minutes per episode. He notices them mostly at night. He states he recently retired and then went back to work. He feels the increase in palpitations is related to some stress he is experiencing. He denies any chest pain, dyspnea, orthopnea, PND, edema. He is rhythm controlled with sotalol. He has never been anticoagulated and states he is very aware when he is out of rhythm. He only uses Xarelto for A. fib lasting for a long duration    Marianne Obando MD is PCP.   Reshma Gimenez has the following history as recorded in [facility]:    Patient Active Problem List    Diagnosis Date Noted    Epigastric discomfort 2018    Chronic nausea 2018    Mild CAD 2018    IFG (impaired fasting glucose) 2018    Primary insomnia 10/26/2017    Chronic GERD 10/26/2017    Degenerative lumbar disc 10/26/2017    PAF (paroxysmal atrial fibrillation) (HCC)     Hx pulmonary embolism 2017    Benign prostatic hyperplasia with lower urinary tract symptoms 2017    Mixed hyperlipidemia 2017    BiPAP (biphasic positive airway pressure) dependence     Mild mitral regurgitation by prior echocardiogram 2017    Essential hypertension 2017    Obstructive sleep apnea      Past Medical History:   Diagnosis Date    A-fib [facility])     Atelectasis of right lung 2017    Back pain, lumbosacral     BiPAP (biphasic positive airway pressure) dependence     12cm to 22cm    Clotting disorder ([facility] [address]     Cyst near coccyx     removed    GERD (gastroesophageal reflux disease)     Heart murmur     Hx of blood clots     Hyperlipidemia     Hypertension     Mild CAD 11/13/2018    Obstructive sleep apnea     AHI:  36.5 per PSG, 9/2015 - BiPAP    Osteoarthritis     Paroxysmal atrial fibrillation with RVR ([facility] [address] 6/26/2017    Pneumonia due to organism     Pulmonary embolism [facility])      Past Surgical History:   Procedure Laterality Date    ANKLE FRACTURE SURGERY      left ankle    ARM SURGERY Left 01/04/2018    fracture     CARDIAC CATHETERIZATION  06/2018    Mild CAD    CHOLECYSTECTOMY, LAPAROSCOPIC N/A 12/15/2017    CHOLECYSTECTOMY LAPAROSCOPIC performed by Mone Oro MD at [address] [place] [address]  2012    GALLBLADDER SURGERY      MS EGD TRANSORAL BIOPSY SINGLE/MULTIPLE N/A 11/15/2018    Dr Angus Lang      undecended testicle done when 1years old     Family History   Problem Relation Age of Onset    Heart Disease Mother     High Blood Pressure Mother     Heart Disease Father 54        mi    High Blood Pressure Father     High Cholesterol Father     Lung Cancer Father     Cancer Father         Lung CA - Oat cell    Stroke Father     Diabetes Paternal Aunt     Colon Cancer Neg Hx     Colon Polyps Neg Hx     Esophageal Cancer Neg Hx     Liver Cancer Neg Hx     Liver Disease Neg Hx     Rectal Cancer Neg Hx     Stomach Cancer Neg Hx      Social History     Tobacco Use    Smoking status: Never Smoker    Smokeless tobacco: Former User     Types: Chew   Substance Use Topics    Alcohol use: Yes     Comment: occ      Current Outpatient Medications   Medication Sig Dispense Refill    tadalafil (CIALIS) 5 MG tablet TAKE 1 TABLET DAILY 54 tablet 2    hyoscyamine (OSCIMIN) 125 MCG TBDP dispersible tablet PLACE 2 TABLETS ON TONGUE EVERY 12 HOURS AS NEEDED FOR ABDOMINAL PAIN/CRAMPING 60 tablet 24    sotalol (BETAPACE) 80 MG tablet TAKE 1 TABLET TWICE A  tablet 3    rosuvastatin (CRESTOR) 10 MG tablet Take 1 tablet by mouth nightly 90 tablet 3    fluticasone (FLONASE) 50 MCG/ACT nasal spray USE 1 SPRAY NASALLY DAILY 48 g 3    omeprazole (PRILOSEC) 20 MG delayed release capsule TAKE 1 CAPSULE TWICE A  capsule 3    amLODIPine (NORVASC) 5 MG tablet Take 5 mg by mouth daily      rivaroxaban (XARELTO) 20 MG TABS tablet Take 1 tablet by mouth Daily with supper As needed for AF episode 12hrs or longer in duration 90 tablet 1    hydrochlorothiazide (HYDRODIURIL) 25 MG tablet TAKE ONE-HALF (1/2) TABLET IN THE MORNING 45 tablet 3    lisinopril (PRINIVIL;ZESTRIL) 40 MG tablet TAKE 1 TABLET DAILY 90 tablet 3    Potassium 99 MG TABS Take 99 mg by mouth daily       niacin (NIASPAN) 1000 MG extended release tablet Take 1,000 mg by mouth 4 times daily      aspirin 81 MG EC tablet Take 81 mg by mouth daily      Omega-3 Fatty Acids (FISH OIL PO) Take 2,150 mg by mouth daily       Ascorbic Acid (VITAMIN C) 500 MG tablet Take 500 mg by mouth 2 times daily       vitamin E 400 UNIT capsule Take 400 Units by mouth daily       No current facility-administered medications for this visit. Allergies: Patient has no known allergies. Review of Systems  Review of Systems   Constitutional: Negative for chills and fever. HENT: Negative for nosebleeds. Eyes: Negative for blurred vision. Respiratory: Negative for cough and shortness of breath. Cardiovascular: Positive for palpitations (every few days- lasting 20min). Negative for chest pain, orthopnea, leg swelling and PND. Gastrointestinal: Negative for blood in stool, heartburn, nausea and vomiting. Musculoskeletal: Negative for falls and myalgias. Skin: Negative for rash. Neurological: Negative for dizziness, sensory change, speech change and focal weakness. Endo/Heme/Allergies: Does not bruise/bleed easily. Psychiatric/Behavioral: Negative for depression.  The patient is not

## 2020-03-10 ENCOUNTER — NURSE ONLY (OUTPATIENT)
Dept: INTERNAL MEDICINE | Age: 60
End: 2020-03-10
Payer: COMMERCIAL

## 2020-03-10 PROCEDURE — 90750 HZV VACC RECOMBINANT IM: CPT | Performed by: INTERNAL MEDICINE

## 2020-03-10 PROCEDURE — 90471 IMMUNIZATION ADMIN: CPT | Performed by: INTERNAL MEDICINE

## 2020-03-10 ASSESSMENT — PATIENT HEALTH QUESTIONNAIRE - PHQ9
SUM OF ALL RESPONSES TO PHQ QUESTIONS 1-9: 0
1. LITTLE INTEREST OR PLEASURE IN DOING THINGS: 0
SUM OF ALL RESPONSES TO PHQ9 QUESTIONS 1 & 2: 0
2. FEELING DOWN, DEPRESSED OR HOPELESS: 0
SUM OF ALL RESPONSES TO PHQ QUESTIONS 1-9: 0

## 2020-03-10 NOTE — PROGRESS NOTES
After obtaining consent, and per orders of Dr. Ankit Miranda, injection of SHINGLES given in Left deltoid by Alanis Gutierrez. Patient instructed to remain in clinic for 20 minutes afterwards, and to report any adverse reaction to me immediately.

## 2020-03-11 ENCOUNTER — TELEPHONE (OUTPATIENT)
Dept: NEUROLOGY | Age: 60
End: 2020-03-11

## 2020-03-11 RX ORDER — HYDROCHLOROTHIAZIDE 25 MG/1
TABLET ORAL
Qty: 45 TABLET | Refills: 3 | Status: SHIPPED | OUTPATIENT
Start: 2020-03-11 | End: 2020-09-22

## 2020-03-11 RX ORDER — LISINOPRIL 40 MG/1
TABLET ORAL
Qty: 90 TABLET | Refills: 3 | Status: SHIPPED | OUTPATIENT
Start: 2020-03-11 | End: 2020-09-22

## 2020-03-13 DIAGNOSIS — R97.20 ELEVATED PSA: ICD-10-CM

## 2020-03-15 LAB
PROSTATE SPECIFIC ANTIGEN FREE: 1.2 NG/ML
PROSTATE SPECIFIC ANTIGEN PERCENT FREE: 26 %
PROSTATE SPECIFIC ANTIGEN: 4.6 NG/ML (ref 0–4)

## 2020-03-19 ENCOUNTER — TELEPHONE (OUTPATIENT)
Dept: CARDIOLOGY | Age: 60
End: 2020-03-19

## 2020-03-20 ENCOUNTER — OFFICE VISIT (OUTPATIENT)
Dept: UROLOGY | Age: 60
End: 2020-03-20
Payer: COMMERCIAL

## 2020-03-20 VITALS
TEMPERATURE: 97.7 F | HEIGHT: 74 IN | SYSTOLIC BLOOD PRESSURE: 129 MMHG | WEIGHT: 262 LBS | DIASTOLIC BLOOD PRESSURE: 76 MMHG | BODY MASS INDEX: 33.62 KG/M2 | HEART RATE: 81 BPM

## 2020-03-20 PROBLEM — R97.20 ELEVATED PSA: Status: ACTIVE | Noted: 2020-03-20

## 2020-03-20 LAB
APPEARANCE FLUID: CLEAR
BILIRUBIN, POC: NORMAL
BLOOD URINE, POC: NORMAL
CLARITY, POC: CLEAR
COLOR, POC: YELLOW
GLUCOSE URINE, POC: NORMAL
KETONES, POC: NORMAL
LEUKOCYTE EST, POC: NORMAL
NITRITE, POC: NORMAL
PH, POC: 7.5
PROTEIN, POC: NORMAL
SPECIFIC GRAVITY, POC: 1.02
UROBILINOGEN, POC: 0.2

## 2020-03-20 PROCEDURE — 81002 URINALYSIS NONAUTO W/O SCOPE: CPT | Performed by: UROLOGY

## 2020-03-20 PROCEDURE — 99213 OFFICE O/P EST LOW 20 MIN: CPT | Performed by: UROLOGY

## 2020-03-20 ASSESSMENT — ENCOUNTER SYMPTOMS
WHEEZING: 0
BACK PAIN: 0
VOMITING: 0
SINUS PAIN: 0
ABDOMINAL PAIN: 0
EYE PAIN: 0
SHORTNESS OF BREATH: 0

## 2020-03-20 NOTE — PROGRESS NOTES
when 1years old       Current Outpatient Medications   Medication Sig Dispense Refill    hydroCHLOROthiazide (HYDRODIURIL) 25 MG tablet TAKE ONE-HALF (1/2) TABLET IN THE MORNING 45 tablet 0    lisinopril (PRINIVIL;ZESTRIL) 40 MG tablet Take 1 tablet by mouth daily 90 tablet 0    lisinopril (PRINIVIL;ZESTRIL) 40 MG tablet TAKE 1 TABLET DAILY 90 tablet 3    hydroCHLOROthiazide (HYDRODIURIL) 25 MG tablet TAKE ONE-HALF (1/2) TABLET IN THE MORNING 45 tablet 3    tadalafil (CIALIS) 5 MG tablet TAKE 1 TABLET DAILY 54 tablet 2    hyoscyamine (OSCIMIN) 125 MCG TBDP dispersible tablet PLACE 2 TABLETS ON TONGUE EVERY 12 HOURS AS NEEDED FOR ABDOMINAL PAIN/CRAMPING 60 tablet 24    sotalol (BETAPACE) 80 MG tablet TAKE 1 TABLET TWICE A  tablet 3    rosuvastatin (CRESTOR) 10 MG tablet Take 1 tablet by mouth nightly 90 tablet 3    fluticasone (FLONASE) 50 MCG/ACT nasal spray USE 1 SPRAY NASALLY DAILY 48 g 3    omeprazole (PRILOSEC) 20 MG delayed release capsule TAKE 1 CAPSULE TWICE A  capsule 3    amLODIPine (NORVASC) 5 MG tablet Take 5 mg by mouth daily      rivaroxaban (XARELTO) 20 MG TABS tablet Take 1 tablet by mouth Daily with supper As needed for AF episode 12hrs or longer in duration 90 tablet 1    Potassium 99 MG TABS Take 99 mg by mouth daily       niacin (NIASPAN) 1000 MG extended release tablet Take 1,000 mg by mouth 4 times daily      aspirin 81 MG EC tablet Take 81 mg by mouth daily      Omega-3 Fatty Acids (FISH OIL PO) Take 2,150 mg by mouth daily       Ascorbic Acid (VITAMIN C) 500 MG tablet Take 500 mg by mouth 2 times daily       vitamin E 400 UNIT capsule Take 400 Units by mouth daily       No current facility-administered medications for this visit.         No Known Allergies    Social History     Socioeconomic History    Marital status:      Spouse name: None    Number of children: None    Years of education: None    Highest education level: None   Occupational History  None   Social Needs    Financial resource strain: None    Food insecurity     Worry: None     Inability: None    Transportation needs     Medical: None     Non-medical: None   Tobacco Use    Smoking status: Never Smoker    Smokeless tobacco: Former User     Types: Chew   Substance and Sexual Activity    Alcohol use: Yes     Comment: occ    Drug use: No    Sexual activity: Yes     Partners: Female   Lifestyle    Physical activity     Days per week: None     Minutes per session: None    Stress: None   Relationships    Social connections     Talks on phone: None     Gets together: None     Attends [employer] service: None     Active member of club or organization: None     Attends meetings of clubs or organizations: None     Relationship status: None    Intimate partner violence     Fear of current or ex partner: None     Emotionally abused: None     Physically abused: None     Forced sexual activity: None   Other Topics Concern    None   Social History Narrative    None       Family History   Problem Relation Age of Onset    Heart Disease Mother     High Blood Pressure Mother     Heart Disease Father 54        mi    High Blood Pressure Father     High Cholesterol Father     Lung Cancer Father     Cancer Father         Lung CA - Oat cell    Stroke Father     Diabetes Paternal Aunt     Colon Cancer Neg Hx     Colon Polyps Neg Hx     Esophageal Cancer Neg Hx     Liver Cancer Neg Hx     Liver Disease Neg Hx     Rectal Cancer Neg Hx     Stomach Cancer Neg Hx        REVIEW OF SYSTEMS:  Review of Systems   HENT: Negative for nosebleeds and sinus pain. Eyes: Negative for pain. Respiratory: Negative for shortness of breath and wheezing. Cardiovascular: Negative for palpitations and leg swelling. Gastrointestinal: Negative for abdominal pain and vomiting. Endocrine: Negative for polydipsia. Genitourinary: Positive for frequency and urgency.  Negative for dysuria, flank pain and

## 2020-06-18 RX ORDER — OMEPRAZOLE 20 MG/1
CAPSULE, DELAYED RELEASE ORAL
Qty: 180 CAPSULE | Refills: 3 | Status: SHIPPED | OUTPATIENT
Start: 2020-06-18 | End: 2021-02-03

## 2020-06-29 RX ORDER — TADALAFIL 5 MG/1
TABLET ORAL
Qty: 54 TABLET | Refills: 5 | Status: SHIPPED | OUTPATIENT
Start: 2020-06-29 | End: 2021-03-18 | Stop reason: SDUPTHER

## 2020-08-04 ENCOUNTER — TELEPHONE (OUTPATIENT)
Dept: CARDIOLOGY | Age: 60
End: 2020-08-04

## 2020-08-04 NOTE — TELEPHONE ENCOUNTER
Called to reschedule 10/20 JDT appt, left message to return call & reschedule with CARDIOLOGIST ONLY

## 2020-08-05 ENCOUNTER — TELEPHONE (OUTPATIENT)
Dept: CARDIOLOGY | Age: 60
End: 2020-08-05

## 2020-08-06 ENCOUNTER — TELEPHONE (OUTPATIENT)
Dept: CARDIOLOGY | Age: 60
End: 2020-08-06

## 2020-08-06 NOTE — TELEPHONE ENCOUNTER
Called to reschedule 10/20 JDT appt, left message to return call & reschedule with CARDIOLOGIST ONLY. rf 08/04/20, 08/05/20, 08/06/20  Attempted to contact pt to reschedule apt with no response from pt, canceled apt & letter sent.

## 2020-08-10 RX ORDER — SOTALOL HYDROCHLORIDE 80 MG/1
TABLET ORAL
Qty: 180 TABLET | Refills: 0 | Status: SHIPPED | OUTPATIENT
Start: 2020-08-10 | End: 2020-10-05

## 2020-08-24 ENCOUNTER — TELEPHONE (OUTPATIENT)
Dept: CARDIOLOGY | Age: 60
End: 2020-08-24

## 2020-08-24 NOTE — TELEPHONE ENCOUNTER
8/24 needing to r/s apt with Yaneli. he will be out of the office the week of 9-21 thru 9-25. can be at his next opening or with a NP if the pt chooses.   Attempt-1

## 2020-09-18 DIAGNOSIS — R73.01 IFG (IMPAIRED FASTING GLUCOSE): ICD-10-CM

## 2020-09-18 DIAGNOSIS — E78.2 MIXED HYPERLIPIDEMIA: ICD-10-CM

## 2020-09-18 DIAGNOSIS — I48.0 PAF (PAROXYSMAL ATRIAL FIBRILLATION) (HCC): ICD-10-CM

## 2020-09-18 DIAGNOSIS — R97.20 ELEVATED PSA: ICD-10-CM

## 2020-09-18 DIAGNOSIS — I10 ESSENTIAL HYPERTENSION: ICD-10-CM

## 2020-09-18 DIAGNOSIS — F51.01 PRIMARY INSOMNIA: ICD-10-CM

## 2020-09-18 LAB
ALBUMIN SERPL-MCNC: 4.2 G/DL (ref 3.5–5.2)
ALP BLD-CCNC: 62 U/L (ref 40–130)
ALT SERPL-CCNC: 16 U/L (ref 5–41)
ANION GAP SERPL CALCULATED.3IONS-SCNC: 13 MMOL/L (ref 7–19)
AST SERPL-CCNC: 21 U/L (ref 5–40)
BASOPHILS ABSOLUTE: 0 K/UL (ref 0–0.2)
BASOPHILS RELATIVE PERCENT: 0.4 % (ref 0–1)
BILIRUB SERPL-MCNC: 0.8 MG/DL (ref 0.2–1.2)
BILIRUBIN URINE: NEGATIVE
BLOOD, URINE: NEGATIVE
BUN BLDV-MCNC: 13 MG/DL (ref 8–23)
CALCIUM SERPL-MCNC: 9.3 MG/DL (ref 8.8–10.2)
CHLORIDE BLD-SCNC: 106 MMOL/L (ref 98–111)
CHOLESTEROL, TOTAL: 137 MG/DL (ref 160–199)
CLARITY: CLEAR
CO2: 26 MMOL/L (ref 22–29)
COLOR: YELLOW
CREAT SERPL-MCNC: 0.7 MG/DL (ref 0.5–1.2)
EOSINOPHILS ABSOLUTE: 0.3 K/UL (ref 0–0.6)
EOSINOPHILS RELATIVE PERCENT: 5.9 % (ref 0–5)
GFR AFRICAN AMERICAN: >59
GFR NON-AFRICAN AMERICAN: >60
GLUCOSE BLD-MCNC: 99 MG/DL (ref 74–109)
GLUCOSE URINE: NEGATIVE MG/DL
HBA1C MFR BLD: 5.4 % (ref 4–6)
HCT VFR BLD CALC: 45.8 % (ref 42–52)
HDLC SERPL-MCNC: 48 MG/DL (ref 55–121)
HEMOGLOBIN: 14.9 G/DL (ref 14–18)
IMMATURE GRANULOCYTES #: 0 K/UL
KETONES, URINE: NEGATIVE MG/DL
LDL CHOLESTEROL CALCULATED: 74 MG/DL
LEUKOCYTE ESTERASE, URINE: NEGATIVE
LYMPHOCYTES ABSOLUTE: 1.3 K/UL (ref 1.1–4.5)
LYMPHOCYTES RELATIVE PERCENT: 28.4 % (ref 20–40)
MCH RBC QN AUTO: 32.4 PG (ref 27–31)
MCHC RBC AUTO-ENTMCNC: 32.5 G/DL (ref 33–37)
MCV RBC AUTO: 99.6 FL (ref 80–94)
MONOCYTES ABSOLUTE: 0.5 K/UL (ref 0–0.9)
MONOCYTES RELATIVE PERCENT: 10.6 % (ref 0–10)
NEUTROPHILS ABSOLUTE: 2.5 K/UL (ref 1.5–7.5)
NEUTROPHILS RELATIVE PERCENT: 54.5 % (ref 50–65)
NITRITE, URINE: NEGATIVE
PDW BLD-RTO: 13.5 % (ref 11.5–14.5)
PH UA: 7.5 (ref 5–8)
PLATELET # BLD: 214 K/UL (ref 130–400)
PMV BLD AUTO: 10.3 FL (ref 9.4–12.4)
POTASSIUM SERPL-SCNC: 4 MMOL/L (ref 3.5–5)
PROTEIN UA: NEGATIVE MG/DL
RBC # BLD: 4.6 M/UL (ref 4.7–6.1)
SODIUM BLD-SCNC: 145 MMOL/L (ref 136–145)
SPECIFIC GRAVITY UA: 1.02 (ref 1–1.03)
TOTAL PROTEIN: 7 G/DL (ref 6.6–8.7)
TRIGL SERPL-MCNC: 75 MG/DL (ref 0–149)
TSH SERPL DL<=0.05 MIU/L-ACNC: 3.17 UIU/ML (ref 0.27–4.2)
UROBILINOGEN, URINE: 1 E.U./DL
WBC # BLD: 4.6 K/UL (ref 4.8–10.8)

## 2020-09-20 LAB
PROSTATE SPECIFIC ANTIGEN FREE: 1.1 NG/ML
PROSTATE SPECIFIC ANTIGEN PERCENT FREE: 26 %
PROSTATE SPECIFIC ANTIGEN: 4.2 NG/ML (ref 0–4)

## 2020-09-21 ENCOUNTER — OFFICE VISIT (OUTPATIENT)
Dept: CARDIOLOGY | Age: 60
End: 2020-09-21
Payer: COMMERCIAL

## 2020-09-21 VITALS
SYSTOLIC BLOOD PRESSURE: 126 MMHG | HEART RATE: 48 BPM | BODY MASS INDEX: 33.11 KG/M2 | WEIGHT: 258 LBS | DIASTOLIC BLOOD PRESSURE: 86 MMHG | HEIGHT: 74 IN

## 2020-09-21 PROCEDURE — 99213 OFFICE O/P EST LOW 20 MIN: CPT | Performed by: INTERNAL MEDICINE

## 2020-09-21 ASSESSMENT — ENCOUNTER SYMPTOMS
BLURRED VISION: 0
COUGH: 0
BLOOD IN STOOL: 0
ORTHOPNEA: 0
VOMITING: 0
NAUSEA: 0
SHORTNESS OF BREATH: 0
HEARTBURN: 0

## 2020-09-21 NOTE — PROGRESS NOTES
Heart murmur     Hx of blood clots     Hyperlipidemia     Hypertension     Mild CAD 11/13/2018    Obstructive sleep apnea     AHI:  36.5 per PSG, 9/2015 - BiPAP    Osteoarthritis     Paroxysmal atrial fibrillation with RVR ([address] 6/26/2017    Pneumonia due to organism     Pulmonary embolism [facility])      Past Surgical History:   Procedure Laterality Date    ANKLE FRACTURE SURGERY      left ankle    ARM SURGERY Left 01/04/2018    fracture     CARDIAC CATHETERIZATION  06/2018    Mild CAD    CHOLECYSTECTOMY, LAPAROSCOPIC N/A 12/15/2017    CHOLECYSTECTOMY LAPAROSCOPIC performed by Dora Vera MD at [address] [place] [address] COLONOSCOPY  2012    GALLBLADDER SURGERY      [place] EGD TRANSORAL BIOPSY SINGLE/MULTIPLE N/A 11/15/2018    Dr Sara Rivera      undecended testicle done when 1years old     Family History   Problem Relation Age of Onset    Heart Disease Mother     High Blood Pressure Mother     Heart Disease Father 54        mi    High Blood Pressure Father     High Cholesterol Father     Lung Cancer Father     Cancer Father         Lung CA - Oat cell    Stroke Father     Diabetes Paternal Aunt     Colon Cancer Neg Hx     Colon Polyps Neg Hx     Esophageal Cancer Neg Hx     Liver Cancer Neg Hx     Liver Disease Neg Hx     Rectal Cancer Neg Hx     Stomach Cancer Neg Hx      Social History     Tobacco Use    Smoking status: Never Smoker    Smokeless tobacco: Former User     Types: Chew   Substance Use Topics    Alcohol use: Yes     Comment: occ      Current Outpatient Medications   Medication Sig Dispense Refill    sotalol (BETAPACE) 80 MG tablet TAKE 1 TABLET TWICE A  tablet 0    amLODIPine (NORVASC) 5 MG tablet TAKE 1 TABLET DAILY 30 tablet 1    fluticasone (FLONASE) 50 MCG/ACT nasal spray USE 1 SPRAY NASALLY DAILY 48 g 1    tadalafil (CIALIS) 5 MG tablet TAKE 1 TABLET DAILY 54 tablet 5    omeprazole (PRILOSEC) 20 MG delayed release capsule TAKE 1 CAPSULE TWICE Anticoagulation therapy to prevent embolization    EKG shows normal sinus rhythm rate 64 with a QTc interval of 0.437 ms        Assessment:    --------------------  PAF- his A. fib seems to be well-controlled at this time. He has Xarelto at home and has been instructed to use this if he has an A. fib episode lasting 12 hours or more. Hypertension-well controlled on current regimen    Obstructive sleep apnea-wearing CPAP regularly    Mild CAD-per cath 2018 without symptoms of angina    Plan    Follow up 6 months  Seek medical attention for A fib lasting 12hrs or more. Call for increasing frequency  Use Xarelto for  Atrial fib episodes lasting 12 hrs or more    Call with any questions or concerns  Follow up with Reed Gomez MD for non cardiac problems  Report any new problems  Cardiovascular Fitness-Exercise as tolerated. Strive for 15 minutes of exercise most days of the week. Cardiac / Healthy Diet  Continue current medications as directed  Continue plan of treatment  It is always recommended that you bring your medications bottles with you to each visit - this is for your safety!          Papi Piedra MD, [facility]  Interventional Cardiologist, Endovascular Specialist   Medical Director, Structural Heart Program   [facility]

## 2020-09-22 ENCOUNTER — OFFICE VISIT (OUTPATIENT)
Dept: NEUROLOGY | Age: 60
End: 2020-09-22
Payer: COMMERCIAL

## 2020-09-22 VITALS
OXYGEN SATURATION: 93 % | WEIGHT: 258 LBS | HEIGHT: 74 IN | DIASTOLIC BLOOD PRESSURE: 86 MMHG | HEART RATE: 48 BPM | SYSTOLIC BLOOD PRESSURE: 131 MMHG | BODY MASS INDEX: 33.11 KG/M2

## 2020-09-22 PROCEDURE — 99213 OFFICE O/P EST LOW 20 MIN: CPT | Performed by: PHYSICIAN ASSISTANT

## 2020-09-22 NOTE — PROGRESS NOTES
[facility] Sleep Follow Up Encounter      Information:   Patient Name: Abby Henriquez  :   1960  Age:   56 y.o. MRN:   [id number]  Account #:  [de-identified]  Today:                20    Provider:  Celestino Orosco PA-C    Chief Complaint   Patient presents with    Sleep Apnea     1 year sleep follow up, pt states nothing new is going on         Subjective:   Abby Henriquez is a 56 y.o. male  with a history of severe HANANE who comes in for an annual sleep clinic follow up. The PSG, 2015 revealed an AHI of 36.5. He is prescribed auto BiPAP therapy at a pressure range of 12cm to 22cm. The compliance report indicates that he is using CPAP 8 hours per night. He was out of [place] and didn't take it when he traveled. He reports that consistent BiPAP use has alleviated the previous HANANE symptoms. Location or symptom:  HANANE  Onset:  PS  Timing:  q hs  Severity:  Severe  Associated:  Snoring and witnessed apneas  Alleviated:   BiPAP      Objective:     Past Medical History:   Diagnosis Date    A-fib ([address]     Atelectasis of right lung 2017    Back pain, lumbosacral     BiPAP (biphasic positive airway pressure) dependence     12cm to 22cm    Clotting disorder (HCC)     Cyst near coccyx     removed    GERD (gastroesophageal reflux disease)     Heart murmur     Hx of blood clots     Hyperlipidemia     Hypertension     Mild CAD 2018    Obstructive sleep apnea     AHI:  36.5 per PSG, 2015 - BiPAP    Osteoarthritis     Paroxysmal atrial fibrillation with RVR ([address] 2017    Pneumonia due to organism     Pulmonary embolism [facility])        Past Surgical History:   Procedure Laterality Date    ANKLE FRACTURE SURGERY      left ankle    ARM SURGERY Left 2018    fracture     CARDIAC CATHETERIZATION  2018    Mild CAD    CHOLECYSTECTOMY, LAPAROSCOPIC N/A 12/15/2017    CHOLECYSTECTOMY LAPAROSCOPIC performed by Catalina Stinson MD at [address] [place] [address]      GALLBLADDER SURGERY      [place] EGD mouth Daily with supper As needed for AF episode 12hrs or longer in duration 90 tablet 1    Potassium 99 MG TABS Take 99 mg by mouth daily       niacin (NIASPAN) 1000 MG extended release tablet Take 1,000 mg by mouth 4 times daily      aspirin 81 MG EC tablet Take 81 mg by mouth daily      Omega-3 Fatty Acids (FISH OIL PO) Take 2,150 mg by mouth daily       Ascorbic Acid (VITAMIN C) 500 MG tablet Take 500 mg by mouth 2 times daily       vitamin E 400 UNIT capsule Take 400 Units by mouth daily       No current facility-administered medications for this visit. Allergies:  Patient has no known allergies. REVIEW OF SYSTEMS     Constitutional: []? Fever []? Sweats []? Chills []? Recent Injury   [x]? Denies all unless marked  HENT:[]? Headache  []? Head Injury  []? Sore Throat  []? Ear Pain  []? Dizziness []? Hearing Loss   [x]? Denies all unless marked  Spine:  []? Neck pain  []? Back pain  []? Sciaticia  [x]? Denies all unless marked  Cardiovascular:[]? Chest Pain []? Palpitations []? Heart Disease  [x]? Denies all unless marked  Pulmonary: []? Shortness of Breath []? Cough   [x]? Denies all unless marked  Gastrointestinal:  []? Abdominal Pain  []? Blood in Stool  []? Diarrhea []? Constipation []? Nausea  []? Vomiting  [x]? Denies all unless marked  Genitourinary:  []? Dysuria []? Frequency  []? Incontinence []? Urgency   [x]? Denies all unless marked  Musculoskeletal: []? Arthralgia  []? Myalgias []? Muscle cramps  []? Muscle twitches   [x]? Denies all unless marked   Extremities:   []? Pain   []? Swelling   [x]? Denies all unless marked  Skin:[]? Rash  []? Color Change  [x]? Denies all unless marked  Neurological:[]? Visual Disturbance []? Double Vision []? Slurred Speech []? Trouble swallowing  []? Vertigo []? Tingling []? Numbness []? Weakness []? Loss of Balance   []? Loss of Consciousness []? Memory Loss []? Seizures  [x]? Denies all unless marked  Psychiatric/Behavioral:[]? Depression []? Anxiety  [x]?  Denies all unless marked  Sleep: []? Insomnia []? Sleep Disturbance []? Snoring []? Restless Legs []? Daytime Sleepiness [x]? Sleep Apnea  [x]? Denies all unless marked    The MA has completed the ROS with the patient. I have reviewed it in its' entirety with the patient and agree with the documentation. PHYSICAL EXAM  /86   Pulse (!) 48   Ht 6' 2\" (1.88 m)   Wt 258 lb (117 kg)   SpO2 93%   BMI 33.13 kg/m²      Constitutional - No acute distress    HEENT- Conjunctiva normal.  No scars, masses, or lesions over external nose or ears, no neck masses noted, no jugular vein distension, no bruit  Cardiac- Regular rate and rhythm with a grade I/VI murmur  Pulmonary- Clear to auscultation, good expansion, normal effort without use of accessory muscles  Musculoskeletal - No significant wasting of muscles noted, no bony deformities  Extremities - No clubbing, cyanosis or edema  Skin - Warm, dry, and intact. No rash, erythema, or pallor  Psychiatric - Mood, affect, and behavior appear normal      Neurologic:  Extraocular movements are intact without nystagmus. Visual fields are full to confrontation. Facial movements are symmetrical and normal.  Speech is precise. Extremity strength is normal in both uppers and lowers. Deep tendon reflexes are intact and symmetrical.  Rapid alternating movements are unimpaired. Finger-to-nose testing is performed well, without dysmetria. Gait is normal.    I reviewed the following studies:      []  :  Clinical laboratory test results    []  :  Radiology reports    []  :  Review and summarization of medical records and/or obtain medical records     []  :  Previous/recent polysomnogram report(s)    []  :  [place] Sleepiness Scale      [x]  :  Compliance download: The auto BiPAP is set at a pressure range of 12cm to 22cm. Compliance download shows that he uses device: 73% of the time;  percentage of days with usage >=4 hours: 73%.   AHI: Not recorded    Assessment:

## 2020-09-22 NOTE — PATIENT INSTRUCTIONS
Patient education: Sleep apnea in adults       INTRODUCTION -- Normally during sleep, air moves through the throat and in and out of the lungs at a regular rhythm. In a person with sleep apnea, air movement is periodically diminished or stopped. There are two types of sleep apnea: obstructive sleep apnea and central sleep apnea. In obstructive sleep apnea, breathing is abnormal because of narrowing or closure of the throat. In central sleep apnea, breathing is abnormal because of a change in the breathing control and rhythm. Sleep apnea is a serious condition that can affect a person's ability to safely perform normal daily activities and can affect long term health. Approximately 25 percent of adults are at risk for sleep apnea of some degree. Men are more commonly affected than women. Other risk factors include middle and older age, being overweight or obese, and having a small mouth and throat. This topic review focuses on the most common type of sleep apnea in adults, obstructive sleep apnea (HANANE). HOW SLEEP APNEA OCCURS -- The throat is surrounded by muscles that control the airway for speaking, swallowing, and breathing. During sleep, these muscles are less active, and this causes the throat to narrow. In most people, this narrowing does not affect breathing. In others, it can cause snoring, sometimes with reduced or completely blocked airflow. A completely blocked airway without airflow is called an obstructive apnea. Partial obstruction with diminished airflow is called a hypopnea. A person may have apnea and hypopnea during sleep. Insufficient breathing due to apnea or hypopnea causes oxygen levels to fall and carbon dioxide to rise. Because the airway is blocked, breathing faster or harder does not help to improve oxygen levels until the airway is reopened. Typically, the obstruction requires the person to awaken to activate the upper airway muscles.  Once the airway is opened, the person then apnea. Testing is usually performed in a sleep laboratory. A full sleep study is called a polysomnogram. The polysomnogram measures the breathing effort and airflow, blood oxygen level, heart rate and rhythm, duration of the various stages of sleep, body position, and movement of the arms/legs. Home monitoring devices are available that can perform a sleep study. This is a reasonable alternative to conventional testing in a sleep laboratory if the clinician strongly suspects moderate or severe sleep apnea and the patient does not have other illnesses or sleep disorders that may interfere with the results. SLEEP APNEA TREATMENT -- Sleep apnea is best treated by a knowledgeable sleep medicine specialist. The goal of treatment is to maintain an open airway during sleep. Effective treatment will eliminate the symptoms of sleep disturbance; long-term health consequences are also reduced. Most treatments require nightly use. The challenge for the clinician and the patient is to select an effective therapy that is appropriate for the patient's problem and that is acceptable for long term use. Auto-titrating CPAP delivers an amount of PAP that varies during the night. The variation is dependent on event detection software algorithms, which will increase the pressure gradually in response to flow changes until adequate patency is detected. After a period of sustained upper airway patency, the delivered level of pressure gradually decreases until the algorithm identifies recurrent upper airway obstruction, at which point the delivered pressure again increases. The result is that the delivered pressure varies throughout the night, in an effort to provide the lowest pressure that is necessary to maintain upper airway patency. Continuous positive airway pressure (CPAP) -- The most effective treatment for sleep apnea uses air pressure from a mechanical device to keep the upper airway open during sleep.  A CPAP (continuous positive airway pressure)  device uses an air-tight attachment to the nose, typically a mask, connected to a tube and a blower which generates the pressure. Devices that fit comfortably into the nasal opening, rather than over the nose, are also available. CPAP should be used any time the person sleeps (day or night). The CPAP device is usually used for the first time in the sleep lab, where a technician can adjust the pressure and select the best equipment to keep the airway open. Alternatively, an auto device with a self-adjusting pressure feature, provided with proper education and training, can get treatment started without another sleep test. While the treatment may seem uncomfortable, noisy, or bulky at first, most people accept the treatment after experiencing better sleep. However, difficulty with mask comfort and nasal congestion prevent up to 50 percent of people from using the treatment on a regular basis. Continued follow up with a healthcare provider helps to ensure that the treatment is effective and comfortable. Information from the CPAP machine is often used by physicians, therapists, and insurers to track the success of treatment. CPAP can be delivered with different features to improve comfort and solve problems that may come up during treatment. Changes in treatment may be needed if symptoms do not improve or if the persons condition changes, such as a gain or loss of weight. Adjust sleep position -- Adjusting sleep position (to stay off the back) may help improve sleep quality in people who have HANANE when sleeping on the back. However, this is difficult to maintain throughout the night and is rarely an adequate solution. Weight loss -- Weight loss may be helpful for obese or overweight patients. Weight loss may be accomplished with dietary changes, exercise, and/or surgical treatment.  However, it can be difficult to maintain weight loss; the five-year success of non-surgical weight loss is only 5 percent, meaning that 95 percent of people regain lost weight. Avoid alcohol and other sedatives -- Alcohol can worsen sleepiness, potentially increasing the risk of accidents or injury. People with HANANE are often counseled to drink little to no alcohol, even during the daytime. Similarly, people who take anti-anxiety medications or sedatives to sleep should speak with their healthcare provider about the safety of these medications. People with HANANE must notify all healthcare providers, including surgeons, about their condition and the potential risks of being sedated. People with HANANE who are given anesthesia and/or pain medications require special management and close monitoring to reduce the risk of a blocked airway. Dental devices -- A dental device, called an oral appliance or mandibular advancement device, can reposition the jaw (mandible), bringing the tongue and soft palate forward as well. This may relieve obstruction in some people. This treatment is excellent for reducing snoring, although the effect on HANANE is sometimes more limited. As a result, dental devices are best used for mild cases of HANANE when relief of snoring is the main goal. Failure to tolerate and accept CPAP is another indication for dental devices. While dental devices are not as effective as CPAP for HANANE, some patients prefer a dental device to CPAP. Side effects of dental devices are generally minor but may include changes to the bite with prolonged use. Surgical treatment -- Surgery is an alternative therapy for patients who cannot tolerate or do not improve with nonsurgical treatments such as CPAP or oral devices. Surgery can also be used in combination with other nonsurgical treatments. Surgical procedures reshape structures in the upper airways or surgically reposition bone or soft tissue. Uvulopalatopharyngoplasty (UPPP) removes the uvula and excessive tissue in the throat, including the tonsils, if present. Other procedures, such as maxillomandibular advancement (MMA), address both the upper and lower pharyngeal airway more globally. UPPP alone has limited success rates (less than 50 percent) and people can relapse (when HANANE symptoms return after surgery). As a result, this surgery is only recommended in a minority of people and should be considered with caution. MMA may have a higher success rate, particularly in people with abnormal jaw (maxilla and mandible) anatomy, but it is the most complicated procedure. A newer surgical approach, nerve stimulation to protrude the tongue, has promising success rates in very selected people. Tracheostomy creates a permanent opening in the neck. It is reserved for people with severe disease in whom less drastic measures have failed or are inappropriate. Although it is always successful in eliminating obstructive sleep apnea, tracheostomy requires significant lifestyle changes and carries some serious risks (eg, infection, bleeding, blockage). All surgical treatments require discussions about the goals of treatment, the expected outcomes, and potential complications. Hypoglossal nerve stimulator- \"Inspire\" device    PAP treatment failure:  Possible causes of treatment failure include nonadherence or suboptimal adherence, weight gain, an inappropriate level of prescribed positive pressure, or an additional disorder causing sleepiness (eg, narcolepsy) that may require alterations in the therapeutic regimen. A review of medications should also be undertaken since many drugs may lead to sleepiness. Inadequate sleep time may also negate the expected effects from treatment of HANANE. Also, pt's can have persistent hypersomnolence associated with sleep apnea even in the presence of adequate therapy and at those times Provigil or Nuvigil or other stimulants may be indicated.     Once the patient's positive airway pressure therapy has been optimized and symptoms resolved, a regimen of long-term follow-up should be established. Annual visits are reasonable, with more frequent visits in between if new issues arise. The purpose of long-term follow-up is to assess usage and monitor for recurrent HANANE, new side effects, air leakage, and fluctuations in body weight. WHERE TO GET MORE INFORMATION -- Your healthcare provider is the best source of information for questions and concerns related to your medical problem. Organizations  American Sleep Apnea Association  Provides information about sleep apnea to the public, publishes a newsletter, and serves as an advocate for people with the disorder. [place], [address], [address] [place] [address]   Payal@[employer]. org   [employer].. org   Tel: [phone]   Fax: [facility] organization that works to [employer] and safety by promoting public understanding of sleep and sleep disorders. Supports sleep-related education, research, and advocacy; produces and distributes educational materials to the public and healthcare professionals; and offers postdoctoral fellowships and grants for sleep researchers. [address]   Demario@[employer]. org   [employer].eu. org   Tel: [phone]   Fax: [phone]    Important information:  Medicare/private insurance CPAP/BiPAP/APAP requirements:  Medicare/private insurance has specific requirements for PAP compliance that must be met during the first 90 days of use to continue coverage for CPAP/BiPAP/APAP  from day 91 and beyond. The policy requires that patients use a PAP device 4 hours per 24 hour period, at least 70% of the time over a 30 day period. This data must be downloaded as a report direct from the PAP devices. This is called a compliance download. Your PAP supplier will assist you in this matter.      Reminder:  Please bring a copy of the compliance download to your next office visit or have your supplier fax it to our office prior to your office visit. Note:  Where applicable, we will utilize PAP device efficiency reports, additional testing, and face-to-face  clinical evaluation subsequent to any treatment, changes in treatment, and continued treatment. Caution:  Please abstain from driving or engaging in other activities which may be hazardous in the presence of diminished alertness or daytime drowsiness. And avoid the use of sedatives or alcohol, which can worsen sleep apnea and daytime drowsiness. Mask suggestions:  -     Resmed Airfit N20 (Nasal) or F20 (Full face mask). They conform to your face, thus decreasing the potential for mask leakage. You might like the AirTouch F20(full face mask). It has a \"memory foam\" like cushion. The AirFit F30 is a smaller style full face mask designed to sit low on and cover less of your face for fewer facial marks. AirFit N30i has a top of the head tube with a nasal mask. AirFit P10 reported to be the most comfortable nasal pillow mask. Resmed [place] FX reported to be the most comfortable nasal mask. Resmed Mirage [place] reported to be the most comfortable hybrid mask. AirTouch N20-memory foam nasal mask. Respironics: You might also like to try a nasal mask called a Dreamwear nasal mask or the Dreamwear nasal pillow. Another suggestion is the [facility], it is a minimal contact full face mask. The [place] incredible under the nose design makes it the only full face mask that won't cause red marks on the bridge of your nose when compared to other full face masks. The Dreamwear full face mask has a  soft feel, unique in-frame air-flow, and innovative air tube connection at the top of the head for the ultimate in sleep comfort. Comfort Gel Blue. Dreamwear gel pillows.     Carlos & Lori: Jayda Anastasia nasal pillow mask and Simplus FFM    The use of a memory foam CPAP pillow supports the head and neck throughout the

## 2020-09-23 ENCOUNTER — OFFICE VISIT (OUTPATIENT)
Dept: INTERNAL MEDICINE | Age: 60
End: 2020-09-23
Payer: COMMERCIAL

## 2020-09-23 VITALS
BODY MASS INDEX: 33.11 KG/M2 | HEART RATE: 57 BPM | OXYGEN SATURATION: 98 % | WEIGHT: 258 LBS | DIASTOLIC BLOOD PRESSURE: 80 MMHG | SYSTOLIC BLOOD PRESSURE: 118 MMHG | RESPIRATION RATE: 18 BRPM | HEIGHT: 74 IN

## 2020-09-23 PROCEDURE — 90471 IMMUNIZATION ADMIN: CPT | Performed by: INTERNAL MEDICINE

## 2020-09-23 PROCEDURE — 90686 IIV4 VACC NO PRSV 0.5 ML IM: CPT | Performed by: INTERNAL MEDICINE

## 2020-09-23 PROCEDURE — 99396 PREV VISIT EST AGE 40-64: CPT | Performed by: INTERNAL MEDICINE

## 2020-09-23 ASSESSMENT — ENCOUNTER SYMPTOMS
COUGH: 0
CONSTIPATION: 0
ABDOMINAL PAIN: 0
WHEEZING: 0
SORE THROAT: 0
CHEST TIGHTNESS: 0

## 2020-09-23 NOTE — PROGRESS NOTES
Pt received an injection of Flu vaccine in the left Deltoid in the office today. Pt has signed an Injection consent form for the injection. Pt does not show any signs of reaction to the injection. Pt tolerated the injection well, and is advised to call the office if he/she notices any kind of reaction to happen once the Pt leaves the office.

## 2020-09-23 NOTE — PROGRESS NOTES
Chief Complaint:   Anastacia Santana is a 56 y.o. male who presents forcomplete physical exam.    History of Present Illness:      Anastacia Santana is a 56 y.o. male who presents todayfor wellness visit AND follow up on his chronic medical conditions as noted below. Essential hypertension- BP has been now better with recent changes ( see my ov note 1 week ago)     Obstructive sleep apnea- using CPAP/ Sleeping better     Mixed hyperlipidemia- Patient  has tried to follow diet recommendations.  Has been taking  cholesterol lowering medication as prescribed and does not report any side effects.     PAF (paroxysmal atrial fibrillation) ([facility] [address]- he follows with dr alexander/ no further episdoes         Patient Active Problem List    Diagnosis Date Noted    Elevated PSA 03/20/2020    Epigastric discomfort 11/14/2018    Chronic nausea 11/14/2018    Mild CAD 11/13/2018    IFG (impaired fasting glucose) 05/11/2018    Primary insomnia 10/26/2017    Chronic GERD 10/26/2017    Degenerative lumbar disc 10/26/2017    PAF (paroxysmal atrial fibrillation) ([facility] [address] [facility]     Hx pulmonary embolism 06/26/2017    Benign prostatic hyperplasia with lower urinary tract symptoms 06/26/2017    Mixed hyperlipidemia 06/26/2017    BiPAP (biphasic positive airway pressure) dependence      12cm to 22cm      Mild mitral regurgitation by prior echocardiogram 02/08/2017    Essential hypertension 02/08/2017    Obstructive sleep apnea        Past Medical History:   Diagnosis Date    A-fib [facility])     Atelectasis of right lung 6/26/2017    Back pain, lumbosacral     BiPAP (biphasic positive airway pressure) dependence     12cm to 22cm    Clotting disorder (HCC)     Cyst near coccyx     removed    GERD (gastroesophageal reflux disease)     Heart murmur     Hx of blood clots     Hyperlipidemia     Hypertension     Mild CAD 11/13/2018    Obstructive sleep apnea     AHI:  36.5 per PSG, 9/2015 - BiPAP    Osteoarthritis     Paroxysmal atrial fibrillation with RVR ([facility] [address] 6/26/2017    Pneumonia due to organism     Pulmonary embolism [facility])        Past Surgical History:   Procedure Laterality Date    ANKLE FRACTURE SURGERY      left ankle    ARM SURGERY Left 01/04/2018    fracture     CARDIAC CATHETERIZATION  06/2018    Mild CAD    CHOLECYSTECTOMY, LAPAROSCOPIC N/A 12/15/2017    CHOLECYSTECTOMY LAPAROSCOPIC performed by Marion Blake MD at [address] [place] [address]  2012    GALLBLADDER SURGERY      [place] EGD TRANSORAL BIOPSY SINGLE/MULTIPLE N/A 11/15/2018    Dr Lin Chen Hope-Gastropathy    TESTICLE SURGERY      undecended testicle done when 1years old       Current Outpatient Medications   Medication Sig Dispense Refill    sotalol (BETAPACE) 80 MG tablet TAKE 1 TABLET TWICE A  tablet 0    amLODIPine (NORVASC) 5 MG tablet TAKE 1 TABLET DAILY 30 tablet 1    fluticasone (FLONASE) 50 MCG/ACT nasal spray USE 1 SPRAY NASALLY DAILY 48 g 1    tadalafil (CIALIS) 5 MG tablet TAKE 1 TABLET DAILY 54 tablet 5    omeprazole (PRILOSEC) 20 MG delayed release capsule TAKE 1 CAPSULE TWICE A  capsule 3    hydroCHLOROthiazide (HYDRODIURIL) 25 MG tablet TAKE ONE-HALF (1/2) TABLET IN THE MORNING 45 tablet 0    lisinopril (PRINIVIL;ZESTRIL) 40 MG tablet Take 1 tablet by mouth daily 90 tablet 0    hyoscyamine (OSCIMIN) 125 MCG TBDP dispersible tablet PLACE 2 TABLETS ON TONGUE EVERY 12 HOURS AS NEEDED FOR ABDOMINAL PAIN/CRAMPING 60 tablet 24    rosuvastatin (CRESTOR) 10 MG tablet Take 1 tablet by mouth nightly 90 tablet 3    rivaroxaban (XARELTO) 20 MG TABS tablet Take 1 tablet by mouth Daily with supper As needed for AF episode 12hrs or longer in duration 90 tablet 1    Potassium 99 MG TABS Take 99 mg by mouth daily       niacin (NIASPAN) 1000 MG extended release tablet Take 1,000 mg by mouth 4 times daily      aspirin 81 MG EC tablet Take 81 mg by mouth daily      Omega-3 Fatty Acids (FISH OIL PO) Take 2,150 mg by mouth daily       Ascorbic Acid (VITAMIN C) 500 MG tablet Take 500 mg by mouth 2 times daily       vitamin E 400 UNIT capsule Take 400 Units by mouth daily       No current facility-administered medications for this visit.       No Known Allergies    Social History     Socioeconomic History    Marital status:      Spouse name: None    Number of children: None    Years of education: None    Highest education level: None   Occupational History    None   Social Needs    Financial resource strain: None    Food insecurity     Worry: None     Inability: None    Transportation needs     Medical: None     Non-medical: None   Tobacco Use    Smoking status: Never Smoker    Smokeless tobacco: Former User     Types: Chew   Substance and Sexual Activity    Alcohol use: Yes     Comment: occ    Drug use: No    Sexual activity: Yes     Partners: Female   Lifestyle    Physical activity     Days per week: None     Minutes per session: None    Stress: None   Relationships    Social connections     Talks on phone: None     Gets together: None     Attends Adventism service: None     Active member of club or organization: None     Attends meetings of clubs or organizations: None     Relationship status: None    Intimate partner violence     Fear of current or ex partner: None     Emotionally abused: None     Physically abused: None     Forced sexual activity: None   Other Topics Concern    None   Social History Narrative    None     Family History   Problem Relation Age of Onset    Heart Disease Mother     High Blood Pressure Mother     Heart Disease Father 54        mi    High Blood Pressure Father     High Cholesterol Father     Lung Cancer Father     Cancer Father         Lung CA - Oat cell    Stroke Father     Diabetes Paternal Aunt     Colon Cancer Neg Hx     Colon Polyps Neg Hx     Esophageal Cancer Neg Hx     Liver Cancer Neg Hx     Liver Disease Neg Hx     Rectal Cancer Neg Hx     Stomach Cancer Neg Hx           Past Surgical History:   Procedure Laterality Date    ANKLE FRACTURE SURGERY      left ankle    ARM SURGERY Left 01/04/2018    fracture     CARDIAC CATHETERIZATION  06/2018    Mild CAD    CHOLECYSTECTOMY, LAPAROSCOPIC N/A 12/15/2017    CHOLECYSTECTOMY LAPAROSCOPIC performed by Mckinley Gautam MD at [address] [place] [address]  2012    GALLBLADDER SURGERY      [place] EGD TRANSORAL BIOPSY SINGLE/MULTIPLE N/A 11/15/2018    Dr Lashanda Jones      undecended testicle done when 1years old         Lab Review   Orders Only on 09/18/2020   Component Date Value    TSH 09/18/2020 3.170     Color, UA 09/18/2020 YELLOW     Clarity, UA 09/18/2020 Clear     Glucose, Ur 09/18/2020 Negative     Bilirubin Urine 09/18/2020 Negative     Ketones, Urine 09/18/2020 Negative     Specific Gravity, UA 09/18/2020 1.023     Blood, Urine 09/18/2020 Negative     pH, UA 09/18/2020 7.5     Protein, UA 09/18/2020 Negative     Urobilinogen, Urine 09/18/2020 1.0     Nitrite, Urine 09/18/2020 Negative     Leukocyte Esterase, Urine 09/18/2020 Negative     Cholesterol, Total 09/18/2020 137*    Triglycerides 09/18/2020 75     HDL 09/18/2020 48*    LDL Calculated 09/18/2020 74     Hemoglobin A1C 09/18/2020 5.4     Sodium 09/18/2020 145     Potassium 09/18/2020 4.0     Chloride 09/18/2020 106     CO2 09/18/2020 26     Anion Gap 09/18/2020 13     Glucose 09/18/2020 99     BUN 09/18/2020 13     CREATININE 09/18/2020 0.7     GFR Non- 09/18/2020 >60     GFR  09/18/2020 >59     Calcium 09/18/2020 9.3     Total Protein 09/18/2020 7.0     Alb 09/18/2020 4.2     Total Bilirubin 09/18/2020 0.8     Alkaline Phosphatase 09/18/2020 62     ALT 09/18/2020 16     AST 09/18/2020 21     WBC 09/18/2020 4.6*    RBC 09/18/2020 4.60*    Hemoglobin 09/18/2020 14.9     Hematocrit 09/18/2020 45.8     MCV 09/18/2020 99.6*    MCH 09/18/2020 32.4*    MCHC 09/18/2020 32.5*    RDW 09/18/2020 13.5     Platelets 32/34/0006 214     MPV 09/18/2020 10.3     Neutrophils % 09/18/2020 54.5     Lymphocytes % 09/18/2020 28.4     Monocytes % 09/18/2020 10.6*    Eosinophils % 09/18/2020 5.9*    Basophils % 09/18/2020 0.4     Neutrophils Absolute 09/18/2020 2.5     Immature Granulocytes # 09/18/2020 0.0     Lymphocytes Absolute 09/18/2020 1.3     Monocytes Absolute 09/18/2020 0.50     Eosinophils Absolute 09/18/2020 0.30     Basophils Absolute 09/18/2020 0.00    Orders Only on 09/18/2020   Component Date Value    PSA 09/18/2020 4.2*    PSA, Free 09/18/2020 1.1     PSA, Free Pct 09/18/2020 26          Review of Systems   Constitutional: Negative for chills, fatigue and fever. HENT: Negative for congestion, ear pain, nosebleeds, postnasal drip and sore throat. Respiratory: Negative for cough, chest tightness and wheezing. Cardiovascular: Negative for chest pain, palpitations and leg swelling. Gastrointestinal: Negative for abdominal pain and constipation. Genitourinary: Negative for dysuria and urgency. Musculoskeletal: Negative. Negative for arthralgias. Right shoulder pain with certain movements   Skin: Negative for rash. Neurological: Negative for dizziness and headaches. Psychiatric/Behavioral: Negative. Vitals:    09/23/20 1102   BP: 118/80   Site: Left Upper Arm   Position: Sitting   Cuff Size: Large Adult   Pulse: 57   Resp: 18   SpO2: 98%   Weight: 258 lb (117 kg)   Height: 6' 2\" (1.88 m)      Wt Readings from Last 3 Encounters:   09/23/20 258 lb (117 kg)   09/22/20 258 lb (117 kg)   09/21/20 258 lb (117 kg)   Body mass index is 33.13 kg/m². BP Readings from Last 3 Encounters:   09/23/20 118/80   09/22/20 131/86   09/21/20 126/86       Physical Exam  Constitutional:       Appearance: He is well-developed. HENT:      Right Ear: External ear normal.      Left Ear: External ear normal.      Mouth/Throat:      Pharynx: No oropharyngeal exudate.    Eyes: negative     Right rotator cuff strain several months ago  Suggest exercise/therapy  Exercise instructions given to the patient    Need for immunization against influenza  -     INFLUENZA, QUADV, 3 YRS AND OLDER, IM PF, PREFILL SYR OR SDV, 0.5ML (AFLURIA QUADV, PF)        Orders Placed This Encounter   Procedures    INFLUENZA, QUADV, 3 YRS AND OLDER, IM PF, PREFILL SYR OR SDV, 0.5ML (AFLURIA QUADV, PF)    Hepatitis C Antibody    HIV Screen    Comprehensive Metabolic Panel    Hemoglobin A1C    Lipid Panel     New Prescriptions    No medications on file      There are no Patient Instructions on file for this visit. No follow-ups on file. EMR Dragon/transcription disclaimer:Significant part of this  encounter note is electronic transcription/translation of spoken language to printed text. The electronic translation of spoken language may beerroneous, or at times, nonsensical words or phrases may be inadvertently transcribed.  Although I have reviewed the note for such errors, some may still exist.

## 2020-09-25 ENCOUNTER — OFFICE VISIT (OUTPATIENT)
Dept: UROLOGY | Age: 60
End: 2020-09-25
Payer: COMMERCIAL

## 2020-09-25 ENCOUNTER — TELEPHONE (OUTPATIENT)
Dept: UROLOGY | Age: 60
End: 2020-09-25

## 2020-09-25 VITALS — BODY MASS INDEX: 33.24 KG/M2 | TEMPERATURE: 96.6 F | HEIGHT: 74 IN | WEIGHT: 259 LBS

## 2020-09-25 LAB
APPEARANCE FLUID: CLEAR
BILIRUBIN, POC: NORMAL
BLOOD URINE, POC: NORMAL
CLARITY, POC: CLEAR
COLOR, POC: YELLOW
GLUCOSE URINE, POC: NORMAL
KETONES, POC: NORMAL
LEUKOCYTE EST, POC: NORMAL
NITRITE, POC: NORMAL
PH, POC: 7
PROTEIN, POC: NORMAL
SPECIFIC GRAVITY, POC: 1.02
UROBILINOGEN, POC: 0.2

## 2020-09-25 PROCEDURE — 81002 URINALYSIS NONAUTO W/O SCOPE: CPT | Performed by: UROLOGY

## 2020-09-25 PROCEDURE — 99214 OFFICE O/P EST MOD 30 MIN: CPT | Performed by: UROLOGY

## 2020-09-25 RX ORDER — PHENAZOPYRIDINE HYDROCHLORIDE 100 MG/1
100 TABLET, FILM COATED ORAL 3 TIMES DAILY PRN
Qty: 21 TABLET | Refills: 0 | Status: SHIPPED | OUTPATIENT
Start: 2020-09-25 | End: 2020-10-02

## 2020-09-25 RX ORDER — ALPRAZOLAM 0.5 MG/1
0.5 TABLET ORAL ONCE
Qty: 1 TABLET | Refills: 0 | Status: SHIPPED | OUTPATIENT
Start: 2020-09-25 | End: 2020-09-25

## 2020-09-25 RX ORDER — CIPROFLOXACIN 500 MG/1
500 TABLET, FILM COATED ORAL 2 TIMES DAILY
Qty: 20 TABLET | Refills: 0 | Status: SHIPPED | OUTPATIENT
Start: 2020-09-25 | End: 2020-10-05

## 2020-09-25 RX ORDER — OXYCODONE HYDROCHLORIDE AND ACETAMINOPHEN 5; 325 MG/1; MG/1
1 TABLET ORAL ONCE
Qty: 1 TABLET | Refills: 0 | Status: SHIPPED | OUTPATIENT
Start: 2020-09-25 | End: 2020-09-25

## 2020-09-25 RX ORDER — CIPROFLOXACIN 500 MG/1
500 TABLET, FILM COATED ORAL 2 TIMES DAILY
Qty: 20 TABLET | Refills: 0 | Status: SHIPPED | OUTPATIENT
Start: 2020-09-25 | End: 2020-09-25

## 2020-09-25 RX ORDER — PHENAZOPYRIDINE HYDROCHLORIDE 100 MG/1
100 TABLET, FILM COATED ORAL 3 TIMES DAILY PRN
Qty: 21 TABLET | Refills: 0 | Status: SHIPPED | OUTPATIENT
Start: 2020-09-25 | End: 2020-09-25 | Stop reason: CLARIF

## 2020-09-25 ASSESSMENT — ENCOUNTER SYMPTOMS
COLOR CHANGE: 0
VOMITING: 0
BACK PAIN: 0
DIARRHEA: 0
ABDOMINAL DISTENTION: 0
BLOOD IN STOOL: 0
SHORTNESS OF BREATH: 0
EYE DISCHARGE: 0
ABDOMINAL PAIN: 0
NAUSEA: 0
RHINORRHEA: 0
EYE REDNESS: 0
SINUS PRESSURE: 0
WHEEZING: 0
COUGH: 0
CONSTIPATION: 0
SORE THROAT: 0
EYE PAIN: 0
FACIAL SWELLING: 0

## 2020-09-25 NOTE — TELEPHONE ENCOUNTER
Called to say that the 4 scripts for 9/25/20 should go to his local pharmacy. If home delivery is used, they may not get there until week after next.

## 2020-09-25 NOTE — PROGRESS NOTES
Abby Henriquez is a 56 y.o. male who presents today   Chief Complaint   Patient presents with    Follow-up     patient here for a follow up on elevated PSA with lab    Elevated PSA     Elevated PSA:  Patient is here today for history of an elevated PSA. His last several PSA values are as follows:  Lab Results   Component Value Date    PSA 4.2 (H) 09/18/2020    PSA 4.6 (H) 03/13/2020    PSA 5.4 (H) 08/27/2019     Overall the PSA level is: not significantly changed stable  Recent history of urinary tract infection/prostatitis? no  Previous prostate biopsy? yes -October 5, 2018 for PSA 8.49. Prostate volume was 54  Lower urinary tract symptoms: urgency, frequency and nocturia, 2 times per night      Benign Prostatic Hypertrophy  Patient complains of lower urinary tract symptoms. He reports frequency, incomplete emptying, intermittency, nocturia two times a night and urgency. He denies straining and weak stream. Patient states symptoms are of moderate severity. Onset of symptoms was a few years ago and was sudden in onset. His AUA Symptom Score is, 11/35 manifested as irritative symptoms including frequency, urgency, nocturia and obstructive symptoms including incomplete emptying, intermittency. He has no personal history and no family history of prostate cancer. He reports a history of no complicating symptoms. He denies flank pain, gross hematuria, kidney stones and recurrent UTI.   Patient takes daily Cialis but no other medication    Past Medical History:   Diagnosis Date    A-fib [facility])     Atelectasis of right lung 6/26/2017    Back pain, lumbosacral     BiPAP (biphasic positive airway pressure) dependence     12cm to 22cm    Clotting disorder (HCC)     Cyst near coccyx     removed    GERD (gastroesophageal reflux disease)     Heart murmur     Hx of blood clots     Hyperlipidemia     Hypertension     Mild CAD 11/13/2018    Obstructive sleep apnea     AHI:  36.5 per PSG, 9/2015 - BiPAP    Osteoarthritis  Paroxysmal atrial fibrillation with RVR ([address] 6/26/2017    Pneumonia due to organism     Pulmonary embolism [facility])        Past Surgical History:   Procedure Laterality Date    ANKLE FRACTURE SURGERY      left ankle    ARM SURGERY Left 01/04/2018    fracture     CARDIAC CATHETERIZATION  06/2018    Mild CAD    CHOLECYSTECTOMY, LAPAROSCOPIC N/A 12/15/2017    CHOLECYSTECTOMY LAPAROSCOPIC performed by Jud Rivera MD at [address] [facility]  2012    GALLBLADDER SURGERY      [place] EGD TRANSORAL BIOPSY SINGLE/MULTIPLE N/A 11/15/2018    Dr Axel Hope-Gastropathy   [facility]      undecended testicle done when 1years old       Current Outpatient Medications   Medication Sig Dispense Refill    ALPRAZolam (XANAX) 0.5 MG tablet Take 1 tablet by mouth once for 1 dose. Take 30 to 60 minutes prior to procedure 1 tablet 0    ciprofloxacin (CIPRO) 500 MG tablet Take 1 tablet by mouth 2 times daily for 10 days Begin taking day prior to procedure 20 tablet 0    oxyCODONE-acetaminophen (PERCOCET) 5-325 MG per tablet Take 1 tablet by mouth once for 1 dose.  Take 30 to 60 minutes prior to procedure 1 tablet 0    phenazopyridine (PYRIDIUM) 100 MG tablet Take 1 tablet by mouth 3 times daily as needed for Pain (for burning and spasms) Begin taking after catheter removed 21 tablet 0    sotalol (BETAPACE) 80 MG tablet TAKE 1 TABLET TWICE A  tablet 0    amLODIPine (NORVASC) 5 MG tablet TAKE 1 TABLET DAILY 30 tablet 1    fluticasone (FLONASE) 50 MCG/ACT nasal spray USE 1 SPRAY NASALLY DAILY 48 g 1    tadalafil (CIALIS) 5 MG tablet TAKE 1 TABLET DAILY 54 tablet 5    omeprazole (PRILOSEC) 20 MG delayed release capsule TAKE 1 CAPSULE TWICE A  capsule 3    hydroCHLOROthiazide (HYDRODIURIL) 25 MG tablet TAKE ONE-HALF (1/2) TABLET IN THE MORNING 45 tablet 0    lisinopril (PRINIVIL;ZESTRIL) 40 MG tablet Take 1 tablet by mouth daily 90 tablet 0    hyoscyamine (OSCIMIN) 125 MCG TBDP dispersible tablet PLACE 2 TABLETS ON TONGUE EVERY 12 HOURS AS NEEDED FOR ABDOMINAL PAIN/CRAMPING 60 tablet 24    rosuvastatin (CRESTOR) 10 MG tablet Take 1 tablet by mouth nightly 90 tablet 3    rivaroxaban (XARELTO) 20 MG TABS tablet Take 1 tablet by mouth Daily with supper As needed for AF episode 12hrs or longer in duration 90 tablet 1    Potassium 99 MG TABS Take 99 mg by mouth daily       niacin (NIASPAN) 1000 MG extended release tablet Take 1,000 mg by mouth 4 times daily      aspirin 81 MG EC tablet Take 81 mg by mouth daily      Omega-3 Fatty Acids (FISH OIL PO) Take 2,150 mg by mouth daily       Ascorbic Acid (VITAMIN C) 500 MG tablet Take 500 mg by mouth 2 times daily       vitamin E 400 UNIT capsule Take 400 Units by mouth daily       No current facility-administered medications for this visit.         No Known Allergies    Social History     Socioeconomic History    Marital status:      Spouse name: None    Number of children: None    Years of education: None    Highest education level: None   Occupational History    None   Social Needs    Financial resource strain: None    Food insecurity     Worry: None     Inability: None    Transportation needs     Medical: None     Non-medical: None   Tobacco Use    Smoking status: Never Smoker    Smokeless tobacco: Former User     Types: Chew   Substance and Sexual Activity    Alcohol use: Yes     Comment: occ    Drug use: No    Sexual activity: Yes     Partners: Female   Lifestyle    Physical activity     Days per week: None     Minutes per session: None    Stress: None   Relationships    Social connections     Talks on phone: None     Gets together: None     Attends Orthodoxy service: None     Active member of club or organization: None     Attends meetings of clubs or organizations: None     Relationship status: None    Intimate partner violence     Fear of current or ex partner: None     Emotionally abused: None     Physically abused: None     Forced 99 09/18/2020    PROT 7.0 09/18/2020    LABALBU 4.2 09/18/2020    CALCIUM 9.3 09/18/2020    BILITOT 0.8 09/18/2020    ALKPHOS 62 09/18/2020    AST 21 09/18/2020    ALT 16 09/18/2020     Results for orders placed or performed in visit on 09/25/20   POCT Urinalysis no Micro   Result Value Ref Range    Color, UA yellow     Clarity, UA clear     Glucose, UA POC neg     Bilirubin, UA neg     Ketones, UA neg     Spec Grav, UA 1.020     Blood, UA POC neg     pH, UA 7.0     Protein, UA POC neg     Urobilinogen, UA 0.2     Leukocytes, UA neg     Nitrite, UA neg     Appearance, Fluid Clear Clear, Slightly Cloudy     Lab Results   Component Value Date    PSA 4.2 (H) 09/18/2020    PSA 4.6 (H) 03/13/2020    PSA 5.4 (H) 08/27/2019     Lab Results   Component Value Date    PSAFREEPCT 26 09/18/2020    PSAFREEPCT 26 03/13/2020    PSAFREEPCT 28 08/27/2019         1. Elevated PSA  His PSA remains stable no need to recheck about 6 months.  - POCT Urinalysis no Micro    2. Benign prostatic hyperplasia with urinary frequency  Patient has moderate obstructive voiding symptoms he would like to proceed with minimally invasive therapy with Rezum this was discussed with him today including outcomes expectations and the procedure itself. He does understand there is risk of sexual dysfunction and incontinence but this is low compared to TURP. We also discussed the fact that he will have to have a Samano catheter post procedure. We discussed the risk of infection. He does understand this will be done under some oral sedation with Xanax and Percocet and a prostate block and 2% lidocaine jelly for anesthetic purposes. He does understand there will be immediately some irritative symptoms that will slowly improve over the course of time. He understands and wishes to proceed. - ALPRAZolam (XANAX) 0.5 MG tablet; Take 1 tablet by mouth once for 1 dose. Take 30 to 60 minutes prior to procedure  Dispense: 1 tablet;  Refill: 0  - ciprofloxacin (CIPRO) 500 MG tablet; Take 1 tablet by mouth 2 times daily for 10 days Begin taking day prior to procedure  Dispense: 20 tablet; Refill: 0  - oxyCODONE-acetaminophen (PERCOCET) 5-325 MG per tablet; Take 1 tablet by mouth once for 1 dose. Take 30 to 60 minutes prior to procedure  Dispense: 1 tablet; Refill: 0  - phenazopyridine (PYRIDIUM) 100 MG tablet; Take 1 tablet by mouth 3 times daily as needed for Pain (for burning and spasms) Begin taking after catheter removed  Dispense: 21 tablet; Refill: 0      Orders Placed This Encounter   Procedures    POCT Urinalysis no Micro        Return in about 1 month (around 10/25/2020) for He will return for Rezum RF water vapor therapy treatment of the prostate. All information inputted into the note by the MA to include chief complaint, past medical history, past surgical history, medications, allergies, social and family history and review of systems has been reviewed and updated as needed by me. EMR Dragon/transcription disclaimer: Much of this documentt is electronic  transcription/translation of spoken language to printed text. The  electronic translation of spoken language may be erroneous, or at times,  nonsensical words or phrases may be inadvertently transcribed.  Although I  have reviewed the document for such errors, some may still exist.

## 2020-10-05 RX ORDER — SOTALOL HYDROCHLORIDE 80 MG/1
TABLET ORAL
Qty: 180 TABLET | Refills: 3 | Status: SHIPPED | OUTPATIENT
Start: 2020-10-05 | End: 2021-11-09

## 2020-10-05 RX ORDER — ROSUVASTATIN CALCIUM 10 MG/1
TABLET, COATED ORAL
Qty: 90 TABLET | Refills: 3 | Status: SHIPPED | OUTPATIENT
Start: 2020-10-05 | End: 2021-07-06

## 2020-10-05 RX ORDER — AMLODIPINE BESYLATE 5 MG/1
TABLET ORAL
Qty: 90 TABLET | Refills: 1 | Status: SHIPPED | OUTPATIENT
Start: 2020-10-05 | End: 2021-02-05

## 2020-10-05 NOTE — TELEPHONE ENCOUNTER
Lucille Goldberg called requesting a refill of the below medication which has been pended for you:     Requested Prescriptions     Pending Prescriptions Disp Refills    amLODIPine (NORVASC) 5 MG tablet [Pharmacy Med Name: AMLODIPINE BESYLATE TABS 5MG] 30 tablet 2     Sig: TAKE 1 TABLET DAILY    rosuvastatin (CRESTOR) 10 MG tablet [Pharmacy Med Name: ROSUVASTATIN TABS 10MG] 90 tablet 3     Sig: TAKE 1 TABLET NIGHTLY       Last Appointment Date: 9/23/2020  Next Appointment Date: 3/24/2021    No Known Allergies

## 2020-11-19 ENCOUNTER — TELEPHONE (OUTPATIENT)
Dept: UROLOGY | Age: 60
End: 2020-11-19

## 2020-11-19 NOTE — TELEPHONE ENCOUNTER
LEFT MSG WITH PT THAT HIS REZUME APPT IS CX DUE TO NOT GETTING HIS COVID TEXT PRIOR TO THE PROCEDURE. DID CALL WIFE CELL PHONE. SPOKE WITH PT.  HE STATED THAT NO ONE TOLD HIM HE HAD TO GET THE COVID TEST. HE WAS UPSET. STATED HE WILL CONSIDER FINDING A NEW PROVIDER.

## 2021-01-07 ENCOUNTER — VIRTUAL VISIT (OUTPATIENT)
Dept: INTERNAL MEDICINE | Age: 61
End: 2021-01-07
Payer: COMMERCIAL

## 2021-01-07 DIAGNOSIS — E66.09 EXOGENOUS OBESITY: ICD-10-CM

## 2021-01-07 DIAGNOSIS — R03.0 ELEVATED BLOOD PRESSURE READING: ICD-10-CM

## 2021-01-07 DIAGNOSIS — I10 ESSENTIAL HYPERTENSION: Primary | ICD-10-CM

## 2021-01-07 DIAGNOSIS — E87.6 HYPOKALEMIA: ICD-10-CM

## 2021-01-07 DIAGNOSIS — I34.0 MILD MITRAL REGURGITATION BY PRIOR ECHOCARDIOGRAM: ICD-10-CM

## 2021-01-07 DIAGNOSIS — I48.0 PAF (PAROXYSMAL ATRIAL FIBRILLATION) (HCC): ICD-10-CM

## 2021-01-07 PROCEDURE — 99214 OFFICE O/P EST MOD 30 MIN: CPT | Performed by: INTERNAL MEDICINE

## 2021-01-07 RX ORDER — CLONIDINE HYDROCHLORIDE 0.1 MG/1
TABLET ORAL
Qty: 30 TABLET | Refills: 1 | Status: SHIPPED | OUTPATIENT
Start: 2021-01-07 | End: 2021-01-13 | Stop reason: SDUPTHER

## 2021-01-07 RX ORDER — POTASSIUM CHLORIDE 750 MG/1
TABLET, EXTENDED RELEASE ORAL
Qty: 30 TABLET | Refills: 1 | Status: SHIPPED | OUTPATIENT
Start: 2021-01-07 | End: 2021-01-13 | Stop reason: SDUPTHER

## 2021-01-07 ASSESSMENT — ENCOUNTER SYMPTOMS
COUGH: 0
WHEEZING: 0
CHEST TIGHTNESS: 0
ABDOMINAL PAIN: 0
CONSTIPATION: 0
SORE THROAT: 0

## 2021-01-07 ASSESSMENT — PATIENT HEALTH QUESTIONNAIRE - PHQ9
1. LITTLE INTEREST OR PLEASURE IN DOING THINGS: 0
SUM OF ALL RESPONSES TO PHQ QUESTIONS 1-9: 0
SUM OF ALL RESPONSES TO PHQ QUESTIONS 1-9: 0

## 2021-01-07 NOTE — PATIENT INSTRUCTIONS
Patient Education        DASH Diet: Care Instructions  Your Care Instructions     The DASH diet is an eating plan that can help lower your blood pressure. DASH stands for Dietary Approaches to Stop Hypertension. Hypertension is high blood pressure. The DASH diet focuses on eating foods that are high in calcium, potassium, and magnesium. These nutrients can lower blood pressure. The foods that are highest in these nutrients are fruits, vegetables, low-fat dairy products, nuts, seeds, and legumes. But taking calcium, potassium, and magnesium supplements instead of eating foods that are high in those nutrients does not have the same effect. The DASH diet also includes whole grains, fish, and poultry. The DASH diet is one of several lifestyle changes your doctor may recommend to lower your high blood pressure. Your doctor may also want you to decrease the amount of sodium in your diet. Lowering sodium while following the DASH diet can lower blood pressure even further than just the DASH diet alone. Follow-up care is a key part of your treatment and safety. Be sure to make and go to all appointments, and call your doctor if you are having problems. It's also a good idea to know your test results and keep a list of the medicines you take. How can you care for yourself at home? Following the DASH diet  · Eat 4 to 5 servings of fruit each day. A serving is 1 medium-sized piece of fruit, ½ cup chopped or canned fruit, 1/4 cup dried fruit, or 4 ounces (½ cup) of fruit juice. Choose fruit more often than fruit juice. · Eat 4 to 5 servings of vegetables each day. A serving is 1 cup of lettuce or raw leafy vegetables, ½ cup of chopped or cooked vegetables, or 4 ounces (½ cup) of vegetable juice. Choose vegetables more often than vegetable juice. · Get 2 to 3 servings of low-fat and fat-free dairy each day. A serving is 8 ounces of milk, 1 cup of yogurt, or 1 ½ ounces of cheese. ? Sprinkle sunflower seeds or chopped almonds over salads. Or try adding chopped walnuts or almonds to cooked vegetables. ? Try some vegetarian meals using beans and peas. Add garbanzo or kidney beans to salads. Make burritos and tacos with mashed jaramillo beans or black beans. Where can you learn more? Go to [email or web address] org and sign in to your [employer] account. Enter H191 in the [employer] box to learn more about \"DASH Diet: Care Instructions. \"     If you do not have an account, please click on the \"Sign Up Now\" link. Current as of: December 16, 2019               Content Version: 12.6  © 4975-0012 [employer]. Care instructions adapted under license by [facility] ([place] [facility] If you have questions about a medical condition or this instruction, always ask your healthcare professional. [address] any warranty or liability for your use of this information.

## 2021-01-07 NOTE — PROGRESS NOTES
Chief Complaint   Patient presents with    Hypertension     went to ED on 01/06 due to BP being high, the reading 175/135. History of presenting illness:  Suzanna Estevez is a56 y.o. male     TELEHEALTH EVALUATION -- Audio/Visual (During NCWJF-19 public health emergency)  Patient location-home  Pursuant to the emergency declaration under the [address] [place] [address] authority and the [employer] Act, this Virtual  Visit was conducted, with patient's consent, to reduce the patient's risk of exposure to COVID-19 and provide continuity of care for an established patient. Services were provided through a video synchronous discussion virtually to substitute for in-person clinic visit.     Reason for VV:  Seen at ED yst for elevated blood pressure  States his hr also was elevated  No CP    BP readings this am  168/122- hr 61  166/127 hr 59  157/ 104  153/ 97    States - has gained about 10 lbs over last 2-3 months      Patient Active Problem List    Diagnosis Date Noted    Elevated PSA 03/20/2020    Epigastric discomfort 11/14/2018    Chronic nausea 11/14/2018    Mild CAD 11/13/2018    IFG (impaired fasting glucose) 05/11/2018    Primary insomnia 10/26/2017    Chronic GERD 10/26/2017    Degenerative lumbar disc 10/26/2017    PAF (paroxysmal atrial fibrillation) (HCC)     Hx pulmonary embolism 06/26/2017    Benign prostatic hyperplasia with lower urinary tract symptoms 06/26/2017    Mixed hyperlipidemia 06/26/2017    BiPAP (biphasic positive airway pressure) dependence      Overview Note:     12cm to 22cm      Mild mitral regurgitation by prior echocardiogram 02/08/2017    Essential hypertension 02/08/2017    Obstructive sleep apnea      Past Medical History:   Diagnosis Date    A-fib [facility])     Atelectasis of right lung 6/26/2017    Back pain, lumbosacral  omeprazole (PRILOSEC) 20 MG delayed release capsule TAKE 1 CAPSULE TWICE A  capsule 3    hyoscyamine (OSCIMIN) 125 MCG TBDP dispersible tablet PLACE 2 TABLETS ON TONGUE EVERY 12 HOURS AS NEEDED FOR ABDOMINAL PAIN/CRAMPING 60 tablet 24    rivaroxaban (XARELTO) 20 MG TABS tablet Take 1 tablet by mouth Daily with supper As needed for AF episode 12hrs or longer in duration 90 tablet 1    Potassium 99 MG TABS Take 99 mg by mouth daily       niacin (NIASPAN) 1000 MG extended release tablet Take 1,000 mg by mouth 4 times daily      aspirin 81 MG EC tablet Take 81 mg by mouth daily      Omega-3 Fatty Acids (FISH OIL PO) Take 2,150 mg by mouth daily       Ascorbic Acid (VITAMIN C) 500 MG tablet Take 500 mg by mouth 2 times daily       vitamin E 400 UNIT capsule Take 400 Units by mouth daily       No current facility-administered medications for this visit. No Known Allergies  Social History     Tobacco Use    Smoking status: Never Smoker    Smokeless tobacco: Former User     Types: Chew   Substance Use Topics    Alcohol use: Yes     Comment: occ      Family History   Problem Relation Age of Onset    Heart Disease Mother     High Blood Pressure Mother     Heart Disease Father 54        mi    High Blood Pressure Father     High Cholesterol Father     Lung Cancer Father     Cancer Father         Lung CA - Oat cell    Stroke Father     Diabetes Paternal Aunt     Colon Cancer Neg Hx     Colon Polyps Neg Hx     Esophageal Cancer Neg Hx     Liver Cancer Neg Hx     Liver Disease Neg Hx     Rectal Cancer Neg Hx     Stomach Cancer Neg Hx        Review of Systems   Constitutional: Positive for fatigue. Negative for chills and fever. HENT: Negative for congestion, ear pain, nosebleeds, postnasal drip and sore throat. Respiratory: Negative for cough, chest tightness and wheezing. Cardiovascular: Negative for chest pain, palpitations and leg swelling. Gastrointestinal: Negative for abdominal pain and constipation. Genitourinary: Negative for dysuria and urgency. Musculoskeletal: Negative. Negative for arthralgias. Skin: Negative for rash. Neurological: Negative for dizziness and headaches. Psychiatric/Behavioral: Negative. There were no vitals filed for this visit. There is no height or weight on file to calculate BMI. Patient-Reported Vitals 1/7/2021   Patient-Reported Weight 265   Patient-Reported Height 6 2   Patient-Reported Systolic 725   Patient-Reported Diastolic 259   Patient-Reported Pulse 57        Physical Exam  PHYSICAL EXAMINATION:  [ INSTRUCTIONS:  \"[x]\" Indicates a positive item  \"[]\" Indicates a negative item  -- DELETE ALL ITEMS NOT EXAMINED]  [x] Alert  [x] Oriented to person/place/time    [x] No apparent distress  [] Toxic appearing    [] Face flushed appearing [] Sclera clear  [] Lips are cyanotic      [x] Breathing appears normal  [] Appears tachypneic      [] Rash on visible skin    [x] Cranial Nerves II-XII grossly intact    [x] Motor grossly intact in visible upper extremities    [x] Motor grossly intact in visible lower extremities    [x] Normal Mood  [] Anxious appearing    [] Depressed appearing  [] Confused appearing      [] Poor short term memory  [] Poor long term memory    [] OTHER:      Due to this being a TeleHealth encounter, evaluation of the following organ systems is limited: Vitals/Constitutional/EENT/Resp/CV/GI//MS/Neuro/Skin/Heme-Lymph-Imm. Lab Review   No visits with results within 2 Month(s) from this visit.    Latest known visit with results is:   Office Visit on 09/25/2020   Component Date Value    Color, UA 09/25/2020 yellow     Clarity, UA 09/25/2020 clear     Glucose, UA POC 09/25/2020 neg     Bilirubin, UA 09/25/2020 neg     Ketones, UA 09/25/2020 neg     Spec Grav, UA 09/25/2020 1.020     Blood, UA POC 09/25/2020 neg     pH, UA 09/25/2020 7.0     Protein, UA POC 09/25/2020 neg  Urobilinogen, UA 09/25/2020 0.2     Leukocytes, UA 09/25/2020 neg     Nitrite, UA 09/25/2020 neg     Appearance, Fluid 09/25/2020 Clear            ASSESSMENT/PLAN:  Dwight Quintanilla was seen today for hypertension. Diagnoses and all orders for this visit:    Essential hypertension    Elevated blood pressure reading    Outside records reviewed  E KG compared with previous EKGs from our chart, no electrocardiogram changes on electrocardiogram 1/6/2021 compared to EKGs in 2020  Lab reviewed  Hypokalemia with potassium level 3.2, otherwise CBC and BMP normal  ER record reviewed, scanned to chart    Blood pressure readings that patient has been checking since yesterday discussed with patient  Blood pressure readings recorded as above  All results discussed with patient in detail  Recommendations today  1. Change amlodipine from 5 mg daily to 5 mg twice daily  2. Lisinopril 40 mg daily  3. Hydrochlorothiazide 12.5 daily  4. Clonidine 0.1 every 12 hours if blood pressure systolic over 328 or diastolic over 070  5. Blood pressure readings recordings with heart rates to me on 1/12/2021  6. Recommendations further on 1/12/2021    Hypokalemia  Potassium level 3.2  Patient has been taking potassium 99 OTC  Recommendations today  1. Potassium 99 1 daily  2. Potassium 10 mEq daily until 1/11/2021  Thereafter take 10 mg every Monday Wednesday and Friday  3.   Plan repeat this level once patient is back in town for follow-up appointment in February 2021    PAF (paroxysmal atrial fibrillation) ([facility] [address]  Has remained in sinus rhythm  Recommendations  Keep appointment with cardiology  Sotalol 80 mg twice daily  Xarelto 20 mg daily    Mild mitral regurgitation by prior echocardiogram  Monitoring as per cardiology    Exogenous obesity  Further weight gain over last several months  Patient's BMI now 34  Discussed with patient regarding importance of diet and exercise for blood pressure control Suggest Mediterranean diet, plant-based/decreasing animal products in food  Mediterranean diet instructions-DASH diet sent to patient  Follow-up here February 2021    Other orders  -     cloNIDine (CATAPRES) 0.1 MG tablet; Take one every 12 hours if BP systolic blood pressure over 541 or diastolic BP over 141  -     potassium chloride (KLOR-CON M) 10 MEQ extended release tablet; Take one tablet every Monday, Wednesday, Friday              No orders of the defined types were placed in this encounter. New Prescriptions    CLONIDINE (CATAPRES) 0.1 MG TABLET    Take one every 12 hours if BP systolic blood pressure over 348 or diastolic BP over 414    POTASSIUM CHLORIDE (KLOR-CON M) 10 MEQ EXTENDED RELEASE TABLET    Take one tablet every Monday, Wednesday, Friday         No follow-ups on file. Patient Instructions     Patient Education        DASH Diet: Care Instructions  Your Care Instructions     The DASH diet is an eating plan that can help lower your blood pressure. DASH stands for Dietary Approaches to Stop Hypertension. Hypertension is high blood pressure. The DASH diet focuses on eating foods that are high in calcium, potassium, and magnesium. These nutrients can lower blood pressure. The foods that are highest in these nutrients are fruits, vegetables, low-fat dairy products, nuts, seeds, and legumes. But taking calcium, potassium, and magnesium supplements instead of eating foods that are high in those nutrients does not have the same effect. The DASH diet also includes whole grains, fish, and poultry. The DASH diet is one of several lifestyle changes your doctor may recommend to lower your high blood pressure. Your doctor may also want you to decrease the amount of sodium in your diet. Lowering sodium while following the DASH diet can lower blood pressure even further than just the DASH diet alone. Follow-up care is a key part of your treatment and safety. Be sure to make and go to all appointments, and call your doctor if you are having problems. It's also a good idea to know your test results and keep a list of the medicines you take. How can you care for yourself at home? Following the DASH diet  · Eat 4 to 5 servings of fruit each day. A serving is 1 medium-sized piece of fruit, ½ cup chopped or canned fruit, 1/4 cup dried fruit, or 4 ounces (½ cup) of fruit juice. Choose fruit more often than fruit juice. · Eat 4 to 5 servings of vegetables each day. A serving is 1 cup of lettuce or raw leafy vegetables, ½ cup of chopped or cooked vegetables, or 4 ounces (½ cup) of vegetable juice. Choose vegetables more often than vegetable juice. · Get 2 to 3 servings of low-fat and fat-free dairy each day. A serving is 8 ounces of milk, 1 cup of yogurt, or 1 ½ ounces of cheese. · Eat 6 to 8 servings of grains each day. A serving is 1 slice of bread, 1 ounce of dry cereal, or ½ cup of cooked rice, pasta, or cooked cereal. Try to choose whole-grain products as much as possible. · Limit lean meat, poultry, and fish to 2 servings each day. A serving is 3 ounces, about the size of a deck of cards. · Eat 4 to 5 servings of nuts, seeds, and legumes (cooked dried beans, lentils, and split peas) each week. A serving is 1/3 cup of nuts, 2 tablespoons of seeds, or ½ cup of cooked beans or peas. · Limit fats and oils to 2 to 3 servings each day. A serving is 1 teaspoon of vegetable oil or 2 tablespoons of salad dressing. · Limit sweets and added sugars to 5 servings or less a week. A serving is 1 tablespoon jelly or jam, ½ cup sorbet, or 1 cup of lemonade. · Eat less than 2,300 milligrams (mg) of sodium a day. If you limit your sodium to 1,500 mg a day, you can lower your blood pressure even more.   Tips for success

## 2021-01-13 ENCOUNTER — TELEPHONE (OUTPATIENT)
Dept: INTERNAL MEDICINE | Age: 61
End: 2021-01-13

## 2021-01-13 ENCOUNTER — PATIENT MESSAGE (OUTPATIENT)
Dept: INTERNAL MEDICINE | Age: 61
End: 2021-01-13

## 2021-01-13 RX ORDER — POTASSIUM CHLORIDE 750 MG/1
TABLET, EXTENDED RELEASE ORAL
Qty: 36 TABLET | Refills: 1 | Status: SHIPPED | OUTPATIENT
Start: 2021-01-13 | End: 2022-10-12 | Stop reason: CLARIF

## 2021-01-13 RX ORDER — CLONIDINE HYDROCHLORIDE 0.1 MG/1
TABLET ORAL
Qty: 180 TABLET | Refills: 1 | Status: SHIPPED | OUTPATIENT
Start: 2021-01-13

## 2021-01-13 NOTE — TELEPHONE ENCOUNTER
Reviewed his blood pressure readings  They are better but still remain elevated  Recommend following  1.  Continue amlodipine 5 mg twice daily   2.  Continue lisinopril 40 mg daily   3.  Continue hydrochlorothiazide 12.5 daily   4.  Clonidine 0.1 every 12 hours if blood pressure systolic over 072 or diastolic over 179   And also recommend patient  add clonidine 0.1 every night at bedtime   (So for clonidine schedule 0.1 at nighttime and every 12 hours as per above instructions)      Blood pressure readings to me in 1 week

## 2021-01-13 NOTE — TELEPHONE ENCOUNTER
From: Madelyne Carrel  Sent: 1/13/2021 10:36 AM CST  To: [facility] Clinical Staff  Subject: RE: blood pressure    My cell is [phone], [place] My work cell is [phone]. It usually has better signal.  If you could call at convenience I have a couple of questions. Lee Hess  ----- Message -----  From: Sharlene Hameed  Sent: 1/13/21, 11:27  To: Madelyne Carrel  Subject: blood pressure    I tried calling the number I have listed for you and could not get through can you please verify your number. Also this is what she said regarding your blood pressures     Reviewed his blood pressure readings  They are better but still remain elevated  Recommend following  1. Continue amlodipine 5 mg twice daily   2. Continue lisinopril 40 mg daily   3. Continue hydrochlorothiazide 12.5 daily   4.  Clonidine 0.1 every 12 hours if blood pressure systolic over 051 or diastolic over 682   And also recommend patient add clonidine 0.1 every night at bedtime   (So for clonidine schedule 0.1 at nighttime and every 12 hours as per above instructions)      Blood pressure readings to me in 1 week

## 2021-01-13 NOTE — TELEPHONE ENCOUNTER
Devi Cardoso called requesting a refill of the below medication which has been pended for you:     Requested Prescriptions     Pending Prescriptions Disp Refills    cloNIDine (CATAPRES) 0.1 MG tablet 180 tablet 1     Sig: Take 1 tablet night and take one every 12 hours if BP systolic blood pressure over 884 or diastolic BP over 447    potassium chloride (KLOR-CON M) 10 MEQ extended release tablet 36 tablet 1     Sig: Take one tablet every Monday, Wednesday, Friday       Last Appointment Date: Visit date not found  Next Appointment Date: 3/24/2021    No Known Allergies

## 2021-01-19 ENCOUNTER — TELEPHONE (OUTPATIENT)
Dept: INTERNAL MEDICINE | Age: 61
End: 2021-01-19

## 2021-01-19 NOTE — TELEPHONE ENCOUNTER
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  Amlodipine 5 mg twice daily   2.  Lisinopril 40 mg daily   3.  Hydrochlorothiazide 12.5 daily   4.  Clonidine 0.1 every 12 hours if blood pressure systolic over 305 or diastolic over 064   ----- Message -----

## 2021-03-22 DIAGNOSIS — Z23 NEED FOR IMMUNIZATION AGAINST INFLUENZA: ICD-10-CM

## 2021-03-22 DIAGNOSIS — E78.2 MIXED HYPERLIPIDEMIA: ICD-10-CM

## 2021-03-22 DIAGNOSIS — F51.01 PRIMARY INSOMNIA: ICD-10-CM

## 2021-03-22 DIAGNOSIS — Z11.59 NEED FOR HEPATITIS C SCREENING TEST: ICD-10-CM

## 2021-03-22 DIAGNOSIS — R73.01 IFG (IMPAIRED FASTING GLUCOSE): ICD-10-CM

## 2021-03-22 DIAGNOSIS — Z00.00 ANNUAL PHYSICAL EXAM: ICD-10-CM

## 2021-03-22 DIAGNOSIS — Z11.4 SCREENING FOR HIV (HUMAN IMMUNODEFICIENCY VIRUS): ICD-10-CM

## 2021-03-22 DIAGNOSIS — I10 ESSENTIAL HYPERTENSION: ICD-10-CM

## 2021-03-22 DIAGNOSIS — E66.09 EXOGENOUS OBESITY: ICD-10-CM

## 2021-03-22 DIAGNOSIS — I48.0 PAF (PAROXYSMAL ATRIAL FIBRILLATION) (HCC): ICD-10-CM

## 2021-03-22 LAB
ALBUMIN SERPL-MCNC: 4.4 G/DL (ref 3.5–5.2)
ALP BLD-CCNC: 62 U/L (ref 40–130)
ALT SERPL-CCNC: 20 U/L (ref 5–41)
ANION GAP SERPL CALCULATED.3IONS-SCNC: 10 MMOL/L (ref 7–19)
AST SERPL-CCNC: 25 U/L (ref 5–40)
BILIRUB SERPL-MCNC: 1 MG/DL (ref 0.2–1.2)
BUN BLDV-MCNC: 11 MG/DL (ref 8–23)
CALCIUM SERPL-MCNC: 9.7 MG/DL (ref 8.8–10.2)
CHLORIDE BLD-SCNC: 102 MMOL/L (ref 98–111)
CHOLESTEROL, TOTAL: 130 MG/DL (ref 160–199)
CO2: 31 MMOL/L (ref 22–29)
CREAT SERPL-MCNC: 0.8 MG/DL (ref 0.5–1.2)
GFR AFRICAN AMERICAN: >59
GFR NON-AFRICAN AMERICAN: >60
GLUCOSE BLD-MCNC: 119 MG/DL (ref 74–109)
HBA1C MFR BLD: 5.5 % (ref 4–6)
HDLC SERPL-MCNC: 41 MG/DL (ref 55–121)
HEPATITIS C ANTIBODY INTERPRETATION: NORMAL
HIV-1 P24 AG: NORMAL
LDL CHOLESTEROL CALCULATED: 70 MG/DL
POTASSIUM SERPL-SCNC: 3.8 MMOL/L (ref 3.5–5)
RAPID HIV 1&2: NORMAL
SODIUM BLD-SCNC: 143 MMOL/L (ref 136–145)
TOTAL PROTEIN: 7 G/DL (ref 6.6–8.7)
TRIGL SERPL-MCNC: 94 MG/DL (ref 0–149)

## 2021-03-24 ENCOUNTER — OFFICE VISIT (OUTPATIENT)
Dept: INTERNAL MEDICINE | Age: 61
End: 2021-03-24
Payer: COMMERCIAL

## 2021-03-24 VITALS
HEART RATE: 56 BPM | BODY MASS INDEX: 32.47 KG/M2 | DIASTOLIC BLOOD PRESSURE: 72 MMHG | HEIGHT: 74 IN | RESPIRATION RATE: 18 BRPM | SYSTOLIC BLOOD PRESSURE: 102 MMHG | OXYGEN SATURATION: 96 % | WEIGHT: 253 LBS

## 2021-03-24 DIAGNOSIS — I10 ESSENTIAL HYPERTENSION: Primary | ICD-10-CM

## 2021-03-24 DIAGNOSIS — N40.1 BENIGN PROSTATIC HYPERPLASIA WITH URINARY FREQUENCY: ICD-10-CM

## 2021-03-24 DIAGNOSIS — E87.6 HYPOKALEMIA: ICD-10-CM

## 2021-03-24 DIAGNOSIS — E66.09 EXOGENOUS OBESITY: ICD-10-CM

## 2021-03-24 DIAGNOSIS — R73.01 IFG (IMPAIRED FASTING GLUCOSE): ICD-10-CM

## 2021-03-24 DIAGNOSIS — E78.2 MIXED HYPERLIPIDEMIA: ICD-10-CM

## 2021-03-24 DIAGNOSIS — F51.01 PRIMARY INSOMNIA: ICD-10-CM

## 2021-03-24 DIAGNOSIS — Z12.5 SCREENING PSA (PROSTATE SPECIFIC ANTIGEN): ICD-10-CM

## 2021-03-24 DIAGNOSIS — R35.0 BENIGN PROSTATIC HYPERPLASIA WITH URINARY FREQUENCY: ICD-10-CM

## 2021-03-24 PROCEDURE — 99214 OFFICE O/P EST MOD 30 MIN: CPT | Performed by: INTERNAL MEDICINE

## 2021-03-24 RX ORDER — CHLORTHALIDONE 25 MG/1
12.5 TABLET ORAL DAILY
COMMUNITY
End: 2022-10-12 | Stop reason: SDUPTHER

## 2021-03-24 ASSESSMENT — ENCOUNTER SYMPTOMS
BLOOD IN STOOL: 0
EYE PAIN: 0
DIARRHEA: 0
ABDOMINAL PAIN: 0
CONSTIPATION: 0
WHEEZING: 0
VOICE CHANGE: 0
COUGH: 0
CHEST TIGHTNESS: 0
COLOR CHANGE: 0
VOMITING: 0
SINUS PRESSURE: 0
NAUSEA: 0
EYE REDNESS: 0
SORE THROAT: 0
TROUBLE SWALLOWING: 0

## 2021-03-24 NOTE — PROGRESS NOTES
Chief Complaint   Patient presents with    6 Month Follow-Up     History of presenting illness:  Gilmer Nickerson is a56 y.o. male who presents today for follow up on his chronic medical conditions as noted below. Essential hypertension- BP has been now better with recent changes by Brenna Boyce MD ( see my ov note 1 week ago)     Obstructive sleep apnea- using CPAP/ Sleeping better     Mixed hyperlipidemia- Patient Jonna Lozada tried to follow diet recommendations.  Has been taking  cholesterol lowering medication as prescribed and does not report any side effects.     PAF (paroxysmal atrial fibrillation) ([facility]- he follows with dr alexander/ no further episodes    Patient Active Problem List    Diagnosis Date Noted    Elevated PSA 03/20/2020    Epigastric discomfort 11/14/2018    Chronic nausea 11/14/2018    Mild CAD 11/13/2018    IFG (impaired fasting glucose) 05/11/2018    Primary insomnia 10/26/2017    Chronic GERD 10/26/2017    Degenerative lumbar disc 10/26/2017    PAF (paroxysmal atrial fibrillation) ([facility]     Hx pulmonary embolism 06/26/2017    Benign prostatic hyperplasia with lower urinary tract symptoms 06/26/2017    Mixed hyperlipidemia 06/26/2017    BiPAP (biphasic positive airway pressure) dependence      Overview Note:     12cm to 22cm      Mild mitral regurgitation by prior echocardiogram 02/08/2017    Essential hypertension 02/08/2017    Obstructive sleep apnea      Past Medical History:   Diagnosis Date    A-fib [facility])     Atelectasis of right lung 6/26/2017    Back pain, lumbosacral     BiPAP (biphasic positive airway pressure) dependence     12cm to 22cm    Clotting disorder (HCC)     Cyst near coccyx     removed    GERD (gastroesophageal reflux disease)     Heart murmur     Hx of blood clots     Hyperlipidemia     Hypertension     Mild CAD 11/13/2018    Obstructive sleep apnea     AHI:  36.5 per PSG, 9/2015 - BiPAP    Osteoarthritis     Paroxysmal atrial fibrillation with RVR ([address] 6/26/2017    Pneumonia due to organism     Pulmonary embolism [facility])       Past Surgical History:   Procedure Laterality Date    ANKLE FRACTURE SURGERY      left ankle    ARM SURGERY Left 01/04/2018    fracture     CARDIAC CATHETERIZATION  06/2018    Mild CAD    CHOLECYSTECTOMY, LAPAROSCOPIC N/A 12/15/2017    CHOLECYSTECTOMY LAPAROSCOPIC performed by Rikki Soulier, MD at [address] [place] [address]  2012    GALLBLADDER SURGERY      UT EGD TRANSORAL BIOPSY SINGLE/MULTIPLE N/A 11/15/2018    Dr Jennings [facility]-Gast[facility]    TESTICLE SURGERY      undecended testicle done when 1years old     Current Outpatient Medications   Medication Sig Dispense Refill    chlorthalidone (HYGROTON) 25 MG tablet Take 25 mg by mouth daily      tadalafil (CIALIS) 5 MG tablet Take 1 tablet by mouth daily as needed for Erectile Dysfunction 54 tablet 0    amLODIPine (NORVASC) 5 MG tablet TAKE 1 TABLET DAILY 90 tablet 0    omeprazole (PRILOSEC) 20 MG delayed release capsule TAKE 1 CAPSULE TWICE A  capsule 0    fluticasone (FLONASE) 50 MCG/ACT nasal spray USE 1 SPRAY NASALLY DAILY 48 g 5    cloNIDine (CATAPRES) 0.1 MG tablet Take 1 tablet night and take one every 12 hours if BP systolic blood pressure over 407 or diastolic BP over 251 (Patient taking differently: one every 12 hours if BP systolic blood pressure over 100 or diastolic BP over 476) 237 tablet 1    potassium chloride (KLOR-CON M) 10 MEQ extended release tablet Take one tablet every Monday, Wednesday, Friday (Patient taking differently: daily Take one tablet every day) 36 tablet 1    lisinopril (PRINIVIL;ZESTRIL) 40 MG tablet TAKE 1 TABLET DAILY 90 tablet 1    sotalol (BETAPACE) 80 MG tablet TAKE 1 TABLET TWICE A  tablet 3    rosuvastatin (CRESTOR) 10 MG tablet TAKE 1 TABLET NIGHTLY 90 tablet 3    hyoscyamine (OSCIMIN) 125 MCG TBDP dispersible tablet PLACE 2 TABLETS ON TONGUE EVERY 12 HOURS AS NEEDED FOR ABDOMINAL PAIN/CRAMPING 60 tablet 24    rivaroxaban (XARELTO) 20 MG TABS tablet Take 1 tablet by mouth Daily with supper As needed for AF episode 12hrs or longer in duration 90 tablet 1    Potassium 99 MG TABS Take 99 mg by mouth daily       niacin (NIASPAN) 1000 MG extended release tablet Take 1,000 mg by mouth 4 times daily      aspirin 81 MG EC tablet Take 81 mg by mouth daily      Omega-3 Fatty Acids (FISH OIL PO) Take 2,150 mg by mouth daily       Ascorbic Acid (VITAMIN C) 500 MG tablet Take 500 mg by mouth 2 times daily       vitamin E 400 UNIT capsule Take 400 Units by mouth daily       No current facility-administered medications for this visit. No Known Allergies  Social History     Tobacco Use    Smoking status: Never Smoker    Smokeless tobacco: Former User     Types: Chew   Substance Use Topics    Alcohol use: Yes     Comment: occ      Family History   Problem Relation Age of Onset    Heart Disease Mother     High Blood Pressure Mother     Heart Disease Father 54        mi    High Blood Pressure Father     High Cholesterol Father     Lung Cancer Father     Cancer Father         Lung CA - Oat cell    Stroke Father     Diabetes Paternal Aunt     Colon Cancer Neg Hx     Colon Polyps Neg Hx     Esophageal Cancer Neg Hx     Liver Cancer Neg Hx     Liver Disease Neg Hx     Rectal Cancer Neg Hx     Stomach Cancer Neg Hx        Review of Systems   Constitutional: Negative for chills, fatigue and fever. HENT: Negative for congestion, ear pain, postnasal drip, sinus pressure, sore throat, trouble swallowing and voice change. Eyes: Negative for pain, redness and visual disturbance. Respiratory: Negative for cough, chest tightness and wheezing. Cardiovascular: Negative for chest pain, palpitations and leg swelling. Gastrointestinal: Negative for abdominal pain, blood in stool, constipation, diarrhea, nausea and vomiting. Endocrine: Negative for polydipsia and polyuria.    Genitourinary: Negative for dysuria, enuresis, flank pain, frequency and urgency. Musculoskeletal: Negative for arthralgias, gait problem and joint swelling. Skin: Negative for color change and rash. Neurological: Negative for dizziness, tremors, syncope, facial asymmetry, speech difficulty, weakness, numbness and headaches. Psychiatric/Behavioral: Negative for agitation, behavioral problems, confusion, sleep disturbance and suicidal ideas. The patient is not nervous/anxious. Vitals:    03/24/21 1108   BP: 102/72   Site: Left Upper Arm   Position: Sitting   Cuff Size: Large Adult   Pulse: 56   Resp: 18   SpO2: 96%   Weight: 253 lb (114.8 kg)   Height: 6' 2\" (1.88 m)     Body mass index is 32.48 kg/m². Physical Exam  Constitutional:       General: He is not in acute distress. Appearance: He is well-developed. He is not diaphoretic. HENT:      Head: Normocephalic and atraumatic. Nose: Nose normal.   Eyes:      General: No scleral icterus. Right eye: No discharge. Left eye: No discharge. Conjunctiva/sclera: Conjunctivae normal.      Pupils: Pupils are equal, round, and reactive to light. Neck:      Musculoskeletal: Normal range of motion. Thyroid: No thyromegaly. Vascular: No JVD. Cardiovascular:      Rate and Rhythm: Normal rate and regular rhythm. Heart sounds: No murmur. Pulmonary:      Breath sounds: Normal breath sounds. No wheezing or rales. Chest:      Chest wall: No tenderness. Abdominal:      General: Bowel sounds are normal. There is no distension. Tenderness: There is no guarding. Lymphadenopathy:      Cervical: No cervical adenopathy. Skin:     General: Skin is dry. Findings: No erythema or rash. Neurological:      Mental Status: He is oriented to person, place, and time. Cranial Nerves: No cranial nerve deficit. Coordination: Coordination normal.      Deep Tendon Reflexes: Reflexes are normal and symmetric.    Psychiatric:         Behavior: Behavior normal.         Thought Content:  Thought content normal.         Judgment: Judgment normal.         Lab Review   Orders Only on 03/22/2021   Component Date Value    Cholesterol, Total 03/22/2021 130*    Triglycerides 03/22/2021 94     HDL 03/22/2021 41*    LDL Calculated 03/22/2021 70     Hemoglobin A1C 03/22/2021 5.5     Sodium 03/22/2021 143     Potassium 03/22/2021 3.8     Chloride 03/22/2021 102     CO2 03/22/2021 31*    Anion Gap 03/22/2021 10     Glucose 03/22/2021 119*    BUN 03/22/2021 11     CREATININE 03/22/2021 0.8     GFR Non- 03/22/2021 >60     GFR  03/22/2021 >59     Calcium 03/22/2021 9.7     Total Protein 03/22/2021 7.0     Albumin 03/22/2021 4.4     Total Bilirubin 03/22/2021 1.0     Alkaline Phosphatase 03/22/2021 62     ALT 03/22/2021 20     AST 03/22/2021 25     Hep C Ab Interp 03/22/2021 Non-Reactive     Rapid HIV 1&2 03/22/2021 Non-reactive     HIV-1 P24 Ag 03/22/2021 Non-reactive            ASSESSMENT/PLAN:       Mixed hyperlipidemia  LDL now good 70 in 3/2021 (74 ( 80(79) (73 )  Cont small dose crestor 10 mg daily  niacin 4 times a day ( taking this for low HDL)  Repeat testing in 6 months     PAF (paroxysmal atrial fibrillation) (HCC)  [facility]  Patient [facility] cardiology  Most recent echocardiogram 2017  Follow-up as per cardiology   = patient will continue sotalol 80 mg twice daily     Hypertension-   Blood pressure readings now good  * RX lisinopril 40 mg daily  * RX chlorthalidone 25 daily  * RX amlodipine to  5 BID   Will continue to monitor his blood pressure closely at home     IFG= bs mild elevated 102  a1c 5.5 (5.4)  Diet  Wt loss      Epigastric abd pain / midabd pains better since stopped working at his old job  Dyspepsia  He is post cholecystectomy- this actually made his symptoms worse  Seen gastro  Had EGD 11/2018- negative  Has removed pork from diet and     Obstructive sleep apnea- doing better, sleeping better continue current BiPAP settings      Orders Placed This Encounter   Procedures    CBC Auto Differential    Comprehensive Metabolic Panel    Hemoglobin A1C    Lipid Panel    Urinalysis    TSH without Reflex    PSA screening     New Prescriptions    No medications on file         Return in about 6 months (around 9/24/2021) for Annual Physical.   There are no Patient Instructions on file for this visit. EMR Dragon/transcription disclaimer:Significant part of this  encounter note is electronic transcription/translationof spoken language to printed text. The electronic translation of spoken language may be erroneous, or at times, nonsensical words or phrases may be inadvertently transcribed.  Although I have reviewed the note for sucherrors, some may still exist.

## 2021-03-25 ENCOUNTER — OFFICE VISIT (OUTPATIENT)
Dept: CARDIOLOGY CLINIC | Age: 61
End: 2021-03-25
Payer: COMMERCIAL

## 2021-03-25 VITALS
WEIGHT: 257 LBS | BODY MASS INDEX: 32.98 KG/M2 | DIASTOLIC BLOOD PRESSURE: 80 MMHG | SYSTOLIC BLOOD PRESSURE: 124 MMHG | HEIGHT: 74 IN | HEART RATE: 55 BPM

## 2021-03-25 DIAGNOSIS — G47.33 OBSTRUCTIVE SLEEP APNEA: ICD-10-CM

## 2021-03-25 DIAGNOSIS — I10 ESSENTIAL HYPERTENSION: ICD-10-CM

## 2021-03-25 DIAGNOSIS — I34.0 NONRHEUMATIC MITRAL VALVE REGURGITATION: ICD-10-CM

## 2021-03-25 DIAGNOSIS — I25.10 MILD CAD: ICD-10-CM

## 2021-03-25 DIAGNOSIS — I48.0 PAROXYSMAL ATRIAL FIBRILLATION (HCC): Primary | ICD-10-CM

## 2021-03-25 DIAGNOSIS — I36.1 NONRHEUMATIC TRICUSPID VALVE REGURGITATION: ICD-10-CM

## 2021-03-25 PROCEDURE — 93000 ELECTROCARDIOGRAM COMPLETE: CPT | Performed by: CLINICAL NURSE SPECIALIST

## 2021-03-25 PROCEDURE — 99213 OFFICE O/P EST LOW 20 MIN: CPT | Performed by: CLINICAL NURSE SPECIALIST

## 2021-03-25 RX ORDER — AMLODIPINE BESYLATE 5 MG/1
5 TABLET ORAL 2 TIMES DAILY
Qty: 180 TABLET | Refills: 3 | Status: SHIPPED | OUTPATIENT
Start: 2021-03-25 | End: 2021-09-29

## 2021-03-25 ASSESSMENT — ENCOUNTER SYMPTOMS
HEARTBURN: 0
NAUSEA: 0
VOMITING: 0
COUGH: 0
BLURRED VISION: 0
ORTHOPNEA: 0
SHORTNESS OF BREATH: 0
BLOOD IN STOOL: 0

## 2021-03-25 NOTE — PATIENT INSTRUCTIONS
Follow up 6 months  Seek medical attention for A fib lasting 12hrs or more.   Call for increasing frequency  Use Xarelto for  Atrial fib episodes lasting 12 hrs or more

## 2021-03-25 NOTE — PROGRESS NOTES
[facility], [address], [address] [address]  Phone: [phone]  Fax: [phone]    OFFICE VISIT:  3/25/2021    Emmanuel Baltazar - : 1960    Reason For Visit:  Jerardo Evangelista is a 56 y.o. male who is here for follow-up for paroxysmal atrial fibrillation     Diagnosis Orders   1. Paroxysmal atrial fibrillation (HCC)  EKG 12 lead   2. Essential hypertension     3. Mild CAD     4. Obstructive sleep apnea       HPI   Patient is here for follow-up for paroxysmal atrial fibrillation, hypertention, mild CAD per heart cath, sleep apnea. He has never been anticoagulated and states he is very aware when he is out of rhythm. He only uses Xarelto for A. fib lasting for a  duration of several hours and has not needed to use since his last visit here. Alejandro Fonseca He has not noticed a recurrence of atrial fibrillation since his last visit here. He does report he has been working in [place] and in December felt he had some \"elevated heart rate\" but not atrial fibrillation. He went to a local emergency room and nothing significant was found. He also reports an ER visit in January for elevated blood pressure. His meds were adjusted by his PCP and things have improved. He denies chest pain, unusual dyspnea, orthopnea, PND, edema, palpitations      Michelle Vick MD is PCP.   Emmanuel Baltazar has the following history as [facility] in [facility]:    Patient Active Problem List    Diagnosis Date Noted    Elevated PSA 2020    Epigastric discomfort 2018    Chronic nausea 2018    Mild CAD 2018    IFG (impaired fasting glucose) 2018    Primary insomnia 10/26/2017    Chronic GERD 10/26/2017    Degenerative lumbar disc 10/26/2017    PAF (paroxysmal atrial fibrillation) (HCC)     Hx pulmonary embolism 2017    Benign prostatic hyperplasia with lower urinary tract symptoms 2017    Mixed hyperlipidemia 2017    BiPAP (biphasic positive airway pressure) dependence     Mild mitral regurgitation by prior echocardiogram 02/08/2017    Essential hypertension 02/08/2017    Obstructive sleep apnea      Past Medical History:   Diagnosis Date    A-fib [facility])     Atelectasis of right lung 6/26/2017    Back pain, lumbosacral     BiPAP (biphasic positive airway pressure) dependence     12cm to 22cm    Clotting disorder (HCC)     Cyst near coccyx     removed    GERD (gastroesophageal reflux disease)     Heart murmur     Hx of blood clots     Hyperlipidemia     Hypertension     Mild CAD 11/13/2018    Obstructive sleep apnea     AHI:  36.5 per PSG, 9/2015 - BiPAP    Osteoarthritis     Paroxysmal atrial fibrillation with RVR ([address] 6/26/2017    Pneumonia due to organism     Pulmonary embolism [facility])      Past Surgical History:   Procedure Laterality Date    ANKLE FRACTURE SURGERY      left ankle    ARM SURGERY Left 01/04/2018    fracture     CARDIAC CATHETERIZATION  06/2018    Mild CAD    CHOLECYSTECTOMY, LAPAROSCOPIC N/A 12/15/2017    CHOLECYSTECTOMY LAPAROSCOPIC performed by Mj Trimble MD at [address] [place] [address]  2012    GALLBLADDER SURGERY      UT EGD TRANSORAL BIOPSY SINGLE/MULTIPLE N/A 11/15/2018    Dr Angelica Hickman      undecended testicle done when 1years old     Family History   Problem Relation Age of Onset    Heart Disease Mother     High Blood Pressure Mother     Heart Disease Father 54        mi    High Blood Pressure Father     High Cholesterol Father     Lung Cancer Father     Cancer Father         Lung CA - Oat cell    Stroke Father     Diabetes Paternal Aunt     Colon Cancer Neg Hx     Colon Polyps Neg Hx     Esophageal Cancer Neg Hx     Liver Cancer Neg Hx     Liver Disease Neg Hx     Rectal Cancer Neg Hx     Stomach Cancer Neg Hx      Social History     Tobacco Use    Smoking status: Never Smoker    Smokeless tobacco: Former User     Types: Chew   Substance Use Topics    Alcohol use:  Yes Comment: occ      Current Outpatient Medications   Medication Sig Dispense Refill    chlorthalidone (HYGROTON) 25 MG tablet Take 25 mg by mouth daily      tadalafil (CIALIS) 5 MG tablet Take 1 tablet by mouth daily as needed for Erectile Dysfunction 54 tablet 0    amLODIPine (NORVASC) 5 MG tablet TAKE 1 TABLET DAILY 90 tablet 0    fluticasone (FLONASE) 50 MCG/ACT nasal spray USE 1 SPRAY NASALLY DAILY 48 g 5    cloNIDine (CATAPRES) 0.1 MG tablet Take 1 tablet night and take one every 12 hours if BP systolic blood pressure over 698 or diastolic BP over 533 (Patient taking differently: one every 12 hours if BP systolic blood pressure over 817 or diastolic BP over 782) 762 tablet 1    potassium chloride (KLOR-CON M) 10 MEQ extended release tablet Take one tablet every Monday, Wednesday, Friday (Patient taking differently: daily Take one tablet every day) 36 tablet 1    lisinopril (PRINIVIL;ZESTRIL) 40 MG tablet TAKE 1 TABLET DAILY 90 tablet 1    sotalol (BETAPACE) 80 MG tablet TAKE 1 TABLET TWICE A  tablet 3    rosuvastatin (CRESTOR) 10 MG tablet TAKE 1 TABLET NIGHTLY 90 tablet 3    hyoscyamine (OSCIMIN) 125 MCG TBDP dispersible tablet PLACE 2 TABLETS ON TONGUE EVERY 12 HOURS AS NEEDED FOR ABDOMINAL PAIN/CRAMPING 60 tablet 24    rivaroxaban (XARELTO) 20 MG TABS tablet Take 1 tablet by mouth Daily with supper As needed for AF episode 12hrs or longer in duration 90 tablet 1    Potassium 99 MG TABS Take 99 mg by mouth daily       niacin (NIASPAN) 1000 MG extended release tablet Take 1,000 mg by mouth 4 times daily      aspirin 81 MG EC tablet Take 81 mg by mouth daily      Omega-3 Fatty Acids (FISH OIL PO) Take 2,150 mg by mouth daily       Ascorbic Acid (VITAMIN C) 500 MG tablet Take 500 mg by mouth 2 times daily       vitamin E 400 UNIT capsule Take 400 Units by mouth daily      omeprazole (PRILOSEC) 20 MG delayed release capsule TAKE 1 CAPSULE TWICE A  capsule 0     No current facility-administered medications for this visit. Allergies: Patient has no known allergies. Review of Systems  Review of Systems   Constitutional: Negative for chills, fever and weight loss. HENT: Negative for nosebleeds. Eyes: Negative for blurred vision. Respiratory: Negative for cough and shortness of breath. Cardiovascular: Positive for palpitations (every few days- lasting 20min). Negative for chest pain, orthopnea, leg swelling and PND. Gastrointestinal: Negative for blood in stool, heartburn, nausea and vomiting. Musculoskeletal: Negative for falls and myalgias. Skin: Negative for rash. Neurological: Negative for dizziness, sensory change, speech change and focal weakness. Endo/Heme/Allergies: Does not bruise/bleed easily. Psychiatric/Behavioral: Negative for depression. The patient is not nervous/anxious. Objective  Vital Signs - /80   Pulse 55   Ht 6' 2\" (1.88 m)   Wt 257 lb (116.6 kg)   BMI 33.00 kg/m²   Physical Exam   Constitutional: He is oriented to person, place, and time. He appears well-developed and well-nourished. No distress. HENT:   Head: Normocephalic and atraumatic. Eyes: Pupils are equal, round, and reactive to light. Right eye exhibits no discharge. Left eye exhibits no discharge. Neck: No JVD present. No tracheal deviation present. Cardiovascular: Normal rate, regular rhythm and intact distal pulses. Exam reveals no gallop and no friction rub. Murmur heard. No carotid bruit   Pulmonary/Chest: Effort normal and breath sounds normal. No respiratory distress. He has no wheezes. He has no rales. Abdominal: Soft. There is no abdominal tenderness. Musculoskeletal:         General: No edema. Comments: Normal gait and station   Neurological: He is alert and oriented to person, place, and time. No cranial nerve deficit. Skin: Skin is warm and dry. No rash noted. Psychiatric: He has a normal mood and affect.  His behavior is normal. Judgment normal.   Nursing note and vitals reviewed. Data:    Echo 2017   Summary   1. Mildly dilated left atrium   2. Mild concentric LVH   3. Normal LV systolic function (estimated EF 55 -60 %)   4. Grade I diastolic dysfunction (impaired relaxation) with normal left   atrial pressure   5. Mild mitral regurgitation   6. Mitral annular calcification is present   7. Mild tricuspid regurgitation; estimated RVSP of at least 38 mmHg    Cardiac Cath 6/18  1.  Successful femoral artery ultrasound  2.  Successful femoral artery arteriogram  3.  Mild coronary artery disease  4.  Left ventricular function is normal.    [id number] Score for Atrial Fibrillation Stroke Risk   Risk   Factors  Component Value   C CHF No 0   H HTN Yes 1   A2 Age >= 76 No,  (62 y.o.) 0   D DM No 0   S2 Prior Stroke/TIA No 0   V Vascular Disease No 0   A Age 74-69 No,  (62 y.o.) 0   Sc Sex male 0    [id number]  Score  1   Score last updated 3/30/15 2:10 AM    Click here for a link to the UpToDate guideline \"Atrial Fibrillation: Anticoagulation therapy to prevent embolization    EKG shows normal sinus rhythm rate 55 with a QTc interval of 0.440 ms    Assessment:     Diagnosis Orders   1. Paroxysmal atrial fibrillation (HCC)  EKG 12 lead   2. Essential hypertension     3. Mild CAD     4. Obstructive sleep apnea         PAF-no recurrence per patient report. He is symptomatic when A. fib occurs and has Xarelto at home to use on an as-needed basis should he have an episode lasting several hours. He has not needed to use Xarelto since last visit here. Stable on sotalol. Continue present treatment    Hypertension-elevated in recent months and meds have been adjusted, currently stable    Obstructive sleep apnea-wearing CPAP regularly    Mild CAD-per cath 2018 without symptoms of angina    Mitral and tricuspid regurgitation-mild per echo in 2017.   Consider repeat of echo at next visit    Plan    Follow up 6 months  Seek medical attention for A fib lasting 12hrs or more. Call for increasing frequency  Use Xarelto for  Atrial fib episodes lasting 12 hrs or more  Consider Echocardiogram next visit      Call with any questions or concerns  Follow up with Arnulfo Laguna MD for non cardiac problems  Report any new problems  Cardiovascular Fitness-Exercise as tolerated. Strive for 15 minutes of exercise most days of the week. Cardiac / Healthy Diet  Continue current medications as directed  Continue plan of treatment  It is always recommended that you bring your medications bottles with you to each visit - this is for your safety!        Azbe Bentley APRN

## 2021-07-06 RX ORDER — ROSUVASTATIN CALCIUM 10 MG/1
TABLET, COATED ORAL
Qty: 90 TABLET | Refills: 3 | Status: SHIPPED | OUTPATIENT
Start: 2021-07-06 | End: 2022-09-06

## 2021-07-06 NOTE — TELEPHONE ENCOUNTER
[facility] called requesting a refill of the below medication which has been pended for you:     Requested Prescriptions     Pending Prescriptions Disp Refills    rosuvastatin (CRESTOR) 10 MG tablet [Pharmacy Med Name: ROSUVASTATIN TABS 10MG] 90 tablet 3     Sig: TAKE 1 TABLET NIGHTLY       Last Appointment Date: 3/24/2021  Next Appointment Date: 9/29/2021    No Known Allergies

## 2021-09-29 ENCOUNTER — OFFICE VISIT (OUTPATIENT)
Dept: INTERNAL MEDICINE | Age: 61
End: 2021-09-29
Payer: COMMERCIAL

## 2021-09-29 ENCOUNTER — OFFICE VISIT (OUTPATIENT)
Dept: CARDIOLOGY CLINIC | Age: 61
End: 2021-09-29
Payer: COMMERCIAL

## 2021-09-29 ENCOUNTER — TELEPHONE (OUTPATIENT)
Dept: NEUROLOGY | Age: 61
End: 2021-09-29

## 2021-09-29 VITALS
DIASTOLIC BLOOD PRESSURE: 76 MMHG | HEART RATE: 66 BPM | BODY MASS INDEX: 34.27 KG/M2 | WEIGHT: 267 LBS | OXYGEN SATURATION: 98 % | HEIGHT: 74 IN | SYSTOLIC BLOOD PRESSURE: 106 MMHG

## 2021-09-29 VITALS — RESPIRATION RATE: 18 BRPM | BODY MASS INDEX: 34.27 KG/M2 | WEIGHT: 267 LBS | HEIGHT: 74 IN

## 2021-09-29 DIAGNOSIS — E78.2 MIXED HYPERLIPIDEMIA: ICD-10-CM

## 2021-09-29 DIAGNOSIS — I25.10 MILD CAD: ICD-10-CM

## 2021-09-29 DIAGNOSIS — R73.01 IFG (IMPAIRED FASTING GLUCOSE): ICD-10-CM

## 2021-09-29 DIAGNOSIS — I48.0 PAF (PAROXYSMAL ATRIAL FIBRILLATION) (HCC): ICD-10-CM

## 2021-09-29 DIAGNOSIS — R71.8 ELEVATED MCV: ICD-10-CM

## 2021-09-29 DIAGNOSIS — I48.0 PAROXYSMAL ATRIAL FIBRILLATION (HCC): Primary | ICD-10-CM

## 2021-09-29 DIAGNOSIS — Z23 NEED FOR IMMUNIZATION AGAINST INFLUENZA: ICD-10-CM

## 2021-09-29 DIAGNOSIS — I34.0 NONRHEUMATIC MITRAL VALVE REGURGITATION: ICD-10-CM

## 2021-09-29 DIAGNOSIS — I10 ESSENTIAL HYPERTENSION: ICD-10-CM

## 2021-09-29 DIAGNOSIS — I36.1 NONRHEUMATIC TRICUSPID VALVE REGURGITATION: ICD-10-CM

## 2021-09-29 DIAGNOSIS — F51.01 PRIMARY INSOMNIA: ICD-10-CM

## 2021-09-29 DIAGNOSIS — E66.09 EXOGENOUS OBESITY: ICD-10-CM

## 2021-09-29 DIAGNOSIS — Z00.00 ANNUAL PHYSICAL EXAM: Primary | ICD-10-CM

## 2021-09-29 DIAGNOSIS — G47.33 OBSTRUCTIVE SLEEP APNEA: ICD-10-CM

## 2021-09-29 PROCEDURE — 99396 PREV VISIT EST AGE 40-64: CPT | Performed by: INTERNAL MEDICINE

## 2021-09-29 PROCEDURE — 99214 OFFICE O/P EST MOD 30 MIN: CPT | Performed by: CLINICAL NURSE SPECIALIST

## 2021-09-29 PROCEDURE — 90674 CCIIV4 VAC NO PRSV 0.5 ML IM: CPT | Performed by: INTERNAL MEDICINE

## 2021-09-29 PROCEDURE — 90471 IMMUNIZATION ADMIN: CPT | Performed by: INTERNAL MEDICINE

## 2021-09-29 PROCEDURE — 93000 ELECTROCARDIOGRAM COMPLETE: CPT | Performed by: CLINICAL NURSE SPECIALIST

## 2021-09-29 RX ORDER — AMLODIPINE BESYLATE 5 MG/1
5 TABLET ORAL DAILY
Qty: 90 TABLET | Refills: 3
Start: 2021-09-29

## 2021-09-29 RX ORDER — MAGNESIUM GLUCONATE 27 MG(500)
500 TABLET ORAL DAILY
COMMUNITY

## 2021-09-29 ASSESSMENT — ENCOUNTER SYMPTOMS
COUGH: 0
FACIAL SWELLING: 0
WHEEZING: 0
WHEEZING: 0
COUGH: 0
CONSTIPATION: 0
EYE REDNESS: 0
ABDOMINAL PAIN: 0
SORE THROAT: 0
NAUSEA: 0
CHEST TIGHTNESS: 0
ABDOMINAL PAIN: 0
VOMITING: 0
CHEST TIGHTNESS: 0
SHORTNESS OF BREATH: 0

## 2021-09-29 NOTE — PATIENT INSTRUCTIONS
Xarelto 20mg once daily if you have episode of atrial fib    [place] at the [facility] and [address] [facility] located on the first floor of [place] [address] through hospital main entrance and turn immediately to your left. Date/Time:     Patient's contact number:  [phone] (home) [phone] (work)    Echocardiogram -  No prep. Takes approximately 30 min. An echocardiogram uses sound waves to produce images of your heart. This commonly used test allows your doctor to see how your heart is beating and pumping blood. Your doctor can use the images from an echocardiogram to identify various abnormalities in the heart muscle and valves. This test has 2 parts:   Ø You will be asked to disrobe from the waist up and given a gown to wear. The technologist will then hook up an EKG monitor to you for the entire exam.   Ø You will then have an ultrasound of your heart (echocardiogram) to assess the heart muscle, heart valves and heart function. You may eat and take any medicines before the exam.     If you need to change your appointment, please call outpatient scheduling at [phone].

## 2021-09-29 NOTE — PROGRESS NOTES
[facility], [address], [address] [address]  Phone: [phone]  Fax: [phone]    OFFICE VISIT:  2021    Audrey Lantigua - : 1960    Reason For Visit:  Christiano Denton is a 59 y.o. male who is here for 6 Month Follow-Up, Atrial Fibrillation, and Hypertension       Diagnosis Orders   1. Paroxysmal atrial fibrillation (HCC)  EKG 12 lead   2. Essential hypertension     3. Mild CAD     4. Obstructive sleep apnea     5. Nonrheumatic mitral valve regurgitation  ECHO Complete 2D W Doppler W Color   6. Nonrheumatic tricuspid valve regurgitation           HPI  Patient is here for follow-up for paroxysmal atrial fibrillation, hypertention, mild CAD per heart cath, sleep apnea. He has never been anticoagulated and states he is very aware when he is out of rhythm. He only uses Xarelto for A. fib lasting for a  duration of several hours and has not needed to use since his last visit here. Carmen Sparrow He has not noticed a recurrence of atrial fibrillation since his last visit here.     He works out of town in [place] he comes back to [facility] every few months. He denies chest pain, unusual dyspnea, orthopnea, PND, edema, palpitations        Artemio Tanner MD is PCP.   Audrey Lantigua has the following history as recorded in [facility]:    Patient Active Problem List    Diagnosis Date Noted    Elevated PSA 2020    Epigastric discomfort 2018    Chronic nausea 2018    Mild CAD 2018    IFG (impaired fasting glucose) 2018    Primary insomnia 10/26/2017    Chronic GERD 10/26/2017    Degenerative lumbar disc 10/26/2017    PAF (paroxysmal atrial fibrillation) (HCC)     Hx pulmonary embolism 2017    Benign prostatic hyperplasia with lower urinary tract symptoms 2017    Mixed hyperlipidemia 2017    BiPAP (biphasic positive airway pressure) dependence     Mild mitral regurgitation by prior echocardiogram 2017    Essential hypertension 02/08/2017    Obstructive sleep apnea      Past Medical History:   Diagnosis Date    A-fib [facility])     Atelectasis of right lung 6/26/2017    Back pain, lumbosacral     BiPAP (biphasic positive airway pressure) dependence     12cm to 22cm    Clotting disorder (HCC)     Cyst near coccyx     removed    GERD (gastroesophageal reflux disease)     Heart murmur     Hx of blood clots     Hyperlipidemia     Hypertension     Mild CAD 11/13/2018    Obstructive sleep apnea     AHI:  36.5 per PSG, 9/2015 - BiPAP    Osteoarthritis     Paroxysmal atrial fibrillation with RVR ([address] 6/26/2017    Pneumonia due to organism     Pulmonary embolism [facility])      Past Surgical History:   Procedure Laterality Date    ANKLE FRACTURE SURGERY      left ankle    ARM SURGERY Left 01/04/2018    fracture     CARDIAC CATHETERIZATION  06/2018    Mild CAD    CHOLECYSTECTOMY, LAPAROSCOPIC N/A 12/15/2017    CHOLECYSTECTOMY LAPAROSCOPIC performed by Brayan Bar MD at [address] [place] [address]  2012    GALLBLADDER SURGERY      [place] EGD TRANSORAL BIOPSY SINGLE/MULTIPLE N/A 11/15/2018    Dr Nhan Daniels      undecended testicle done when 1years old     Family History   Problem Relation Age of Onset    Heart Disease Mother     High Blood Pressure Mother     Heart Disease Father 54        mi    High Blood Pressure Father     High Cholesterol Father     Lung Cancer Father     Cancer Father         Lung CA - Oat cell    Stroke Father     Diabetes Paternal Aunt     Colon Cancer Neg Hx     Colon Polyps Neg Hx     Esophageal Cancer Neg Hx     Liver Cancer Neg Hx     Liver Disease Neg Hx     Rectal Cancer Neg Hx     Stomach Cancer Neg Hx      Social History     Tobacco Use    Smoking status: Never Smoker    Smokeless tobacco: Former User     Types: Chew   Substance Use Topics    Alcohol use: Yes     Comment: occ      Current Outpatient Medications   Medication Sig Dispense Refill    magnesium gluconate (MAGONATE) 500 MG tablet Take 500 mg by mouth daily      amLODIPine (NORVASC) 5 MG tablet Take 1 tablet by mouth daily 90 tablet 3    rosuvastatin (CRESTOR) 10 MG tablet TAKE 1 TABLET NIGHTLY 90 tablet 3    lisinopril (PRINIVIL;ZESTRIL) 40 MG tablet TAKE 1 TABLET DAILY 90 tablet 1    omeprazole (PRILOSEC) 20 MG delayed release capsule TAKE 1 CAPSULE TWICE A  capsule 1    chlorthalidone (HYGROTON) 25 MG tablet Take 12.5 mg by mouth daily       tadalafil (CIALIS) 5 MG tablet Take 1 tablet by mouth daily as needed for Erectile Dysfunction 54 tablet 0    fluticasone (FLONASE) 50 MCG/ACT nasal spray USE 1 SPRAY NASALLY DAILY 48 g 5    cloNIDine (CATAPRES) 0.1 MG tablet Take 1 tablet night and take one every 12 hours if BP systolic blood pressure over 442 or diastolic BP over 852 (Patient taking differently: one every 12 hours if BP systolic blood pressure over 599 or diastolic BP over 863) 596 tablet 1    potassium chloride (KLOR-CON M) 10 MEQ extended release tablet Take one tablet every Monday, Wednesday, Friday (Patient taking differently: daily Take one tablet every day) 36 tablet 1    sotalol (BETAPACE) 80 MG tablet TAKE 1 TABLET TWICE A  tablet 3    hyoscyamine (OSCIMIN) 125 MCG TBDP dispersible tablet PLACE 2 TABLETS ON TONGUE EVERY 12 HOURS AS NEEDED FOR ABDOMINAL PAIN/CRAMPING 60 tablet 24    rivaroxaban (XARELTO) 20 MG TABS tablet Take 1 tablet by mouth Daily with supper As needed for AF episode 12hrs or longer in duration 90 tablet 1    Potassium 99 MG TABS Take 99 mg by mouth daily       niacin (NIASPAN) 1000 MG extended release tablet Take 1,000 mg by mouth 4 times daily      aspirin 81 MG EC tablet Take 81 mg by mouth daily      Omega-3 Fatty Acids (FISH OIL PO) Take 1,280 mg by mouth daily       Ascorbic Acid (VITAMIN C) 500 MG tablet Take 500 mg by mouth 2 times daily       vitamin E 400 UNIT capsule Take 400 Units by mouth daily       No current facility-administered medications for this visit. Allergies: Patient has no known allergies. Review of Systems  Review of Systems   Constitutional: Negative for activity change, diaphoresis, fatigue, fever and unexpected weight change. HENT: Negative for facial swelling and nosebleeds. Eyes: Negative for redness and visual disturbance. Respiratory: Negative for cough, chest tightness, shortness of breath and wheezing. Cardiovascular: Negative for chest pain, palpitations and leg swelling. Gastrointestinal: Negative for abdominal pain, nausea and vomiting. Endocrine: Negative for cold intolerance and heat intolerance. Genitourinary: Negative for dysuria and hematuria. Musculoskeletal: Negative for arthralgias and myalgias. Skin: Negative for pallor and rash. Neurological: Negative for dizziness, seizures, syncope, weakness and light-headedness. Hematological: Does not bruise/bleed easily. Psychiatric/Behavioral: Negative for agitation. The patient is not nervous/anxious. Objective  Vital Signs - /76   Pulse 66   Ht 6' 2\" (1.88 m)   Wt 267 lb (121.1 kg)   SpO2 98%   BMI 34.28 kg/m²   Physical Exam  Vitals and nursing note reviewed. Constitutional:       General: He is not in acute distress. Appearance: He is well-developed. He is not diaphoretic. HENT:      Head: Normocephalic and atraumatic. Right Ear: Hearing and external ear normal.      Left Ear: Hearing and external ear normal.      Nose: Nose normal.   Eyes:      General:         Right eye: No discharge. Left eye: No discharge. Pupils: Pupils are equal, round, and reactive to light. Neck:      Thyroid: No thyromegaly. Vascular: No carotid bruit or JVD. Trachea: No tracheal deviation. Cardiovascular:      Rate and Rhythm: Normal rate and regular rhythm. Heart sounds: Murmur (2/6 systolic) heard. No friction rub. No gallop.     Pulmonary:      Effort: Pulmonary effort Waleska Reddy, APRN

## 2021-09-29 NOTE — TELEPHONE ENCOUNTER
Called and left patient a VM to get a Current DME dwnld report of his sleep machine and bring report to his appointment.

## 2021-09-29 NOTE — PROGRESS NOTES
Chief Complaint:   Austen Guevara is a 59 y.o. male who presents forcomplete physical exam.    History of Present Illness:      Ausetn Guevara is a 59 y.o. male who presents todayfor wellness visit AND follow up on his chronic medical conditions as noted below.     Patient Active Problem List    Diagnosis Date Noted    Elevated PSA 03/20/2020    Epigastric discomfort 11/14/2018    Chronic nausea 11/14/2018    Mild CAD 11/13/2018    IFG (impaired fasting glucose) 05/11/2018    Primary insomnia 10/26/2017    Chronic GERD 10/26/2017    Degenerative lumbar disc 10/26/2017    PAF (paroxysmal atrial fibrillation) (HCC)     Hx pulmonary embolism 06/26/2017    Benign prostatic hyperplasia with lower urinary tract symptoms 06/26/2017    Mixed hyperlipidemia 06/26/2017    BiPAP (biphasic positive airway pressure) dependence      12cm to 22cm      Mild mitral regurgitation by prior echocardiogram 02/08/2017    Essential hypertension 02/08/2017    Obstructive sleep apnea        Past Medical History:   Diagnosis Date    A-fib [facility])     Atelectasis of right lung 6/26/2017    Back pain, lumbosacral     BiPAP (biphasic positive airway pressure) dependence     12cm to 22cm    Clotting disorder (HCC)     Cyst near coccyx     removed    GERD (gastroesophageal reflux disease)     Heart murmur     Hx of blood clots     Hyperlipidemia     Hypertension     Mild CAD 11/13/2018    Obstructive sleep apnea     AHI:  36.5 per PSG, 9/2015 - BiPAP    Osteoarthritis     Paroxysmal atrial fibrillation with RVR ([facility] [address] 6/26/2017    Pneumonia due to organism     Pulmonary embolism [facility])        Past Surgical History:   Procedure Laterality Date    ANKLE FRACTURE SURGERY      left ankle    ARM SURGERY Left 01/04/2018    fracture     CARDIAC CATHETERIZATION  06/2018    Mild CAD    CHOLECYSTECTOMY, LAPAROSCOPIC N/A 12/15/2017    CHOLECYSTECTOMY LAPAROSCOPIC performed by Lincoln Cooper MD at [address] [place] [address] Street  2012    GALLBLADDER SURGERY      [place] EGD TRANSORAL BIOPSY SINGLE/MULTIPLE N/A 11/15/2018    Dr Carri Hope-Gastropathy    TESTICLE SURGERY      undecended testicle done when 1years old       Current Outpatient Medications   Medication Sig Dispense Refill    magnesium gluconate (MAGONATE) 500 MG tablet Take 500 mg by mouth daily      rosuvastatin (CRESTOR) 10 MG tablet TAKE 1 TABLET NIGHTLY 90 tablet 3    lisinopril (PRINIVIL;ZESTRIL) 40 MG tablet TAKE 1 TABLET DAILY 90 tablet 1    omeprazole (PRILOSEC) 20 MG delayed release capsule TAKE 1 CAPSULE TWICE A  capsule 1    amLODIPine (NORVASC) 5 MG tablet Take 1 tablet by mouth 2 times daily (Patient taking differently: Take 5 mg by mouth daily Indications: Dropped to once daily by the physician in [place] ) 180 tablet 3    chlorthalidone (HYGROTON) 25 MG tablet Take 12.5 mg by mouth daily       fluticasone (FLONASE) 50 MCG/ACT nasal spray USE 1 SPRAY NASALLY DAILY 48 g 5    cloNIDine (CATAPRES) 0.1 MG tablet Take 1 tablet night and take one every 12 hours if BP systolic blood pressure over 496 or diastolic BP over 295 (Patient taking differently: one every 12 hours if BP systolic blood pressure over 691 or diastolic BP over 095) 500 tablet 1    potassium chloride (KLOR-CON M) 10 MEQ extended release tablet Take one tablet every Monday, Wednesday, Friday (Patient taking differently: daily Take one tablet every day) 36 tablet 1    sotalol (BETAPACE) 80 MG tablet TAKE 1 TABLET TWICE A  tablet 3    rivaroxaban (XARELTO) 20 MG TABS tablet Take 1 tablet by mouth Daily with supper As needed for AF episode 12hrs or longer in duration 90 tablet 1    Potassium 99 MG TABS Take 99 mg by mouth daily       niacin (NIASPAN) 1000 MG extended release tablet Take 1,000 mg by mouth 4 times daily      aspirin 81 MG EC tablet Take 81 mg by mouth daily      Omega-3 Fatty Acids (FISH OIL PO) Take 1,280 mg by mouth daily       Ascorbic Acid (VITAMIN C) 500 MG tablet Take 500 mg by mouth 2 times daily       vitamin E 400 UNIT capsule Take 400 Units by mouth daily      tadalafil (CIALIS) 5 MG tablet Take 1 tablet by mouth daily as needed for Erectile Dysfunction 54 tablet 0    hyoscyamine (OSCIMIN) 125 MCG TBDP dispersible tablet PLACE 2 TABLETS ON TONGUE EVERY 12 HOURS AS NEEDED FOR ABDOMINAL PAIN/CRAMPING (Patient not taking: Reported on 9/29/2021) 60 tablet 24     No current facility-administered medications for this visit. No Known Allergies    Social History     Socioeconomic History    Marital status:      Spouse name: None    Number of children: None    Years of education: None    Highest education level: None   Occupational History    None   Tobacco Use    Smoking status: Never Smoker    Smokeless tobacco: Former User     Types: Chew   Vaping Use    Vaping Use: Never used   Substance and Sexual Activity    Alcohol use: Yes     Comment: occ    Drug use: No    Sexual activity: Yes     Partners: Female   Other Topics Concern    None   Social History Narrative    None     Social Determinants of Health     Financial Resource Strain:     Difficulty of Paying Living Expenses:    Food Insecurity:     Worried About Running Out of Food in the Last Year:     Ran Out of Food in the Last Year:    Transportation Needs:     Lack of Transportation (Medical):      Lack of Transportation (Non-Medical):    Physical Activity:     Days of Exercise per Week:     Minutes of Exercise per Session:    Stress:     Feeling of Stress :    Social Connections:     Frequency of Communication with Friends and Family:     Frequency of Social Gatherings with Friends and Family:     Attends Buddhist Services:     Active Member of Clubs or Organizations:     Attends Club or Organization Meetings:     Marital Status:    Intimate Partner Violence:     Fear of Current or Ex-Partner:     Emotionally Abused:     Physically Abused:     Sexually Abused:      Family History Problem Relation Age of Onset    Heart Disease Mother     High Blood Pressure Mother     Heart Disease Father 54        mi    High Blood Pressure Father     High Cholesterol Father     Lung Cancer Father     Cancer Father         Lung CA - Oat cell    Stroke Father     Diabetes Paternal Aunt     Colon Cancer Neg Hx     Colon Polyps Neg Hx     Esophageal Cancer Neg Hx     Liver Cancer Neg Hx     Liver Disease Neg Hx     Rectal Cancer Neg Hx     Stomach Cancer Neg Hx           Past Surgical History:   Procedure Laterality Date    ANKLE FRACTURE SURGERY      left ankle    ARM SURGERY Left 01/04/2018    fracture     CARDIAC CATHETERIZATION  06/2018    Mild CAD    CHOLECYSTECTOMY, LAPAROSCOPIC N/A 12/15/2017    CHOLECYSTECTOMY LAPAROSCOPIC performed by Eric Loco MD at [address] [place] [address] COLONOSCOPY  2012    GALLBLADDER SURGERY      [place] EGD TRANSORAL BIOPSY SINGLE/MULTIPLE N/A 11/15/2018    Dr Lazarus Kempf Hope-Gastropathy    TESTICLE SURGERY      undecended testicle done when 1years old         Lab Review   Orders Only on 09/27/2021   Component Date Value    PSA 09/27/2021 4.07*    TSH 09/27/2021 2.500     Color, UA 09/27/2021 YELLOW     Clarity, UA 09/27/2021 Clear     Glucose, Ur 09/27/2021 Negative     Bilirubin Urine 09/27/2021 Negative     Ketones, Urine 09/27/2021 TRACE*    Specific Gravity, UA 09/27/2021 1.025     Blood, Urine 09/27/2021 Negative     pH, UA 09/27/2021 7.5     Protein, UA 09/27/2021 TRACE*    Urobilinogen, Urine 09/27/2021 1.0     Nitrite, Urine 09/27/2021 Negative     Leukocyte Esterase, Urine 09/27/2021 TRACE*    Cholesterol, Total 09/27/2021 129*    Triglycerides 09/27/2021 100     HDL 09/27/2021 40*    LDL Calculated 09/27/2021 69     Hemoglobin A1C 09/27/2021 5.5     Sodium 09/27/2021 147*    Potassium 09/27/2021 4.1     Chloride 09/27/2021 107     CO2 09/27/2021 26     Anion Gap 09/27/2021 14     Glucose 09/27/2021 96     BUN 09/27/2021 16     CREATININE 09/27/2021 0.8     GFR Non- 09/27/2021 >60     GFR  09/27/2021 >59     Calcium 09/27/2021 9.8     Total Protein 09/27/2021 6.6     Albumin 09/27/2021 4.4     Total Bilirubin 09/27/2021 0.7     Alkaline Phosphatase 09/27/2021 53     ALT 09/27/2021 32     AST 09/27/2021 24     WBC 09/27/2021 5.3     RBC 09/27/2021 4.27*    Hemoglobin 09/27/2021 14.3     Hematocrit 09/27/2021 44.2     MCV 09/27/2021 103.5*    MCH 09/27/2021 33.5*    MCHC 09/27/2021 32.4*    RDW 09/27/2021 12.6     Platelets 88/35/4141 204     MPV 09/27/2021 9.7     Neutrophils % 09/27/2021 51.5     Lymphocytes % 09/27/2021 32.0     Monocytes % 09/27/2021 10.8*    Eosinophils % 09/27/2021 4.7     Basophils % 09/27/2021 0.8     Neutrophils Absolute 09/27/2021 2.7     Immature Granulocytes # 09/27/2021 0.0     Lymphocytes Absolute 09/27/2021 1.7     Monocytes Absolute 09/27/2021 0.60     Eosinophils Absolute 09/27/2021 0.30     Basophils Absolute 09/27/2021 0.00     Bacteria, UA 09/27/2021 NEGATIVE*    Crystals, UA 09/27/2021 NEG*    Hyaline Casts, UA 09/27/2021 2     WBC, UA 09/27/2021 1     RBC, UA 09/27/2021 1     Epithelial Cells, UA 09/27/2021 1          Review of Systems   Constitutional: Positive for fatigue. Negative for chills and fever. HENT: Negative for congestion, ear pain, nosebleeds, postnasal drip and sore throat. Respiratory: Negative for cough, chest tightness and wheezing. Cardiovascular: Negative for chest pain, palpitations and leg swelling. Gastrointestinal: Negative for abdominal pain and constipation. Genitourinary: Negative for dysuria and urgency. Musculoskeletal: Negative. Negative for arthralgias. Skin: Negative for rash. Neurological: Negative for dizziness and headaches. Psychiatric/Behavioral: Negative.            Vitals:    09/29/21 1106   Resp: 18   Weight: 267 lb (121.1 kg)   Height: 6' 2\" (1.88 m)      Wt Readings from Last 3 Encounters:   09/29/21 267 lb (121.1 kg)   03/25/21 257 lb (116.6 kg)   03/24/21 253 lb (114.8 kg)   Body mass index is 34.28 kg/m². BP Readings from Last 3 Encounters:   03/25/21 124/80   03/24/21 102/72   09/23/20 118/80       Physical Exam  Constitutional:       Appearance: He is well-developed. HENT:      Right Ear: External ear normal.      Left Ear: External ear normal.      Mouth/Throat:      Pharynx: No oropharyngeal exudate. Eyes:      Conjunctiva/sclera: Conjunctivae normal.      Pupils: Pupils are equal, round, and reactive to light. Neck:      Thyroid: No thyromegaly. Vascular: No JVD. Cardiovascular:      Rate and Rhythm: Normal rate. Heart sounds: Normal heart sounds. No murmur heard. Pulmonary:      Effort: No respiratory distress. Breath sounds: Normal breath sounds. No wheezing or rales. Chest:      Chest wall: No tenderness. Abdominal:      General: Bowel sounds are normal.      Palpations: Abdomen is soft. Musculoskeletal:      Cervical back: Neck supple. Lymphadenopathy:      Cervical: No cervical adenopathy. Skin:     Findings: No rash. ASSESSMENT/PLAN    ANNUAL WELLNESS   1:PSA/ PROSTATE CANCER SCREEN per dr Miguelito Michelle  2.  SCREENING CSCOPE 2016 repeat 2026     Mixed hyperlipidemia  LDL now good 69 in 9/2021   Cont small dose crestor 10 mg daily  niacin 4 times a day ( taking this for low HDL)  Repeat testing in 6 months     PAF (paroxysmal atrial fibrillation) (HCC)  Mild mitral regurgitation/2/6 systolic murmur on exam today  Patient follows with [address] [facility] cardiology  Most recent echocardiogram 2017  Follow-up as per cardiology   = patient will continue sotalol 80 mg twice daily     Hypertension-   Blood pressure readings now good  * RX lisinopril 40 mg daily  * RX chlorthalidone 12.5 daily  * RX amlodipine to  5 QD   Will continue to monitor his blood pressure closely at home  (Recently his MD in [place] changed his medications, amlodipine dose was lowered from 5 twice daily to 5 daily and chlorthalidone dose was lowered from 25 daily to 12.5 daily related to lower blood pressure readings)     IFG  a1c 5.5 (5.4)  Diet  Wt loss  Healthy, mostly fiber rich nonstarchy plant-based diet recommended  Recommend to decrease intake of processed foods, simple carbohydrates and animal-based products that high in saturated fats        Epigastric abd pain / midabd pains better since stopped working at his old job  Dyspepsia  He is post cholecystectomy- this actually made his symptoms worse  Seen gastro  Had EGD 11/2018- negative  Different diet changes have not helped     Obstructive sleep apnea- doing better, sleeping better continue current BiPAP settings    MCV-mild elevated at 102  Previously around 120 20  No history of EtOH use  Suggest B complex vitamin  We will repeat this in 6 months       Orders Placed This Encounter   Procedures    INFLUENZA, MDCK QUADV, 2 YRS AND OLDER, IM, PF, PREFILL SYR OR SDV, 0.5ML (FLUCELVAX QUADV, PF)    Hemoglobin A1C    Comprehensive Metabolic Panel    Lipid Panel    CBC Auto Differential     New Prescriptions    No medications on file      There are no Patient Instructions on file for this visit. Return in about 6 months (around 3/29/2022) for Medication check. EMR Dragon/transcription disclaimer:Significant part of this  encounter note is electronic transcription/translation of spoken language to printed text. The electronic translation of spoken language may beerroneous, or at times, nonsensical words or phrases may be inadvertently transcribed.  Although I have reviewed the note for such errors, some may still exist.

## 2021-10-01 ENCOUNTER — HOSPITAL ENCOUNTER (OUTPATIENT)
Dept: NON INVASIVE DIAGNOSTICS | Age: 61
Discharge: HOME | End: 2021-10-01
Payer: COMMERCIAL

## 2021-10-01 ENCOUNTER — OFFICE VISIT (OUTPATIENT)
Dept: NEUROLOGY | Age: 61
End: 2021-10-01
Payer: COMMERCIAL

## 2021-10-01 VITALS
BODY MASS INDEX: 34.27 KG/M2 | HEIGHT: 74 IN | OXYGEN SATURATION: 98 % | HEART RATE: 46 BPM | WEIGHT: 267 LBS | DIASTOLIC BLOOD PRESSURE: 75 MMHG | SYSTOLIC BLOOD PRESSURE: 107 MMHG

## 2021-10-01 DIAGNOSIS — Z99.89 BIPAP (BIPHASIC POSITIVE AIRWAY PRESSURE) DEPENDENCE: ICD-10-CM

## 2021-10-01 DIAGNOSIS — G47.33 OBSTRUCTIVE SLEEP APNEA: Primary | ICD-10-CM

## 2021-10-01 DIAGNOSIS — I34.0 NONRHEUMATIC MITRAL VALVE REGURGITATION: ICD-10-CM

## 2021-10-01 LAB
LV EF: 58 %
LVEF MODALITY: NORMAL

## 2021-10-01 PROCEDURE — 99213 OFFICE O/P EST LOW 20 MIN: CPT | Performed by: PHYSICIAN ASSISTANT

## 2021-10-01 PROCEDURE — 93306 TTE W/DOPPLER COMPLETE: CPT

## 2021-10-01 NOTE — PATIENT INSTRUCTIONS
Patient education: Sleep apnea in adults       INTRODUCTION -- Normally during sleep, air moves through the throat and in and out of the lungs at a regular rhythm. In a person with sleep apnea, air movement is periodically diminished or stopped. There are two types of sleep apnea: obstructive sleep apnea and central sleep apnea. In obstructive sleep apnea, breathing is abnormal because of narrowing or closure of the throat. In central sleep apnea, breathing is abnormal because of a change in the breathing control and rhythm. Sleep apnea is a serious condition that can affect a person's ability to safely perform normal daily activities and can affect long term health. Approximately 25 percent of adults are at risk for sleep apnea of some degree. Men are more commonly affected than women. Other risk factors include middle and older age, being overweight or obese, and having a small mouth and throat. This topic review focuses on the most common type of sleep apnea in adults, obstructive sleep apnea (HANANE). HOW SLEEP APNEA OCCURS -- The throat is surrounded by muscles that control the airway for speaking, swallowing, and breathing. During sleep, these muscles are less active, and this causes the throat to narrow. In most people, this narrowing does not affect breathing. In others, it can cause snoring, sometimes with reduced or completely blocked airflow. A completely blocked airway without airflow is called an obstructive apnea. Partial obstruction with diminished airflow is called a hypopnea. A person may have apnea and hypopnea during sleep. Insufficient breathing due to apnea or hypopnea causes oxygen levels to fall and carbon dioxide to rise. Because the airway is blocked, breathing faster or harder does not help to improve oxygen levels until the airway is reopened. Typically, the obstruction requires the person to awaken to activate the upper airway muscles.  Once the airway is opened, the person then takes several deep breaths to catch up on breathing. As the person awakens, he or she may move briefly, snort or snore, and take a deep breath. Less frequently, a person may awaken completely with a sensation of gasping, smothering, or choking. If the person falls back to sleep quickly, he or she will not remember the event. Many people with sleep apnea are unaware of their abnormal breathing in sleep, and all patients underestimate how often their sleep is interrupted. Awakening from sleep causes sleep to be unrefreshing and causes fatigue and daytime sleepiness. Anatomic causes of obstructive sleep apnea --  Most patients have HANANE because of a small upper airway. As the bones of the face and skull develop, some people develop a small lower face, a small mouth, and a tongue that seems too large for the mouth. These features are genetically determined, which explains why HANANE tends to cluster in families. Obesity is another major factor. Tonsil enlargement can be an important cause, especially in children. SLEEP APNEA SYMPTOMS -- The main symptoms of HANANE are loud snoring, fatigue, and daytime sleepiness. However, some people have no symptoms. For example, if the person does not have a bed partner, he or she may not be aware of the snoring. Fatigue and sleepiness have many causes and are often attributed to overwork and increasing age. As a result, a person may be slow to recognize that they have a problem. A bed partner or spouse often prompts the patient to seek medical care. Other symptoms may include one or more of the following:  ?Restless sleep  ? Awakening with choking, gasping, or smothering  ? Morning headaches, dry mouth, or sore throat  ? Waking frequently to urinate  ? Awakening unrested, groggy  ? Low energy, difficulty concentrating, memory impairment    Risk factors -- Certain factors increase the risk of sleep apnea.   ?Increasing age - HANANE occurs at all ages, but it is more common in middle and older age adults. ?Male sex - HANANE is two times more common in men, especially in middle age. ?Obesity - The more obese a person is, the more likely he or she is to have HANANE. ? Sedation from medication or alcohol - This interferes with the ability to awaken from sleep and can lengthen periods of apnea (no breathing), with potentially dangerous consequences. ? Abnormality of the airway. SLEEP APNEA CONSEQUENCES -- Complications of sleep apnea can include daytime sleepiness and difficulty concentrating. The consequence of this is an increased risk of accidents and errors in daily activities. Studies have shown that people with severe HANANE are more than twice as likely to be involved in a motor vehicle accident as people without these conditions. People with HANANE are encouraged to discuss options for driving, working, and performing other high-risk tasks with a healthcare provider. In addition, people with untreated HANANE may have an increased risk of cardiovascular problems such as high blood pressure, heart attack, abnormal heart rhythms, or stroke. This risk may be due to changes in the heart rate and blood pressure that occur during sleep. SLEEP APNEA DIAGNOSIS -- The diagnosis of HANANE is best made by a knowledgeable sleep medicine specialist who has an understanding of the individual's health issues. The diagnosis is usually based upon the person's medical history, physical examination, and testing, including:  ? A complaint of snoring and ineffective sleep  ? Neck size (greater than 16 inches in men or 14 inches in women) is associated with an increased risk of sleep apnea  ? A small upper airway: difficulty seeing the throat because of a tongue that is large for the mouth  ? High blood pressure, especially if it is resistant to treatment  ? If a bed partner has observed the patient during episodes of stopped breathing (apnea), choking, or gasping during sleep, there is a strong possibility of sleep (continuous positive airway pressure)  device uses an air-tight attachment to the nose, typically a mask, connected to a tube and a blower which generates the pressure. Devices that fit comfortably into the nasal opening, rather than over the nose, are also available. CPAP should be used any time the person sleeps (day or night). The CPAP device is usually used for the first time in the sleep lab, where a technician can adjust the pressure and select the best equipment to keep the airway open. Alternatively, an auto device with a self-adjusting pressure feature, provided with proper education and training, can get treatment started without another sleep test. While the treatment may seem uncomfortable, noisy, or bulky at first, most people accept the treatment after experiencing better sleep. However, difficulty with mask comfort and nasal congestion prevent up to 50 percent of people from using the treatment on a regular basis. Continued follow up with a healthcare provider helps to ensure that the treatment is effective and comfortable. Information from the CPAP machine is often used by physicians, therapists, and insurers to track the success of treatment. CPAP can be delivered with different features to improve comfort and solve problems that may come up during treatment. Changes in treatment may be needed if symptoms do not improve or if the persons condition changes, such as a gain or loss of weight. Adjust sleep position -- Adjusting sleep position (to stay off the back) may help improve sleep quality in people who have HANANE when sleeping on the back. However, this is difficult to maintain throughout the night and is rarely an adequate solution. Weight loss -- Weight loss may be helpful for obese or overweight patients. Weight loss may be accomplished with dietary changes, exercise, and/or surgical treatment.  However, it can be difficult to maintain weight loss; the five-year success of non-surgical weight loss is only 5 percent, meaning that 95 percent of people regain lost weight. Avoid alcohol and other sedatives -- Alcohol can worsen sleepiness, potentially increasing the risk of accidents or injury. People with HANANE are often counseled to drink little to no alcohol, even during the daytime. Similarly, people who take anti-anxiety medications or sedatives to sleep should speak with their healthcare provider about the safety of these medications. People with HANANE must notify all healthcare providers, including surgeons, about their condition and the potential risks of being sedated. People with HANANE who are given anesthesia and/or pain medications require special management and close monitoring to reduce the risk of a blocked airway. Dental devices -- A dental device, called an oral appliance or mandibular advancement device, can reposition the jaw (mandible), bringing the tongue and soft palate forward as well. This may relieve obstruction in some people. This treatment is excellent for reducing snoring, although the effect on HANANE is sometimes more limited. As a result, dental devices are best used for mild cases of HANANE when relief of snoring is the main goal. Failure to tolerate and accept CPAP is another indication for dental devices. While dental devices are not as effective as CPAP for HANANE, some patients prefer a dental device to CPAP. Side effects of dental devices are generally minor but may include changes to the bite with prolonged use. Surgical treatment -- Surgery is an alternative therapy for patients who cannot tolerate or do not improve with nonsurgical treatments such as CPAP or oral devices. Surgery can also be used in combination with other nonsurgical treatments. Surgical procedures reshape structures in the upper airways or surgically reposition bone or soft tissue. Uvulopalatopharyngoplasty (UPPP) removes the uvula and excessive tissue in the throat, including the tonsils, if present. Other procedures, such as maxillomandibular advancement (MMA), address both the upper and lower pharyngeal airway more globally. UPPP alone has limited success rates (less than 50 percent) and people can relapse (when HANANE symptoms return after surgery). As a result, this surgery is only recommended in a minority of people and should be considered with caution. MMA may have a higher success rate, particularly in people with abnormal jaw (maxilla and mandible) anatomy, but it is the most complicated procedure. A newer surgical approach, nerve stimulation to protrude the tongue, has promising success rates in very selected people. Tracheostomy creates a permanent opening in the neck. It is reserved for people with severe disease in whom less drastic measures have failed or are inappropriate. Although it is always successful in eliminating obstructive sleep apnea, tracheostomy requires significant lifestyle changes and carries some serious risks (eg, infection, bleeding, blockage). All surgical treatments require discussions about the goals of treatment, the expected outcomes, and potential complications. Hypoglossal nerve stimulator- \"Inspire\" device    PAP treatment failure:  Possible causes of treatment failure include nonadherence or suboptimal adherence, weight gain, an inappropriate level of prescribed positive pressure, or an additional disorder causing sleepiness (eg, narcolepsy) that may require alterations in the therapeutic regimen. A review of medications should also be undertaken since many drugs may lead to sleepiness. Inadequate sleep time may also negate the expected effects from treatment of HANANE. Also, pt's can have persistent hypersomnolence associated with sleep apnea even in the presence of adequate therapy and at those times Provigil or Nuvigil or other stimulants may be indicated.     Once the patient's positive airway pressure therapy has been optimized and symptoms resolved, a regimen of long-term follow-up should be established. Annual visits are reasonable, with more frequent visits in between if new issues arise. The purpose of long-term follow-up is to assess usage and monitor for recurrent HANANE, new side effects, air leakage, and fluctuations in body weight. WHERE TO GET MORE INFORMATION -- Your healthcare provider is the best source of information for questions and concerns related to your medical problem. Organizations  American Sleep Apnea Association  Provides information about sleep apnea to the public, publishes a newsletter, and serves as an advocate for people with the disorder. Radha, [address]   [place]@[employer]. org   http://layton.[employer]/. org   Tel: [phone]   Fax: [facility] organization that works to [employer] and safety by promoting public understanding of sleep and sleep disorders. Supports sleep-related education, research, and advocacy; produces and distributes educational materials to the public and healthcare professionals; and offers postdoctoral fellowships and grants for sleep researchers. [address]   Shelly@[employer]. org   [employer]. org   Tel: [phone]   Fax: [phone]    Important information:  Medicare/private insurance CPAP/BiPAP/APAP requirements:  Medicare/private insurance has specific requirements for PAP compliance that must be met during the first 90 days of use to continue coverage for CPAP/BiPAP/APAP  from day 91 and beyond. The policy requires that patients use a PAP device 4 hours per 24 hour period, at least 70% of the time over a 30 day period. This data must be downloaded as a report direct from the PAP devices. This is called a compliance download. Your PAP supplier will assist you in this matter.      Note:  Where applicable, we will utilize PAP device efficiency reports, additional testing, and face-to-face  clinical evaluation subsequent to any treatment, changes in treatment, and continued treatment. Caution:  Please abstain from driving or engaging in other activities which may be hazardous in the presence of diminished alertness or daytime drowsiness. And avoid the use of sedatives or alcohol, which can worsen sleep apnea and daytime drowsiness. Mask suggestions:  -     Resmed Airfit N20 (Nasal) or F20 (Full face mask). They conform to your face, thus decreasing the potential for mask leakage. You might like the AirTouch F20(full face mask). It has a \"memory foam\" like cushion. The AirFit F30 is a smaller style full face mask designed to sit low on and cover less of your face for fewer facial marks. AirFit N30i has a top of the head tube with a nasal mask. AirFit P10 reported to be the most comfortable nasal pillow mask. Resmed Mirage FX reported to be the most comfortable nasal mask. Resmed Mirage [place] reported to be the most comfortable hybrid mask. AirTouch N20-memory foam nasal mask. Respironics: You might also like to try a nasal mask called a Dreamwear nasal mask or the Dreamwear nasal pillow. Another suggestion is the [facility], it is a minimal contact full face mask. The Minda Bella incredible under the nose design makes it the only full face mask that won't cause red marks on the bridge of your nose when compared to other full face masks. The Dreamwear full face mask has a  soft feel, unique in-frame air-flow, and innovative air tube connection at the top of the head for the ultimate in sleep comfort. Comfort Gel Blue. Dreamwear gel pillows. Carlos & Lori: Brevida nasal pillow mask and Simplus FFM    The use of a memory foam CPAP pillow supports the head and neck throughout the night.

## 2021-10-01 NOTE — LETTER
[facility]  [address] [place] [address] [address]  Paul Howard  Phone [phone]  Fax [phone]             Re:  Rashel Saxena.    10/01/21  :  1960  Address: [address] [place] [address]         REFERRAL  Referred to: DME provider of patient's choice  Rashel Saxena. is referred for the following:    DME Equipment HPCPS Code Setting   Auto Adjusting BiPAP device with flex or comparable pressure relief per comfort  Min EPAP: 12cm to   Max IPAP: 22cm   Heated Humidifier  Patient Choice     Diagnoses:  Obstructive sleep apnea (G47.33)  Length of Need: Lifetime, 99    Ordering Provider: Milton Magdaleno PA-C  NPI:  [id number]        Signature:  [unfilled]        Date: 10/1/2021      Electronically Signed by Milton Magdaleno PA-C  on 10/1/2021 at 10:38 AM

## 2021-10-01 NOTE — LETTER
[facility]  [address] [place] [address] [address], [address] [place] [address]  Phone [phone]  Fax [phone]             Re:  Juan Tyler.    10/01/21  :  1960  Address: [address] [place] [address]       Replinishible PAP Supplies, 1 year supply  Item HPCPS Code Frequency   Mask of choice  or  1 per 3 months   Nasal Mask cushion/pillows [id number] or  2 per 30 days   Full Face Mask Interface  1 per 30 days   Headgear  1 per 6 months   Tubing, length of choice  or  1 per 3 months   Water Chamber  1 per 6 months   Chinstrap  1 per 6 months   Disposable Filters  2 per 30 days   Reusable Filters  1 per 6 months     Diagnoses:  Obstructive sleep apnea (G47.33)  Length of Need: Lifetime, 99    Ordering Provider: Barney Fierro PA-C  NPI:  [id number]        Signature: [unfilled]        Date: 10/1/2021      Electronically Signed by Barney Fierro PA-C  on 10/1/2021 at 10:39 AM

## 2021-10-01 NOTE — PROGRESS NOTES
[facility]  [address] [place] [address], [address] [place] [address],[address]  [address] [facility], [place] [address]  Phone [phone]  Fax [phone]       [facility] Sleep Follow Up Encounter      Information:   Patient Name: Pedrito Conner :   1960  Age:   59 y.o. MRN:   [id number]  Account #:  [de-identified]  Today:                10/1/21    Provider:  Aaliyah Arango PA-C    Chief Complaint   Patient presents with    Sleep Apnea     follow up         Subjective:   Pedrito Conner is a 59 y.o. male  with a history of severe HANANE who comes in for an annual sleep clinic follow up. The PSG, 2015 revealed an AHI of 36.5.  He is prescribed auto BiPAP therapy at a pressure range of 12cm to 22cm. The compliance report indicates that he is using BiPAP close to 8 hours per night. There is some data from January through April that it did not record. He reports that consistent BiPAP use has alleviated the previous HANANE symptoms.  The BiPAP is out of date and in the recall.      Location or symptom:  HANANE  Onset:  PS  Timing:  q hs  Severity:  Severe  Associated:  Snoring and witnessed apneas  Alleviated:  BiPAP         Objective:     Past Medical History:   Diagnosis Date    A-fib ([address]     Atelectasis of right lung 2017    Back pain, lumbosacral     BiPAP (biphasic positive airway pressure) dependence     12cm to 22cm    Clotting disorder (HCC)     Cyst near coccyx     removed    GERD (gastroesophageal reflux disease)     Heart murmur     Hx of blood clots     Hyperlipidemia     Hypertension     Mild CAD 2018    Obstructive sleep apnea     AHI:  36.5 per PSG, 2015 - BiPAP    Osteoarthritis     Paroxysmal atrial fibrillation with RVR ([address] 2017    Pneumonia due to organism     Pulmonary embolism [facility])        Past Surgical History:   Procedure Laterality Date    ANKLE FRACTURE SURGERY      left ankle    ARM SURGERY Left 2018    fracture     CARDIAC CATHETERIZATION  2018    Mild CAD    CHOLECYSTECTOMY, LAPAROSCOPIC N/A 12/15/2017    CHOLECYSTECTOMY LAPAROSCOPIC performed by Michael Rangel MD at [facility] COLONOSCOPY  2012    GALLBLADDER SURGERY      NE EGD TRANSORAL BIOPSY SINGLE/MULTIPLE N/A 11/15/2018    Dr Montenegro Men      undecended testicle done when 1years old       Recent Hospitalizations  ·     Significant Injuries  ·     Family History   Problem Relation Age of Onset    Heart Disease Mother     High Blood Pressure Mother     Heart Disease Father 54        mi    High Blood Pressure Father     High Cholesterol Father     Lung Cancer Father     Cancer Father         Lung CA - Oat cell    Stroke Father     Diabetes Paternal Aunt     Colon Cancer Neg Hx     Colon Polyps Neg Hx     Esophageal Cancer Neg Hx     Liver Cancer Neg Hx     Liver Disease Neg Hx     Rectal Cancer Neg Hx     Stomach Cancer Neg Hx        Social History  Social History     Tobacco Use   Smoking Status Never Smoker   Smokeless Tobacco Former User    Types: Chew     Social History     Substance and Sexual Activity   Alcohol Use Yes    Comment: occ     Social History     Substance and Sexual Activity   Drug Use No         Current Outpatient Medications   Medication Sig Dispense Refill    magnesium gluconate (MAGONATE) 500 MG tablet Take 500 mg by mouth daily      amLODIPine (NORVASC) 5 MG tablet Take 1 tablet by mouth daily 90 tablet 3    rosuvastatin (CRESTOR) 10 MG tablet TAKE 1 TABLET NIGHTLY 90 tablet 3    lisinopril (PRINIVIL;ZESTRIL) 40 MG tablet TAKE 1 TABLET DAILY 90 tablet 1    omeprazole (PRILOSEC) 20 MG delayed release capsule TAKE 1 CAPSULE TWICE A  capsule 1    chlorthalidone (HYGROTON) 25 MG tablet Take 12.5 mg by mouth daily       tadalafil (CIALIS) 5 MG tablet Take 1 tablet by mouth daily as needed for Erectile Dysfunction 54 tablet 0    fluticasone (FLONASE) 50 MCG/ACT nasal spray USE 1 SPRAY NASALLY DAILY 48 g 5    cloNIDine (CATAPRES) 0.1 MG tablet Take 1 tablet night and take one every 12 hours if BP systolic blood pressure over 220 or diastolic BP over 046 758 tablet 1    potassium chloride (KLOR-CON M) 10 MEQ extended release tablet Take one tablet every Monday, Wednesday, Friday (Patient taking differently: daily Take one tablet every day) 36 tablet 1    sotalol (BETAPACE) 80 MG tablet TAKE 1 TABLET TWICE A  tablet 3    hyoscyamine (OSCIMIN) 125 MCG TBDP dispersible tablet PLACE 2 TABLETS ON TONGUE EVERY 12 HOURS AS NEEDED FOR ABDOMINAL PAIN/CRAMPING 60 tablet 24    rivaroxaban (XARELTO) 20 MG TABS tablet Take 1 tablet by mouth Daily with supper As needed for AF episode 12hrs or longer in duration 90 tablet 1    Potassium 99 MG TABS Take 99 mg by mouth daily       niacin (NIASPAN) 1000 MG extended release tablet Take 1,000 mg by mouth 4 times daily      aspirin 81 MG EC tablet Take 81 mg by mouth daily      Omega-3 Fatty Acids (FISH OIL PO) Take 1,280 mg by mouth daily       Ascorbic Acid (VITAMIN C) 500 MG tablet Take 500 mg by mouth 2 times daily       vitamin E 400 UNIT capsule Take 400 Units by mouth daily       No current facility-administered medications for this visit. Allergies:  Patient has no known allergies. REVIEW OF SYSTEMS     Constitutional: []? Fever []? Sweats []? Chills []? Recent Injury   [x]? Denies all unless marked  HENT:[]? Headache  []? Head Injury  []? Sore Throat  []? Ear Pain  []? Dizziness []? Hearing Loss   [x]? Denies all unless marked  Musculoskeletal: []? Arthralgia  []? Myalgias []? Muscle cramps  []? Muscle twitches   [x]? Denies all unless marked   Spine:  []? Neck pain  []? Back pain  []? Sciaticia  [x]? Denies all unless marked  Neurological:[]? Visual Disturbance []? Double Vision []? Slurred Speech []? Trouble swallowing  []? Vertigo []? Tingling []? Numbness []? Weakness []? Loss of Balance   []? Loss of Consciousness []? Memory Loss []? Seizures  [x]?  Denies all unless marked  Psychiatric/Behavioral:[]? Depression []? Anxiety  [x]? Denies all unless marked  Sleep: []? Insomnia []? Sleep Disturbance []? Snoring []? Restless Legs []? Daytime Sleepiness [x]? Sleep Apnea  []? Denies all unless marked    The MA has completed the ROS with the patient. I have reviewed it in its' entirety with the patient and agree with the documentation. PHYSICAL EXAM  /75 (Site: Left Upper Arm, Position: Sitting, Cuff Size: Large Adult)   Pulse (!) 46   Ht 6' 2\" (1.88 m)   Wt 267 lb (121.1 kg)   SpO2 98%   BMI 34.28 kg/m²      Constitutional -  Alert in NAD, well developed, pleasant and cooperative with exam; body habitus obese as indicated by BMI  HEENT- Conjunctiva normal.  No scars, masses, or lesions over external nose or ears, hearing intact, no neck masses noted, no jugular vein distension, no bruit  Cardiac- Regular rate and rhythm; Grade I/VI murmur  Pulmonary- Clear to auscultation, good expansion, normal effort without use of accessory muscles  Musculoskeletal - No significant wasting of muscles noted, no bony deformities  Extremities - No clubbing, cyanosis or edema  Skin - Warm, dry, and intact. No rash, erythema, or pallor  Psychiatric - Mood, affect, and behavior appear normal      Neurologic:  Extraocular movements are intact without nystagmus. PERRL. Visual fields are full to confrontation. Facial movements are symmetrical and normal.  Speech is precise. Extremity strength is normal in both uppers and lowers. Deep tendon reflexes are intact and symmetrical.  Rapid alternating movements are unimpaired. Finger-to-nose testing is performed well, without dysmetria.   Gait is normal.    I reviewed the following studies:       []  :  Clinical laboratory test results     []  :  Radiology reports                    [x]  :  Review and summarization of medical records and/or obtain medical records        []  :  Previous/recent polysomnogram report(s)     []  :  [place] Sleepiness Scale       [x]  :  Compliance download: The auto BiPAP is set at a pressure range of 12cm to 22cm. Compliance download shows that he uses device: 99% of the time;  percentage of days with usage >=4 hours: 97%. AHI: <7.5    Assessment:       ICD-10-CM    1. Obstructive sleep apnea  G47.33    2. BiPAP (biphasic positive airway pressure) dependence  Z99.89           []  :  Stable     []  :  Improved                       [x]  :  Well controlled              []  :  Resolving     []  :  Resolved     []  :  Inadequately controlled     []  :  Worsening     []  :  Additional workup planned    Patient is compliant and benefiting from therapy as indicated by compliance evaluation and patient report. Plan:     No orders of the defined types were placed in this encounter. 1.   Previously or presently advised of the etiology,  pathophysiology, diagnosis, treatment options, and risks of untreated HANANE. Risks may include, but are not limited to  hypertension, coronary artery disease, atrial fibrillation, CHF, diabetes, stroke, weight gain, impaired cognition, daytime somnolence, and motor vehicle accidents. Advised to abstain from driving or operating heavy machinery when drowsy and the use of respiratory suppressants. Discussed diagnostic studies and potential treatment plan. 2.  Will evaluate for PAP clinical benefit and and compliance during a 30 day period within the preceding 90 days PRN. 3.  The following educational material has been included in this visit after visit summary for your review: HANANE/PAP guidelines-Discussed with the patient and all questions fully answered. 4.  Continue BiPAP, but follow up with DME supplier: [employer] device, possible recall on your device. 5.  Order-auto BiPAP with a pressure range of 12cm to 22cm-[facility]; order-supplies-[facility]  6.   Follow up per protocol

## 2021-10-05 ENCOUNTER — TELEPHONE (OUTPATIENT)
Dept: CARDIOLOGY CLINIC | Age: 61
End: 2021-10-05

## 2021-10-05 NOTE — TELEPHONE ENCOUNTER
ECHO Complete 2D W Doppler W Color: Result Notes   Pam Quinonez   10/5/2021 12:45 PM CDT       LVM for patient to return call regarding 2D echo results.      YolandaDL Fraser   10/4/2021  1:33 PM CDT       Please let patient know his recent echo showed mild tricuspid regurgitation, or leakiness of the tricuspid valve which she has had previously. [facility] also has mild aortic stenosis or stiffening of the aortic valve which is likely what I was hearing on exam.  This is something we would monitor her with an echo every few years.  No change in treatment based on these findings

## 2021-11-08 DIAGNOSIS — I48.0 PAROXYSMAL ATRIAL FIBRILLATION WITH RVR (HCC): ICD-10-CM

## 2021-11-09 RX ORDER — SOTALOL HYDROCHLORIDE 80 MG/1
TABLET ORAL
Qty: 180 TABLET | Refills: 1 | Status: SHIPPED | OUTPATIENT
Start: 2021-11-09 | End: 2022-09-06 | Stop reason: SDUPTHER

## 2022-01-03 RX ORDER — LISINOPRIL 40 MG/1
TABLET ORAL
Qty: 90 TABLET | Refills: 0 | Status: SHIPPED | OUTPATIENT
Start: 2022-01-03 | End: 2022-04-11

## 2022-01-03 RX ORDER — FLUTICASONE PROPIONATE 50 MCG
SPRAY, SUSPENSION (ML) NASAL
Qty: 48 G | Refills: 0 | Status: SHIPPED | OUTPATIENT
Start: 2022-01-03 | End: 2022-05-26

## 2022-01-03 RX ORDER — OMEPRAZOLE 20 MG/1
CAPSULE, DELAYED RELEASE ORAL
Qty: 180 CAPSULE | Refills: 0 | Status: SHIPPED | OUTPATIENT
Start: 2022-01-03 | End: 2022-04-11

## 2022-01-03 NOTE — TELEPHONE ENCOUNTER
Last Appointment Date: 9/29/2021  Next Appointment Date: 3/29/2022    No Known Allergies    Patient needs refill on   Requested Prescriptions     Pending Prescriptions Disp Refills    omeprazole (PRILOSEC) 20 MG delayed release capsule [Pharmacy Med Name: OMEPRAZOLE DR CAPS 20MG] 180 capsule 3     Sig: TAKE 1 CAPSULE TWICE A DAY    lisinopril (PRINIVIL;ZESTRIL) 40 MG tablet [Pharmacy Med Name: LISINOPRIL TABS 40MG] 90 tablet 3     Sig: TAKE 1 TABLET DAILY    fluticasone (FLONASE) 50 MCG/ACT nasal spray [Pharmacy Med Name: FLUTICASONE PROP NASAL SPRAY 16GM 50MCG] 48 g 5     Sig: USE 1 SPRAY NASALLY DAILY

## 2022-04-07 DIAGNOSIS — E66.09 EXOGENOUS OBESITY: ICD-10-CM

## 2022-04-07 DIAGNOSIS — R71.8 ELEVATED MCV: ICD-10-CM

## 2022-04-07 DIAGNOSIS — I10 ESSENTIAL HYPERTENSION: ICD-10-CM

## 2022-04-07 DIAGNOSIS — I48.0 PAF (PAROXYSMAL ATRIAL FIBRILLATION) (HCC): ICD-10-CM

## 2022-04-07 DIAGNOSIS — F51.01 PRIMARY INSOMNIA: ICD-10-CM

## 2022-04-07 DIAGNOSIS — Z23 NEED FOR IMMUNIZATION AGAINST INFLUENZA: ICD-10-CM

## 2022-04-07 DIAGNOSIS — Z00.00 ANNUAL PHYSICAL EXAM: ICD-10-CM

## 2022-04-07 DIAGNOSIS — R73.01 IFG (IMPAIRED FASTING GLUCOSE): ICD-10-CM

## 2022-04-07 DIAGNOSIS — E78.2 MIXED HYPERLIPIDEMIA: ICD-10-CM

## 2022-04-07 LAB
ALBUMIN SERPL-MCNC: 4.5 G/DL (ref 3.5–5.2)
ALP BLD-CCNC: 57 U/L (ref 40–130)
ALT SERPL-CCNC: 32 U/L (ref 5–41)
ANION GAP SERPL CALCULATED.3IONS-SCNC: 10 MMOL/L (ref 7–19)
AST SERPL-CCNC: 25 U/L (ref 5–40)
BASOPHILS ABSOLUTE: 0 K/UL (ref 0–0.2)
BASOPHILS RELATIVE PERCENT: 0.7 % (ref 0–1)
BILIRUB SERPL-MCNC: 0.7 MG/DL (ref 0.2–1.2)
BUN BLDV-MCNC: 14 MG/DL (ref 8–23)
CALCIUM SERPL-MCNC: 9.6 MG/DL (ref 8.8–10.2)
CHLORIDE BLD-SCNC: 103 MMOL/L (ref 98–111)
CHOLESTEROL, TOTAL: 143 MG/DL (ref 160–199)
CO2: 29 MMOL/L (ref 22–29)
CREAT SERPL-MCNC: 0.8 MG/DL (ref 0.5–1.2)
EOSINOPHILS ABSOLUTE: 0.3 K/UL (ref 0–0.6)
EOSINOPHILS RELATIVE PERCENT: 4.5 % (ref 0–5)
GFR AFRICAN AMERICAN: >59
GFR NON-AFRICAN AMERICAN: >60
GLUCOSE BLD-MCNC: 99 MG/DL (ref 74–109)
HBA1C MFR BLD: 5.6 % (ref 4–6)
HCT VFR BLD CALC: 46.3 % (ref 42–52)
HDLC SERPL-MCNC: 38 MG/DL (ref 55–121)
HEMOGLOBIN: 15.2 G/DL (ref 14–18)
IMMATURE GRANULOCYTES #: 0 K/UL
LDL CHOLESTEROL CALCULATED: 73 MG/DL
LYMPHOCYTES ABSOLUTE: 1.6 K/UL (ref 1.1–4.5)
LYMPHOCYTES RELATIVE PERCENT: 29.3 % (ref 20–40)
MCH RBC QN AUTO: 33.3 PG (ref 27–31)
MCHC RBC AUTO-ENTMCNC: 32.8 G/DL (ref 33–37)
MCV RBC AUTO: 101.3 FL (ref 80–94)
MONOCYTES ABSOLUTE: 0.7 K/UL (ref 0–0.9)
MONOCYTES RELATIVE PERCENT: 12.4 % (ref 0–10)
NEUTROPHILS ABSOLUTE: 2.9 K/UL (ref 1.5–7.5)
NEUTROPHILS RELATIVE PERCENT: 52.7 % (ref 50–65)
PDW BLD-RTO: 12.9 % (ref 11.5–14.5)
PLATELET # BLD: 210 K/UL (ref 130–400)
PMV BLD AUTO: 9.3 FL (ref 9.4–12.4)
POTASSIUM SERPL-SCNC: 3.7 MMOL/L (ref 3.5–5)
RBC # BLD: 4.57 M/UL (ref 4.7–6.1)
SODIUM BLD-SCNC: 142 MMOL/L (ref 136–145)
TOTAL PROTEIN: 6.6 G/DL (ref 6.6–8.7)
TRIGL SERPL-MCNC: 158 MG/DL (ref 0–149)
WBC # BLD: 5.6 K/UL (ref 4.8–10.8)

## 2022-04-11 ENCOUNTER — OFFICE VISIT (OUTPATIENT)
Dept: INTERNAL MEDICINE | Age: 62
End: 2022-04-11
Payer: COMMERCIAL

## 2022-04-11 VITALS
SYSTOLIC BLOOD PRESSURE: 128 MMHG | WEIGHT: 282 LBS | BODY MASS INDEX: 36.19 KG/M2 | HEART RATE: 76 BPM | OXYGEN SATURATION: 98 % | RESPIRATION RATE: 18 BRPM | DIASTOLIC BLOOD PRESSURE: 80 MMHG | HEIGHT: 74 IN

## 2022-04-11 DIAGNOSIS — E78.2 MIXED HYPERLIPIDEMIA: ICD-10-CM

## 2022-04-11 DIAGNOSIS — F51.01 PRIMARY INSOMNIA: ICD-10-CM

## 2022-04-11 DIAGNOSIS — R41.0 EPISODE OF CONFUSION: ICD-10-CM

## 2022-04-11 DIAGNOSIS — I48.0 PAF (PAROXYSMAL ATRIAL FIBRILLATION) (HCC): ICD-10-CM

## 2022-04-11 DIAGNOSIS — I10 ESSENTIAL HYPERTENSION: Primary | ICD-10-CM

## 2022-04-11 DIAGNOSIS — G45.9 TIA (TRANSIENT ISCHEMIC ATTACK): ICD-10-CM

## 2022-04-11 DIAGNOSIS — R71.8 ELEVATED MCV: ICD-10-CM

## 2022-04-11 DIAGNOSIS — E66.09 EXOGENOUS OBESITY: ICD-10-CM

## 2022-04-11 DIAGNOSIS — Z12.5 SCREENING PSA (PROSTATE SPECIFIC ANTIGEN): ICD-10-CM

## 2022-04-11 DIAGNOSIS — R73.01 IFG (IMPAIRED FASTING GLUCOSE): ICD-10-CM

## 2022-04-11 PROCEDURE — 99215 OFFICE O/P EST HI 40 MIN: CPT | Performed by: INTERNAL MEDICINE

## 2022-04-11 RX ORDER — LISINOPRIL 40 MG/1
TABLET ORAL
Qty: 90 TABLET | Refills: 0 | Status: SHIPPED | OUTPATIENT
Start: 2022-04-11 | End: 2022-08-08

## 2022-04-11 RX ORDER — OMEPRAZOLE 20 MG/1
CAPSULE, DELAYED RELEASE ORAL
Qty: 180 CAPSULE | Refills: 3 | Status: SHIPPED | OUTPATIENT
Start: 2022-04-11

## 2022-04-11 RX ORDER — MONTELUKAST SODIUM 4 MG/1
1 TABLET, CHEWABLE ORAL DAILY
Qty: 30 TABLET | Refills: 3 | Status: SHIPPED | OUTPATIENT
Start: 2022-04-11 | End: 2022-04-27 | Stop reason: SDUPTHER

## 2022-04-11 RX ORDER — TERBINAFINE HYDROCHLORIDE 250 MG/1
250 TABLET ORAL DAILY
COMMUNITY
End: 2022-10-12 | Stop reason: CLARIF

## 2022-04-11 SDOH — ECONOMIC STABILITY: FOOD INSECURITY: WITHIN THE PAST 12 MONTHS, THE FOOD YOU BOUGHT JUST DIDN'T LAST AND YOU DIDN'T HAVE MONEY TO GET MORE.: NEVER TRUE

## 2022-04-11 SDOH — ECONOMIC STABILITY: FOOD INSECURITY: WITHIN THE PAST 12 MONTHS, YOU WORRIED THAT YOUR FOOD WOULD RUN OUT BEFORE YOU GOT MONEY TO BUY MORE.: NEVER TRUE

## 2022-04-11 ASSESSMENT — ENCOUNTER SYMPTOMS
ABDOMINAL PAIN: 0
SORE THROAT: 0
CONSTIPATION: 0
CHEST TIGHTNESS: 0
COUGH: 0
WHEEZING: 0

## 2022-04-11 ASSESSMENT — SOCIAL DETERMINANTS OF HEALTH (SDOH): HOW HARD IS IT FOR YOU TO PAY FOR THE VERY BASICS LIKE FOOD, HOUSING, MEDICAL CARE, AND HEATING?: NOT HARD AT ALL

## 2022-04-11 NOTE — PROGRESS NOTES
Chief Complaint   Patient presents with    6 Month Follow-Up     Pt needs Dr. Chelsie Lindsay to take over his Medications that were initially started by Cardio in [place] since pt is no longer going there      History of presenting illness:  Parish Ho is a55 y.o. male who presents today for follow up on his chronic medical conditions as noted below.       Patient Active Problem List    Diagnosis Date Noted    Elevated PSA 03/20/2020    Epigastric discomfort 11/14/2018    Chronic nausea 11/14/2018    Mild CAD 11/13/2018    IFG (impaired fasting glucose) 05/11/2018    Primary insomnia 10/26/2017    Chronic GERD 10/26/2017    Degenerative lumbar disc 10/26/2017    PAF (paroxysmal atrial fibrillation) (HCC)     Hx pulmonary embolism 06/26/2017    Benign prostatic hyperplasia with lower urinary tract symptoms 06/26/2017    Mixed hyperlipidemia 06/26/2017    CPAP (continuous positive airway pressure) dependence      Overview Note:     12cm to 22cm      Mild mitral regurgitation by prior echocardiogram 02/08/2017    Essential hypertension 02/08/2017    Obstructive sleep apnea      Past Medical History:   Diagnosis Date    A-fib [facility])     Atelectasis of right lung 6/26/2017    Back pain, lumbosacral     BiPAP (biphasic positive airway pressure) dependence     12cm to 22cm    Clotting disorder (HCC)     Cyst near coccyx     removed    GERD (gastroesophageal reflux disease)     Heart murmur     Hx of blood clots     Hyperlipidemia     Hypertension     Mild CAD 11/13/2018    Obstructive sleep apnea     AHI:  36.5 per PSG, 9/2015 - BiPAP    Osteoarthritis     Paroxysmal atrial fibrillation with RVR ([address] 6/26/2017    Pneumonia due to organism     Pulmonary embolism [facility])       Past Surgical History:   Procedure Laterality Date    ANKLE FRACTURE SURGERY      left ankle    ARM SURGERY Left 01/04/2018    fracture     CARDIAC CATHETERIZATION  06/2018    Mild CAD    CHOLECYSTECTOMY, LAPAROSCOPIC N/A 12/15/2017    CHOLECYSTECTOMY LAPAROSCOPIC performed by Yoselin Mcdonough MD at [facility] COLONOSCOPY  2012    GALLBLADDER SURGERY      CT EGD TRANSORAL BIOPSY SINGLE/MULTIPLE N/A 11/15/2018    Dr Fazal Hope-Gastropathy    TESTICLE SURGERY      undecended testicle done when 1years old     Current Outpatient Medications   Medication Sig Dispense Refill    terbinafine (LAMISIL) 250 MG tablet Take 250 mg by mouth daily      B Complex Vitamins (B COMPLEX PO) Take 150 mg by mouth      colestipol (COLESTID) 1 g tablet Take 1 tablet by mouth daily 30 tablet 3    omeprazole (PRILOSEC) 20 MG delayed release capsule TAKE 1 CAPSULE TWICE A  capsule 0    lisinopril (PRINIVIL;ZESTRIL) 40 MG tablet TAKE 1 TABLET DAILY 90 tablet 0    sotalol (BETAPACE) 80 MG tablet TAKE 1 TABLET TWICE A  tablet 1    magnesium gluconate (MAGONATE) 500 MG tablet Take 500 mg by mouth daily      amLODIPine (NORVASC) 5 MG tablet Take 1 tablet by mouth daily 90 tablet 3    rosuvastatin (CRESTOR) 10 MG tablet TAKE 1 TABLET NIGHTLY 90 tablet 3    chlorthalidone (HYGROTON) 25 MG tablet Take 12.5 mg by mouth daily       tadalafil (CIALIS) 5 MG tablet Take 1 tablet by mouth daily as needed for Erectile Dysfunction 54 tablet 0    potassium chloride (KLOR-CON M) 10 MEQ extended release tablet Take one tablet every Monday, Wednesday, Friday (Patient taking differently: daily Take one tablet every day) 36 tablet 1    rivaroxaban (XARELTO) 20 MG TABS tablet Take 1 tablet by mouth Daily with supper As needed for AF episode 12hrs or longer in duration 90 tablet 1    Potassium 99 MG TABS Take 99 mg by mouth daily       niacin (NIASPAN) 1000 MG extended release tablet Take 1,000 mg by mouth 4 times daily      aspirin 81 MG EC tablet Take 81 mg by mouth daily      Omega-3 Fatty Acids (FISH OIL PO) Take 1,280 mg by mouth daily       Ascorbic Acid (VITAMIN C) 500 MG tablet Take 500 mg by mouth 2 times daily       vitamin E 400 UNIT capsule Take 400 Units by mouth daily      fluticasone (FLONASE) 50 MCG/ACT nasal spray USE 1 SPRAY NASALLY DAILY (Patient not taking: Reported on 4/11/2022) 48 g 0    cloNIDine (CATAPRES) 0.1 MG tablet Take 1 tablet night and take one every 12 hours if BP systolic blood pressure over 688 or diastolic BP over 668 (Patient not taking: Reported on 4/11/2022) 180 tablet 1    hyoscyamine (OSCIMIN) 125 MCG TBDP dispersible tablet PLACE 2 TABLETS ON TONGUE EVERY 12 HOURS AS NEEDED FOR ABDOMINAL PAIN/CRAMPING (Patient not taking: Reported on 4/11/2022) 60 tablet 24     No current facility-administered medications for this visit. No Known Allergies  Social History     Tobacco Use    Smoking status: Never Smoker    Smokeless tobacco: Former User     Types: Chew   Substance Use Topics    Alcohol use: Yes     Comment: occ      Family History   Problem Relation Age of Onset    Heart Disease Mother     High Blood Pressure Mother     Heart Disease Father 54        mi    High Blood Pressure Father     High Cholesterol Father     Lung Cancer Father     Cancer Father         Lung CA - Oat cell    Stroke Father     Diabetes Paternal Aunt     Colon Cancer Neg Hx     Colon Polyps Neg Hx     Esophageal Cancer Neg Hx     Liver Cancer Neg Hx     Liver Disease Neg Hx     Rectal Cancer Neg Hx     Stomach Cancer Neg Hx        Review of Systems   Constitutional: Negative for chills, fatigue and fever. HENT: Negative for congestion, ear pain, nosebleeds, postnasal drip and sore throat. Respiratory: Negative for cough, chest tightness and wheezing. Cardiovascular: Negative for chest pain, palpitations and leg swelling. Gastrointestinal: Negative for abdominal pain and constipation. Genitourinary: Negative for dysuria and urgency. Musculoskeletal: Negative. Negative for arthralgias. Skin: Negative for rash. Neurological: Negative for dizziness and headaches.    Psychiatric/Behavioral: Negative. Vitals:    04/11/22 1308   BP: 128/80   Site: Left Upper Arm   Position: Sitting   Cuff Size: Large Adult   Pulse: 76   Resp: 18   SpO2: 98%   Weight: 282 lb (127.9 kg)   Height: 6' 2\" (1.88 m)     Body mass index is 36.21 kg/m². Physical Exam  Constitutional:       Appearance: He is well-developed. HENT:      Right Ear: External ear normal.      Left Ear: External ear normal.      Mouth/Throat:      Pharynx: No oropharyngeal exudate. Eyes:      Conjunctiva/sclera: Conjunctivae normal.      Pupils: Pupils are equal, round, and reactive to light. Neck:      Thyroid: No thyromegaly. Vascular: No JVD. Cardiovascular:      Rate and Rhythm: Normal rate. Heart sounds: Normal heart sounds. No murmur heard. Pulmonary:      Effort: No respiratory distress. Breath sounds: Normal breath sounds. No wheezing or rales. Chest:      Chest wall: No tenderness. Abdominal:      General: Bowel sounds are normal.      Palpations: Abdomen is soft. Musculoskeletal:      Cervical back: Neck supple. Lymphadenopathy:      Cervical: No cervical adenopathy. Skin:     Findings: No rash.          Lab Review   Orders Only on 04/07/2022   Component Date Value    WBC 04/07/2022 5.6     RBC 04/07/2022 4.57*    Hemoglobin 04/07/2022 15.2     Hematocrit 04/07/2022 46.3     MCV 04/07/2022 101.3*    MCH 04/07/2022 33.3*    MCHC 04/07/2022 32.8*    RDW 04/07/2022 12.9     Platelets 47/61/2452 210     MPV 04/07/2022 9.3*    Neutrophils % 04/07/2022 52.7     Lymphocytes % 04/07/2022 29.3     Monocytes % 04/07/2022 12.4*    Eosinophils % 04/07/2022 4.5     Basophils % 04/07/2022 0.7     Neutrophils Absolute 04/07/2022 2.9     Immature Granulocytes # 04/07/2022 0.0     Lymphocytes Absolute 04/07/2022 1.6     Monocytes Absolute 04/07/2022 0.70     Eosinophils Absolute 04/07/2022 0.30     Basophils Absolute 04/07/2022 0.00     Cholesterol, Total 04/07/2022 143*    Triglycerides 04/07/2022 158*    HDL 04/07/2022 38*    LDL Calculated 04/07/2022 73     Sodium 04/07/2022 142     Potassium 04/07/2022 3.7     Chloride 04/07/2022 103     CO2 04/07/2022 29     Anion Gap 04/07/2022 10     Glucose 04/07/2022 99     BUN 04/07/2022 14     CREATININE 04/07/2022 0.8     GFR Non- 04/07/2022 >60     GFR  04/07/2022 >59     Calcium 04/07/2022 9.6     Total Protein 04/07/2022 6.6     Albumin 04/07/2022 4.5     Total Bilirubin 04/07/2022 0.7     Alkaline Phosphatase 04/07/2022 57     ALT 04/07/2022 32     AST 04/07/2022 25     Hemoglobin A1C 04/07/2022 5.6            ASSESSMENT/PLAN:      Mixed hyperlipidemia  LDL now good 73 in 3/2022  RX crestor 10 mg daily  niacin 4 times a day ( taking this for low HDL)  Repeat testing in 6 months     PAF (paroxysmal atrial fibrillation) (HCC)  Mild mitral regurgitation/2/6 systolic murmur on exam today  Patient follows with [address] [facility] [employer] [facility] cardiology  Most recent echocardiogram 2017  Follow-up as per cardiology   = patient will continue sotalol 80 mg twice daily     Hypertension-   Blood pressure readings now good  * RX lisinopril 40 mg daily  * RX chlorthalidone 12.5 daily  * RX amlodipine to  5 QD      IFG  a1c  5.6 higher   (5.5 (5.4)  Diet  Wt loss  Healthy, mostly fiber rich nonstarchy plant-based diet recommended  Recommend to decrease intake of processed foods, simple carbohydrates and animal-based products that high in saturated fats         Epigastric abd pains / midabd pains better since stopped working at his old job  Dyspepsia  He is post cholecystectomy- this actually made his symptoms worse  Seen gastro  Had EGD 11/2018- negative  Different diet changes have not helped    Diarrhea daily in am  Trial of colestid 1 g daily     Obstructive sleep apnea- doing better, sleeping better continue current BiPAP settings     MCV-mild elevated at 102  Previously around 120 20  No history of EtOH use  Suggest B complex vitamin  We will repeat this in 6 months  Check b12 in 6 months    Odd episodes when patient could hear the words but was unable to follow \" logic\" of words  Known HO PAF  obtian carotid US + MRI brain      Orders Placed This Encounter   Procedures    MRI BRAIN W WO CONTRAST    US CAROTID ARTERY BILATERAL    Hemoglobin A1C    Comprehensive Metabolic Panel    CBC with Auto Differential    Lipid Panel    Urinalysis    TSH    PSA Screening    Vitamin B12     New Prescriptions    COLESTIPOL (COLESTID) 1 G TABLET    Take 1 tablet by mouth daily         Return in about 6 months (around 10/11/2022) for Annual Physical.   There are no Patient Instructions on file for this visit. EMR Dragon/transcription disclaimer:Significant part of this  encounter note is electronic transcription/translationof spoken language to printed text. The electronic translation of spoken language may be erroneous, or at times, nonsensical words or phrases may be inadvertently transcribed.  Although I have reviewed the note for sucherrors, some may still exist.

## 2022-04-11 NOTE — TELEPHONE ENCOUNTER
Dante Thurston called requesting a refill of the below medication which has been pended for you:     Requested Prescriptions     Pending Prescriptions Disp Refills    omeprazole (PRILOSEC) 20 MG delayed release capsule [Pharmacy Med Name: OMEPRAZOLE DR CAPS 20MG] 180 capsule 3     Sig: TAKE 1 CAPSULE TWICE A DAY    lisinopril (PRINIVIL;ZESTRIL) 40 MG tablet [Pharmacy Med Name: LISINOPRIL TABS 40MG] 90 tablet 0     Sig: TAKE 1 TABLET DAILY       Last Appointment Date: 9/29/2021  Next Appointment Date: 4/11/2022    No Known Allergies Says she is having a lot of highs and lows.Hard for her to even get out of bed. Her baby is 6 wks old.Doesn't know if it is post partum depression.Does she need an appt?

## 2022-04-12 ENCOUNTER — OFFICE VISIT (OUTPATIENT)
Dept: CARDIOLOGY CLINIC | Age: 62
End: 2022-04-12
Payer: COMMERCIAL

## 2022-04-12 VITALS
BODY MASS INDEX: 36.19 KG/M2 | DIASTOLIC BLOOD PRESSURE: 66 MMHG | WEIGHT: 282 LBS | HEIGHT: 74 IN | SYSTOLIC BLOOD PRESSURE: 102 MMHG | HEART RATE: 55 BPM

## 2022-04-12 DIAGNOSIS — I35.0 AORTIC VALVE STENOSIS, ETIOLOGY OF CARDIAC VALVE DISEASE UNSPECIFIED: ICD-10-CM

## 2022-04-12 DIAGNOSIS — I25.10 MILD CAD: ICD-10-CM

## 2022-04-12 DIAGNOSIS — I10 ESSENTIAL HYPERTENSION: ICD-10-CM

## 2022-04-12 DIAGNOSIS — G47.33 OBSTRUCTIVE SLEEP APNEA: ICD-10-CM

## 2022-04-12 DIAGNOSIS — R40.4 SPELL OF ALTERED CONSCIOUSNESS: ICD-10-CM

## 2022-04-12 DIAGNOSIS — I48.0 PAROXYSMAL ATRIAL FIBRILLATION WITH RVR (HCC): Primary | ICD-10-CM

## 2022-04-12 PROCEDURE — 93000 ELECTROCARDIOGRAM COMPLETE: CPT | Performed by: CLINICAL NURSE SPECIALIST

## 2022-04-12 PROCEDURE — 99214 OFFICE O/P EST MOD 30 MIN: CPT | Performed by: CLINICAL NURSE SPECIALIST

## 2022-04-12 ASSESSMENT — ENCOUNTER SYMPTOMS
COUGH: 0
WHEEZING: 0
EYE REDNESS: 0
FACIAL SWELLING: 0
CHEST TIGHTNESS: 0
VOMITING: 0
SHORTNESS OF BREATH: 0
NAUSEA: 0
ABDOMINAL PAIN: 0

## 2022-04-12 NOTE — PROGRESS NOTES
[facility], [address] [place] [address], [address] [id number]  Phone: [phone]  Fax: [phone]    OFFICE VISIT:  4/12/2022    [address]: 1960    Reason For Visit:  Dante Thurston is a 59 y.o. male who is here for 6 Month Follow-Up and Atrial Fibrillation       Diagnosis Orders   1. Paroxysmal atrial fibrillation with RVR ([facility])  EKG 12 lead    [place] EXTERNAL ECG REC>7D<15D RECORDING   2. Essential hypertension     3. Mild CAD     4. Obstructive sleep apnea     5. Aortic valve stenosis, etiology of cardiac valve disease unspecified     6. Spell of altered consciousness           HPI  Patient is here for follow-up for paroxysmal atrial fibrillation, hypertention, mild CAD per heart cath, sleep apnea. He has never been anticoagulated and states he is very aware when he is out of rhythm. He only uses Xarelto for A. fib lasting for a  duration of several hours and has not needed to use since his last visit here. Patient reports he has had 2 episodes of A. fib since his last visit here. 1 episode lasted only 5 to 10 minutes, second episode lasted 1 to 2 hours. States symptoms eventually resolved with rest.      He is concerned about an episode he had recently where he was talking to his wife on the phone and felt confused and unable to understand her words. This episode passed within a matter of a minute or so. His wife reported no slurred speech. Patient denied any unilateral weakness. PCP has ordered carotid studies and an MRI of the brain. He works out of town in [place] he comes back to [employer] every few months. He denies chest pain, unusual dyspnea, orthopnea, PND, edema, palpitations        Benjamin Martínez MD is PCP.   Eber Noel. has the following history as [facility] in [facility]:    Patient Active Problem List    Diagnosis Date Noted    Aortic valve stenosis 04/12/2022    Elevated PSA 03/20/2020    Epigastric discomfort 11/14/2018    Chronic nausea 11/14/2018    Mild CAD 11/13/2018    IFG (impaired fasting glucose) 05/11/2018    Primary insomnia 10/26/2017    Chronic GERD 10/26/2017    Degenerative lumbar disc 10/26/2017    PAF (paroxysmal atrial fibrillation) (HCC)     Hx pulmonary embolism 06/26/2017    Benign prostatic hyperplasia with lower urinary tract symptoms 06/26/2017    Mixed hyperlipidemia 06/26/2017    CPAP (continuous positive airway pressure) dependence     Mild mitral regurgitation by prior echocardiogram 02/08/2017    Essential hypertension 02/08/2017    Obstructive sleep apnea      Past Medical History:   Diagnosis Date    A-fib [facility])     Atelectasis of right lung 6/26/2017    Back pain, lumbosacral     BiPAP (biphasic positive airway pressure) dependence     12cm to 22cm    Clotting disorder (HCC)     Cyst near coccyx     removed    GERD (gastroesophageal reflux disease)     Heart murmur     Hx of blood clots     Hyperlipidemia     Hypertension     Mild CAD 11/13/2018    Obstructive sleep apnea     AHI:  36.5 per PSG, 9/2015 - BiPAP    Osteoarthritis     Paroxysmal atrial fibrillation with RVR ([address] 6/26/2017    Pneumonia due to organism     Pulmonary embolism [facility])      Past Surgical History:   Procedure Laterality Date    ANKLE FRACTURE SURGERY      left ankle    ARM SURGERY Left 01/04/2018    fracture     CARDIAC CATHETERIZATION  06/2018    Mild CAD    CHOLECYSTECTOMY, LAPAROSCOPIC N/A 12/15/2017    CHOLECYSTECTOMY LAPAROSCOPIC performed by Rock Burrell MD at [address] [place] [address] COLONOSCOPY  2012    GALLBLADDER SURGERY      MI EGD TRANSORAL BIOPSY SINGLE/MULTIPLE N/A 11/15/2018    Dr Patricia Hope-Gastropathy    TESTICLE SURGERY      undecended testicle done when 1years old     Family History   Problem Relation Age of Onset    Heart Disease Mother     High Blood Pressure Mother     Heart Disease Father 54        mi    High Blood Pressure Father     High Cholesterol Father     Lung Cancer Father     Cancer Father         Lung CA - Oat cell    Stroke Father     Diabetes Paternal Aunt     Colon Cancer Neg Hx     Colon Polyps Neg Hx     Esophageal Cancer Neg Hx     Liver Cancer Neg Hx     Liver Disease Neg Hx     Rectal Cancer Neg Hx     Stomach Cancer Neg Hx      Social History     Tobacco Use    Smoking status: Never Smoker    Smokeless tobacco: Former User     Types: Chew   Substance Use Topics    Alcohol use: Yes     Comment: occ      Current Outpatient Medications   Medication Sig Dispense Refill    omeprazole (PRILOSEC) 20 MG delayed release capsule TAKE 1 CAPSULE TWICE A  capsule 3    lisinopril (PRINIVIL;ZESTRIL) 40 MG tablet TAKE 1 TABLET DAILY 90 tablet 0    terbinafine (LAMISIL) 250 MG tablet Take 250 mg by mouth daily      B Complex Vitamins (B COMPLEX PO) Take 150 mg by mouth      colestipol (COLESTID) 1 g tablet Take 1 tablet by mouth daily 30 tablet 3    fluticasone (FLONASE) 50 MCG/ACT nasal spray USE 1 SPRAY NASALLY DAILY 48 g 0    sotalol (BETAPACE) 80 MG tablet TAKE 1 TABLET TWICE A  tablet 1    magnesium gluconate (MAGONATE) 500 MG tablet Take 500 mg by mouth daily      amLODIPine (NORVASC) 5 MG tablet Take 1 tablet by mouth daily 90 tablet 3    rosuvastatin (CRESTOR) 10 MG tablet TAKE 1 TABLET NIGHTLY 90 tablet 3    chlorthalidone (HYGROTON) 25 MG tablet Take 12.5 mg by mouth daily       tadalafil (CIALIS) 5 MG tablet Take 1 tablet by mouth daily as needed for Erectile Dysfunction 54 tablet 0    cloNIDine (CATAPRES) 0.1 MG tablet Take 1 tablet night and take one every 12 hours if BP systolic blood pressure over 373 or diastolic BP over [id number] tablet 1    potassium chloride (KLOR-CON M) 10 MEQ extended release tablet Take one tablet every Monday, Wednesday, Friday (Patient taking differently: daily Take one tablet every day) 36 tablet 1    hyoscyamine (OSCIMIN) 125 MCG TBDP dispersible tablet PLACE 2 TABLETS ON TONGUE EVERY 12 HOURS AS NEEDED FOR ABDOMINAL PAIN/CRAMPING 60 tablet 24    rivaroxaban (XARELTO) 20 MG TABS tablet Take 1 tablet by mouth Daily with supper As needed for AF episode 12hrs or longer in duration 90 tablet 1    Potassium 99 MG TABS Take 99 mg by mouth daily       niacin (NIASPAN) 1000 MG extended release tablet Take 1,000 mg by mouth 4 times daily      aspirin 81 MG EC tablet Take 81 mg by mouth daily      Omega-3 Fatty Acids (FISH OIL PO) Take 1,280 mg by mouth daily       Ascorbic Acid (VITAMIN C) 500 MG tablet Take 500 mg by mouth 2 times daily       vitamin E 400 UNIT capsule Take 400 Units by mouth daily       No current facility-administered medications for this visit. Allergies: Patient has no known allergies. Review of Systems  Review of Systems   Constitutional: Negative for activity change, diaphoresis, fatigue, fever and unexpected weight change. HENT: Negative for facial swelling and nosebleeds. Eyes: Negative for redness and visual disturbance. Respiratory: Negative for cough, chest tightness, shortness of breath and wheezing. Cardiovascular: Negative for chest pain, palpitations and leg swelling. Gastrointestinal: Negative for abdominal pain, nausea and vomiting. Endocrine: Negative for cold intolerance and heat intolerance. Genitourinary: Negative for dysuria and hematuria. Musculoskeletal: Negative for arthralgias and myalgias. Skin: Negative for pallor and rash. Neurological: Negative for dizziness, seizures, syncope, weakness and light-headedness. Complains of episode of confusion and inability to understand speech that lasted a minute or so while speaking to wife on phone   Hematological: Does not bruise/bleed easily. Psychiatric/Behavioral: Negative for agitation. The patient is not nervous/anxious. Objective  Vital Signs - /66   Pulse 55   Ht 6' 2\" (1.88 m)   Wt 282 lb (127.9 kg)   BMI 36.21 kg/m²   Physical Exam  Vitals and nursing note reviewed.    Constitutional: General: He is not in acute distress. Appearance: He is well-developed. He is not diaphoretic. HENT:      Head: Normocephalic and atraumatic. Right Ear: Hearing and external ear normal.      Left Ear: Hearing and external ear normal.      Nose: Nose normal.   Eyes:      General:         Right eye: No discharge. Left eye: No discharge. Pupils: Pupils are equal, round, and reactive to light. Neck:      Thyroid: No thyromegaly. Vascular: No carotid bruit or JVD. Trachea: No tracheal deviation. Cardiovascular:      Rate and Rhythm: Normal rate and regular rhythm. Heart sounds: Murmur (2/6 systolic) heard. No friction rub. No gallop. Pulmonary:      Effort: Pulmonary effort is normal. No respiratory distress. Breath sounds: Normal breath sounds. No wheezing or rales. Abdominal:      Palpations: Abdomen is soft. Tenderness: There is no abdominal tenderness. Musculoskeletal:         General: No swelling or deformity. Cervical back: Neck supple. No muscular tenderness. Right lower leg: No edema. Left lower leg: No edema. Comments: Normal gait and station   Skin:     General: Skin is warm and dry. Findings: No rash. Neurological:      General: No focal deficit present. Mental Status: He is alert and oriented to person, place, and time. Cranial Nerves: No cranial nerve deficit.    Psychiatric:         Mood and Affect: Mood normal.         Behavior: Behavior normal.         Judgment: Judgment normal.         Data:  Lab Results   Component Value Date    WBC 5.6 04/07/2022    RBC 4.57 04/07/2022    HGB 15.2 04/07/2022    HCT 46.3 04/07/2022     04/07/2022      Lab Results   Component Value Date    CHOL 143 04/07/2022    TRIG 158 04/07/2022    HDL 38 04/07/2022    LDLCALC 73 04/07/2022     Lab Results   Component Value Date     04/07/2022    K 3.7 04/07/2022     04/07/2022    CO2 29 04/07/2022    GLUCOSE 99 04/07/2022    BUN 14 04/07/2022    CREATININE 0.8 04/07/2022    CALCIUM 9.6 04/07/2022    ALT 32 04/07/2022    AST 25 04/07/2022     Lab Results   Component Value Date    TSH 2.500 09/27/2021     Echo 10/1/21   Conclusions      Summary   Mitral valve leaflets are mildly thickened with preserved leaflet   mobility. Mild aortic stenosis. Mean gradient 8 mm hg   Trivial aortic regurgitation is noted. Tricuspid valve is structurally normal.   Mild tricuspid regurgitation with estimated RVSP of 43 mm Hg. Normal left ventricular size with preserved LV function and an estimated   ejection fraction of approximately 55-60%. Mild concentric left ventricular hypertrophy. No regional wall motion abnormalities. Normal right atrial dimension with no evidence of thrombus or mass noted. IVC dilated. Signature      ----------------------------------------------------------------   Electronically signed by Tati Ellington MD(Interpreting   physician) on 10/01/2021 01:49 PM  Echo 6/26/17  Summary   1. Mildly dilated left atrium   2. Mild concentric LVH   3. Normal LV systolic function (estimated EF 55 -60 %)   4. Grade I diastolic dysfunction (impaired relaxation) with normal left   atrial pressure   5. Mild mitral regurgitation   6. Mitral annular calcification is present   7. Mild tricuspid regurgitation; estimated RVSP of at least 38 mmHg    EKG shows sinus rhythm rate 56 with a QTc interval of 0.446 ms    Assessment:     Diagnosis Orders   1. Paroxysmal atrial fibrillation with RVR (HCC)  EKG 12 lead    [place] EXTERNAL ECG REC>7D<15D RECORDING   2. Essential hypertension     3. Mild CAD     4. Obstructive sleep apnea     5. Aortic valve stenosis, etiology of cardiac valve disease unspecified     6. Spell of altered consciousness          PAF- He is symptomatic when A. fib occurs and has Xarelto at home to use on an as-needed basis.   2 episodes as described above, longest episode lasting 1 to 2 hours since last visit here.  He did not use his Xarelto. We discussed and I encouraged him to take the Xarelto if the episode lasts more than an hour. He is concerned about the episode of confusion he had recently been wondering if this could be a TIA. PCP has work-up in progress. We discussed that he could possibly be having episodes of A. fib that he was not aware of. We will go ahead and place a 2-week Zio patch heart monitor    Hypertension-stable on current regimen with amlodipine, lisinopril    Obstructive sleep apnea-stable, wearing CPAP regularly     Mild CAD-per cath 2018 without symptoms of angina, stable     Aortic stenosis-mild per recent echo. No significant change in symptoms. Continue to monitor    Spell of altered consciousness-he was unable to understand his wife on the phone that lasted briefly. PCP has ordered carotids and MRI of the brain. We will also check a Zio patch heart monitor to be sure he is not having any PAF episodes that he is unaware of. Plan    Return in about 6 months (around 10/12/2022) for APRN. Zio Patch 2 weeks  Use Xarelto as needed for episode of AF    Call with any questionsor concerns  Follow up with Sudarshan Leonard MD for non cardiac problems  Report any new problems  Cardiovascular Fitness-Exercise as tolerated. Strive for 15 minutes of exercise most days of the week. Cardiac / HealthyDiet  Continue current medications as directed  Continue plan of treatment  It is always recommended that you bring your medicationsbottles with you to each visit - this is for your safety!        Thang No, APRN

## 2022-04-12 NOTE — PATIENT INSTRUCTIONS
Return in about 6 months (around 10/12/2022) for APRN.   Zio Patch 2 weeks  Use Xarelto as needed for episode of AF

## 2022-04-13 ENCOUNTER — HOSPITAL ENCOUNTER (OUTPATIENT)
Dept: MRI IMAGING | Age: 62
Discharge: HOME | End: 2022-04-13
Payer: COMMERCIAL

## 2022-04-13 ENCOUNTER — HOSPITAL ENCOUNTER (OUTPATIENT)
Dept: NON INVASIVE DIAGNOSTICS | Age: 62
Discharge: HOME | End: 2022-04-13
Payer: COMMERCIAL

## 2022-04-13 DIAGNOSIS — R41.0 EPISODE OF CONFUSION: ICD-10-CM

## 2022-04-13 DIAGNOSIS — G45.9 TIA (TRANSIENT ISCHEMIC ATTACK): ICD-10-CM

## 2022-04-13 DIAGNOSIS — I48.0 PAF (PAROXYSMAL ATRIAL FIBRILLATION) (HCC): ICD-10-CM

## 2022-04-13 PROCEDURE — 93880 EXTRACRANIAL BILAT STUDY: CPT

## 2022-04-13 PROCEDURE — A9585 GADOBUTROL INJECTION: HCPCS | Performed by: INTERNAL MEDICINE

## 2022-04-13 PROCEDURE — 6360000004 HC RX CONTRAST MEDICATION: Performed by: INTERNAL MEDICINE

## 2022-04-13 PROCEDURE — 70553 MRI BRAIN STEM W/O & W/DYE: CPT

## 2022-04-13 RX ADMIN — GADOBUTROL 3 ML: 604.72 INJECTION INTRAVENOUS at 13:59

## 2022-04-13 RX ADMIN — GADOBUTROL 10 ML: 604.72 INJECTION INTRAVENOUS at 13:58

## 2022-04-27 ENCOUNTER — PATIENT MESSAGE (OUTPATIENT)
Dept: INTERNAL MEDICINE | Age: 62
End: 2022-04-27

## 2022-04-27 RX ORDER — MONTELUKAST SODIUM 4 MG/1
1 TABLET, CHEWABLE ORAL DAILY
Qty: 90 TABLET | Refills: 3 | Status: SHIPPED | OUTPATIENT
Start: 2022-04-27

## 2022-04-27 NOTE — TELEPHONE ENCOUNTER
From: Manuel Noble To: Dr. Hardy Doctor: 4/27/2022 3:04 PM CDT  Subject: Colestipol    Dr. Kamilla Negron prescribed Colestipol on a trial basis. It works. Can you send a 3 month prescription to [facility]?     Thanks - Lisa Covert

## 2022-04-27 NOTE — TELEPHONE ENCOUNTER
Car Augustin called requesting a refill of the below medication which has been pended for you:     Requested Prescriptions     Pending Prescriptions Disp Refills    colestipol (COLESTID) 1 g tablet 90 tablet 3     Sig: Take 1 tablet by mouth daily       Last Appointment Date: 4/11/2022  Next Appointment Date: 10/12/2022    No Known Allergies

## 2022-05-12 ENCOUNTER — TELEPHONE (OUTPATIENT)
Dept: CARDIOLOGY CLINIC | Age: 62
End: 2022-05-12

## 2022-05-26 RX ORDER — FLUTICASONE PROPIONATE 50 MCG
SPRAY, SUSPENSION (ML) NASAL
Qty: 48 G | Refills: 5 | Status: SHIPPED | OUTPATIENT
Start: 2022-05-26

## 2022-05-26 NOTE — TELEPHONE ENCOUNTER
Regino Moron called requesting a refill of the below medication which has been pended for you:     Requested Prescriptions     Pending Prescriptions Disp Refills    fluticasone (FLONASE) 50 MCG/ACT nasal spray [Pharmacy Med Name: FLUTICASONE PROP NASAL SPRAY 16GM 50MCG] 48 g 5     Sig: USE 1 SPRAY NASALLY DAILY       Last Appointment Date: 4/11/2022  Next Appointment Date: 10/12/2022    No Known Allergies

## 2022-06-01 NOTE — H&P
Abdomen soft, non-tender and non-distended, no rebound, no guarding and no masses. no hepatosplenomegaly.
undecended testicle done when 1years old       Social History:  Social History   Substance Use Topics    Smoking status: Never Smoker    Smokeless tobacco: Former User     Types: Chew    Alcohol use Yes      Comment: occ       Vital Signs:   Vitals:    11/15/18 0747   BP: 121/83   Pulse: 52   Resp: 18   Temp: 98 °F (36.7 °C)   SpO2: 95%        Physical Exam:  Cardiac:  [x]WNL  []Comments:  Pulmonary:  [x]WNL   []Comments:  Neuro/Mental Status:  [x]WNL  []Comments:  Abdominal:  [x]WNL    []Comments:  Other:   []WNL  []Comments:    Informed Consent:  The risks and benefits of the procedure have been discussed with either the patient or if they cannot consent, their representative. Assessment:  Patient examined and appropriate for planned sedation and procedure. Plan:  Proceed with planned sedation and procedure as above.     Mildred Martinez MD

## 2022-08-08 RX ORDER — LISINOPRIL 40 MG/1
TABLET ORAL
Qty: 90 TABLET | Refills: 1 | Status: SHIPPED | OUTPATIENT
Start: 2022-08-08

## 2022-08-08 NOTE — TELEPHONE ENCOUNTER
Toi Colon called requesting a refill of the below medication which has been pended for you:     Requested Prescriptions     Pending Prescriptions Disp Refills    lisinopril (PRINIVIL;ZESTRIL) 40 MG tablet [Pharmacy Med Name: LISINOPRIL TABS 40MG] 90 tablet 1     Sig: TAKE 1 TABLET DAILY       Last Appointment Date: 4/11/2022  Next Appointment Date: 10/12/2022    No Known Allergies

## 2022-09-02 DIAGNOSIS — I48.0 PAROXYSMAL ATRIAL FIBRILLATION WITH RVR (HCC): ICD-10-CM

## 2022-09-06 DIAGNOSIS — I48.0 PAROXYSMAL ATRIAL FIBRILLATION WITH RVR (HCC): ICD-10-CM

## 2022-09-06 RX ORDER — SOTALOL HYDROCHLORIDE 80 MG/1
TABLET ORAL
Qty: 180 TABLET | Refills: 1 | Status: SHIPPED | OUTPATIENT
Start: 2022-09-06

## 2022-09-06 RX ORDER — ROSUVASTATIN CALCIUM 10 MG/1
TABLET, COATED ORAL
Qty: 90 TABLET | Refills: 1 | Status: SHIPPED | OUTPATIENT
Start: 2022-09-06

## 2022-09-06 RX ORDER — SOTALOL HYDROCHLORIDE 80 MG/1
80 TABLET ORAL 2 TIMES DAILY
Qty: 180 TABLET | Refills: 3 | Status: SHIPPED | OUTPATIENT
Start: 2022-09-06 | End: 2022-10-12 | Stop reason: SDUPTHER

## 2022-09-06 RX ORDER — SOTALOL HYDROCHLORIDE 80 MG/1
80 TABLET ORAL 2 TIMES DAILY
Qty: 180 TABLET | Refills: 3 | Status: SHIPPED | OUTPATIENT
Start: 2022-09-06 | End: 2022-09-06 | Stop reason: SDUPTHER

## 2022-10-10 ENCOUNTER — OFFICE VISIT (OUTPATIENT)
Dept: NEUROLOGY | Age: 62
End: 2022-10-10
Payer: COMMERCIAL

## 2022-10-10 VITALS
BODY MASS INDEX: 36.57 KG/M2 | DIASTOLIC BLOOD PRESSURE: 78 MMHG | OXYGEN SATURATION: 99 % | WEIGHT: 285 LBS | SYSTOLIC BLOOD PRESSURE: 114 MMHG | HEIGHT: 74 IN | HEART RATE: 69 BPM

## 2022-10-10 DIAGNOSIS — F51.01 PRIMARY INSOMNIA: ICD-10-CM

## 2022-10-10 DIAGNOSIS — Z99.89 BIPAP (BIPHASIC POSITIVE AIRWAY PRESSURE) DEPENDENCE: ICD-10-CM

## 2022-10-10 DIAGNOSIS — Z12.5 SCREENING PSA (PROSTATE SPECIFIC ANTIGEN): ICD-10-CM

## 2022-10-10 DIAGNOSIS — R71.8 ELEVATED MCV: ICD-10-CM

## 2022-10-10 DIAGNOSIS — I10 ESSENTIAL HYPERTENSION: ICD-10-CM

## 2022-10-10 DIAGNOSIS — R73.01 IFG (IMPAIRED FASTING GLUCOSE): ICD-10-CM

## 2022-10-10 DIAGNOSIS — G47.33 OBSTRUCTIVE SLEEP APNEA: Primary | ICD-10-CM

## 2022-10-10 DIAGNOSIS — E66.09 EXOGENOUS OBESITY: ICD-10-CM

## 2022-10-10 DIAGNOSIS — E78.2 MIXED HYPERLIPIDEMIA: ICD-10-CM

## 2022-10-10 DIAGNOSIS — I48.0 PAF (PAROXYSMAL ATRIAL FIBRILLATION) (HCC): ICD-10-CM

## 2022-10-10 LAB
ALBUMIN SERPL-MCNC: 4.8 G/DL (ref 3.5–5.2)
ALP BLD-CCNC: 66 U/L (ref 40–130)
ALT SERPL-CCNC: 25 U/L (ref 5–41)
ANION GAP SERPL CALCULATED.3IONS-SCNC: 14 MMOL/L (ref 7–19)
AST SERPL-CCNC: 33 U/L (ref 5–40)
BACTERIA: NEGATIVE /HPF
BASOPHILS ABSOLUTE: 0 K/UL (ref 0–0.2)
BASOPHILS RELATIVE PERCENT: 0.6 % (ref 0–1)
BILIRUB SERPL-MCNC: 0.7 MG/DL (ref 0.2–1.2)
BILIRUBIN URINE: ABNORMAL
BLOOD, URINE: NEGATIVE
BUN BLDV-MCNC: 9 MG/DL (ref 8–23)
CALCIUM SERPL-MCNC: 9.8 MG/DL (ref 8.8–10.2)
CHLORIDE BLD-SCNC: 102 MMOL/L (ref 98–111)
CHOLESTEROL, TOTAL: 151 MG/DL (ref 160–199)
CLARITY: CLEAR
CO2: 27 MMOL/L (ref 22–29)
COLOR: ABNORMAL
CREAT SERPL-MCNC: 0.7 MG/DL (ref 0.5–1.2)
CRYSTALS, UA: ABNORMAL /HPF
EOSINOPHILS ABSOLUTE: 0.4 K/UL (ref 0–0.6)
EOSINOPHILS RELATIVE PERCENT: 5.8 % (ref 0–5)
EPITHELIAL CELLS, UA: 0 /HPF (ref 0–5)
GFR AFRICAN AMERICAN: >59
GFR NON-AFRICAN AMERICAN: >60
GLUCOSE BLD-MCNC: 111 MG/DL (ref 74–109)
GLUCOSE URINE: NEGATIVE MG/DL
HBA1C MFR BLD: 5.4 % (ref 4–6)
HCT VFR BLD CALC: 45.9 % (ref 42–52)
HDLC SERPL-MCNC: 47 MG/DL (ref 55–121)
HEMOGLOBIN: 15.3 G/DL (ref 14–18)
HYALINE CASTS: 0 /HPF (ref 0–8)
IMMATURE GRANULOCYTES #: 0 K/UL
KETONES, URINE: ABNORMAL MG/DL
LDL CHOLESTEROL CALCULATED: 75 MG/DL
LEUKOCYTE ESTERASE, URINE: ABNORMAL
LYMPHOCYTES ABSOLUTE: 1.9 K/UL (ref 1.1–4.5)
LYMPHOCYTES RELATIVE PERCENT: 28.5 % (ref 20–40)
MCH RBC QN AUTO: 34.2 PG (ref 27–31)
MCHC RBC AUTO-ENTMCNC: 33.3 G/DL (ref 33–37)
MCV RBC AUTO: 102.5 FL (ref 80–94)
MONOCYTES ABSOLUTE: 0.8 K/UL (ref 0–0.9)
MONOCYTES RELATIVE PERCENT: 11.5 % (ref 0–10)
NEUTROPHILS ABSOLUTE: 3.5 K/UL (ref 1.5–7.5)
NEUTROPHILS RELATIVE PERCENT: 53.3 % (ref 50–65)
NITRITE, URINE: NEGATIVE
PDW BLD-RTO: 12.9 % (ref 11.5–14.5)
PH UA: 7.5 (ref 5–8)
PLATELET # BLD: 227 K/UL (ref 130–400)
PMV BLD AUTO: 9.9 FL (ref 9.4–12.4)
POTASSIUM SERPL-SCNC: 4.2 MMOL/L (ref 3.5–5)
PROSTATE SPECIFIC ANTIGEN: 3.08 NG/ML (ref 0–4)
PROTEIN UA: NEGATIVE MG/DL
RBC # BLD: 4.48 M/UL (ref 4.7–6.1)
RBC UA: 1 /HPF (ref 0–4)
SODIUM BLD-SCNC: 143 MMOL/L (ref 136–145)
SPECIFIC GRAVITY UA: 1.02 (ref 1–1.03)
TOTAL PROTEIN: 7.4 G/DL (ref 6.6–8.7)
TRIGL SERPL-MCNC: 144 MG/DL (ref 0–149)
TSH SERPL DL<=0.05 MIU/L-ACNC: 1.66 UIU/ML (ref 0.27–4.2)
UROBILINOGEN, URINE: 1 E.U./DL
VITAMIN B-12: 642 PG/ML (ref 211–946)
WBC # BLD: 6.5 K/UL (ref 4.8–10.8)
WBC UA: 0 /HPF (ref 0–5)

## 2022-10-10 PROCEDURE — 99214 OFFICE O/P EST MOD 30 MIN: CPT | Performed by: PHYSICIAN ASSISTANT

## 2022-10-10 NOTE — PROGRESS NOTES
[facility]  [address] [place] [address], [address] [place] [address],[address]  [facility]  Phone [phone]  Fax [phone]       [facility] Sleep Follow Up Encounter      Information:   Patient Name: Tatiana Howard :   1960  Age:   57 y.o. MRN:   [id number]  Account #:  [de-identified]  Today:                10/10/22    Provider:  Ward Salas PA-C    Chief Complaint   Patient presents with    Sleep Apnea        Subjective:   Tatiana Howard is a 57 y.o. male  with a history of severe HANANE who comes in for an annual sleep clinic follow up. The PSG, 2015 revealed an AHI of 36.5. He is prescribed auto BiPAP therapy at a pressure range of 12cm to 22cm. It is a [employer] device. It has been registered. The BiPAP is also out of date. The orders from last year have . He requires new orders. The compliance report indicates smcpblfptb12,60, and 90 days. He averages close to 9 hours of PAP usage per day. He reports that consistent BiPAP usage alleviates the previous HANANE symptoms. Location or symptom:  HANANE  Onset:  PS  Timing:  q hs  Severity:  Severe  Associated:  Snoring and witnessed apneas  Alleviated:   BiPAP       Objective:     Past Medical History:   Diagnosis Date    A-fib ([address]     Atelectasis of right lung 2017    Back pain, lumbosacral     BiPAP (biphasic positive airway pressure) dependence     12cm to 22cm    Clotting disorder (HCC)     Cyst near coccyx     removed    GERD (gastroesophageal reflux disease)     Heart murmur     Hx of blood clots     Hyperlipidemia     Hypertension     Mild CAD 2018    Obstructive sleep apnea     AHI:  36.5 per PSG, 2015 - BiPAP    Osteoarthritis     Paroxysmal atrial fibrillation with RVR ([address] 2017    Pneumonia due to organism     Pulmonary embolism [facility])        Past Surgical History:   Procedure Laterality Date    ANKLE FRACTURE SURGERY      left ankle    ARM SURGERY Left 2018    fracture     CARDIAC CATHETERIZATION 06/2018    Mild CAD    CHOLECYSTECTOMY, LAPAROSCOPIC N/A 12/15/2017    CHOLECYSTECTOMY LAPAROSCOPIC performed by Redd Mccain MD at [address]  2012    GALLBLADDER SURGERY      IA EGD TRANSORAL BIOPSY SINGLE/MULTIPLE N/A 11/15/2018    Dr [place]      undecended testicle done when 1years old       Recent Hospitalizations      Significant Injuries      Family History   Problem Relation Age of Onset    Heart Disease Mother     High Blood Pressure Mother     Heart Disease Father 54        mi    High Blood Pressure Father     High Cholesterol Father     Lung Cancer Father     Cancer Father         Lung CA - Oat cell    Stroke Father     Diabetes Paternal Aunt     Colon Cancer Neg Hx     Colon Polyps Neg Hx     Esophageal Cancer Neg Hx     Liver Cancer Neg Hx     Liver Disease Neg Hx     Rectal Cancer Neg Hx     Stomach Cancer Neg Hx        Social History  Social History     Tobacco Use   Smoking Status Never   Smokeless Tobacco Former    Types: Chew     Social History     Substance and Sexual Activity   Alcohol Use Yes    Comment: occ     Social History     Substance and Sexual Activity   Drug Use No         Current Outpatient Medications   Medication Sig Dispense Refill    sotalol (BETAPACE) 80 MG tablet TAKE 1 TABLET TWICE A  tablet 1    rosuvastatin (CRESTOR) 10 MG tablet TAKE 1 TABLET NIGHTLY 90 tablet 1    sotalol (BETAPACE) 80 MG tablet Take 1 tablet by mouth 2 times daily 180 tablet 3    lisinopril (PRINIVIL;ZESTRIL) 40 MG tablet TAKE 1 TABLET DAILY 90 tablet 1    fluticasone (FLONASE) 50 MCG/ACT nasal spray USE 1 SPRAY NASALLY DAILY 48 g 5    colestipol (COLESTID) 1 g tablet Take 1 tablet by mouth daily 90 tablet 3    omeprazole (PRILOSEC) 20 MG delayed release capsule TAKE 1 CAPSULE TWICE A  capsule 3    terbinafine (LAMISIL) 250 MG tablet Take 250 mg by mouth daily      B Complex Vitamins (B COMPLEX PO) Take 150 mg by mouth      magnesium gluconate (MAGONATE) 500 MG tablet Take 500 mg by mouth daily      amLODIPine (NORVASC) 5 MG tablet Take 1 tablet by mouth daily 90 tablet 3    chlorthalidone (HYGROTON) 25 MG tablet Take 12.5 mg by mouth daily       tadalafil (CIALIS) 5 MG tablet Take 1 tablet by mouth daily as needed for Erectile Dysfunction 54 tablet 0    cloNIDine (CATAPRES) 0.1 MG tablet Take 1 tablet night and take one every 12 hours if BP systolic blood pressure over 975 or diastolic BP over 416 001 tablet 1    potassium chloride (KLOR-CON M) 10 MEQ extended release tablet Take one tablet every Monday, Wednesday, Friday (Patient taking differently: daily Take one tablet every day) 36 tablet 1    hyoscyamine (OSCIMIN) 125 MCG TBDP dispersible tablet PLACE 2 TABLETS ON TONGUE EVERY 12 HOURS AS NEEDED FOR ABDOMINAL PAIN/CRAMPING 60 tablet 24    rivaroxaban (XARELTO) 20 MG TABS tablet Take 1 tablet by mouth Daily with supper As needed for AF episode 12hrs or longer in duration 90 tablet 1    Potassium 99 MG TABS Take 99 mg by mouth daily       niacin (NIASPAN) 1000 MG extended release tablet Take 1,000 mg by mouth 4 times daily      aspirin 81 MG EC tablet Take 81 mg by mouth daily      Omega-3 Fatty Acids (FISH OIL PO) Take 1,280 mg by mouth daily       Ascorbic Acid (VITAMIN C) 500 MG tablet Take 500 mg by mouth 2 times daily       vitamin E 400 UNIT capsule Take 400 Units by mouth daily       No current facility-administered medications for this visit. Allergies:  Patient has no known allergies.     REVIEW OF SYSTEMS     Constitutional: []Fever []Sweats []Chills [] Recent Injury   [x] Denies all unless marked  HENT:[]Headache  [] Head Injury  [] Sore Throat  [] Ear Pain  [] Dizziness [] Hearing Loss   [x] Denies all unless marked  Musculoskeletal: [] Arthralgia  [] Myalgias [] Muscle cramps  [] Muscle twitches   [x] Denies all unless marked   Spine:  [] Neck pain  [] Back pain  [] Sciaticia  [x] Denies all unless marked  Neurological:[] Visual Disturbance [] Double Vision [] Slurred Speech [] Trouble swallowing  [] Vertigo [] Tingling [] Numbness [] Weakness [] Loss of Balance   [] Loss of Consciousness [] Memory Loss [] Seizures  [x] Denies all unless marked  Psychiatric/Behavioral:[] Depression [] Anxiety  [x] Denies all unless marked  Sleep: []  Insomnia [] Sleep Disturbance [] Snoring [] Restless Legs [] Daytime Sleepiness [x] Sleep Apnea  [] Denies all unless marked    The MA has completed the ROS with the patient. I have reviewed it in its' entirety with the patient and agree with the documentation. PHYSICAL EXAM  /78   Pulse 69   Ht 6' 2\" (1.88 m)   Wt 285 lb (129.3 kg)   SpO2 99%   BMI 36.59 kg/m²     Constitutional -  Alert in NAD, well developed, pleasant and cooperative with exam  HEENT- Conjunctiva normal.  No scars, masses, or lesions over external nose or ears, hearing intact, no neck masses noted, no jugular vein distension, no bruit  Cardiac- Regular rate and rhythm  Pulmonary- Clear to auscultation, good expansion, normal effort without use of accessory muscles  Musculoskeletal - No significant wasting of muscles noted. No bony deformities  Extremities - No clubbing, cyanosis or edema  Skin - Warm, dry, and intact.   No rash, erythema, or pallor  Psychiatric - Mood, affect, and behavior appear normal      Neurological exam  Awake, alert, fluent oriented  appropriate affect  Attention and concentration appear appropriate  Recent and remote memory appears unremarkable  Speech normal without dysarthria  No clear issues with language of fund of knowledge    Cranial Nerve Exam     CN III, IV,VI-EOMI, No nystagmus, conjugate eye movements, no ptosis  CN VII-No facial assymetry    Motor Exam    Antigravity throughout upper and lower extremities bilaterally    Tremors and coordination    No tremors in hands or head noted     Gait    Normal base and speed  No ataxia    I reviewed the following studies:       []  :  Clinical laboratory test results     []  :  Radiology reports                    [x]  :  Review and summarization of medical records-compliance reports, 30, 60, and 90 day reviewed     []     Request for medical records       []  :  Reviewed previous/recent polysomnogram report(s)      []  :  [place] Sleepiness Scale    Compliance download:  He has an auto BiPAP set at a pressure 12cm to 22cm. Compliance download shows that he uses device:  100% of the time;  percentage of days with usage >=4 hours:  100%. AHI:  <5 average (9/10/2022-10/9/2022)       Assessment:       ICD-10-CM    1. Obstructive sleep apnea  G47.33       2. BiPAP (biphasic positive airway pressure) dependence  Z99.89              []  :  Stable     []  :  Improved                       [x]  :  Well controlled              []  :  Resolving     []  :  Resolved     []  :  Inadequately controlled     []  :  Worsening     []  :  Additional workup planned    Debbi Castellon. is compliant and benefiting from therapy as indicated by compliance evaluation and patient report. Plan:     No orders of the defined types were placed in this encounter. 1.   Previously advised of the etiology,  pathophysiology, diagnosis, treatment options, and risks of untreated HANANE. Risks may include, but are not limited to  hypertension, coronary artery disease, atrial fibrillation, CHF, diabetes, stroke, weight gain, impaired cognition, daytime somnolence, and motor vehicle accidents. Advised to abstain from driving or operating heavy machinery when drowsy and the use of respiratory suppressants. Discussed diagnostic studies and potential treatment plan. Discussed HANANE treatment options including CPAP therapy, oral dental devices, \"Inspire\" device, and other surgical options. 2.  Will evaluate for PAP clinical benefit and and compliance during a 30 day period within the preceding 90 days PRN.   3.  The following educational material has been included in this visit after visit summary for your review: HANANE/PAP guidelines-Discussed with the patient and all questions fully answered. 4.  Continue PAP therapy. The patient voices understanding and recognizes the need for adherence to the prescribed therapy  5. Order-supplies-[employer]   6. Order-auto BiPAP with a pressure range of 12cm to 22cm-[facility]   7. Consider referral to ENT if desires for consultation re: \"Inspire\" device  8.   Follow up per protocol    Replinishible PAP Supplies, 1 year supply  Item HPCPS Code Frequency   Mask of choice  or  1 per 3 months   Nasal Mask cushion/pillows  or  2 per 30 days   Full Face Mask Interface  1 per 30 days   Headgear  1 per 6 months   Tubing, length of choice  or  1 per 3 months   Water Chamber  1 per 6 months   Chinstrap  1 per 6 months   Disposable Filters  2 per 30 days   Reusable Filters  1 per 6 months     Diagnoses:  Obstructive sleep apnea (G47.33)  Length of Need: Lifetime, 99    Ordering Provider: Jerry Foster PA-C  NPI:  [id number]    DME Equipment HPCPS Code Setting   Auto Adjusting BiPAP device with flex or comparable pressure relief per comfort  Min EPAP: 12cm to   Max IPAP: 22cm   Heated Humidifier  Patient Choice     Diagnoses:  Obstructive sleep apnea (G47.33)  Length of Need: Lifetime, 99    Ordering Provider: Jerry Foster PA-C  NPI:  [id number]

## 2022-10-10 NOTE — PATIENT INSTRUCTIONS

## 2022-10-10 NOTE — LETTER
[address] [facility]rology and Sleep Medicine  [address] [place] [address] Drive, [address]  Paul Levin  Phone [phone]  Fax [phone]             Re:  Katelynn Cristy.    10/11/22  :  1960  Address: [address] [place] [address]         REFERRAL  Referred to: DME provider of patient's choice  Katelynn Muniz. is referred for the following:    DME Equipment HPCPS Code Setting   Auto Adjusting BiPAP device with flex or comparable pressure relief per comfort  Min EPAP: 12cm to   Max IPAP: 22cm   Heated Humidifier  Patient Choice     Diagnoses:  Obstructive sleep apnea (G47.33)  Length of Need: Lifetime, 99    Ordering Provider: Andrea Parrish PA-C  NPI:  [id number]        Signature:  @ED@        Date: 10/11/2022      Electronically Signed by Andrea Parrish PA-C  on 10/11/2022 at 2:20 PM

## 2022-10-10 NOTE — LETTER
[facility] Neurology and Sleep Medicine  [address] [place] [address] Drive, [address] [place] [address]  Phone [phone]  Fax [phone]             Re:  Gaurang Nguyen.    10/11/22  :  1960  Address: [address] [place] [address]       Replinishible PAP Supplies, 1 year supply  Item HPCPS Code Frequency   Mask of choice  or  1 per 3 months   Nasal Mask cushion/pillows [id number] or  2 per 30 days   Full Face Mask Interface  1 per 30 days   Headgear  1 per 6 months   Tubing, length of choice  or  1 per 3 months   Water Chamber  1 per 6 months   Chinstrap  1 per 6 months   Disposable Filters  2 per 30 days   Reusable Filters  1 per 6 months     Diagnoses:  Obstructive sleep apnea (G47.33)  Length of Need: Lifetime, 99    Ordering Provider: Ray Conner PA-C  NPI:  [id number]        Signature: [unfilled]        Date: 10/11/2022      Electronically Signed by Ray Conner PA-C  on 10/11/2022 at 2:19 PM

## 2022-10-11 ENCOUNTER — TELEPHONE (OUTPATIENT)
Dept: CARDIOLOGY CLINIC | Age: 62
End: 2022-10-11

## 2022-10-12 ENCOUNTER — OFFICE VISIT (OUTPATIENT)
Dept: CARDIOLOGY CLINIC | Age: 62
End: 2022-10-12
Payer: COMMERCIAL

## 2022-10-12 ENCOUNTER — OFFICE VISIT (OUTPATIENT)
Dept: INTERNAL MEDICINE | Age: 62
End: 2022-10-12
Payer: COMMERCIAL

## 2022-10-12 VITALS
WEIGHT: 290 LBS | HEART RATE: 71 BPM | DIASTOLIC BLOOD PRESSURE: 80 MMHG | HEIGHT: 74 IN | BODY MASS INDEX: 37.22 KG/M2 | SYSTOLIC BLOOD PRESSURE: 128 MMHG | RESPIRATION RATE: 18 BRPM | OXYGEN SATURATION: 98 %

## 2022-10-12 VITALS
WEIGHT: 289 LBS | BODY MASS INDEX: 37.09 KG/M2 | HEART RATE: 57 BPM | DIASTOLIC BLOOD PRESSURE: 84 MMHG | SYSTOLIC BLOOD PRESSURE: 124 MMHG | HEIGHT: 74 IN

## 2022-10-12 DIAGNOSIS — Z12.11 SCREENING FOR COLON CANCER: ICD-10-CM

## 2022-10-12 DIAGNOSIS — Z23 FLU VACCINE NEED: ICD-10-CM

## 2022-10-12 DIAGNOSIS — Z00.00 ANNUAL PHYSICAL EXAM: Primary | ICD-10-CM

## 2022-10-12 DIAGNOSIS — I25.10 MILD CAD: ICD-10-CM

## 2022-10-12 DIAGNOSIS — I10 ESSENTIAL HYPERTENSION: ICD-10-CM

## 2022-10-12 DIAGNOSIS — R71.8 ELEVATED MCV: ICD-10-CM

## 2022-10-12 DIAGNOSIS — I35.0 AORTIC VALVE STENOSIS, ETIOLOGY OF CARDIAC VALVE DISEASE UNSPECIFIED: ICD-10-CM

## 2022-10-12 DIAGNOSIS — G47.33 OBSTRUCTIVE SLEEP APNEA: ICD-10-CM

## 2022-10-12 DIAGNOSIS — R73.01 IFG (IMPAIRED FASTING GLUCOSE): ICD-10-CM

## 2022-10-12 DIAGNOSIS — E66.09 EXOGENOUS OBESITY: ICD-10-CM

## 2022-10-12 DIAGNOSIS — I48.0 PAROXYSMAL ATRIAL FIBRILLATION WITH RVR (HCC): Primary | ICD-10-CM

## 2022-10-12 DIAGNOSIS — E78.2 MIXED HYPERLIPIDEMIA: ICD-10-CM

## 2022-10-12 DIAGNOSIS — I48.0 PAF (PAROXYSMAL ATRIAL FIBRILLATION) (HCC): ICD-10-CM

## 2022-10-12 DIAGNOSIS — F51.01 PRIMARY INSOMNIA: ICD-10-CM

## 2022-10-12 PROCEDURE — 99214 OFFICE O/P EST MOD 30 MIN: CPT | Performed by: CLINICAL NURSE SPECIALIST

## 2022-10-12 PROCEDURE — 90471 IMMUNIZATION ADMIN: CPT | Performed by: INTERNAL MEDICINE

## 2022-10-12 PROCEDURE — 93000 ELECTROCARDIOGRAM COMPLETE: CPT | Performed by: CLINICAL NURSE SPECIALIST

## 2022-10-12 PROCEDURE — 90674 CCIIV4 VAC NO PRSV 0.5 ML IM: CPT | Performed by: INTERNAL MEDICINE

## 2022-10-12 PROCEDURE — 99396 PREV VISIT EST AGE 40-64: CPT | Performed by: INTERNAL MEDICINE

## 2022-10-12 RX ORDER — CHLORTHALIDONE 25 MG/1
12.5 TABLET ORAL DAILY
Qty: 45 TABLET | Refills: 1 | Status: SHIPPED | OUTPATIENT
Start: 2022-10-12

## 2022-10-12 RX ORDER — POTASSIUM CHLORIDE 750 MG/1
10 CAPSULE, EXTENDED RELEASE ORAL DAILY
COMMUNITY
End: 2022-10-12 | Stop reason: SDUPTHER

## 2022-10-12 RX ORDER — CHOLECALCIFEROL (VITAMIN D3) 125 MCG
CAPSULE ORAL
COMMUNITY

## 2022-10-12 RX ORDER — POTASSIUM CHLORIDE 750 MG/1
10 CAPSULE, EXTENDED RELEASE ORAL DAILY
Qty: 90 CAPSULE | Refills: 1 | Status: SHIPPED | OUTPATIENT
Start: 2022-10-12

## 2022-10-12 ASSESSMENT — ENCOUNTER SYMPTOMS
COUGH: 0
EYE REDNESS: 0
SHORTNESS OF BREATH: 0
FACIAL SWELLING: 0
WHEEZING: 0
CHEST TIGHTNESS: 0
COUGH: 0
ABDOMINAL PAIN: 0
CONSTIPATION: 0
ABDOMINAL PAIN: 0
WHEEZING: 0
CHEST TIGHTNESS: 0
SORE THROAT: 0
NAUSEA: 0
VOMITING: 0

## 2022-10-12 ASSESSMENT — PATIENT HEALTH QUESTIONNAIRE - PHQ9
SUM OF ALL RESPONSES TO PHQ QUESTIONS 1-9: 0
SUM OF ALL RESPONSES TO PHQ QUESTIONS 1-9: 0
1. LITTLE INTEREST OR PLEASURE IN DOING THINGS: 0
2. FEELING DOWN, DEPRESSED OR HOPELESS: 0
SUM OF ALL RESPONSES TO PHQ QUESTIONS 1-9: 0
SUM OF ALL RESPONSES TO PHQ9 QUESTIONS 1 & 2: 0
SUM OF ALL RESPONSES TO PHQ QUESTIONS 1-9: 0

## 2022-10-12 NOTE — PATIENT INSTRUCTIONS
Return in about 6 months (around 4/12/2023) for Dr Marvin Elise.   Use Xarelto as needed for episode of AF

## 2022-10-12 NOTE — PROGRESS NOTES
[facility], [address] [place] [address], Shabnam Wilhelm  [address]  Phone: [phone]  Fax: [phone]    OFFICE VISIT:  10/12/2022    [address]: 1960    Reason For Visit:  Theo You is a 57 y.o. male who is here for 6 Month Follow-Up and Atrial Fibrillation       Diagnosis Orders   1. Paroxysmal atrial fibrillation with RVR ([facility])  EKG 12 lead            HPI  Patient is here for follow-up for paroxysmal atrial fibrillation, hypertention, mild CAD per heart cath, sleep apnea, aortic stenosis. He has never been anticoagulated and states he is very aware when he is out of rhythm. He only uses Xarelto for A. fib lasting for a  duration of several hours and has not needed to use since his last visit here. At his visit 6 months ago, there was concern about some episodes of confusion and difficulty speaking. He underwent work-up with an head MRI. Nothing acute was found. He has had no further episodes. He has some rare, brief palpitations. He has not needed to use his Xarelto as he has had no prolonged episodes. No unusual chest pain or dyspnea. He is wearing sleep equipment regularly    He works out of town in [place] he comes back to [place] every few months. Lashanda Duncan MD is PCP.   Jonah Veliz. has the following history as recorded in [facility]:    Patient Active Problem List    Diagnosis Date Noted    Aortic valve stenosis 04/12/2022    Elevated PSA 03/20/2020    Epigastric discomfort 11/14/2018    Chronic nausea 11/14/2018    Mild CAD 11/13/2018    IFG (impaired fasting glucose) 05/11/2018    Primary insomnia 10/26/2017    Chronic GERD 10/26/2017    Degenerative lumbar disc 10/26/2017    PAF (paroxysmal atrial fibrillation) ([facility])     Hx pulmonary embolism 06/26/2017    Benign prostatic hyperplasia with lower urinary tract symptoms 06/26/2017    Mixed hyperlipidemia 06/26/2017    CPAP (continuous positive airway pressure) dependence     Mild mitral regurgitation by prior echocardiogram 02/08/2017    Essential hypertension 02/08/2017    Obstructive sleep apnea      Past Medical History:   Diagnosis Date    A-fib ([facility]     Atelectasis of right lung 6/26/2017    Back pain, lumbosacral     BiPAP (biphasic positive airway pressure) dependence     12cm to 22cm    Clotting disorder (HCC)     Cyst near coccyx     removed    GERD (gastroesophageal reflux disease)     Heart murmur     Hx of blood clots     Hyperlipidemia     Hypertension     Mild CAD 11/13/2018    Obstructive sleep apnea     AHI:  36.5 per PSG, 9/2015 - BiPAP    Osteoarthritis     Paroxysmal atrial fibrillation with RVR ([facility] 6/26/2017    Pneumonia due to organism     Pulmonary embolism [facility])      Past Surgical History:   Procedure Laterality Date    ANKLE FRACTURE SURGERY      left ankle    ARM SURGERY Left 01/04/2018    fracture     CARDIAC CATHETERIZATION  06/2018    Mild CAD    CHOLECYSTECTOMY, LAPAROSCOPIC N/A 12/15/2017    CHOLECYSTECTOMY LAPAROSCOPIC performed by Landon Hammans, MD at [address]  2012    GALLBLADDER SURGERY      MS EGD TRANSORAL BIOPSY SINGLE/MULTIPLE N/A 11/15/2018    Dr Lauren Krishnan      undecended testicle done when 1years old     Family History   Problem Relation Age of Onset    Heart Disease Mother     High Blood Pressure Mother     Heart Disease Father 54        mi    High Blood Pressure Father     High Cholesterol Father     Lung Cancer Father     Cancer Father         Lung CA - Oat cell    Stroke Father     Diabetes Paternal Aunt     Colon Cancer Neg Hx     Colon Polyps Neg Hx     Esophageal Cancer Neg Hx     Liver Cancer Neg Hx     Liver Disease Neg Hx     Rectal Cancer Neg Hx     Stomach Cancer Neg Hx      Social History     Tobacco Use    Smoking status: Never    Smokeless tobacco: Former     Types: Chew   Substance Use Topics    Alcohol use: Yes     Comment: occ      Current Outpatient Medications   Medication Sig Dispense Refill sotalol (BETAPACE) 80 MG tablet TAKE 1 TABLET TWICE A  tablet 1    rosuvastatin (CRESTOR) 10 MG tablet TAKE 1 TABLET NIGHTLY 90 tablet 1    lisinopril (PRINIVIL;ZESTRIL) 40 MG tablet TAKE 1 TABLET DAILY 90 tablet 1    fluticasone (FLONASE) 50 MCG/ACT nasal spray USE 1 SPRAY NASALLY DAILY 48 g 5    colestipol (COLESTID) 1 g tablet Take 1 tablet by mouth daily 90 tablet 3    omeprazole (PRILOSEC) 20 MG delayed release capsule TAKE 1 CAPSULE TWICE A  capsule 3    B Complex Vitamins (B COMPLEX PO) Take 150 mg by mouth      magnesium gluconate (MAGONATE) 500 MG tablet Take 500 mg by mouth daily      amLODIPine (NORVASC) 5 MG tablet Take 1 tablet by mouth daily 90 tablet 3    chlorthalidone (HYGROTON) 25 MG tablet Take 12.5 mg by mouth daily       cloNIDine (CATAPRES) 0.1 MG tablet Take 1 tablet night and take one every 12 hours if BP systolic blood pressure over 231 or diastolic BP over 431 905 tablet 1    potassium chloride (KLOR-CON M) 10 MEQ extended release tablet Take one tablet every Monday, Wednesday, Friday (Patient taking differently: daily Take one tablet every day) 36 tablet 1    hyoscyamine (OSCIMIN) 125 MCG TBDP dispersible tablet PLACE 2 TABLETS ON TONGUE EVERY 12 HOURS AS NEEDED FOR ABDOMINAL PAIN/CRAMPING 60 tablet 24    rivaroxaban (XARELTO) 20 MG TABS tablet Take 1 tablet by mouth Daily with supper As needed for AF episode 12hrs or longer in duration 90 tablet 1    Potassium 99 MG TABS Take 99 mg by mouth daily       niacin (NIASPAN) 1000 MG extended release tablet Take 1,000 mg by mouth 4 times daily      aspirin 81 MG EC tablet Take 81 mg by mouth daily      Omega-3 Fatty Acids (FISH OIL PO) Take 1,280 mg by mouth daily       Ascorbic Acid (VITAMIN C) 500 MG tablet Take 500 mg by mouth 2 times daily       vitamin E 400 UNIT capsule Take 400 Units by mouth daily      terbinafine (LAMISIL) 250 MG tablet Take 250 mg by mouth daily (Patient not taking: Reported on 10/12/2022) tadalafil (CIALIS) 5 MG tablet Take 1 tablet by mouth daily as needed for Erectile Dysfunction 54 tablet 0     No current facility-administered medications for this visit. Allergies: Patient has no known allergies. Review of Systems  Review of Systems   Constitutional:  Negative for activity change, diaphoresis, fatigue, fever and unexpected weight change. HENT:  Negative for facial swelling and nosebleeds. Eyes:  Negative for redness and visual disturbance. Respiratory:  Negative for cough, chest tightness, shortness of breath and wheezing. Cardiovascular:  Positive for palpitations (brief, infrequent). Negative for chest pain and leg swelling. Gastrointestinal:  Negative for abdominal pain, nausea and vomiting. Endocrine: Negative for cold intolerance and heat intolerance. Genitourinary:  Negative for dysuria and hematuria. Musculoskeletal:  Negative for arthralgias and myalgias. Skin:  Negative for pallor and rash. Neurological:  Negative for dizziness, seizures, syncope, weakness and light-headedness. Hematological:  Does not bruise/bleed easily. Psychiatric/Behavioral:  Negative for agitation. The patient is not nervous/anxious. Objective  Vital Signs - /84   Pulse 57   Ht 6' 2\" (1.88 m)   Wt 289 lb (131.1 kg)   BMI 37.11 kg/m²   Physical Exam  Vitals and nursing note reviewed. Constitutional:       General: He is not in acute distress. Appearance: He is well-developed. He is not diaphoretic. HENT:      Head: Normocephalic and atraumatic. Right Ear: Hearing and external ear normal.      Left Ear: Hearing and external ear normal.      Nose: Nose normal.   Eyes:      General:         Right eye: No discharge. Left eye: No discharge. Pupils: Pupils are equal, round, and reactive to light. Neck:      Thyroid: No thyromegaly. Vascular: No carotid bruit or JVD. Trachea: No tracheal deviation.    Cardiovascular:      Rate and Rhythm: Normal rate and regular rhythm. Heart sounds: Murmur (2/6 systolic) heard. No friction rub. No gallop. Pulmonary:      Effort: Pulmonary effort is normal. No respiratory distress. Breath sounds: Normal breath sounds. No wheezing or rales. Abdominal:      Palpations: Abdomen is soft. Tenderness: no abdominal tenderness   Musculoskeletal:         General: No swelling or deformity. Cervical back: Neck supple. No muscular tenderness. Right lower leg: No edema. Left lower leg: No edema. Comments: Normal gait and station   Skin:     General: Skin is warm and dry. Findings: No rash. Neurological:      General: No focal deficit present. Mental Status: He is alert and oriented to person, place, and time. Cranial Nerves: No cranial nerve deficit. Psychiatric:         Mood and Affect: Mood normal.         Behavior: Behavior normal.         Judgment: Judgment normal.       Data:  Lab Results   Component Value Date/Time    WBC 6.5 10/10/2022 11:50 AM    RBC 4.48 10/10/2022 11:50 AM    HGB 15.3 10/10/2022 11:50 AM    HCT 45.9 10/10/2022 11:50 AM     10/10/2022 11:50 AM      Lab Results   Component Value Date/Time    CHOL 151 10/10/2022 11:50 AM    TRIG 144 10/10/2022 11:50 AM    HDL 47 10/10/2022 11:50 AM    LDLCALC 75 10/10/2022 11:50 AM     Lab Results   Component Value Date/Time     10/10/2022 11:50 AM    K 4.2 10/10/2022 11:50 AM     10/10/2022 11:50 AM    CO2 27 10/10/2022 11:50 AM    GLUCOSE 111 10/10/2022 11:50 AM    BUN 9 10/10/2022 11:50 AM    CREATININE 0.7 10/10/2022 11:50 AM    CALCIUM 9.8 10/10/2022 11:50 AM    ALT 25 10/10/2022 11:50 AM    AST 33 10/10/2022 11:50 AM     Lab Results   Component Value Date/Time    TSH 1.660 10/10/2022 11:50 AM     Echo 10/1/21   Conclusions      Summary   Mitral valve leaflets are mildly thickened with preserved leaflet   mobility. Mild aortic stenosis.    Mean gradient 8 mm hg   Trivial aortic regurgitation is noted. Tricuspid valve is structurally normal.   Mild tricuspid regurgitation with estimated RVSP of 43 mm Hg. Normal left ventricular size with preserved LV function and an estimated   ejection fraction of approximately 55-60%. Mild concentric left ventricular hypertrophy. No regional wall motion abnormalities. Normal right atrial dimension with no evidence of thrombus or mass noted. IVC dilated. Signature      ----------------------------------------------------------------   Electronically signed by Ayleen Luke MD(Interpreting   physician) on 10/01/2021 01:49 PM    Echo 6/26/17  Summary   1. Mildly dilated left atrium   2. Mild concentric LVH   3. Normal LV systolic function (estimated EF 55 -60 %)   4. Grade I diastolic dysfunction (impaired relaxation) with normal left   atrial pressure   5. Mild mitral regurgitation   6. Mitral annular calcification is present   7. Mild tricuspid regurgitation; estimated RVSP of at least 38 mmHg    Zio patch heart monitor for 4/14/22  Worn 14 days  No atrial fibrillation, pauses or high degree heart block  8 brief SVT, longest 20 beats    EKG shows sinus rhythm rate 56 with a QTc interval of 0.446 ms    Assessment:     Diagnosis Orders   1. Paroxysmal atrial fibrillation with RVR ([facility])  EKG 12 lead           PAF- He is symptomatic when A. fib occurs and has Xarelto at home to use on an as-needed basis. Continues on sotalol. Stable    Hypertension-stable on current regimen with amlodipine, lisinopril    Obstructive sleep apnea-stable, wearing CPAP regularly     Mild CAD-per cath 2018 without symptoms of angina, stable     Aortic stenosis-mild per recent echo. Stable without significant change in symptoms. Continue to monitor      Plan    Return in about 6 months (around 4/12/2023) for Dr Sarah Tanner.   Use Xarelto as needed for episode of AF    Call with any questionsor concerns  Follow up with Dequan Brown MD for non cardiac problems  Report any new problems  Cardiovascular Fitness-Exercise as tolerated. Strive for 15 minutes of exercise most days of the week. Cardiac / HealthyDiet  Continue current medications as directed  Continue plan of treatment  It is always recommended that you bring your medicationsbottles with you to each visit - this is for your safety!        Ina Crocker, DL

## 2022-10-12 NOTE — PROGRESS NOTES
Chief Complaint:   Jonah Dsouza is a 57 y.o. male who presents forcomplete physical exam.    History of Present Illness:      Jonah Dsouza is a 57 y.o. male who presents todayfor wellness visit AND follow up on his chronic medical conditions as noted below.     Patient Active Problem List    Diagnosis Date Noted    Aortic valve stenosis 04/12/2022    Elevated PSA 03/20/2020    Epigastric discomfort 11/14/2018    Chronic nausea 11/14/2018    Mild CAD 11/13/2018    IFG (impaired fasting glucose) 05/11/2018    Primary insomnia 10/26/2017    Chronic GERD 10/26/2017    Degenerative lumbar disc 10/26/2017    PAF (paroxysmal atrial fibrillation) (HCC)     Hx pulmonary embolism 06/26/2017    Benign prostatic hyperplasia with lower urinary tract symptoms 06/26/2017    Mixed hyperlipidemia 06/26/2017    CPAP (continuous positive airway pressure) dependence      12cm to 22cm      Mild mitral regurgitation by prior echocardiogram 02/08/2017    Essential hypertension 02/08/2017    Obstructive sleep apnea        Past Medical History:   Diagnosis Date    A-fib ([address]     Atelectasis of right lung 6/26/2017    Back pain, lumbosacral     BiPAP (biphasic positive airway pressure) dependence     12cm to 22cm    Clotting disorder (HCC)     Cyst near coccyx     removed    GERD (gastroesophageal reflux disease)     Heart murmur     Hx of blood clots     Hyperlipidemia     Hypertension     Mild CAD 11/13/2018    Obstructive sleep apnea     AHI:  36.5 per PSG, 9/2015 - BiPAP    Osteoarthritis     Paroxysmal atrial fibrillation with RVR ([address] 6/26/2017    Pneumonia due to organism     Pulmonary embolism [facility])        Past Surgical History:   Procedure Laterality Date    ANKLE FRACTURE SURGERY      left ankle    ARM SURGERY Left 01/04/2018    fracture     CARDIAC CATHETERIZATION  06/2018    Mild CAD    CHOLECYSTECTOMY, LAPAROSCOPIC N/A 12/15/2017    CHOLECYSTECTOMY LAPAROSCOPIC performed by [place]ny Sandhoff, [place] at [address] [facility] 2012    GALLBLADDER SURGERY      CO EGD TRANSORAL BIOPSY SINGLE/MULTIPLE N/A 11/15/2018    Dr Michael Tipton      undecended testicle done when 1years old       Current Outpatient Medications   Medication Sig Dispense Refill    Bacillus Coagulans-Inulin (PROBIOTIC-PREBIOTIC) 1-250 BILLION-MG CAPS Take by mouth      potassium chloride (MICRO-K) 10 MEQ extended release capsule Take 1 capsule by mouth daily 90 capsule 1    chlorthalidone (HYGROTON) 25 MG tablet Take 0.5 tablets by mouth daily 45 tablet 1    sotalol (BETAPACE) 80 MG tablet TAKE 1 TABLET TWICE A  tablet 1    rosuvastatin (CRESTOR) 10 MG tablet TAKE 1 TABLET NIGHTLY 90 tablet 1    lisinopril (PRINIVIL;ZESTRIL) 40 MG tablet TAKE 1 TABLET DAILY 90 tablet 1    fluticasone (FLONASE) 50 MCG/ACT nasal spray USE 1 SPRAY NASALLY DAILY 48 g 5    colestipol (COLESTID) 1 g tablet Take 1 tablet by mouth daily 90 tablet 3    omeprazole (PRILOSEC) 20 MG delayed release capsule TAKE 1 CAPSULE TWICE A  capsule 3    B Complex Vitamins (B COMPLEX PO) Take 150 mg by mouth      magnesium gluconate (MAGONATE) 500 MG tablet Take 500 mg by mouth daily      amLODIPine (NORVASC) 5 MG tablet Take 1 tablet by mouth daily 90 tablet 3    cloNIDine (CATAPRES) 0.1 MG tablet Take 1 tablet night and take one every 12 hours if BP systolic blood pressure over 999 or diastolic BP over 120 812 tablet 1    hyoscyamine (OSCIMIN) 125 MCG TBDP dispersible tablet PLACE 2 TABLETS ON TONGUE EVERY 12 HOURS AS NEEDED FOR ABDOMINAL PAIN/CRAMPING 60 tablet 24    rivaroxaban (XARELTO) 20 MG TABS tablet Take 1 tablet by mouth Daily with supper As needed for AF episode 12hrs or longer in duration 90 tablet 1    Potassium 99 MG TABS Take 99 mg by mouth daily       niacin (NIASPAN) 1000 MG extended release tablet Take 1,000 mg by mouth 4 times daily      aspirin 81 MG EC tablet Take 81 mg by mouth daily      Omega-3 Fatty Acids (FISH OIL PO) Take 1,280 mg by mouth daily       Ascorbic Acid (VITAMIN C) 500 MG tablet Take 500 mg by mouth 2 times daily       vitamin E 400 UNIT capsule Take 400 Units by mouth daily       No current facility-administered medications for this visit.      No Known Allergies    Social History     Socioeconomic History    Marital status:      Spouse name: None    Number of children: None    Years of education: None    Highest education level: None   Tobacco Use    Smoking status: Never    Smokeless tobacco: Former     Types: Chew   Vaping Use    Vaping Use: Never used   Substance and Sexual Activity    Alcohol use: Yes     Comment: occ    Drug use: No    Sexual activity: Yes     Partners: Female     Social Determinants of Health     Financial Resource Strain: Low Risk     Difficulty of Paying Living Expenses: Not hard at all   Food Insecurity: No Food Insecurity    Worried About Running Out of Food in the Last Year: Never true    Ran Out of Food in the Last Year: Never true     Family History   Problem Relation Age of Onset    Heart Disease Mother     High Blood Pressure Mother     Heart Disease Father 54        mi    High Blood Pressure Father     High Cholesterol Father     Lung Cancer Father     Cancer Father         Lung CA - Oat cell    Stroke Father     Diabetes Paternal Aunt     Colon Cancer Neg Hx     Colon Polyps Neg Hx     Esophageal Cancer Neg Hx     Liver Cancer Neg Hx     Liver Disease Neg Hx     Rectal Cancer Neg Hx     Stomach Cancer Neg Hx           Past Surgical History:   Procedure Laterality Date    ANKLE FRACTURE SURGERY      left ankle    ARM SURGERY Left 01/04/2018    fracture     CARDIAC CATHETERIZATION  06/2018    Mild CAD    CHOLECYSTECTOMY, LAPAROSCOPIC N/A 12/15/2017    CHOLECYSTECTOMY LAPAROSCOPIC performed by Jennifer [facility] MD [facility]  2012    GALLBLADDER SURGERY      VT EGD TRANSORAL BIOPSY SINGLE/MULTIPLE N/A 11/15/2018    Dr Reynaldo Suh      undecended testicle done when 1years old         Lab Review   Orders Only on 10/10/2022   Component Date Value    Vitamin B-12 10/10/2022 642     PSA 10/10/2022 3.08     TSH 10/10/2022 1.660     Color, UA 10/10/2022 DARK YELLOW (A)     Clarity, UA 10/10/2022 Clear     Glucose, Ur 10/10/2022 Negative     Bilirubin Urine 10/10/2022 SMALL (A)     Ketones, Urine 10/10/2022 TRACE (A)     Specific Porter, UA 10/10/2022 1.021     Blood, Urine 10/10/2022 Negative     pH, UA 10/10/2022 7.5     Protein, UA 10/10/2022 Negative     Urobilinogen, Urine 10/10/2022 1.0     Nitrite, Urine 10/10/2022 Negative     Leukocyte Esterase, Urine 10/10/2022 TRACE (A)     Cholesterol, Total 10/10/2022 151 (A)     Triglycerides 10/10/2022 144     HDL 10/10/2022 47 (A)     LDL Calculated 10/10/2022 75     WBC 10/10/2022 6.5     RBC 10/10/2022 4.48 (A)     Hemoglobin 10/10/2022 15.3     Hematocrit 10/10/2022 45.9     MCV 10/10/2022 102.5 (A)     MCH 10/10/2022 34.2 (A)     MCHC 10/10/2022 33.3     RDW 10/10/2022 12.9     Platelets 19/01/8149 227     MPV 10/10/2022 9.9     Neutrophils % 10/10/2022 53.3     Lymphocytes % 10/10/2022 28.5     Monocytes % 10/10/2022 11.5 (A)     Eosinophils % 10/10/2022 5.8 (A)     Basophils % 10/10/2022 0.6     Neutrophils Absolute 10/10/2022 3.5     Immature Granulocytes # 10/10/2022 0.0     Lymphocytes Absolute 10/10/2022 1.9     Monocytes Absolute 10/10/2022 0.80     Eosinophils Absolute 10/10/2022 0.40     Basophils Absolute 10/10/2022 0.00     Sodium 10/10/2022 143     Potassium 10/10/2022 4.2     Chloride 10/10/2022 102     CO2 10/10/2022 27     Anion Gap 10/10/2022 14     Glucose 10/10/2022 111 (A)     BUN 10/10/2022 9     Creatinine 10/10/2022 0.7     GFR Non- 10/10/2022 >60     GFR  10/10/2022 >59     Calcium 10/10/2022 9.8     Total Protein 10/10/2022 7.4     Albumin 10/10/2022 4.8     Total Bilirubin 10/10/2022 0.7     Alkaline Phosphatase 10/10/2022 66     ALT 10/10/2022 25     AST 10/10/2022 33     Hemoglobin A1C 10/10/2022 5.4     Bacteria, UA 10/10/2022 NEGATIVE (A)     Crystals, UA 10/10/2022 NEG (A)     Hyaline Casts, UA 10/10/2022 0     WBC, UA 10/10/2022 0     RBC, UA 10/10/2022 1     Epithelial Cells, UA 10/10/2022 0          Review of Systems   Constitutional:  Negative for chills, fatigue and fever. HENT:  Negative for congestion, ear pain, nosebleeds, postnasal drip and sore throat. Respiratory:  Negative for cough, chest tightness and wheezing. Cardiovascular:  Negative for chest pain, palpitations and leg swelling. Gastrointestinal:  Negative for abdominal pain and constipation. Genitourinary:  Negative for dysuria and urgency. Musculoskeletal: Negative. Negative for arthralgias. Skin:  Negative for rash. Neurological:  Negative for dizziness and headaches. Psychiatric/Behavioral: Negative. Vitals:    10/12/22 1333   BP: 128/80   Site: Left Upper Arm   Position: Sitting   Cuff Size: Large Adult   Pulse: 71   Resp: 18   SpO2: 98%   Weight: 290 lb (131.5 kg)   Height: 6' 2\" (1.88 m)      Wt Readings from Last 3 Encounters:   10/12/22 290 lb (131.5 kg)   10/12/22 289 lb (131.1 kg)   10/10/22 285 lb (129.3 kg)   Body mass index is 37.23 kg/m². BP Readings from Last 3 Encounters:   10/12/22 128/80   10/12/22 124/84   10/10/22 114/78       Physical Exam  Constitutional:       Appearance: He is well-developed. He is obese. HENT:      Right Ear: External ear normal.      Left Ear: External ear normal.      Mouth/Throat:      Pharynx: No oropharyngeal exudate. Eyes:      Conjunctiva/sclera: Conjunctivae normal.      Pupils: Pupils are equal, round, and reactive to light. Neck:      Thyroid: No thyromegaly. Vascular: No JVD. Cardiovascular:      Rate and Rhythm: Normal rate. Heart sounds: Normal heart sounds. No murmur heard. Pulmonary:      Effort: No respiratory distress. Breath sounds: Normal breath sounds. No wheezing or rales.    Chest: Chest wall: No tenderness. Abdominal:      General: Bowel sounds are normal.      Palpations: Abdomen is soft. Musculoskeletal:      Cervical back: Neck supple. Lymphadenopathy:      Cervical: No cervical adenopathy. Skin:     General: Skin is warm. Findings: No rash. Neurological:      Mental Status: He is oriented to person, place, and time. ASSESSMENT/PLAN  ANNUAL WELLNESS   1:PSA/ PROSTATE CANCER SCREEN per dr Emily Vale  2.  SCREENING CSCOPE 2016   Repeat due  Orders placed       Mixed hyperlipidemia  LDL now good 75  RX crestor 10 mg daily  niacin 4 times a day ( taking this for low HDL)  Repeat testing in 6 months     PAF (paroxysmal atrial fibrillation) (HCC)  Mild mitral CLTOWRIMGOEOP/9/4 systolic murmur   Patient follows with [place] [facility]  Most recent echocardiogram 2017  Follow-up as per cardiology   = patient will continue sotalol 80 mg twice daily     Hypertension-   Blood pressure readings now good  * RX lisinopril 40 mg daily  * RX chlorthalidone 12.5 daily  * RX amlodipine to  5 QD    K+ good  Cont kcl 10      IFG  a1c better 5.4  Diet  Wt loss  Healthy, mostly fiber rich nonstarchy plant-based diet recommended  Recommend to decrease intake of processed foods, simple carbohydrates and animal-based products that high in saturated fats         Epigastric abd pains better   Dyspepsia  He is post cholecystectomy- this actually made his symptoms worse  Seen gastro  Had EGD 11/2018- negative  Different diet changes have not helped     Diarrhea daily in am- now better with colestid     Obstructive sleep apnea- doing better, sleeping better continue current BiPAP settings     MCV-mild elevated at 102  No history of EtOH use  Suggest B complex vitamin  B12 level nl 642 - 10/2022    Orders Placed This Encounter   Procedures    Influenza, FLUCELVAX, (age 10 mo+), IM, Preservative Free, 0.5 mL    Hemoglobin A1C    Comprehensive Metabolic Panel    CBC with Auto Differential    TSH    Lipid Melba Domingo MD, Gastroenterology, [facility] Prescriptions    No medications on file      There are no Patient Instructions on file for this visit. No follow-ups on file. EMR Dragon/transcription disclaimer:Significant part of this  encounter note is electronic transcription/translation of spoken language to printed text. The electronic translation of spoken language may beerroneous, or at times, nonsensical words or phrases may be inadvertently transcribed.  Although I have reviewed the note for such errors, some may still exist.

## 2023-01-23 RX ORDER — CHLORTHALIDONE 25 MG/1
12.5 TABLET ORAL DAILY
Qty: 45 TABLET | Refills: 1 | Status: SHIPPED | OUTPATIENT
Start: 2023-01-23

## 2023-01-23 NOTE — TELEPHONE ENCOUNTER
Saulo Hassan called requesting a refill of the below medication which has been pended for you:     Requested Prescriptions     Pending Prescriptions Disp Refills    chlorthalidone (HYGROTON) 25 MG tablet 45 tablet 1     Sig: Take 0.5 tablets by mouth daily       Last Appointment Date: 10/12/2022  Next Appointment Date: 4/12/2023    No Known Allergies

## 2023-01-24 DIAGNOSIS — I48.0 PAROXYSMAL ATRIAL FIBRILLATION WITH RVR (HCC): ICD-10-CM

## 2023-01-25 RX ORDER — SOTALOL HYDROCHLORIDE 80 MG/1
TABLET ORAL
Qty: 180 TABLET | Refills: 1 | Status: SHIPPED | OUTPATIENT
Start: 2023-01-25

## 2023-02-06 ENCOUNTER — ANESTHESIA EVENT (OUTPATIENT)
Dept: OPERATING ROOM | Age: 63
End: 2023-02-06

## 2023-02-06 ENCOUNTER — ANESTHESIA (OUTPATIENT)
Dept: OPERATING ROOM | Age: 63
End: 2023-02-06

## 2023-02-06 ENCOUNTER — HOSPITAL ENCOUNTER (OUTPATIENT)
Age: 63
Setting detail: OUTPATIENT SURGERY
Discharge: HOME | End: 2023-02-06
Attending: INTERNAL MEDICINE | Admitting: INTERNAL MEDICINE

## 2023-02-06 ENCOUNTER — APPOINTMENT (OUTPATIENT)
Dept: OPERATING ROOM | Age: 63
End: 2023-02-06

## 2023-02-06 VITALS
HEIGHT: 74 IN | OXYGEN SATURATION: 95 % | SYSTOLIC BLOOD PRESSURE: 114 MMHG | BODY MASS INDEX: 37.22 KG/M2 | HEART RATE: 62 BPM | WEIGHT: 290 LBS | DIASTOLIC BLOOD PRESSURE: 82 MMHG | TEMPERATURE: 97.8 F | RESPIRATION RATE: 18 BRPM

## 2023-02-06 PROCEDURE — G0121 COLON CA SCRN NOT HI RSK IND: HCPCS

## 2023-02-06 PROCEDURE — G8907 PT DOC NO EVENTS ON DISCHARG: HCPCS

## 2023-02-06 PROCEDURE — G8918 PT W/O PREOP ORDER IV AB PRO: HCPCS

## 2023-02-06 RX ORDER — SODIUM CHLORIDE, SODIUM LACTATE, POTASSIUM CHLORIDE, CALCIUM CHLORIDE 600; 310; 30; 20 MG/100ML; MG/100ML; MG/100ML; MG/100ML
INJECTION, SOLUTION INTRAVENOUS CONTINUOUS
Status: DISCONTINUED | OUTPATIENT
Start: 2023-02-06 | End: 2023-02-06 | Stop reason: HOSPADM

## 2023-02-06 RX ORDER — LIDOCAINE HYDROCHLORIDE 10 MG/ML
INJECTION, SOLUTION EPIDURAL; INFILTRATION; INTRACAUDAL; PERINEURAL PRN
Status: DISCONTINUED | OUTPATIENT
Start: 2023-02-06 | End: 2023-02-06 | Stop reason: SDUPTHER

## 2023-02-06 RX ORDER — PROPOFOL 10 MG/ML
INJECTION, EMULSION INTRAVENOUS PRN
Status: DISCONTINUED | OUTPATIENT
Start: 2023-02-06 | End: 2023-02-06 | Stop reason: SDUPTHER

## 2023-02-06 RX ADMIN — PROPOFOL 300 MG: 10 INJECTION, EMULSION INTRAVENOUS at 08:43

## 2023-02-06 RX ADMIN — LIDOCAINE HYDROCHLORIDE 30 MG: 10 INJECTION, SOLUTION EPIDURAL; INFILTRATION; INTRACAUDAL; PERINEURAL at 08:43

## 2023-02-06 RX ADMIN — SODIUM CHLORIDE, SODIUM LACTATE, POTASSIUM CHLORIDE, CALCIUM CHLORIDE: 600; 310; 30; 20 INJECTION, SOLUTION INTRAVENOUS at 06:59

## 2023-02-06 NOTE — OP NOTE
Patient: Marley Jules. : 1960  Med Rec#: [id number] Acc#: [id number]   Primary Care Provider Karina Allan MD    Date of Procedure:  2023    Endoscopist: Christopher Ivey MD    Referring Provider: Karina Allan MD,     Operation Performed: Colonoscopy   Indications: Screening    Anesthesia:  Sedation was administered by anesthesia who monitored the patient during the procedure. I met with Marley Jules prior to procedure. We discussed the procedure itself, and I have discussed the risks of endoscopy (including-- but not limited to-- pain, discomfort, bleeding potentially requiring second endoscopic procedure and/or blood transfusion, organ perforation requiring operative repair, damage to organs near the colon, infection, aspiration, cardiopulmonary/allergic reaction), benefits, indications to endoscopy. Additionally, we discussed options other than colonoscopy. The patient expressed understanding. All questions answered. The patient decided to proceed with the procedure. Signed informed consent was placed on the chart. Blood Loss: minimal    Withdrawal time: >6  mins  Bowel Prep: adequate     Complications: no immediate complications    DESCRIPTION OF PROCEDURE:     A time out was performed. After written informed consent was obtained, the patient was placed in the left lateral position. The perianal area was inspected, and a digital rectal exam was performed. A rectal exam was performed: normal tone, no palpable lesions. At this point, a forward viewing Olympus colonoscope was inserted into the anus and carefully advanced to the cecum. The cecum was identified by the ileocecal valve and the appendiceal orifice. The colonoscope was then slowly withdrawn with careful inspection of the mucosa in a linear and circumferential fashion. The scope was retroflexed in the rectum. Suction was utilized during the procedure to remove as much air as possible from the bowel.  The colonoscope was removed from the patient, and the procedure was terminated. Findings are listed below. Findings: There was evidence of diverticular disease throughout the left colon. The mucosa appeared normal throughout the entire examined colon. Retroflexion in the rectum was normal and revealed no further abnormalities. Recommendations:  1. Repeat colonoscopy: 10 years    Findings and recommendations were discussed w/ the patient. A copy of the images was provided. Lebron Ordonez am scribing for and in the presence of Dr. Korina Banks MD.  Electronically signed by Dony Garcia RN on 2/6/2023 at 8:17 AM    I personally performed the services described in this documentation as scribed by Dony Garcia, and it appears accurate and complete.      Korina Banks MD  2/6/2023

## 2023-02-06 NOTE — ANESTHESIA POSTPROCEDURE EVALUATION
Department of Anesthesiology  Postprocedure Note    Patient: Devyn Laguerre MRN: [id number]  YOB: 1960  Date of evaluation: 2/6/2023      Procedure Summary     Date: 02/06/23 Room / Location: [facility] ENDO 02 / [address]    Anesthesia Start: 2231 Anesthesia Stop:     Procedure: [address], NOT HIGH RISK (Abdomen) Diagnosis:       Screen for colon cancer      (Screen for colon cancer [Z12.11])    Surgeons: Tadeo Navarrete MD Responsible Provider: DL Sharp CRNA    Anesthesia Type: general, TIVA ASA Status: 3          Anesthesia Type: No value filed.     Sunil Phase I:      Sunil Phase II:        Anesthesia Post Evaluation    Patient location during evaluation: bedside  Patient participation: complete - patient participated  Level of consciousness: sleepy but conscious  Pain score: 0  Airway patency: patent  Nausea & Vomiting: no nausea and no vomiting  Complications: no  Cardiovascular status: blood pressure returned to baseline  Respiratory status: acceptable, room air and spontaneous ventilation  Hydration status: euvolemic

## 2023-02-06 NOTE — DISCHARGE INSTRUCTIONS
Recommendations:  1. Repeat colonoscopy: 10 years    POST-OP ORDERS: ENDOSCOPY & COLONOSCOPY:    1. Rest today. 2. DO NOT eat or drink until wide awake; eat your usual diet today in moderate amount only. 3. DO NOT drive today. 4. Call physician if you have severe pain, vomiting, fever, rectal bleeding or black bowel movements. 5.  If a biopsy was taken or a polyp removed, you should expect to hear results in about 7-10 days. If you have heard nothing from your physician by then, call the office for results. 6.  Discharge home when patient awake, vitals signs stable and tolerating liquids.

## 2023-02-06 NOTE — ANESTHESIA PRE PROCEDURE
Department of Anesthesiology  Preprocedure Note       Name:  Alberto Whitfield. Age:  57 y.o.  :  1960                                          MRN:  [id number]         Date:  2023      Surgeon: Namita Collazo):  Ifeanyi Lang MD    Procedure: Procedure(s):  COLORECTAL CANCER SCREENING, NOT HIGH RISK    Medications prior to admission:   Prior to Admission medications    Medication Sig Start Date End Date Taking?  Authorizing Provider   sotalol (BETAPACE) 80 MG tablet TAKE 1 TABLET TWICE A DAY 23   DL Daily   chlorthalidone (HYGROTON) 25 MG tablet Take 0.5 tablets by mouth daily 23   Sunny Antony MD   Bacillus Coagulans-Inulin (PROBIOTIC-PREBIOTIC) 1-250 BILLION-MG CAPS Take by mouth    Historical Provider, MD   potassium chloride (MICRO-K) 10 MEQ extended release capsule Take 1 capsule by mouth daily 10/12/22   Sunny Antony MD   rosuvastatin (CRESTOR) 10 MG tablet TAKE 1 TABLET NIGHTLY 22   Sunny Antony MD   lisinopril (PRINIVIL;ZESTRIL) 40 MG tablet TAKE 1 TABLET DAILY 22   Sunny Antony MD   fluticasone [facility]) 50 MCG/ACT nasal spray USE 1 SPRAY NASALLY DAILY 22   Sunny Antony MD   colestipol (COLESTID) 1 g tablet Take 1 tablet by mouth daily 22   Sunny Antony MD   omeprazole (PRILOSEC) 20 MG delayed release capsule TAKE 1 CAPSULE TWICE A DAY 22   Sunny Antony MD   B Complex Vitamins (B COMPLEX PO) Take 150 mg by mouth    Historical Provider, MD   magnesium gluconate (MAGONATE) 500 MG tablet Take 500 mg by mouth daily    Historical Provider, MD   amLODIPine (NORVASC) 5 MG tablet Take 1 tablet by mouth daily 21   DL De Leon   cloNIDine (CATAPRES) 0.1 MG tablet Take 1 tablet night and take one every 12 hours if BP systolic blood pressure over 601 or diastolic BP over 245    Sunny Antony MD   hyoscyamine (OSCIMIN) 125 MCG TBDP dispersible tablet PLACE 2 TABLETS ON TONGUE EVERY 12 HOURS AS NEEDED FOR ABDOMINAL PAIN/CRAMPING 19   Sunny Antony MD rivaroxaban (XARELTO) 20 MG TABS tablet Take 1 tablet by mouth Daily with supper As needed for AF episode 12hrs or longer in duration 8/23/19   LouisDL Farrar   Potassium 99 MG TABS Take 99 mg by mouth daily     Historical Provider, MD   niacin (NIASPAN) 1000 MG extended release tablet Take 1,000 mg by mouth 4 times daily    Historical Provider, MD   aspirin 81 MG EC tablet Take 81 mg by mouth daily    Historical Provider, MD   Omega-3 Fatty Acids (FISH OIL PO) Take 1,280 mg by mouth daily     Historical Provider, MD   Ascorbic Acid (VITAMIN C) 500 MG tablet Take 500 mg by mouth 2 times daily     Historical Provider, MD   vitamin E 400 UNIT capsule Take 400 Units by mouth daily    Historical Provider, MD       Current medications:    Current Facility-Administered Medications   Medication Dose Route Frequency Provider Last Rate Last Admin    lactated ringers IV soln infusion   IntraVENous Continuous Natalie Justice  mL/hr at 02/06/23 0659 New Bag at 02/06/23 0659       Allergies:  No Known Allergies    Problem List:    Patient Active Problem List   Diagnosis Code    Obstructive sleep apnea G47.33    Mild mitral regurgitation by prior echocardiogram I34.0    Essential hypertension I10    CPAP (continuous positive airway pressure) dependence Z99.89    Hx pulmonary embolism Z86.711    Benign prostatic hyperplasia with lower urinary tract symptoms N40.1    Mixed hyperlipidemia E78.2    PAF (paroxysmal atrial fibrillation) (HCC) I48.0    Primary insomnia F51.01    Chronic GERD K21.9    Degenerative lumbar disc M51.36    IFG (impaired fasting glucose) R73.01    Mild CAD I25.10    Epigastric discomfort R10.13    Chronic nausea R11.0    Elevated PSA R97.20    Aortic valve stenosis I35.0       Past Medical History:        Diagnosis Date    A-fib ([facility] [address]     Atelectasis of right lung 6/26/2017    Back pain, lumbosacral     BiPAP (biphasic positive airway pressure) dependence     12cm to 22cm    Clotting disorder (HCC)     Cyst near coccyx     removed    GERD (gastroesophageal reflux disease)     Heart murmur     Hx of blood clots     Hyperlipidemia     Hypertension     Mild CAD 11/13/2018    Obstructive sleep apnea     AHI:  36.5 per PSG, 9/2015 - BiPAP    Osteoarthritis     Paroxysmal atrial fibrillation with RVR ([address] 6/26/2017    Pneumonia due to organism     Pulmonary embolism [facility])        Past Surgical History:        Procedure Laterality Date    ANKLE FRACTURE SURGERY      left ankle    ARM SURGERY Left 01/04/2018    fracture     CARDIAC CATHETERIZATION  06/2018    Mild CAD    CHOLECYSTECTOMY, LAPAROSCOPIC N/A 12/15/2017    CHOLECYSTECTOMY LAPAROSCOPIC performed by Patrick Duke MD at [address]  2012    GALLBLADDER SURGERY      [place] EGD TRANSORAL BIOPSY SINGLE/MULTIPLE N/A 11/15/2018    Dr Sara Gillespie      undecended testicle done when 1years old       Social History:    Social History     Tobacco Use    Smoking status: Never    Smokeless tobacco: Former     Types: Chew   Substance Use Topics    Alcohol use: Yes     Comment: occ                                Counseling given: Not Answered      Vital Signs (Current):   Vitals:    02/06/23 0651   BP: 118/76   Pulse: 63   Resp: 16   Temp: 97.1 °F (36.2 °C)   SpO2: 93%   Weight: 290 lb (131.5 kg)   Height: 6' 2\" (1.88 m)                                              BP Readings from Last 3 Encounters:   02/06/23 118/76   10/12/22 128/80   10/12/22 124/84       NPO Status: Time of last liquid consumption: 0330                        Time of last solid consumption: 2300                        Date of last liquid consumption: 02/06/23                        Date of last solid food consumption: 02/04/23    BMI:   Wt Readings from Last 3 Encounters:   02/06/23 290 lb (131.5 kg)   10/12/22 290 lb (131.5 kg)   10/12/22 289 lb (131.1 kg)     Body mass index is 37.23 kg/m².     CBC:   Lab Results Component Value Date/Time    WBC 6.5 10/10/2022 11:50 AM    RBC 4.48 10/10/2022 11:50 AM    HGB 15.3 10/10/2022 11:50 AM    HCT 45.9 10/10/2022 11:50 AM    .5 10/10/2022 11:50 AM    RDW 12.9 10/10/2022 11:50 AM     10/10/2022 11:50 AM       CMP:   Lab Results   Component Value Date/Time     10/10/2022 11:50 AM    K 4.2 10/10/2022 11:50 AM     10/10/2022 11:50 AM    CO2 27 10/10/2022 11:50 AM    BUN 9 10/10/2022 11:50 AM    CREATININE 0.7 10/10/2022 11:50 AM    GFRAA >59 10/10/2022 11:50 AM    LABGLOM >60 10/10/2022 11:50 AM    GLUCOSE 111 10/10/2022 11:50 AM    PROT 7.4 10/10/2022 11:50 AM    CALCIUM 9.8 10/10/2022 11:50 AM    BILITOT 0.7 10/10/2022 11:50 AM    ALKPHOS 66 10/10/2022 11:50 AM    AST 33 10/10/2022 11:50 AM    ALT 25 10/10/2022 11:50 AM       POC Tests: No results for input(s): POCGLU, POCNA, POCK, POCCL, POCBUN, POCHEMO, POCHCT in the last 72 hours.     Coags:   Lab Results   Component Value Date/Time    PROTIME 13.2 04/09/2018 05:07 PM    INR 1.01 04/09/2018 05:07 PM    APTT 24.1 12/22/2016 08:06 PM       HCG (If Applicable): No results found for: PREGTESTUR, PREGSERUM, HCG, HCGQUANT     ABGs: No results found for: PHART, PO2ART, MOT2BDY, OFM9ZMY, BEART, K5YEMTDU     Type & Screen (If Applicable):  No results found for: LABABO, LABRH    Drug/Infectious Status (If Applicable):  No results found for: HIV, HEPCAB    COVID-19 Screening (If Applicable): No results found for: COVID19        Anesthesia Evaluation  Patient summary reviewed and Nursing notes reviewed  Airway: Mallampati: II  TM distance: >3 FB   Neck ROM: full  Mouth opening: > = 3 FB   Dental: normal exam         Pulmonary:normal exam    (+) pneumonia:  sleep apnea:                             Cardiovascular:Negative CV ROS  Exercise tolerance: good (>4 METS),   (+) hypertension:, CAD:,          Beta Blocker:  Dose within 24 Hrs         Neuro/Psych:   Negative Neuro/Psych ROS              GI/Hepatic/Renal: Neg GI/Hepatic/Renal ROS  (+) GERD:,           Endo/Other: Negative Endo/Other ROS                    Abdominal:             Vascular: negative vascular ROS. Other Findings:           Anesthesia Plan      general and TIVA     ASA 3       Induction: intravenous. Anesthetic plan and risks discussed with patient. Plan discussed with CRNA.                     DL Lopez - CHERIE   2/6/2023

## 2023-02-06 NOTE — H&P
Patient Name: Neptali Gamboa. : 1960  MRN: [id number]  DATE: 23    Allergies: No Known Allergies     ENDOSCOPY  History and Physical    Procedure:    [] Diagnostic Colonoscopy       [x] Screening Colonoscopy  [] EGD      [] ERCP      [] EUS       [] Other    [x] Previous office notes/History and Physical reviewed from the patients chart. Please see EMR for further details of HPI. I have examined the patient's status immediately prior to the procedure and:      Indications/HPI:       [x] Screening              [] History of Polyps      []Fhx of colon CA/polyps []+Cologard/DNA/Stool Testing      Anesthesia:   [x] MAC [] Moderate Sedation   [] General   [] None     ROS: 12 pt review of systems was negative unless stated above    Medications:   Prior to Admission medications    Medication Sig Start Date End Date Taking?  Authorizing Provider   sotalol (BETAPACE) 80 MG tablet TAKE 1 TABLET TWICE A DAY 23   DL Murray   chlorthalidone (HYGROTON) 25 MG tablet Take 0.5 tablets by mouth daily 23   Alfie Soot MD   Bacillus Coagulans-Inulin (PROBIOTIC-PREBIOTIC) 1-250 BILLION-MG CAPS Take by mouth    Historical Provider, MD   potassium chloride (MICRO-K) 10 MEQ extended release capsule Take 1 capsule by mouth daily 10/12/22   Alfie Soto MD   rosuvastatin (CRESTOR) 10 MG tablet TAKE 1 TABLET NIGHTLY 22   Alfie Soto MD   lisinopril (PRINIVIL;ZESTRIL) 40 MG tablet TAKE 1 TABLET DAILY 22   Alfie Soto MD   fluticasone [facility]) 50 MCG/ACT nasal spray USE 1 SPRAY NASALLY DAILY 22   Alfie Soto MD   colestipol (COLESTID) 1 g tablet Take 1 tablet by mouth daily 22   Alfie Soto MD   omeprazole (PRILOSEC) 20 MG delayed release capsule TAKE 1 CAPSULE TWICE A DAY 22   Alfie Soto MD   B Complex Vitamins (B COMPLEX PO) Take 150 mg by mouth    Historical Provider, MD   magnesium gluconate (MAGONATE) 500 MG tablet Take 500 mg by mouth daily    Historical Provider, MD amLODIPine (NORVASC) 5 MG tablet Take 1 tablet by mouth daily 9/29/21   DL Hansen   cloNIDine (CATAPRES) 0.1 MG tablet Take 1 tablet night and take one every 12 hours if BP systolic blood pressure over 903 or diastolic BP over 899 6/33/81   Beatriz Edmondson MD   hyoscyamine (OSCIMIN) 125 MCG TBDP dispersible tablet PLACE 2 TABLETS ON TONGUE EVERY 12 HOURS AS NEEDED FOR ABDOMINAL PAIN/CRAMPING 12/13/19   Beatriz Edmondson MD   rivaroxaban (XARELTO) 20 MG TABS tablet Take 1 tablet by mouth Daily with supper As needed for AF episode 12hrs or longer in duration 8/23/19   DL Hansen   Potassium 99 MG TABS Take 99 mg by mouth daily     Historical Provider, MD   niacin (NIASPAN) 1000 MG extended release tablet Take 1,000 mg by mouth 4 times daily    Historical Provider, MD   aspirin 81 MG EC tablet Take 81 mg by mouth daily    Historical Provider, MD   Omega-3 Fatty Acids (FISH OIL PO) Take 1,280 mg by mouth daily     Historical Provider, MD   Ascorbic Acid (VITAMIN C) 500 MG tablet Take 500 mg by mouth 2 times daily     Historical Provider, MD   vitamin E 400 UNIT capsule Take 400 Units by mouth daily    Historical Provider, MD       Past Medical History:  Past Medical History:   Diagnosis Date    A-fib ([facility] [address]     Atelectasis of right lung 6/26/2017    Back pain, lumbosacral     BiPAP (biphasic positive airway pressure) dependence     12cm to 22cm    Clotting disorder (HCC)     Cyst near coccyx     removed    GERD (gastroesophageal reflux disease)     Heart murmur     Hx of blood clots     Hyperlipidemia     Hypertension     Mild CAD 11/13/2018    Obstructive sleep apnea     AHI:  36.5 per PSG, 9/2015 - BiPAP    Osteoarthritis     Paroxysmal atrial fibrillation with RVR ([facility] [address] 6/26/2017    Pneumonia due to organism     Pulmonary embolism [facility])        Past Surgical History:  Past Surgical History:   Procedure Laterality Date    ANKLE FRACTURE SURGERY      left ankle    ARM SURGERY Left 01/04/2018    fracture CARDIAC CATHETERIZATION  06/2018    Mild CAD    CHOLECYSTECTOMY, LAPAROSCOPIC N/A 12/15/2017    CHOLECYSTECTOMY LAPAROSCOPIC performed by Natty Britt MD at [address]  2012    GALLBLADDER SURGERY      [place] EGD TRANSORAL BIOPSY SINGLE/MULTIPLE N/A 11/15/2018    Dr Alissa Fuchs      undecended testicle done when 1years old       Social History:  Social History     Tobacco Use    Smoking status: Never    Smokeless tobacco: Former     Types: Chew   Vaping Use    Vaping Use: Never used   Substance Use Topics    Alcohol use: Yes     Comment: occ    Drug use: No       Vital Signs:   Vitals:    02/06/23 0651   BP: 118/76   Pulse: 63   Resp: 16   Temp: 97.1 °F (36.2 °C)   SpO2: 93%        Physical Exam:  Cardiac:  [x]WNL []Comments:  Pulmonary:  [x]WNL   []Comments:  Neuro/Mental Status:  [x]WNL  []Comments:  Abdominal:  [x]WNL    []Comments:  Other:   []WNL  []Comments:    Informed Consent:  The risks and benefits of the procedure have been discussed with either the patient or if they cannot consent, their representative. Assessment:  Patient examined and appropriate for planned sedation and procedure. Plan:  Proceed with planned sedation and procedure as above.     Lora Richards MD

## 2023-02-22 RX ORDER — FLUTICASONE PROPIONATE 50 MCG
1 SPRAY, SUSPENSION (ML) NASAL DAILY
Qty: 48 G | Refills: 5 | Status: SHIPPED | OUTPATIENT
Start: 2023-02-22

## 2023-02-22 RX ORDER — ROSUVASTATIN CALCIUM 10 MG/1
10 TABLET, COATED ORAL DAILY
Qty: 90 TABLET | Refills: 1 | Status: SHIPPED | OUTPATIENT
Start: 2023-02-22

## 2023-03-03 ENCOUNTER — OFFICE VISIT (OUTPATIENT)
Dept: NEUROLOGY | Age: 63
End: 2023-03-03
Payer: COMMERCIAL

## 2023-03-03 VITALS
OXYGEN SATURATION: 96 % | HEIGHT: 74 IN | HEART RATE: 63 BPM | SYSTOLIC BLOOD PRESSURE: 111 MMHG | WEIGHT: 290 LBS | BODY MASS INDEX: 37.22 KG/M2 | DIASTOLIC BLOOD PRESSURE: 78 MMHG

## 2023-03-03 DIAGNOSIS — Z99.89 BIPAP (BIPHASIC POSITIVE AIRWAY PRESSURE) DEPENDENCE: ICD-10-CM

## 2023-03-03 DIAGNOSIS — G47.33 OBSTRUCTIVE SLEEP APNEA: Primary | ICD-10-CM

## 2023-03-03 PROCEDURE — 99214 OFFICE O/P EST MOD 30 MIN: CPT | Performed by: PHYSICIAN ASSISTANT

## 2023-03-03 PROCEDURE — 3078F DIAST BP <80 MM HG: CPT | Performed by: PHYSICIAN ASSISTANT

## 2023-03-03 PROCEDURE — 3074F SYST BP LT 130 MM HG: CPT | Performed by: PHYSICIAN ASSISTANT

## 2023-03-03 NOTE — LETTER
[address] [facility]  [address] [place] [address], [address] [place] [address],[address]  Paul Howard  Phone [phone]  Fax [phone]               Re:  Alecia Ephraim.    23  :  1960  Address: [address] [place] [address]       Diagnoses:  Obstructive sleep apnea (G47.33)  Length of Need:  Lifetime, 99      To Whom It May Concern:  [employer]      Current therapy: Auto BiPAP with a pressure range of 12cm to 22cm (does not include pressure support)    Change to:   Auto BiPAP with a pressure rang of 12cm to 22cm with a minimum pressure support of 4cm and a maximum pressure support of 8cm        Ordering Provider:  Jessika Eng PA-C  NPI:  [id number]          Signature:  [unfilled]        Date: 3/3/2023      Electronically Signed by Jessika Eng PA-C  on 3/3/2023 at 1:04 PM

## 2023-03-03 NOTE — PATIENT INSTRUCTIONS

## 2023-03-03 NOTE — PROGRESS NOTES
[facility] Neurology and Sleep Medicine  [address] [place] [address] DriveJacey  [facility], [address]  Phone [phone]  Fax [phone]       [facility] Sleep Follow Up Encounter      Information:   Patient Name: Elizabeth Grant. :   1960  Age:   57 y.o. MRN:   [id number]  Account #:  [de-identified]  Today:                3/3/23    Provider:  Luis Manuel De Souza PA-C    Chief Complaint   Patient presents with    Sleep Apnea    Follow-up        Subjective:   Elizabeth Grant. is a 57 y.o. male  with a history of severe HANANE who comes in for a sleep clinic follow up. The PSG, 2015 revealed an AHI of 36.5. At his last office visit, 10/10/2022 he received orders for a new BiPAP with a pressure range of 12cm to 22cm. He had previously had a [employer] device and it was also out of date. Today he reports that he had trouble with the mask initially. He has since obtained a mask that fits better, but he is aware of leaks. He also doesn't feel that he the PAP is providing proper air flow. He feels that he has to make an effort to breath with the device. He doesn't feel as refreshed in the morning. The compliance report indicates that he averages almost 9 hours of PAP usage per day.        Objective:     Past Medical History:   Diagnosis Date    A-fib ([address]     Atelectasis of right lung 2017    Back pain, lumbosacral     BiPAP (biphasic positive airway pressure) dependence     12cm to 22cm    Clotting disorder (HCC)     Cyst near coccyx     removed    GERD (gastroesophageal reflux disease)     Heart murmur     Hx of blood clots     Hyperlipidemia     Hypertension     Mild CAD 2018    Obstructive sleep apnea     AHI:  36.5 per PSG, 2015 - BiPAP    Osteoarthritis     Paroxysmal atrial fibrillation with RVR ([address] 2017    Pneumonia due to organism     Pulmonary embolism [facility])        Past Surgical History:   Procedure Laterality Date    ANKLE FRACTURE SURGERY Left     ARM SURGERY Left 2018    fracture     CARDIAC CATHETERIZATION  06/2018    Mild CAD    CHOLECYSTECTOMY, LAPAROSCOPIC N/A 12/15/2017    CHOLECYSTECTOMY LAPAROSCOPIC performed by Rajendra Montano MD at [address]  2012    COLONOSCOPY N/A 02/06/2023    Dr MARIAJOSE Hope-Diverticular disease, 10 yr recall    GALLBLADDER SURGERY      [place] EGD TRANSORAL BIOPSY SINGLE/MULTIPLE N/A 11/15/2018    Dr Letitia Hooker      undecended testicle done when 1years old       Recent Hospitalizations      Significant Injuries      Family History   Problem Relation Age of Onset    Heart Disease Mother     High Blood Pressure Mother     Heart Disease Father 54        mi    High Blood Pressure Father     High Cholesterol Father     Lung Cancer Father     Cancer Father         Lung CA - Oat cell    Stroke Father     Diabetes Paternal Aunt     Colon Cancer Neg Hx     Colon Polyps Neg Hx     Esophageal Cancer Neg Hx     Liver Cancer Neg Hx     Liver Disease Neg Hx     Rectal Cancer Neg Hx     Stomach Cancer Neg Hx        Social History  Social History     Tobacco Use   Smoking Status Never   Smokeless Tobacco Former    Types: Chew     Social History     Substance and Sexual Activity   Alcohol Use Yes    Comment: occ     Social History     Substance and Sexual Activity   Drug Use No         Current Outpatient Medications   Medication Sig Dispense Refill    fluticasone (FLONASE) 50 MCG/ACT nasal spray 1 spray by Nasal route daily 48 g 5    rosuvastatin (CRESTOR) 10 MG tablet Take 1 tablet by mouth daily 90 tablet 1    sotalol (BETAPACE) 80 MG tablet TAKE 1 TABLET TWICE A  tablet 1    chlorthalidone (HYGROTON) 25 MG tablet Take 0.5 tablets by mouth daily 45 tablet 1    Bacillus Coagulans-Inulin (PROBIOTIC-PREBIOTIC) 1-250 BILLION-MG CAPS Take by mouth      potassium chloride (MICRO-K) 10 MEQ extended release capsule Take 1 capsule by mouth daily 90 capsule 1    lisinopril (PRINIVIL;ZESTRIL) 40 MG tablet TAKE 1 TABLET DAILY 90 tablet 1    colestipol (COLESTID) 1 g tablet Take 1 tablet by mouth daily 90 tablet 3    omeprazole (PRILOSEC) 20 MG delayed release capsule TAKE 1 CAPSULE TWICE A  capsule 3    B Complex Vitamins (B COMPLEX PO) Take 150 mg by mouth      magnesium gluconate (MAGONATE) 500 MG tablet Take 500 mg by mouth daily      amLODIPine (NORVASC) 5 MG tablet Take 1 tablet by mouth daily 90 tablet 3    cloNIDine (CATAPRES) 0.1 MG tablet Take 1 tablet night and take one every 12 hours if BP systolic blood pressure over 734 or diastolic BP over 425 322 tablet 1    hyoscyamine (OSCIMIN) 125 MCG TBDP dispersible tablet PLACE 2 TABLETS ON TONGUE EVERY 12 HOURS AS NEEDED FOR ABDOMINAL PAIN/CRAMPING 60 tablet 24    rivaroxaban (XARELTO) 20 MG TABS tablet Take 1 tablet by mouth Daily with supper As needed for AF episode 12hrs or longer in duration 90 tablet 1    Potassium 99 MG TABS Take 99 mg by mouth daily       niacin (NIASPAN) 1000 MG extended release tablet Take 1,000 mg by mouth 4 times daily      aspirin 81 MG EC tablet Take 81 mg by mouth daily      Omega-3 Fatty Acids (FISH OIL PO) Take 1,280 mg by mouth daily       Ascorbic Acid (VITAMIN C) 500 MG tablet Take 500 mg by mouth 2 times daily       vitamin E 400 UNIT capsule Take 400 Units by mouth daily       No current facility-administered medications for this visit. Allergies:  Patient has no known allergies.     REVIEW OF SYSTEMS     Constitutional: []Fever []Sweats []Chills [] Recent Injury   [x] Denies all unless marked  HENT:[]Headache  [] Head Injury  [] Sore Throat  [] Ear Pain  [] Dizziness [] Hearing Loss   [x] Denies all unless marked  Musculoskeletal: [] Arthralgia  [] Myalgias [] Muscle cramps  [] Muscle twitches   [x] Denies all unless marked   Spine:  [] Neck pain  [] Back pain  [] Sciatica  [x] Denies all unless marked  Neurological:[] Visual Disturbance [] Double Vision [] Slurred Speech [] Trouble swallowing  [] Vertigo [] Tingling [] Numbness [] Weakness [] Loss of Balance   [] Loss of Consciousness [] Memory Loss [] Seizures  [x] Denies all unless marked  Psychiatric/Behavioral:[] Depression [] Anxiety  [x] Denies all unless marked  Sleep: []  Insomnia [] Sleep Disturbance [] Snoring [] Restless Legs [] Daytime Sleepiness [x] Sleep Apnea  [] Denies all unless marked    The MA has completed the ROS with the patient. I have reviewed it in its' entirety with the patient and agree with the documentation. PHYSICAL EXAM  /78   Pulse 63   Ht 6' 2\" (1.88 m)   Wt 290 lb (131.5 kg)   SpO2 96%   BMI 37.23 kg/m²     Constitutional -  Alert in NAD, well developed, pleasant and cooperative with exam  HEENT- Conjunctiva normal.  No scars, masses, or lesions over external nose or ears, hearing intact, no neck masses noted, no jugular vein distension, no bruit  Musculoskeletal - No significant wasting of muscles noted. No bony deformities  Extremities - No clubbing, cyanosis or edema  Skin - Warm, dry, and intact.   No rash, erythema, or pallor  Psychiatric - Mood, affect, and behavior appear normal      Neurological exam  Awake, alert, fluent oriented  appropriate affect  Attention and concentration appear appropriate  Recent and remote memory appears unremarkable  Speech normal without dysarthria  No clear issues with language of fund of knowledge    Cranial Nerve Exam     CN III, IV,VI-EOMI, No nystagmus, conjugate eye movements, no ptosis  CN VII-No facial assymetry    Motor Exam    Antigravity throughout upper and lower extremities bilaterally    Tremors and coordination    No tremors in hands or head noted     Gait    Normal base and speed  No ataxia    I reviewed the following studies:       []  :  Clinical laboratory test results     []  :  Radiology reports                    [x]  :  Review and summarization of medical records-compliance report      []     Request for medical records       []  :  Reviewed previous/recent polysomnogram report(s)      []  :  [place] Sleepiness Scale       [x]  :  Compliance report :  The auto BiPAP ordered set at a pressure of 12cm to 22cm. Compliance download shows that he uses device: 100% of the time;  percentage of days with usage >=4 hours: 100%. AHI: 5.26 (large leaks noted)    Assessment:       ICD-10-CM    1. Obstructive sleep apnea  G47.33       2. BiPAP (biphasic positive airway pressure) dependence  Z99.89                []  :  Stable     []  :  Improved                       []  :  Well controlled              []  :  Resolving     []  :  Resolved     [x]  :  Inadequately controlled-per pt report     []  :  Worsening     []  :  Additional workup planned    Mavis Almeida. is compliant and benefiting from therapy as indicated by compliance evaluation; noted leaks; low residual AHI        Plan:     No orders of the defined types were placed in this encounter. 1.   Previously advised of the etiology,  pathophysiology, signs, symptoms, diagnosis, treatment options, and risks of untreated HANANE. Risks may include, but are not limited to  hypertension, coronary artery disease, atrial fibrillation, CHF, diabetes, stroke, weight gain, impaired cognition, daytime somnolence, and motor vehicle accidents. Advised to abstain from driving or operating heavy machinery when drowsy and the use of respiratory suppressants. Reviewed treatment plan. Counseled on multimodal approach to treatment of sleep apnea to include but not limited to diet, exercise, sleep hygiene, and compliance with pap therapy, if indicated. 2.  Will evaluate for PAP clinical benefit and compliance during a 30 day period within the preceding 90 days PRN. 3.  The following educational material has been included in this visit after visit summary for your review: HANANE/PAP guidelines-Discussed with the patient and all questions fully answered. 4.  Continue PAP therapy. The patient voices understanding and recognizes the need for adherence to the prescribed therapy  5. Reviewed device setting as noted on the 5/24/2017 download and he was prescribed auto Bi-flex with a pressure range of 12cm to 22cm with a minimum pressure support of 4cm and a maximum pressure support of 8cm; will send orders for device setting to include pressure support to [facility]. 6.  Follow up in 3 to reassess symptomatology, PAP tolerance, efficacy and compliance; sooner if symptomatic; if no improvement on additional pressure support setting, consider a repeat sleep study to assess for central sleep apnea           ORDER:     Current therapy: Auto BiPAP with a pressure range of 12cm to 22cm (does not include pressure support)    Change to: Auto BiPAP with a pressure rang of 12cm to 22cm with a minimum pressure support of 4cm and a maximum pressure support of 8cm        Ordering Provider:  Samia Pickett PA-C  NPI:  [id number]      I spent a total of 35 minutes in management and coordination of pt care.

## 2023-03-10 ENCOUNTER — TELEPHONE (OUTPATIENT)
Dept: NEUROLOGY | Age: 63
End: 2023-03-10

## 2023-03-10 NOTE — TELEPHONE ENCOUNTER
Kristi Sullivan, Patient called after he speaking christina at [facility] and patient is needing a order for Back up rate     Please place order

## 2023-04-10 DIAGNOSIS — Z00.00 ANNUAL PHYSICAL EXAM: ICD-10-CM

## 2023-04-10 DIAGNOSIS — R73.01 IFG (IMPAIRED FASTING GLUCOSE): ICD-10-CM

## 2023-04-10 DIAGNOSIS — F51.01 PRIMARY INSOMNIA: ICD-10-CM

## 2023-04-10 DIAGNOSIS — I48.0 PAF (PAROXYSMAL ATRIAL FIBRILLATION) (HCC): ICD-10-CM

## 2023-04-10 DIAGNOSIS — E78.2 MIXED HYPERLIPIDEMIA: ICD-10-CM

## 2023-04-10 DIAGNOSIS — E66.09 EXOGENOUS OBESITY: ICD-10-CM

## 2023-04-10 DIAGNOSIS — I10 ESSENTIAL HYPERTENSION: ICD-10-CM

## 2023-04-10 DIAGNOSIS — Z12.11 SCREENING FOR COLON CANCER: ICD-10-CM

## 2023-04-10 DIAGNOSIS — Z23 FLU VACCINE NEED: ICD-10-CM

## 2023-04-10 DIAGNOSIS — R71.8 ELEVATED MCV: ICD-10-CM

## 2023-04-10 LAB
ALBUMIN SERPL-MCNC: 4.3 G/DL (ref 3.5–5.2)
ALP SERPL-CCNC: 57 U/L (ref 40–130)
ALT SERPL-CCNC: 29 U/L (ref 5–41)
ANION GAP SERPL CALCULATED.3IONS-SCNC: 14 MMOL/L (ref 7–19)
AST SERPL-CCNC: 32 U/L (ref 5–40)
BASOPHILS # BLD: 0 K/UL (ref 0–0.2)
BASOPHILS NFR BLD: 0.7 % (ref 0–1)
BILIRUB SERPL-MCNC: 0.8 MG/DL (ref 0.2–1.2)
BUN SERPL-MCNC: 12 MG/DL (ref 8–23)
CALCIUM SERPL-MCNC: 9.5 MG/DL (ref 8.8–10.2)
CHLORIDE SERPL-SCNC: 105 MMOL/L (ref 98–111)
CHOLEST SERPL-MCNC: 149 MG/DL (ref 160–199)
CO2 SERPL-SCNC: 26 MMOL/L (ref 22–29)
CREAT SERPL-MCNC: 0.8 MG/DL (ref 0.5–1.2)
EOSINOPHIL # BLD: 0.3 K/UL (ref 0–0.6)
EOSINOPHIL NFR BLD: 5.6 % (ref 0–5)
ERYTHROCYTE [DISTWIDTH] IN BLOOD BY AUTOMATED COUNT: 13.4 % (ref 11.5–14.5)
GLUCOSE SERPL-MCNC: 110 MG/DL (ref 74–109)
HBA1C MFR BLD: 5.3 % (ref 4–6)
HCT VFR BLD AUTO: 43.7 % (ref 42–52)
HDLC SERPL-MCNC: 37 MG/DL (ref 55–121)
HGB BLD-MCNC: 14.2 G/DL (ref 14–18)
IMM GRANULOCYTES # BLD: 0 K/UL
LDLC SERPL CALC-MCNC: 62 MG/DL
LYMPHOCYTES # BLD: 1.8 K/UL (ref 1.1–4.5)
LYMPHOCYTES NFR BLD: 34 % (ref 20–40)
MCH RBC QN AUTO: 33.4 PG (ref 27–31)
MCHC RBC AUTO-ENTMCNC: 32.5 G/DL (ref 33–37)
MCV RBC AUTO: 102.8 FL (ref 80–94)
MONOCYTES # BLD: 0.6 K/UL (ref 0–0.9)
MONOCYTES NFR BLD: 11.9 % (ref 0–10)
NEUTROPHILS # BLD: 2.6 K/UL (ref 1.5–7.5)
NEUTS SEG NFR BLD: 47.6 % (ref 50–65)
PLATELET # BLD AUTO: 213 K/UL (ref 130–400)
PMV BLD AUTO: 9.5 FL (ref 9.4–12.4)
POTASSIUM SERPL-SCNC: 4.6 MMOL/L (ref 3.5–5)
PROT SERPL-MCNC: 6.7 G/DL (ref 6.6–8.7)
RBC # BLD AUTO: 4.25 M/UL (ref 4.7–6.1)
SODIUM SERPL-SCNC: 145 MMOL/L (ref 136–145)
TRIGL SERPL-MCNC: 248 MG/DL (ref 0–149)
TSH SERPL DL<=0.005 MIU/L-ACNC: 1.69 UIU/ML (ref 0.27–4.2)
WBC # BLD AUTO: 5.4 K/UL (ref 4.8–10.8)

## 2023-05-02 RX ORDER — MONTELUKAST SODIUM 4 MG/1
1 TABLET, CHEWABLE ORAL DAILY
Qty: 90 TABLET | Refills: 3 | Status: SHIPPED | OUTPATIENT
Start: 2023-05-02

## 2023-05-02 NOTE — TELEPHONE ENCOUNTER
Robert Campos called requesting a refill of the below medication which has been pended for you:     Requested Prescriptions     Pending Prescriptions Disp Refills    colestipol (COLESTID) 1 g tablet 90 tablet 3     Sig: Take 1 tablet by mouth daily       Last Appointment Date: 4/12/2023  Next Appointment Date: 10/17/2023    No Known Allergies

## 2023-05-09 ENCOUNTER — TELEPHONE (OUTPATIENT)
Dept: NEUROLOGY | Age: 63
End: 2023-05-09

## 2023-05-31 NOTE — TELEPHONE ENCOUNTER
Information has been sent to Dr Saud Cui and I have also faxed in the information provided by David Keith to [place] at [address]

## 2023-06-09 ENCOUNTER — OFFICE VISIT (OUTPATIENT)
Dept: NEUROLOGY | Age: 63
End: 2023-06-09
Payer: MEDICARE

## 2023-06-09 VITALS
HEART RATE: 60 BPM | SYSTOLIC BLOOD PRESSURE: 118 MMHG | BODY MASS INDEX: 37.22 KG/M2 | HEIGHT: 74 IN | WEIGHT: 290 LBS | DIASTOLIC BLOOD PRESSURE: 72 MMHG | OXYGEN SATURATION: 100 %

## 2023-06-09 DIAGNOSIS — G47.33 OBSTRUCTIVE SLEEP APNEA: Primary | ICD-10-CM

## 2023-06-09 DIAGNOSIS — Z78.9 DIFFICULTY WITH BIPAP USE: ICD-10-CM

## 2023-06-09 DIAGNOSIS — E66.09 EXOGENOUS OBESITY: ICD-10-CM

## 2023-06-09 DIAGNOSIS — I10 ESSENTIAL HYPERTENSION: ICD-10-CM

## 2023-06-09 DIAGNOSIS — Z12.5 SCREENING PSA (PROSTATE SPECIFIC ANTIGEN): ICD-10-CM

## 2023-06-09 DIAGNOSIS — R53.83 OTHER FATIGUE: ICD-10-CM

## 2023-06-09 DIAGNOSIS — I48.0 PAF (PAROXYSMAL ATRIAL FIBRILLATION) (HCC): ICD-10-CM

## 2023-06-09 DIAGNOSIS — F51.01 PRIMARY INSOMNIA: ICD-10-CM

## 2023-06-09 DIAGNOSIS — E78.2 MIXED HYPERLIPIDEMIA: ICD-10-CM

## 2023-06-09 DIAGNOSIS — R73.01 IFG (IMPAIRED FASTING GLUCOSE): ICD-10-CM

## 2023-06-09 DIAGNOSIS — R06.81 WITNESSED APNEIC SPELLS: ICD-10-CM

## 2023-06-09 DIAGNOSIS — B35.1 ONYCHOMYCOSIS: ICD-10-CM

## 2023-06-09 LAB
ALT SERPL-CCNC: 28 U/L (ref 5–41)
AST SERPL-CCNC: 32 U/L (ref 5–40)

## 2023-06-09 PROCEDURE — 99214 OFFICE O/P EST MOD 30 MIN: CPT | Performed by: PHYSICIAN ASSISTANT

## 2023-06-09 PROCEDURE — 3074F SYST BP LT 130 MM HG: CPT | Performed by: PHYSICIAN ASSISTANT

## 2023-06-09 PROCEDURE — 1123F ACP DISCUSS/DSCN MKR DOCD: CPT | Performed by: PHYSICIAN ASSISTANT

## 2023-06-09 PROCEDURE — 3078F DIAST BP <80 MM HG: CPT | Performed by: PHYSICIAN ASSISTANT

## 2023-06-09 NOTE — PROGRESS NOTES
REVIEW OF SYSTEMS    Constitutional: []Fever []Sweats []Chills [] Recent Injury   [x] Denies all unless marked  HENT:[]Headache  [] Head Injury  [] Sore Throat  [] Ear Pain  [] Dizziness [] Hearing Loss   [x] Denies all unless marked  Musculoskeletal: [] Arthralgia  [] Myalgias [] Muscle cramps  [] Muscle twitches   [x] Denies all unless marked   Spine:  [] Neck pain  [] Back pain  [] Sciatica  [x] Denies all unless marked  Neurological:[] Visual Disturbance [] Double Vision [] Slurred Speech [] Trouble swallowing  [] Vertigo [] Tingling [] Numbness [] Weakness [] Loss of Balance   [] Loss of Consciousness [] Memory Loss [] Seizures  [x] Denies all unless marked  Psychiatric/Behavioral:[] Depression [] Anxiety  [x] Denies all unless marked  Sleep: []  Insomnia [] Sleep Disturbance [] Snoring [] Restless Legs [] Daytime Sleepiness [x] Sleep Apnea  [] Denies all unless marked
facial assymetry    Motor Exam    Antigravity throughout upper and lower extremities bilaterally    Gait    Normal base and speed  No ataxia    I reviewed the following studies:       []  :  Clinical laboratory test results     []  :  Radiology reports                    [x]  :  Review and summarization of medical records-compliance report      []     Request for medical records       []  :  Reviewed previous/recent polysomnogram report(s)      [x]  :  [place] Sleepiness Scale: 6    [place] Sleepiness Scale    Rate the likelihood of dozin = would never doze-\"never\"  1 = slight chance of dozing-\"rarely\"  2 = moderate chance of dozing-\"sometimes\"  3 = high chance of dozing-\"always\"    Situation Chance of Dozing (0-3)    Sitting and reading       1    Watching TV        2    Sitting, inactive in a public place (e.g. a theatre or a meeting) 1    As a passenger in a car for an hour without a break   1    Lying down to rest in the afternoon when circumstances permit 1    Sitting and talking to someone     0    Sitting quietly after lunch without alcohol    0    In a car, while stopped for a few minutes in the traffic  0    Total             Previous ESS score:  ()        NOTE: The total ESS score can range from 0 to 24, with higher scores correlating with increasing degrees of sleepiness. A score greater than 10 is consistent with excessive sleepiness. [x]  :  Compliance report :  The auto BiPAP is set at a pressure range of 12cm to 22cm. Compliance download shows that he uses device: 100% of the time;  percentage of days with usage >=4 hours: 100%. AHI: 5.01 (large leaks with leak 95%: 95.60 L/min)-discussed    Assessment:       ICD-10-CM    1. Obstructive sleep apnea  G47.33 Sleep Study with PAP Titration     [facility]      2. Other fatigue  R53.83 Sleep Study with PAP Titration     [facility]      3.  Difficulty with BiPAP use  Z78.9 Sleep Study with PAP

## 2023-06-09 NOTE — PATIENT INSTRUCTIONS
reports, additional testing, and face-to-face  clinical evaluation subsequent to any treatment, changes in treatment, and continued treatment. Caution:  Please abstain from driving or engaging in other activities which may be hazardous in the presence of diminished alertness or daytime drowsiness. And avoid the use of sedatives or alcohol, which can worsen sleep apnea and daytime drowsiness. Mask suggestions:  -     [employer]med Airfit N20 (Nasal) or F20 (Full face mask). They conform to your face, thus decreasing the potential for mask leakage. You might like the AirTouch F20(full face mask). It has a \"memory foam\" like cushion. The AirFit F30 is a smaller style full face mask designed to sit low on and cover less of your face for fewer facial marks. AirFit N30i has a top of the head tube with a nasal mask. AirFit P10 reported to be the most comfortable nasal pillow mask. Resmed Mirage FX reported to be the most comfortable nasal mask. Resmed Mirage Clarke reported to be the most comfortable hybrid mask. AirTouch N20-memory foam nasal mask. Respironics: You might also like to try a nasal mask called a Dreamwear nasal mask or the Dreamwear nasal pillow. Another suggestion is the [facility], it is a minimal contact full face mask. The Bharati Dowdy incredible under the nose design makes it the only full face mask that won't cause red marks on the bridge of your nose when compared to other full face masks. The Dreamwear full face mask has a  soft feel, unique in-frame air-flow, and innovative air tube connection at the top of the head for the ultimate in sleep comfort. Comfort Gel Blue. Dreamwear gel pillows. Carlos & Lori: Brevida nasal pillow mask and Simplus FFM    The use of a memory foam CPAP pillow supports the head and neck throughout the night.

## 2023-06-26 DIAGNOSIS — I48.0 PAROXYSMAL ATRIAL FIBRILLATION WITH RVR (HCC): ICD-10-CM

## 2023-06-26 RX ORDER — SOTALOL HYDROCHLORIDE 80 MG/1
TABLET ORAL
Qty: 180 TABLET | Refills: 3 | Status: SHIPPED | OUTPATIENT
Start: 2023-06-26

## 2023-07-06 RX ORDER — FLUTICASONE PROPIONATE 50 MCG
1 SPRAY, SUSPENSION (ML) NASAL DAILY
Qty: 48 G | Refills: 5 | Status: SHIPPED | OUTPATIENT
Start: 2023-07-06

## 2023-07-06 NOTE — TELEPHONE ENCOUNTER
[address] [place] [address] called to request a refill on his medication.       Last office visit : 2023   Next office visit : 10/17/2023     Requested Prescriptions     Pending Prescriptions Disp Refills    fluticasone (FLONASE) 50 MCG/ACT nasal spray 48 g 5     Si spray by Nasal route daily            April Lorelei [place], [place]

## 2023-07-10 RX ORDER — ROSUVASTATIN CALCIUM 10 MG/1
10 TABLET, COATED ORAL DAILY
Qty: 90 TABLET | Refills: 1 | Status: SHIPPED | OUTPATIENT
Start: 2023-07-10

## 2023-07-10 NOTE — TELEPHONE ENCOUNTER
Last Appointment Date: 4/12/2023  Next Appointment Date: 10/17/2023    No Known Allergies    Patient needs refill on   Requested Prescriptions     Pending Prescriptions Disp Refills    rosuvastatin (CRESTOR) 10 MG tablet 90 tablet 1     Sig: Take 1 tablet by mouth daily

## 2023-07-25 RX ORDER — AMLODIPINE BESYLATE 5 MG/1
5 TABLET ORAL DAILY
Qty: 90 TABLET | Refills: 3 | Status: SHIPPED | OUTPATIENT
Start: 2023-07-25

## 2023-08-07 RX ORDER — POTASSIUM CHLORIDE 750 MG/1
10 CAPSULE, EXTENDED RELEASE ORAL DAILY
Qty: 90 CAPSULE | Refills: 1 | Status: SHIPPED | OUTPATIENT
Start: 2023-08-07

## 2023-08-07 RX ORDER — OMEPRAZOLE 20 MG/1
20 CAPSULE, DELAYED RELEASE ORAL 2 TIMES DAILY
Qty: 180 CAPSULE | Refills: 3 | Status: SHIPPED | OUTPATIENT
Start: 2023-08-07

## 2023-08-07 NOTE — TELEPHONE ENCOUNTER
Isabela Osorio called requesting a refill of the below medication which has been pended for you:     Requested Prescriptions     Pending Prescriptions Disp Refills    omeprazole (PRILOSEC) 20 MG delayed release capsule 180 capsule 3     Sig: Take 1 capsule by mouth 2 times daily    potassium chloride (MICRO-K) 10 MEQ extended release capsule 90 capsule 1     Sig: Take 1 capsule by mouth daily       Last Appointment Date: 4/12/2023  Next Appointment Date: 10/17/2023    No Known Allergies

## 2023-08-22 ENCOUNTER — HOSPITAL ENCOUNTER (OUTPATIENT)
Dept: SLEEP CENTER | Age: 63
Discharge: HOME | End: 2023-08-24
Payer: MEDICARE

## 2023-08-22 PROCEDURE — 95811 POLYSOM 6/>YRS CPAP 4/> PARM: CPT

## 2023-08-23 NOTE — PROGRESS NOTES
Critical access hospital  [address]  Phone [phone] Fax [phone]     Sleep Study Technician Review    Patient Name:  Kenia Bal. :   1960  Referring Provider: INES Geronimo    Brief History:  Kenia Rosario is a 70 y.o. male with a history of Afib, GERD, HTN, and HANANE who presented for a split-night PSG. Pt is currently on home Auto-BiPAP of min EPAP of 12 and Max IPAP of 67lsT1T. Pt received a new PAP device in 2023 and feels like it is not working as well as his previous machine as he has been feeling more symptomatic. Pt reported witnessed apneas, recurrent daytime fatigue, falling asleep when sedentary, and non-restorative sleep despite BiPAP use. Previous split-night PSG on 2015 showed an AHI of 36.5 with an SpO2 roxanna of 79% during the diagnostic portion of the test. Pt was titrated to a BiPAP pressure of 21/17 cmH2O with AHI of  3.6. Pt spent 98.9 asleep at that setting, 47.5 of which were in REM sleep. Height:   73\"  Weight: 265 lbs (25 lb weight loss since 2023)   BMI:  35.0  Neck Circ: 18.0\"  Mallampati  4  ESS:  6 ( on BiPAP)    Type of Study: Split Night PSG  Time Stage Position Snore Hypopnea Obs Apnea Zoey Apnea PAP O2   2200 N2 supine Yes Yes No No N/A RA   2300 N2/W supine Yes Yes No No N/A RA   2400 N1/N2 supine Yes Yes No No N/A RA   0100 W/N2 supine Yes Yes Yes No 15/11 RA   0200 REM/N2 supine Yes Yes Yes No  RA   0300 REM supine No Yes No Yes  RA   0400 N2/N3 supine No Yes No Yes  RA     Summary:Pt arrived at the sleep center on time. Tech introduced self, verified pt's name and , and escorted pt to room. Tech explained procedure and answered questions. Pt was instructed in supine sleep. Pt verbalized understanding of procedure. Pt was prepared for PSG per protocol without complication.  After sleep onset respiratory events were noted with an estimated AHI in the mild-moderate range

## 2023-09-12 RX ORDER — CHLORTHALIDONE 25 MG/1
12.5 TABLET ORAL DAILY
Qty: 45 TABLET | Refills: 1 | Status: SHIPPED | OUTPATIENT
Start: 2023-09-12

## 2023-09-12 NOTE — TELEPHONE ENCOUNTER
[address] [place] [address] called to request a refill on his medication.       Last office visit : 4/12/2023   Next office visit : 10/17/2023     Requested Prescriptions     Pending Prescriptions Disp Refills    chlorthalidone (HYGROTON) 25 MG tablet 45 tablet 1     Sig: Take 0.5 tablets by mouth daily            April Lorelei [place], [place]

## 2023-09-24 DIAGNOSIS — G47.33 OBSTRUCTIVE SLEEP APNEA: Primary | ICD-10-CM

## 2023-09-25 ENCOUNTER — FOLLOWUP TELEPHONE ENCOUNTER (OUTPATIENT)
Dept: SLEEP CENTER | Age: 63
End: 2023-09-25

## 2023-09-25 NOTE — PROGRESS NOTES
Spoke with patient gave him result of sleep study. His orders will be sent to [address] [place] [address].

## 2023-10-12 DIAGNOSIS — E78.2 MIXED HYPERLIPIDEMIA: ICD-10-CM

## 2023-10-12 DIAGNOSIS — R73.01 IFG (IMPAIRED FASTING GLUCOSE): ICD-10-CM

## 2023-10-12 DIAGNOSIS — I48.0 PAF (PAROXYSMAL ATRIAL FIBRILLATION) (HCC): ICD-10-CM

## 2023-10-12 DIAGNOSIS — Z12.5 SCREENING PSA (PROSTATE SPECIFIC ANTIGEN): ICD-10-CM

## 2023-10-12 DIAGNOSIS — F51.01 PRIMARY INSOMNIA: ICD-10-CM

## 2023-10-12 DIAGNOSIS — I10 ESSENTIAL HYPERTENSION: ICD-10-CM

## 2023-10-12 DIAGNOSIS — E66.09 EXOGENOUS OBESITY: ICD-10-CM

## 2023-10-12 LAB
ALBUMIN SERPL-MCNC: 4.5 G/DL (ref 3.5–5.2)
ALP SERPL-CCNC: 81 U/L (ref 40–130)
ALT SERPL-CCNC: 24 U/L (ref 5–41)
ANION GAP SERPL CALCULATED.3IONS-SCNC: 15 MMOL/L (ref 7–19)
AST SERPL-CCNC: 25 U/L (ref 5–40)
BASOPHILS # BLD: 0 K/UL (ref 0–0.2)
BASOPHILS NFR BLD: 0.9 % (ref 0–1)
BILIRUB SERPL-MCNC: 0.7 MG/DL (ref 0.2–1.2)
BILIRUB UR QL STRIP: NEGATIVE
BUN SERPL-MCNC: 20 MG/DL (ref 8–23)
CALCIUM SERPL-MCNC: 9.9 MG/DL (ref 8.8–10.2)
CHLORIDE SERPL-SCNC: 104 MMOL/L (ref 98–111)
CHOLEST SERPL-MCNC: 109 MG/DL (ref 160–199)
CLARITY UR: CLEAR
CO2 SERPL-SCNC: 25 MMOL/L (ref 22–29)
COLOR UR: YELLOW
CREAT SERPL-MCNC: 1 MG/DL (ref 0.5–1.2)
EOSINOPHIL # BLD: 0.7 K/UL (ref 0–0.6)
EOSINOPHIL NFR BLD: 14.4 % (ref 0–5)
ERYTHROCYTE [DISTWIDTH] IN BLOOD BY AUTOMATED COUNT: 12.5 % (ref 11.5–14.5)
GLUCOSE SERPL-MCNC: 98 MG/DL (ref 74–109)
GLUCOSE UR STRIP.AUTO-MCNC: NEGATIVE MG/DL
HBA1C MFR BLD: 5.3 % (ref 4–6)
HCT VFR BLD AUTO: 43.5 % (ref 42–52)
HDLC SERPL-MCNC: 25 MG/DL (ref 55–121)
HGB BLD-MCNC: 14.2 G/DL (ref 14–18)
HGB UR STRIP.AUTO-MCNC: NEGATIVE MG/L
IMM GRANULOCYTES # BLD: 0 K/UL
KETONES UR STRIP.AUTO-MCNC: NEGATIVE MG/DL
LDLC SERPL CALC-MCNC: 64 MG/DL
LEUKOCYTE ESTERASE UR QL STRIP.AUTO: NEGATIVE
LYMPHOCYTES # BLD: 1.4 K/UL (ref 1.1–4.5)
LYMPHOCYTES NFR BLD: 30.9 % (ref 20–40)
MCH RBC QN AUTO: 32.8 PG (ref 27–31)
MCHC RBC AUTO-ENTMCNC: 32.6 G/DL (ref 33–37)
MCV RBC AUTO: 100.5 FL (ref 80–94)
MONOCYTES # BLD: 0.6 K/UL (ref 0–0.9)
MONOCYTES NFR BLD: 12.7 % (ref 0–10)
NEUTROPHILS # BLD: 1.9 K/UL (ref 1.5–7.5)
NEUTS SEG NFR BLD: 41.1 % (ref 50–65)
NITRITE UR QL STRIP.AUTO: NEGATIVE
PH UR STRIP.AUTO: 7.5 [PH] (ref 5–8)
PLATELET # BLD AUTO: 234 K/UL (ref 130–400)
PMV BLD AUTO: 11 FL (ref 9.4–12.4)
POTASSIUM SERPL-SCNC: 4.4 MMOL/L (ref 3.5–5)
PROT SERPL-MCNC: 7.2 G/DL (ref 6.6–8.7)
PROT UR STRIP.AUTO-MCNC: NEGATIVE MG/DL
PSA SERPL-MCNC: 3.4 NG/ML (ref 0–4)
RBC # BLD AUTO: 4.33 M/UL (ref 4.7–6.1)
SODIUM SERPL-SCNC: 144 MMOL/L (ref 136–145)
SP GR UR STRIP.AUTO: 1.02 (ref 1–1.03)
TRIGL SERPL-MCNC: 98 MG/DL (ref 0–149)
TSH SERPL DL<=0.005 MIU/L-ACNC: 2.84 UIU/ML (ref 0.27–4.2)
UROBILINOGEN UR STRIP.AUTO-MCNC: 0.2 E.U./DL
WBC # BLD AUTO: 4.5 K/UL (ref 4.8–10.8)

## 2023-10-17 ENCOUNTER — OFFICE VISIT (OUTPATIENT)
Dept: INTERNAL MEDICINE | Age: 63
End: 2023-10-17
Payer: MEDICARE

## 2023-10-17 VITALS
OXYGEN SATURATION: 99 % | WEIGHT: 253.6 LBS | BODY MASS INDEX: 32.55 KG/M2 | HEIGHT: 74 IN | DIASTOLIC BLOOD PRESSURE: 70 MMHG | SYSTOLIC BLOOD PRESSURE: 120 MMHG | HEART RATE: 47 BPM

## 2023-10-17 DIAGNOSIS — R53.83 OTHER FATIGUE: ICD-10-CM

## 2023-10-17 DIAGNOSIS — E66.09 EXOGENOUS OBESITY: ICD-10-CM

## 2023-10-17 DIAGNOSIS — Z00.00 WELCOME TO MEDICARE PREVENTIVE VISIT: Primary | ICD-10-CM

## 2023-10-17 DIAGNOSIS — Z23 NEED FOR VACCINATION: ICD-10-CM

## 2023-10-17 DIAGNOSIS — E78.2 MIXED HYPERLIPIDEMIA: ICD-10-CM

## 2023-10-17 DIAGNOSIS — I48.0 PAF (PAROXYSMAL ATRIAL FIBRILLATION) (HCC): ICD-10-CM

## 2023-10-17 DIAGNOSIS — F51.01 PRIMARY INSOMNIA: ICD-10-CM

## 2023-10-17 DIAGNOSIS — I10 ESSENTIAL HYPERTENSION: ICD-10-CM

## 2023-10-17 DIAGNOSIS — R73.01 IFG (IMPAIRED FASTING GLUCOSE): ICD-10-CM

## 2023-10-17 PROCEDURE — 3074F SYST BP LT 130 MM HG: CPT | Performed by: INTERNAL MEDICINE

## 2023-10-17 PROCEDURE — 3078F DIAST BP <80 MM HG: CPT | Performed by: INTERNAL MEDICINE

## 2023-10-17 PROCEDURE — 99213 OFFICE O/P EST LOW 20 MIN: CPT | Performed by: INTERNAL MEDICINE

## 2023-10-17 PROCEDURE — 90694 VACC AIIV4 NO PRSRV 0.5ML IM: CPT | Performed by: INTERNAL MEDICINE

## 2023-10-17 PROCEDURE — 90677 PCV20 VACCINE IM: CPT | Performed by: INTERNAL MEDICINE

## 2023-10-17 PROCEDURE — G0008 ADMIN INFLUENZA VIRUS VAC: HCPCS | Performed by: INTERNAL MEDICINE

## 2023-10-17 PROCEDURE — 1123F ACP DISCUSS/DSCN MKR DOCD: CPT | Performed by: INTERNAL MEDICINE

## 2023-10-17 PROCEDURE — G0402 INITIAL PREVENTIVE EXAM: HCPCS | Performed by: INTERNAL MEDICINE

## 2023-10-17 PROCEDURE — G0009 ADMIN PNEUMOCOCCAL VACCINE: HCPCS | Performed by: INTERNAL MEDICINE

## 2023-10-17 RX ORDER — MULTIVIT-MIN/IRON/FOLIC ACID/K 18-600-40
CAPSULE ORAL DAILY
COMMUNITY

## 2023-10-17 ASSESSMENT — ENCOUNTER SYMPTOMS
COUGH: 0
ABDOMINAL PAIN: 0
SORE THROAT: 0
CONSTIPATION: 0
WHEEZING: 0
CHEST TIGHTNESS: 0

## 2023-10-17 ASSESSMENT — PATIENT HEALTH QUESTIONNAIRE - PHQ9
SUM OF ALL RESPONSES TO PHQ QUESTIONS 1-9: 0
2. FEELING DOWN, DEPRESSED OR HOPELESS: 0
SUM OF ALL RESPONSES TO PHQ9 QUESTIONS 1 & 2: 0
1. LITTLE INTEREST OR PLEASURE IN DOING THINGS: 0
SUM OF ALL RESPONSES TO PHQ QUESTIONS 1-9: 0

## 2023-10-17 ASSESSMENT — LIFESTYLE VARIABLES: HOW MANY STANDARD DRINKS CONTAINING ALCOHOL DO YOU HAVE ON A TYPICAL DAY: PATIENT DOES NOT DRINK

## 2023-10-30 RX ORDER — ROSUVASTATIN CALCIUM 10 MG/1
10 TABLET, COATED ORAL DAILY
Qty: 90 TABLET | Refills: 1 | Status: SHIPPED | OUTPATIENT
Start: 2023-10-30

## 2023-10-30 RX ORDER — LISINOPRIL 40 MG/1
40 TABLET ORAL DAILY
Qty: 90 TABLET | Refills: 1 | Status: SHIPPED | OUTPATIENT
Start: 2023-10-30

## 2023-10-30 NOTE — TELEPHONE ENCOUNTER
[address] [place] [address] called to request a refill on his medication.       Last office visit : 10/17/2023   Next office visit : 4/18/2024     Requested Prescriptions     Pending Prescriptions Disp Refills    rosuvastatin (CRESTOR) 10 MG tablet [Pharmacy Med Name: ROSUVASTATIN TAB 10MG] 90 tablet 1     Sig: TAKE 1 TABLET DAILY    lisinopril (PRINIVIL;ZESTRIL) 40 MG tablet [Pharmacy Med Name: LISINOPRIL TAB 40MG] 90 tablet 1     Sig: TAKE 1 TABLET DAILY            Phylliss Seip, MA

## 2024-01-19 ENCOUNTER — OFFICE VISIT (OUTPATIENT)
Dept: NEUROLOGY | Age: 64
End: 2024-01-19
Payer: MEDICARE

## 2024-01-19 VITALS
BODY MASS INDEX: 31.41 KG/M2 | SYSTOLIC BLOOD PRESSURE: 92 MMHG | HEART RATE: 47 BPM | OXYGEN SATURATION: 100 % | WEIGHT: 237 LBS | DIASTOLIC BLOOD PRESSURE: 64 MMHG | HEIGHT: 73 IN

## 2024-01-19 DIAGNOSIS — Z71.2 ENCOUNTER TO DISCUSS TEST RESULTS: ICD-10-CM

## 2024-01-19 DIAGNOSIS — Z99.89 BIPAP (BIPHASIC POSITIVE AIRWAY PRESSURE) DEPENDENCE: ICD-10-CM

## 2024-01-19 DIAGNOSIS — G47.33 OBSTRUCTIVE SLEEP APNEA: Primary | ICD-10-CM

## 2024-01-19 PROCEDURE — G8427 DOCREV CUR MEDS BY ELIG CLIN: HCPCS | Performed by: PHYSICIAN ASSISTANT

## 2024-01-19 PROCEDURE — 3074F SYST BP LT 130 MM HG: CPT | Performed by: PHYSICIAN ASSISTANT

## 2024-01-19 PROCEDURE — G8484 FLU IMMUNIZE NO ADMIN: HCPCS | Performed by: PHYSICIAN ASSISTANT

## 2024-01-19 PROCEDURE — 99214 OFFICE O/P EST MOD 30 MIN: CPT | Performed by: PHYSICIAN ASSISTANT

## 2024-01-19 PROCEDURE — 1036F TOBACCO NON-USER: CPT | Performed by: PHYSICIAN ASSISTANT

## 2024-01-19 PROCEDURE — 1123F ACP DISCUSS/DSCN MKR DOCD: CPT | Performed by: PHYSICIAN ASSISTANT

## 2024-01-19 PROCEDURE — 3078F DIAST BP <80 MM HG: CPT | Performed by: PHYSICIAN ASSISTANT

## 2024-01-19 PROCEDURE — G8417 CALC BMI ABV UP PARAM F/U: HCPCS | Performed by: PHYSICIAN ASSISTANT

## 2024-01-19 PROCEDURE — 3017F COLORECTAL CA SCREEN DOC REV: CPT | Performed by: PHYSICIAN ASSISTANT

## 2024-01-19 NOTE — PROGRESS NOTES
REVIEW OF SYSTEMS    Constitutional: []Fever []Sweats []Chills [] Recent Injury [x] Denies all unless marked  HEENT:[]Headache  [] Head Injury [] Hearing Loss  [] Sore Throat  [] Ear Ache [x] Denies all unless marked  Spine:  [] Neck pain  [] Back pain  [] Sciaticia  [x] Denies all unless marked  Cardiovascular:[]Heart Disease []Palpitations [] Chest Pain   [x] Denies all unless marked  Pulmonary: []Shortness of Breath []Cough   [x] Denies all unless marked  Psychiatric/Behavioral:[] Depression [] Anxiety [x] Denies all unless marked  Gastrointestinal: []Nausea  []Vomiting  []Abdominal Pain  []Constipation  []Diarrhea  [x] Denies all unless marked  Genitourinary:   [] Frequency  [] Urgency  [] Dysuria [] Incontinence  [x] Denies all unless marked  Extremities: []Pain  []Swelling  [x] Denies all unless marked  Musculoskeletal: [] Myalgias  [] Joint Pain  [] Arthritis [] Muscle Cramps [] Muscle Twitches  [x] Denies all unless marked  Sleep: []Insomnia[]Snoring []Restless Legs  [x]Sleep Apnea  []Daytime Sleepiness  [x] Denies all unless marked  Skin:[] Rash [] Color Change [x] Denies all unless marked   Neurological:[]Visual Disturbance [] Memory Loss []Loss of Balance []Slurred Speech []Weakness []Seizures  [] Dizziness [x] Denies all unless marked      
SHANON Arango, [facility]                                                                               Board certified sleep physician        Final Polysomnogram Report           History:     Roque Hudson Jr. is a 63 year old, 73.0 inch tall, 290.0 pound (BMI 38.3) man with known obstructive sleep apnea who was referred for a polysomnogram.  He was diagnosed with HANANE years ago and has used auto adjusting BiPAP.  He received a new BiPAP last year that was prescribed at the same settings of 12cm to 22cm.  He feels like the new machine is not working correctly.  He is not resting.  He still has snoring and witnessed apneas with the machine.  BiPAP download shows nearly 100% compliance and residual AHI of 4.  The download does not show leakage or confirm settings.  His medical history is significant for hypertension, hyperlipidemia, HANANE, atrial fibrillation, GERD, arthritis, and pulmonary embolism.           Summary of the Diagnostic Portion of the Polysomnogram:     The initial portion of the polysomnogram performed on 8/22/2023 showed obstructive sleep apnea with an apnea and hypopnea index of 34.2 events per hour.  He had oxygen desaturations as low as 82% and spent 8.6% of the recorded time with an oxygen saturation less than 90%.  No periodic limb movements were noted.  His EKG showed normal sinus rhythm.  Please see the data sheets for details.           Summary of the Titration Portion of the Polysomnogram:     Mr. Tristan Ramirez was started on CPAP later in the polysomnogram.  He did not tolerate CPAP and was changed to BiPAP.  At an inspiratory pressure of 18 and an expiratory pressure of 18, his apneas and hypopneas were eliminated.  The apnea and hypopnea index on the final pressure was 2.9 events per hour.  The lowest oxygen saturation was 91% on the final settings.  Periodic limb movements were noted with an index of 10.6 events per hour and a PLMS with arousal index was 1.5.  His EKG showed normal sinus rhythm.

## 2024-01-25 RX ORDER — AMLODIPINE BESYLATE 5 MG/1
5 TABLET ORAL DAILY
Qty: 90 TABLET | Refills: 0 | Status: SHIPPED | OUTPATIENT
Start: 2024-01-25

## 2024-01-25 RX ORDER — POTASSIUM CHLORIDE 750 MG/1
10 CAPSULE, EXTENDED RELEASE ORAL DAILY
Qty: 90 CAPSULE | Refills: 1 | Status: SHIPPED | OUTPATIENT
Start: 2024-01-25

## 2024-01-25 NOTE — TELEPHONE ENCOUNTER
Roque uHdson Jr. called to request a refill on his medication.      Last office visit : 10/17/2023   Next office visit : 4/18/2024     Requested Prescriptions     Pending Prescriptions Disp Refills    potassium chloride (MICRO-K) 10 MEQ extended release capsule 90 capsule 1     Sig: Take 1 capsule by mouth daily            Itzel Sarabia MA

## 2024-03-11 RX ORDER — LISINOPRIL 40 MG/1
40 TABLET ORAL DAILY
Qty: 90 TABLET | Refills: 1 | Status: SHIPPED | OUTPATIENT
Start: 2024-03-11

## 2024-03-11 RX ORDER — CHLORTHALIDONE 25 MG/1
12.5 TABLET ORAL DAILY
Qty: 45 TABLET | Refills: 1 | Status: SHIPPED | OUTPATIENT
Start: 2024-03-11

## 2024-03-11 RX ORDER — ROSUVASTATIN CALCIUM 10 MG/1
10 TABLET, COATED ORAL DAILY
Qty: 90 TABLET | Refills: 1 | Status: SHIPPED | OUTPATIENT
Start: 2024-03-11

## 2024-03-11 NOTE — TELEPHONE ENCOUNTER
Roque Hudson Jr. called to request a refill on his medication.      Last office visit : 10/17/2023   Next office visit : 4/18/2024     Requested Prescriptions     Pending Prescriptions Disp Refills    rosuvastatin (CRESTOR) 10 MG tablet 90 tablet 1     Sig: Take 1 tablet by mouth daily            Itzel Sarabia MA

## 2024-03-11 NOTE — TELEPHONE ENCOUNTER
Roque Hudson Jr. called to request a refill on his medication.      Last office visit : 10/17/2023   Next office visit : 4/18/2024    Requested Prescriptions     Pending Prescriptions Disp Refills    lisinopril (PRINIVIL;ZESTRIL) 40 MG tablet 90 tablet 1     Sig: Take 1 tablet by mouth daily            Itzel Sarabia MA

## 2024-03-11 NOTE — TELEPHONE ENCOUNTER
Roque Hudson Jr. called to request a refill on his medication.      Last office visit : 10/17/2023   Next office visit : 4/18/2024    Requested Prescriptions     Pending Prescriptions Disp Refills    chlorthalidone (HYGROTON) 25 MG tablet 45 tablet 1     Sig: Take 0.5 tablets by mouth daily            Itzel Sarabia MA

## 2024-04-09 DIAGNOSIS — I48.0 PAROXYSMAL ATRIAL FIBRILLATION WITH RVR (HCC): ICD-10-CM

## 2024-04-09 RX ORDER — SOTALOL HYDROCHLORIDE 80 MG/1
TABLET ORAL
Qty: 60 TABLET | Refills: 0 | Status: SHIPPED | OUTPATIENT
Start: 2024-04-09

## 2024-04-16 ENCOUNTER — OFFICE VISIT (OUTPATIENT)
Dept: CARDIOLOGY CLINIC | Age: 64
End: 2024-04-16
Payer: COMMERCIAL

## 2024-04-16 VITALS
HEART RATE: 55 BPM | BODY MASS INDEX: 27.96 KG/M2 | DIASTOLIC BLOOD PRESSURE: 68 MMHG | SYSTOLIC BLOOD PRESSURE: 96 MMHG | HEIGHT: 73 IN | WEIGHT: 211 LBS

## 2024-04-16 DIAGNOSIS — E78.2 MIXED HYPERLIPIDEMIA: ICD-10-CM

## 2024-04-16 DIAGNOSIS — Z23 NEED FOR VACCINATION: ICD-10-CM

## 2024-04-16 DIAGNOSIS — R73.01 IFG (IMPAIRED FASTING GLUCOSE): ICD-10-CM

## 2024-04-16 DIAGNOSIS — Z00.00 WELCOME TO MEDICARE PREVENTIVE VISIT: ICD-10-CM

## 2024-04-16 DIAGNOSIS — I10 ESSENTIAL HYPERTENSION: ICD-10-CM

## 2024-04-16 DIAGNOSIS — E66.09 EXOGENOUS OBESITY: ICD-10-CM

## 2024-04-16 DIAGNOSIS — F51.01 PRIMARY INSOMNIA: ICD-10-CM

## 2024-04-16 DIAGNOSIS — I48.0 PAROXYSMAL ATRIAL FIBRILLATION WITH RVR (HCC): Primary | ICD-10-CM

## 2024-04-16 DIAGNOSIS — I48.0 PAF (PAROXYSMAL ATRIAL FIBRILLATION) (HCC): ICD-10-CM

## 2024-04-16 LAB
ALBUMIN SERPL-MCNC: 4.4 G/DL (ref 3.5–5.2)
ALP SERPL-CCNC: 79 U/L (ref 40–130)
ALT SERPL-CCNC: 34 U/L (ref 5–41)
ANION GAP SERPL CALCULATED.3IONS-SCNC: 10 MMOL/L (ref 7–19)
AST SERPL-CCNC: 27 U/L (ref 5–40)
BILIRUB SERPL-MCNC: 0.8 MG/DL (ref 0.2–1.2)
BUN SERPL-MCNC: 16 MG/DL (ref 8–23)
CALCIUM SERPL-MCNC: 9.8 MG/DL (ref 8.8–10.2)
CHLORIDE SERPL-SCNC: 102 MMOL/L (ref 98–111)
CHOLEST SERPL-MCNC: 117 MG/DL (ref 160–199)
CO2 SERPL-SCNC: 28 MMOL/L (ref 22–29)
CREAT SERPL-MCNC: 0.8 MG/DL (ref 0.5–1.2)
GLUCOSE SERPL-MCNC: 100 MG/DL (ref 74–109)
HDLC SERPL-MCNC: 41 MG/DL (ref 55–121)
LDLC SERPL CALC-MCNC: 66 MG/DL
POTASSIUM SERPL-SCNC: 4.1 MMOL/L (ref 3.5–5)
PROT SERPL-MCNC: 6.9 G/DL (ref 6.6–8.7)
SODIUM SERPL-SCNC: 140 MMOL/L (ref 136–145)
TRIGL SERPL-MCNC: 52 MG/DL (ref 0–149)

## 2024-04-16 PROCEDURE — 3078F DIAST BP <80 MM HG: CPT | Performed by: INTERNAL MEDICINE

## 2024-04-16 PROCEDURE — 99213 OFFICE O/P EST LOW 20 MIN: CPT | Performed by: INTERNAL MEDICINE

## 2024-04-16 PROCEDURE — 93000 ELECTROCARDIOGRAM COMPLETE: CPT | Performed by: INTERNAL MEDICINE

## 2024-04-16 PROCEDURE — 1123F ACP DISCUSS/DSCN MKR DOCD: CPT | Performed by: INTERNAL MEDICINE

## 2024-04-16 PROCEDURE — 3074F SYST BP LT 130 MM HG: CPT | Performed by: INTERNAL MEDICINE

## 2024-04-16 NOTE — PROGRESS NOTES
[facility]  [address]  [address]  [facility] [address]  [phone]    Roque Hudson is a 63 y.o. male presents with atrial fibrillation and hypertension.  He has had no episodes of A-fib and has not required any Xarelto as of yet.  He has been on sotalol with good result.  His blood pressures have been getting less as he has lost weight over the last year.  He may need to cut back on some of his medications.      Review of Systems   Constitutional: Negative for fever, chills, diaphoresis, activity change, appetite change, fatigue and unexpected weight change.   Eyes: Negative for photophobia, pain, redness and visual disturbance.   Respiratory: Negative for apnea, cough, chest tightness, shortness of breath, wheezing and stridor.    Cardiovascular: Negative for chest pain, palpitations and leg swelling.   Gastrointestinal: Negative for abdominal distention.   Genitourinary: Negative for dysuria, urgency and frequency.   Musculoskeletal: Negative for myalgias, arthralgias and gait problem.   Skin: Negative for color change, pallor, rash and wound.   Neurological: Negative for dizziness, tremors, speech difficulty, weakness and numbness.   Hematological: Does not bruise/bleed easily.   Psychiatric/Behavioral: Negative.          Social History     Socioeconomic History    Marital status:      Spouse name: Not on file    Number of children: Not on file    Years of education: Not on file    Highest education level: Not on file   Occupational History    Not on file   Tobacco Use    Smoking status: Never    Smokeless tobacco: Former     Types: Chew   Vaping Use    Vaping Use: Never used   Substance and Sexual Activity    Alcohol use: Yes     Comment: occ    Drug use: No    Sexual activity: Yes     Partners: Female   Other Topics Concern    Not on file   Social History Narrative    Not on file     Social Determinants of Health     Financial Resource Strain: Low Risk  (4/11/2022)    Overall Financial

## 2024-04-17 RX ORDER — AMLODIPINE BESYLATE 5 MG/1
5 TABLET ORAL DAILY
Qty: 90 TABLET | Refills: 3 | Status: SHIPPED | OUTPATIENT
Start: 2024-04-17

## 2024-04-18 ENCOUNTER — OFFICE VISIT (OUTPATIENT)
Dept: INTERNAL MEDICINE | Age: 64
End: 2024-04-18
Payer: MEDICARE

## 2024-04-18 VITALS
HEIGHT: 74 IN | OXYGEN SATURATION: 99 % | HEART RATE: 54 BPM | DIASTOLIC BLOOD PRESSURE: 70 MMHG | WEIGHT: 212 LBS | BODY MASS INDEX: 27.21 KG/M2 | SYSTOLIC BLOOD PRESSURE: 110 MMHG

## 2024-04-18 DIAGNOSIS — E55.9 VITAMIN D DEFICIENCY: ICD-10-CM

## 2024-04-18 DIAGNOSIS — E78.2 MIXED HYPERLIPIDEMIA: ICD-10-CM

## 2024-04-18 DIAGNOSIS — I10 ESSENTIAL HYPERTENSION: Primary | ICD-10-CM

## 2024-04-18 DIAGNOSIS — Z12.5 SCREENING PSA (PROSTATE SPECIFIC ANTIGEN): ICD-10-CM

## 2024-04-18 DIAGNOSIS — I48.0 PAF (PAROXYSMAL ATRIAL FIBRILLATION) (HCC): ICD-10-CM

## 2024-04-18 DIAGNOSIS — F51.01 PRIMARY INSOMNIA: ICD-10-CM

## 2024-04-18 DIAGNOSIS — D68.69 SECONDARY HYPERCOAGULABLE STATE (HCC): ICD-10-CM

## 2024-04-18 DIAGNOSIS — R73.01 IFG (IMPAIRED FASTING GLUCOSE): ICD-10-CM

## 2024-04-18 PROCEDURE — 3078F DIAST BP <80 MM HG: CPT | Performed by: INTERNAL MEDICINE

## 2024-04-18 PROCEDURE — 3074F SYST BP LT 130 MM HG: CPT | Performed by: INTERNAL MEDICINE

## 2024-04-18 PROCEDURE — 3017F COLORECTAL CA SCREEN DOC REV: CPT | Performed by: INTERNAL MEDICINE

## 2024-04-18 PROCEDURE — 1123F ACP DISCUSS/DSCN MKR DOCD: CPT | Performed by: INTERNAL MEDICINE

## 2024-04-18 PROCEDURE — G8417 CALC BMI ABV UP PARAM F/U: HCPCS | Performed by: INTERNAL MEDICINE

## 2024-04-18 PROCEDURE — 99214 OFFICE O/P EST MOD 30 MIN: CPT | Performed by: INTERNAL MEDICINE

## 2024-04-18 PROCEDURE — G8427 DOCREV CUR MEDS BY ELIG CLIN: HCPCS | Performed by: INTERNAL MEDICINE

## 2024-04-18 PROCEDURE — 1036F TOBACCO NON-USER: CPT | Performed by: INTERNAL MEDICINE

## 2024-04-18 SDOH — ECONOMIC STABILITY: HOUSING INSECURITY
IN THE LAST 12 MONTHS, WAS THERE A TIME WHEN YOU DID NOT HAVE A STEADY PLACE TO SLEEP OR SLEPT IN A SHELTER (INCLUDING NOW)?: NO

## 2024-04-18 SDOH — ECONOMIC STABILITY: FOOD INSECURITY: WITHIN THE PAST 12 MONTHS, YOU WORRIED THAT YOUR FOOD WOULD RUN OUT BEFORE YOU GOT MONEY TO BUY MORE.: NEVER TRUE

## 2024-04-18 SDOH — ECONOMIC STABILITY: FOOD INSECURITY: WITHIN THE PAST 12 MONTHS, THE FOOD YOU BOUGHT JUST DIDN'T LAST AND YOU DIDN'T HAVE MONEY TO GET MORE.: NEVER TRUE

## 2024-04-18 SDOH — ECONOMIC STABILITY: INCOME INSECURITY: HOW HARD IS IT FOR YOU TO PAY FOR THE VERY BASICS LIKE FOOD, HOUSING, MEDICAL CARE, AND HEATING?: NOT HARD AT ALL

## 2024-04-18 ASSESSMENT — ENCOUNTER SYMPTOMS
CONSTIPATION: 0
CHEST TIGHTNESS: 0
SORE THROAT: 0
COUGH: 0
ABDOMINAL PAIN: 0
WHEEZING: 0

## 2024-04-18 ASSESSMENT — PATIENT HEALTH QUESTIONNAIRE - PHQ9
SUM OF ALL RESPONSES TO PHQ QUESTIONS 1-9: 0
SUM OF ALL RESPONSES TO PHQ QUESTIONS 1-9: 0
SUM OF ALL RESPONSES TO PHQ9 QUESTIONS 1 & 2: 0
SUM OF ALL RESPONSES TO PHQ QUESTIONS 1-9: 0
2. FEELING DOWN, DEPRESSED OR HOPELESS: NOT AT ALL
1. LITTLE INTEREST OR PLEASURE IN DOING THINGS: NOT AT ALL
SUM OF ALL RESPONSES TO PHQ QUESTIONS 1-9: 0

## 2024-04-18 NOTE — PROGRESS NOTES
Chief Complaint   Patient presents with    Follow-up     Toenail fungus   Blood pressure meds  New measure of cholesterol      History of presenting illness:  Roque Hudson Jr. is a63 y.o. male who presents today for follow up on his chronic medical conditions as noted below.    Patient Active Problem List    Diagnosis Date Noted    Secondary hypercoagulable state (HCC) 04/18/2024    Other fatigue 06/09/2023    Difficulty with BiPAP use 06/09/2023    Aortic valve stenosis 04/12/2022    Elevated PSA 03/20/2020    Epigastric discomfort 11/14/2018    Chronic nausea 11/14/2018    Mild CAD 11/13/2018    IFG (impaired fasting glucose) 05/11/2018    Primary insomnia 10/26/2017    Chronic GERD 10/26/2017    Degenerative lumbar disc 10/26/2017    PAF (paroxysmal atrial fibrillation) ([facility])     Hx pulmonary embolism 06/26/2017    Benign prostatic hyperplasia with lower urinary tract symptoms 06/26/2017    Mixed hyperlipidemia 06/26/2017    CPAP (continuous positive airway pressure) dependence      Overview Note:     12cm to 22cm      Mild mitral regurgitation by prior echocardiogram 02/08/2017    Essential hypertension 02/08/2017    Obstructive sleep apnea      Past Medical History:   Diagnosis Date    A-fib ([facility])     Atelectasis of right lung 06/26/2017    Back pain, lumbosacral     BiPAP (biphasic positive airway pressure) dependence     12cm to 22cm    Clotting disorder ([facility])     Cyst near coccyx     removed    GERD (gastroesophageal reflux disease)     Heart murmur     Hx of blood clots     Hyperlipidemia     Hypertension     Mild CAD 11/13/2018    Obstructive sleep apnea     Osteoarthritis     PAF (paroxysmal atrial fibrillation) (Former[facility])     Paroxysmal atrial fibrillation with RVR ([facility]) 06/26/2017    Pneumonia due to organism     Pulmonary embolism ([facility])       Past Surgical History:   Procedure Laterality Date    ANKLE FRACTURE SURGERY Left     ARM SURGERY Left 01/04/2018    fracture     CARDIAC CATHETERIZATION  06/2018 
Dragon/transcription disclaimer:Significant part of this  encounter note is electronic transcription/translationof spoken language to printed text. The electronic translation of spoken language may be erroneous, or at times, nonsensical words or phrases may be inadvertently transcribed. Although I have reviewed the note for sucherrors, some may still exist.

## 2024-05-05 DIAGNOSIS — I48.0 PAROXYSMAL ATRIAL FIBRILLATION WITH RVR (HCC): ICD-10-CM

## 2024-05-06 RX ORDER — SOTALOL HYDROCHLORIDE 80 MG/1
TABLET ORAL
Qty: 180 TABLET | Refills: 3 | Status: SHIPPED | OUTPATIENT
Start: 2024-05-06

## 2024-05-24 ENCOUNTER — OFFICE VISIT (OUTPATIENT)
Dept: INTERNAL MEDICINE | Age: 64
End: 2024-05-24
Payer: MEDICARE

## 2024-05-24 VITALS
HEIGHT: 73 IN | BODY MASS INDEX: 28.1 KG/M2 | SYSTOLIC BLOOD PRESSURE: 100 MMHG | OXYGEN SATURATION: 97 % | HEART RATE: 56 BPM | WEIGHT: 212 LBS | DIASTOLIC BLOOD PRESSURE: 68 MMHG

## 2024-05-24 DIAGNOSIS — G89.29 CHRONIC LEFT SHOULDER PAIN: ICD-10-CM

## 2024-05-24 DIAGNOSIS — M25.512 CHRONIC LEFT SHOULDER PAIN: ICD-10-CM

## 2024-05-24 DIAGNOSIS — M79.604 PAIN OF RIGHT LOWER EXTREMITY: Primary | ICD-10-CM

## 2024-05-24 PROCEDURE — 99214 OFFICE O/P EST MOD 30 MIN: CPT | Performed by: INTERNAL MEDICINE

## 2024-05-24 PROCEDURE — 1123F ACP DISCUSS/DSCN MKR DOCD: CPT | Performed by: INTERNAL MEDICINE

## 2024-05-24 PROCEDURE — 1036F TOBACCO NON-USER: CPT | Performed by: INTERNAL MEDICINE

## 2024-05-24 PROCEDURE — G8417 CALC BMI ABV UP PARAM F/U: HCPCS | Performed by: INTERNAL MEDICINE

## 2024-05-24 PROCEDURE — G8427 DOCREV CUR MEDS BY ELIG CLIN: HCPCS | Performed by: INTERNAL MEDICINE

## 2024-05-24 PROCEDURE — 3074F SYST BP LT 130 MM HG: CPT | Performed by: INTERNAL MEDICINE

## 2024-05-24 PROCEDURE — 3017F COLORECTAL CA SCREEN DOC REV: CPT | Performed by: INTERNAL MEDICINE

## 2024-05-24 PROCEDURE — 3078F DIAST BP <80 MM HG: CPT | Performed by: INTERNAL MEDICINE

## 2024-05-24 ASSESSMENT — ENCOUNTER SYMPTOMS
BACK PAIN: 0
SORE THROAT: 0
ABDOMINAL PAIN: 0
WHEEZING: 0
VOMITING: 0
ABDOMINAL DISTENTION: 0
SINUS PRESSURE: 0
TROUBLE SWALLOWING: 0
BLOOD IN STOOL: 0
NAUSEA: 0
EYE DISCHARGE: 0
EYE ITCHING: 0
SHORTNESS OF BREATH: 0

## 2024-05-24 NOTE — PROGRESS NOTES
[facility]  [facility]  [address] [place] [address] DRIVE  [address]  [place] [address]  Dept: [phone]  Dept Fax: [phone]  Loc: [phone]      Visit Date: 5/24/2024    Roque Hudson Jr.is a 64 y.o. male who presents today for:  Chief Complaint   Patient presents with    Other     Right leg pain          HPI:     Patient of Dr. Gonzalez is in with right leg pain that is been going on for couple days.  He was doing some leg squats at the gym and felt a pop and since that time he said soreness and is wanting to get a prescription for some physical therapy to work on it next week before he drives to [place] the following week.    Past Medical History:   Diagnosis Date    A-fib (HCC)     Atelectasis of right lung 06/26/2017    Back pain, lumbosacral     BiPAP (biphasic positive airway pressure) dependence     12cm to 22cm    Clotting disorder (HCC)     Cyst near coccyx     removed    GERD (gastroesophageal reflux disease)     Heart murmur     Hx of blood clots     Hyperlipidemia     Hypertension     Mild CAD 11/13/2018    Obstructive sleep apnea     Osteoarthritis     PAF (paroxysmal atrial fibrillation) (HCC)     Paroxysmal atrial fibrillation with RVR (HCC) 06/26/2017    Pneumonia due to organism     Pulmonary embolism ([facility])       Past Surgical History:   Procedure Laterality Date    ANKLE FRACTURE SURGERY Left     ARM SURGERY Left 01/04/2018    fracture     CARDIAC CATHETERIZATION  06/2018    Mild CAD    CHOLECYSTECTOMY, LAPAROSCOPIC N/A 12/15/2017    CHOLECYSTECTOMY LAPAROSCOPIC performed by Carolina Hampton MD at [facility] OR    COLONOSCOPY  2012    COLONOSCOPY N/A 02/06/2023    Dr MARIAJOSE Hope-Diverticular disease, 10 yr recall    GALLBLADDER SURGERY      [place] EGD TRANSORAL BIOPSY SINGLE/MULTIPLE N/A 11/15/2018    Dr MARIAJOSE Hope-Gastropathy    TESTICLE SURGERY      undecended testicle done when 1 years old       Family History   Problem Relation Age of Onset    Heart Disease Mother     High Blood

## 2024-06-19 RX ORDER — FLUTICASONE PROPIONATE 50 MCG
1 SPRAY, SUSPENSION (ML) NASAL DAILY
Qty: 48 G | Refills: 1 | Status: SHIPPED | OUTPATIENT
Start: 2024-06-19

## 2024-06-19 NOTE — TELEPHONE ENCOUNTER
Roque Hudson Jr. called to request a refill on his medication.      Last office visit : 2024   Next office visit : 2024     Requested Prescriptions     Pending Prescriptions Disp Refills    fluticasone (FLONASE) 50 MCG/ACT nasal spray 48 g 5     Si spray by Nasal route daily            Itzel Sarabia MA

## 2024-06-20 ENCOUNTER — OFFICE VISIT (OUTPATIENT)
Dept: INTERNAL MEDICINE | Age: 64
End: 2024-06-20
Payer: MEDICARE

## 2024-06-20 VITALS
DIASTOLIC BLOOD PRESSURE: 70 MMHG | SYSTOLIC BLOOD PRESSURE: 118 MMHG | HEIGHT: 74 IN | WEIGHT: 212 LBS | BODY MASS INDEX: 27.21 KG/M2 | HEART RATE: 49 BPM | OXYGEN SATURATION: 98 %

## 2024-06-20 DIAGNOSIS — M25.512 LEFT ANTERIOR SHOULDER PAIN: Primary | ICD-10-CM

## 2024-06-20 DIAGNOSIS — S46.012A TRAUMATIC INCOMPLETE TEAR OF LEFT ROTATOR CUFF, INITIAL ENCOUNTER: ICD-10-CM

## 2024-06-20 DIAGNOSIS — R00.1 BRADYCARDIA: ICD-10-CM

## 2024-06-20 DIAGNOSIS — I10 ESSENTIAL HYPERTENSION: ICD-10-CM

## 2024-06-20 DIAGNOSIS — I48.0 PAF (PAROXYSMAL ATRIAL FIBRILLATION) (HCC): ICD-10-CM

## 2024-06-20 DIAGNOSIS — I99.8 FLUCTUATING BLOOD PRESSURE: ICD-10-CM

## 2024-06-20 PROCEDURE — 1036F TOBACCO NON-USER: CPT | Performed by: INTERNAL MEDICINE

## 2024-06-20 PROCEDURE — 3074F SYST BP LT 130 MM HG: CPT | Performed by: INTERNAL MEDICINE

## 2024-06-20 PROCEDURE — 3017F COLORECTAL CA SCREEN DOC REV: CPT | Performed by: INTERNAL MEDICINE

## 2024-06-20 PROCEDURE — 99214 OFFICE O/P EST MOD 30 MIN: CPT | Performed by: INTERNAL MEDICINE

## 2024-06-20 PROCEDURE — G8417 CALC BMI ABV UP PARAM F/U: HCPCS | Performed by: INTERNAL MEDICINE

## 2024-06-20 PROCEDURE — G8427 DOCREV CUR MEDS BY ELIG CLIN: HCPCS | Performed by: INTERNAL MEDICINE

## 2024-06-20 PROCEDURE — 1123F ACP DISCUSS/DSCN MKR DOCD: CPT | Performed by: INTERNAL MEDICINE

## 2024-06-20 PROCEDURE — 3078F DIAST BP <80 MM HG: CPT | Performed by: INTERNAL MEDICINE

## 2024-06-20 ASSESSMENT — ENCOUNTER SYMPTOMS
ABDOMINAL PAIN: 0
WHEEZING: 0
SORE THROAT: 0
COUGH: 0
CHEST TIGHTNESS: 0
CONSTIPATION: 0

## 2024-06-20 NOTE — PROGRESS NOTES
last 3 weeks systolic blood pressure has been around 1 30-1 45 range but patient also has been on a vacation trip to [place]  Suggest he monitor the blood pressure  If he continues to have elevated blood pressure suggest he restart a lower dose of amlodipine 2.5 mg p.o. daily    Questions about slow heart rate  Patient currently is on sotalol 80 mg p.o. twice daily  Heart rate frequently in 40-50-55 range  Patient exercises, and feels tired  My recommendation is to follow-up with cardiology discussed with cardiology regarding sotalol dose/any dose change dose decrease only as per cardiology      Orders Placed This Encounter   Procedures    MRI SHOULDER LEFT WO CONTRAST    Randolph Khanna MD, Orthopedic Surgery, [place]     New Prescriptions    No medications on file         No follow-ups on file.   There are no Patient Instructions on file for this visit.  EMR Dragon/transcription disclaimer:Significant part of this  encounter note is electronic transcription/translationof spoken language to printed text. The electronic translation of spoken language may be erroneous, or at times, nonsensical words or phrases may be inadvertently transcribed. Although I have reviewed the note for sucherrors, some may still exist.

## 2024-07-01 ENCOUNTER — TELEPHONE (OUTPATIENT)
Dept: INTERNAL MEDICINE | Age: 64
End: 2024-07-01

## 2024-07-08 RX ORDER — POTASSIUM CHLORIDE 750 MG/1
10 CAPSULE, EXTENDED RELEASE ORAL DAILY
Qty: 90 CAPSULE | Refills: 1 | Status: SHIPPED | OUTPATIENT
Start: 2024-07-08

## 2024-07-09 RX ORDER — POTASSIUM CHLORIDE 750 MG/1
TABLET, EXTENDED RELEASE ORAL
Refills: 0 | OUTPATIENT
Start: 2024-07-09

## 2024-07-15 ENCOUNTER — HOSPITAL ENCOUNTER (OUTPATIENT)
Dept: MRI IMAGING | Age: 64
Discharge: HOME | End: 2024-07-15
Payer: MEDICARE

## 2024-07-15 DIAGNOSIS — M25.512 LEFT ANTERIOR SHOULDER PAIN: ICD-10-CM

## 2024-07-15 DIAGNOSIS — S46.012A TRAUMATIC INCOMPLETE TEAR OF LEFT ROTATOR CUFF, INITIAL ENCOUNTER: ICD-10-CM

## 2024-07-15 PROCEDURE — 73221 MRI JOINT UPR EXTREM W/O DYE: CPT

## 2024-07-15 RX ORDER — POTASSIUM CHLORIDE 750 MG/1
10 TABLET, EXTENDED RELEASE ORAL DAILY
Qty: 90 TABLET | Refills: 1 | Status: SHIPPED | OUTPATIENT
Start: 2024-07-15

## 2024-07-15 NOTE — TELEPHONE ENCOUNTER
Pt states that he needs the potassium 10 MEQ tablets sent in instead of the Capsules. States that the Capsules are $68.00 verses tablets are $10.00.     Roque Hudson Jr. called to request a refill on his medication.      Last office visit : 6/20/2024   Next office visit : 10/24/2024     Requested Prescriptions     Pending Prescriptions Disp Refills    potassium chloride (KLOR-CON M) 10 MEQ extended release tablet 90 tablet 1     Sig: Take 1 tablet by mouth daily            Itzel Sarabia MA

## 2024-08-30 RX ORDER — LISINOPRIL 40 MG/1
40 TABLET ORAL DAILY
Qty: 90 TABLET | Refills: 1 | Status: SHIPPED | OUTPATIENT
Start: 2024-08-30

## 2024-09-03 ENCOUNTER — HOSPITAL ENCOUNTER (OUTPATIENT)
Dept: PREADMISSION TESTING | Age: 64
End: 2024-09-03
Payer: MEDICARE

## 2024-09-03 VITALS — HEIGHT: 74 IN | BODY MASS INDEX: 25.15 KG/M2 | WEIGHT: 196 LBS

## 2024-09-03 LAB
ANION GAP SERPL CALCULATED.3IONS-SCNC: 10 MMOL/L (ref 7–19)
BUN SERPL-MCNC: 26 MG/DL (ref 8–23)
CALCIUM SERPL-MCNC: 9.7 MG/DL (ref 8.8–10.2)
CHLORIDE SERPL-SCNC: 104 MMOL/L (ref 98–111)
CO2 SERPL-SCNC: 29 MMOL/L (ref 22–29)
CREAT SERPL-MCNC: 0.9 MG/DL (ref 0.7–1.2)
ERYTHROCYTE [DISTWIDTH] IN BLOOD BY AUTOMATED COUNT: 14.3 % (ref 11.5–14.5)
GLUCOSE SERPL-MCNC: 59 MG/DL (ref 70–99)
HCT VFR BLD AUTO: 39.5 % (ref 42–52)
HGB BLD-MCNC: 12.9 G/DL (ref 14–18)
MCH RBC QN AUTO: 32.5 PG (ref 27–31)
MCHC RBC AUTO-ENTMCNC: 32.7 G/DL (ref 33–37)
MCV RBC AUTO: 99.5 FL (ref 80–94)
MRSA DNA SPEC QL NAA+PROBE: NOT DETECTED
PLATELET # BLD AUTO: 151 K/UL (ref 130–400)
PMV BLD AUTO: 10.7 FL (ref 9.4–12.4)
POTASSIUM SERPL-SCNC: 4.1 MMOL/L (ref 3.5–5)
RBC # BLD AUTO: 3.97 M/UL (ref 4.7–6.1)
SODIUM SERPL-SCNC: 143 MMOL/L (ref 136–145)
WBC # BLD AUTO: 4.9 K/UL (ref 4.8–10.8)

## 2024-09-03 PROCEDURE — 85027 COMPLETE CBC AUTOMATED: CPT

## 2024-09-03 PROCEDURE — 80048 BASIC METABOLIC PNL TOTAL CA: CPT

## 2024-09-03 PROCEDURE — 87641 MR-STAPH DNA AMP PROBE: CPT

## 2024-09-03 RX ORDER — TERBINAFINE HYDROCHLORIDE 250 MG/1
250 TABLET ORAL DAILY
COMMUNITY

## 2024-09-03 NOTE — DISCHARGE INSTRUCTIONS
The day before surgery you will receive a phone call from the surgery nurse to let you know what time to arrive on the day of surgery. This call will usually be between 2-4 PM. If you do not receive a phone call by 4 PM the day before your surgery please call [phone] and let them know you have not received an arrival time. If your surgery is on Monday, your call will be on the Friday before your Monday surgery. Please check your voicemail as they may leave a message with that information.           Hair removal for your procedure will be necessary. Great care will be taken to protect your privacy and dignity during this process.    The hair clipping will occur in the same day surgery room you are assigned.    If you normally shave your body hair, please refrain from doing so 24-48 hours prior to your procedure time. Shaving can cause knicks and cuts in the skin and allow microorganisms to enter your body that could lead to infection.    For your procedure, your body hair will be clipped on your operative side in the following areas:  shoulder blade, arm from top of shoulder to elbow, armpit, and the side of your chest.           Chlorhexidine Gluconate 4% Solution    Patient should shower with this soap a minimum of 3 consecutive showers (2 nights before surgery, the night before surgery and the morning of surgery) washing from the neck down (avoiding contact with genitalia).      DO NOT WASH YOUR HAIR OR FACE WITH THIS SOAP.  When washing with this soap, apply enough to suds up the body thoroughly, turn the water away from your body and allow the soap suds to remain on the body for 2 full minutes, then rinse body completely.      After using this soap on the body, please do not apply powders or lotions to your body.  After the shower the night before surgery, please dry off with a new towel, sleep in new freshly laundered pj's, and change your bed linen before going to sleep.      IF YOU HAVE A PET IN YOUR

## 2024-09-03 NOTE — DISCHARGE INSTRUCTIONS
UPPER EXTREMITY POST-OP INSTRUCTIONS - DR. WILLINGHAM    IMPORTANT PHONE NUMBERS:   For emergencies, please call [phone]   You may reach Dr. Willingham and clinical staff at [phone]- M-F 8:00 am-5:00 pm   After 5pm or on the weekends, please call [phone]   Call immediately if you have any of the following symptoms:     Elevated temperature above 101.5 degrees for more than 48 hours after surgery     Persistent drainage from wound     Severe pain around surgical site    Sling use: The sling is provided for your comfort and to ensure proper healing of your repair following surgery. Please place the abduction pillow with the curved side against your side and the sling on the side of the pillow. Your surgery requires that you wear the sling if noted below.  ____ For comfort. Remove sling 24 hours and begin range of motion exercises  __x__ At all times except bathing, dressing, and therapy. Also wear the sling during sleep.  ____ No sling required    Bathing:  ___No bandages, no restrictions!!  _x__You may remove you dressing and shower on the 3rd day after surgery (Ex. Tues surgery, shower on Friday)  ** if you are told to it is ok to remove your dressing and shower, DO NOT SOAK your incisions in a tub.  ___Keep splint clean, dry, and intact. DO NOT place foreign objects into your splint.    DRIVING:  absolutely no driving while taking pain medication.  This will be discussed at your first postop visit.    Dressings: Keep dressing/splint intact unless instructed otherwise below. SOME DRAINAGE IS NORMAL!     DO NOT touch or apply ointment to the incision.     DO NOT remove the steri-strips over the incisions (if you have steri-strips). They will         generally fall off on their own or can be removed 1 weeksafter surgery.     If you have yellow gauze and it comes off, do not worry about it. Leave them off.    Signs of infection that warrant a phone call to our clinical line:     o Excessive drainage or  Simple: Patient demonstrates quick and easy understanding/Verbalized Understanding

## 2024-09-04 PROBLEM — M19.012 PRIMARY OSTEOARTHRITIS OF LEFT SHOULDER: Status: ACTIVE | Noted: 2024-09-04

## 2024-09-04 NOTE — OP NOTE
Patient Name: Terrance  MRN: [id number]  : 1960    [facility]      DATE of SURGERY: 2024    SURGEON: Randolph Ackerman MD    ASSISTANT: NONE    PREOPERATIVE DIAGNOSIS: Left Shoulder Primary Osteoarthritis    POSTOPERATIVE DIAGNOSIS: Left Shoulder Primary Osteoarthritis    PROCEDURE PERFORMED: Left Reverse Total Shoulder Arthroplasty    IMPLANTS: Ar[employer] Revers MGS                Baseplate: 28 mm, + 4 lateralized                Glenosphere: 42 + 4                Poly: + 6 metal spacer, + 3 poly                Cup: 42 (+2 Right)                              Stem: 9 mm pressfit    ANESTHESIA USED: General endotrachial anesthesia, interscalene block    OPERATIVE INDICATIONS: 64 y.o. male with progressive loss of function and increasing pain of the upper extremity due to severe primary osteoarthritis associated with a diseased and dysfunctional rotator cuff. Due to loss of function, and intact deltoid, and progressive pain, a reverse shoulder arthroplasty is planned to improve function and decrease pain.  Surgical evaluation was discussed and the patient wished to proceed understanding risks, benefits, and alternatives. The surgical indications were to relieve pain, improve function, and prevent future disability in regards to the shoulder pathology dictated in the aboved diagnoses.  Risks included, but were not limited to, that of anesthesia, bleeding, infection, pain, damage to local structures, postoperative dislocation, need for further surgery, failure of repair, stiffness, failure of implants, and loss of function.    The patient has failed a combination of the following improve pain and function: physical therapy >12 wks, corticosteroid injections, NSAID’s, activity modification.     ESTIMATED BLOOD LOSS: 150 mL    DRAINS: none     COMPLICATIONS: none    SPECIMENS: none    FINDINGS: see op note    PROCEDURE in DETAIL:  The patient was seen in the preoperative holding room,

## 2024-09-05 ENCOUNTER — ANESTHESIA (OUTPATIENT)
Dept: OPERATING ROOM | Age: 64
End: 2024-09-05
Payer: MEDICARE

## 2024-09-05 ENCOUNTER — HOSPITAL ENCOUNTER (OUTPATIENT)
Age: 64
Setting detail: OUTPATIENT SURGERY
Discharge: HOME | End: 2024-09-05
Attending: ORTHOPAEDIC SURGERY | Admitting: ORTHOPAEDIC SURGERY
Payer: MEDICARE

## 2024-09-05 ENCOUNTER — ANESTHESIA EVENT (OUTPATIENT)
Dept: OPERATING ROOM | Age: 64
End: 2024-09-05
Payer: MEDICARE

## 2024-09-05 ENCOUNTER — APPOINTMENT (OUTPATIENT)
Dept: GENERAL RADIOLOGY | Age: 64
End: 2024-09-05
Attending: ORTHOPAEDIC SURGERY
Payer: MEDICARE

## 2024-09-05 VITALS
RESPIRATION RATE: 20 BRPM | HEART RATE: 51 BPM | OXYGEN SATURATION: 99 % | WEIGHT: 196 LBS | BODY MASS INDEX: 25.98 KG/M2 | SYSTOLIC BLOOD PRESSURE: 114 MMHG | DIASTOLIC BLOOD PRESSURE: 82 MMHG | HEIGHT: 73 IN | TEMPERATURE: 97.2 F

## 2024-09-05 DIAGNOSIS — M19.012 PRIMARY OSTEOARTHRITIS OF LEFT SHOULDER: Primary | ICD-10-CM

## 2024-09-05 PROCEDURE — 2580000003 HC RX 258: Performed by: ANESTHESIOLOGY

## 2024-09-05 PROCEDURE — 2720000010 HC SURG SUPPLY STERILE: Performed by: ORTHOPAEDIC SURGERY

## 2024-09-05 PROCEDURE — 2580000003 HC RX 258: Performed by: NURSE ANESTHETIST, CERTIFIED REGISTERED

## 2024-09-05 PROCEDURE — 73030 X-RAY EXAM OF SHOULDER: CPT

## 2024-09-05 PROCEDURE — C9290 INJ, BUPIVACAINE LIPOSOME: HCPCS | Performed by: ANESTHESIOLOGY

## 2024-09-05 PROCEDURE — 2500000003 HC RX 250 WO HCPCS: Performed by: ANESTHESIOLOGY

## 2024-09-05 PROCEDURE — 3700000000 HC ANESTHESIA ATTENDED CARE: Performed by: ORTHOPAEDIC SURGERY

## 2024-09-05 PROCEDURE — C1713 ANCHOR/SCREW BN/BN,TIS/BN: HCPCS | Performed by: ORTHOPAEDIC SURGERY

## 2024-09-05 PROCEDURE — C1776 JOINT DEVICE (IMPLANTABLE): HCPCS | Performed by: ORTHOPAEDIC SURGERY

## 2024-09-05 PROCEDURE — 7100000011 HC PHASE II RECOVERY - ADDTL 15 MIN: Performed by: ORTHOPAEDIC SURGERY

## 2024-09-05 PROCEDURE — 3600000005 HC SURGERY LEVEL 5 BASE: Performed by: ORTHOPAEDIC SURGERY

## 2024-09-05 PROCEDURE — 64415 NJX AA&/STRD BRCH PLXS IMG: CPT | Performed by: NURSE ANESTHETIST, CERTIFIED REGISTERED

## 2024-09-05 PROCEDURE — 6360000002 HC RX W HCPCS: Performed by: ANESTHESIOLOGY

## 2024-09-05 PROCEDURE — 7100000001 HC PACU RECOVERY - ADDTL 15 MIN: Performed by: ORTHOPAEDIC SURGERY

## 2024-09-05 PROCEDURE — 7100000010 HC PHASE II RECOVERY - FIRST 15 MIN: Performed by: ORTHOPAEDIC SURGERY

## 2024-09-05 PROCEDURE — 2500000003 HC RX 250 WO HCPCS: Performed by: NURSE ANESTHETIST, CERTIFIED REGISTERED

## 2024-09-05 PROCEDURE — 2709999900 HC NON-CHARGEABLE SUPPLY: Performed by: ORTHOPAEDIC SURGERY

## 2024-09-05 PROCEDURE — 6360000002 HC RX W HCPCS: Performed by: NURSE ANESTHETIST, CERTIFIED REGISTERED

## 2024-09-05 PROCEDURE — 3600000015 HC SURGERY LEVEL 5 ADDTL 15MIN: Performed by: ORTHOPAEDIC SURGERY

## 2024-09-05 PROCEDURE — 7100000000 HC PACU RECOVERY - FIRST 15 MIN: Performed by: ORTHOPAEDIC SURGERY

## 2024-09-05 PROCEDURE — 3700000001 HC ADD 15 MINUTES (ANESTHESIA): Performed by: ORTHOPAEDIC SURGERY

## 2024-09-05 DEVICE — UNIVERS REVERS SPACER 42+6MM
Type: IMPLANTABLE DEVICE | Status: FUNCTIONAL
Brand: ARTHREX®

## 2024-09-05 DEVICE — 25MM CENTRAL SCREW, MODULAR
Type: IMPLANTABLE DEVICE | Status: FUNCTIONAL
Brand: ARTHREX®

## 2024-09-05 DEVICE — 5.5X36MM PERIPHERAL SCREW, LOCKING
Type: IMPLANTABLE DEVICE | Status: FUNCTIONAL
Brand: ARTHREX®

## 2024-09-05 DEVICE — 5.5X16MM PERIPHERAL SCREW, LOCKING
Type: IMPLANTABLE DEVICE | Status: FUNCTIONAL
Brand: ARTHREX®

## 2024-09-05 DEVICE — 42 +4 LAT/28 GLENOSPHERE
Type: IMPLANTABLE DEVICE | Status: FUNCTIONAL
Brand: ARTHREX®

## 2024-09-05 DEVICE — 5.5X32MM PERIPHERAL SCREW, LOCKING
Type: IMPLANTABLE DEVICE | Status: FUNCTIONAL
Brand: ARTHREX®

## 2024-09-05 DEVICE — UNIVERS REVERS HUMERAL STEM, SIZE 9
Type: IMPLANTABLE DEVICE | Status: FUNCTIONAL
Brand: ARTHREX®

## 2024-09-05 DEVICE — 28MM +4 LAT BASEPLATE, MODULAR
Type: IMPLANTABLE DEVICE | Status: FUNCTIONAL
Brand: ARTHREX®

## 2024-09-05 DEVICE — HUMERAL INSERT L/42 +3 TO FIT IN 42 CUP
Type: IMPLANTABLE DEVICE | Status: FUNCTIONAL
Brand: ARTHREX®

## 2024-09-05 DEVICE — UNIVERS REVERS SUTURE CUP, 42 (+2 RIGHT)
Type: IMPLANTABLE DEVICE | Status: FUNCTIONAL
Brand: ARTHREX®

## 2024-09-05 RX ORDER — SODIUM CHLORIDE 0.9 % (FLUSH) 0.9 %
5-40 SYRINGE (ML) INJECTION EVERY 12 HOURS SCHEDULED
Status: DISCONTINUED | OUTPATIENT
Start: 2024-09-05 | End: 2024-09-05 | Stop reason: HOSPADM

## 2024-09-05 RX ORDER — SODIUM CHLORIDE 9 MG/ML
INJECTION, SOLUTION INTRAVENOUS PRN
Status: DISCONTINUED | OUTPATIENT
Start: 2024-09-05 | End: 2024-09-05 | Stop reason: HOSPADM

## 2024-09-05 RX ORDER — OXYCODONE AND ACETAMINOPHEN 10; 325 MG/1; MG/1
1 TABLET ORAL EVERY 6 HOURS PRN
Qty: 20 TABLET | Refills: 0 | Status: SHIPPED | OUTPATIENT
Start: 2024-09-05 | End: 2024-09-10

## 2024-09-05 RX ORDER — LIDOCAINE HYDROCHLORIDE 10 MG/ML
1 INJECTION, SOLUTION EPIDURAL; INFILTRATION; INTRACAUDAL; PERINEURAL
Status: DISCONTINUED | OUTPATIENT
Start: 2024-09-05 | End: 2024-09-05 | Stop reason: HOSPADM

## 2024-09-05 RX ORDER — FENTANYL CITRATE 50 UG/ML
50 INJECTION, SOLUTION INTRAMUSCULAR; INTRAVENOUS EVERY 5 MIN PRN
Status: DISCONTINUED | OUTPATIENT
Start: 2024-09-05 | End: 2024-09-05 | Stop reason: HOSPADM

## 2024-09-05 RX ORDER — DIPHENHYDRAMINE HYDROCHLORIDE 50 MG/ML
12.5 INJECTION INTRAMUSCULAR; INTRAVENOUS
Status: DISCONTINUED | OUTPATIENT
Start: 2024-09-05 | End: 2024-09-05 | Stop reason: HOSPADM

## 2024-09-05 RX ORDER — DEXAMETHASONE SODIUM PHOSPHATE 4 MG/ML
4 INJECTION, SOLUTION INTRA-ARTICULAR; INTRALESIONAL; INTRAMUSCULAR; INTRAVENOUS; SOFT TISSUE ONCE
Status: COMPLETED | OUTPATIENT
Start: 2024-09-05 | End: 2024-09-05

## 2024-09-05 RX ORDER — MEPERIDINE HYDROCHLORIDE 25 MG/ML
12.5 INJECTION INTRAMUSCULAR; INTRAVENOUS; SUBCUTANEOUS EVERY 5 MIN PRN
Status: DISCONTINUED | OUTPATIENT
Start: 2024-09-05 | End: 2024-09-05 | Stop reason: HOSPADM

## 2024-09-05 RX ORDER — FENTANYL CITRATE 50 UG/ML
25 INJECTION, SOLUTION INTRAMUSCULAR; INTRAVENOUS EVERY 5 MIN PRN
Status: DISCONTINUED | OUTPATIENT
Start: 2024-09-05 | End: 2024-09-05 | Stop reason: HOSPADM

## 2024-09-05 RX ORDER — EPHEDRINE SULFATE/0.9% NACL/PF 25 MG/5 ML
SYRINGE (ML) INTRAVENOUS PRN
Status: DISCONTINUED | OUTPATIENT
Start: 2024-09-05 | End: 2024-09-05 | Stop reason: SDUPTHER

## 2024-09-05 RX ORDER — SODIUM CHLORIDE, SODIUM LACTATE, POTASSIUM CHLORIDE, CALCIUM CHLORIDE 600; 310; 30; 20 MG/100ML; MG/100ML; MG/100ML; MG/100ML
INJECTION, SOLUTION INTRAVENOUS CONTINUOUS
Status: DISCONTINUED | OUTPATIENT
Start: 2024-09-05 | End: 2024-09-05 | Stop reason: HOSPADM

## 2024-09-05 RX ORDER — GLYCOPYRROLATE 0.2 MG/ML
INJECTION INTRAMUSCULAR; INTRAVENOUS PRN
Status: DISCONTINUED | OUTPATIENT
Start: 2024-09-05 | End: 2024-09-05 | Stop reason: SDUPTHER

## 2024-09-05 RX ORDER — BUPIVACAINE HYDROCHLORIDE 5 MG/ML
INJECTION, SOLUTION EPIDURAL; INTRACAUDAL
Status: COMPLETED | OUTPATIENT
Start: 2024-09-05 | End: 2024-09-05

## 2024-09-05 RX ORDER — NALOXONE HYDROCHLORIDE 0.4 MG/ML
INJECTION, SOLUTION INTRAMUSCULAR; INTRAVENOUS; SUBCUTANEOUS PRN
Status: DISCONTINUED | OUTPATIENT
Start: 2024-09-05 | End: 2024-09-05 | Stop reason: HOSPADM

## 2024-09-05 RX ORDER — ONDANSETRON 4 MG/1
4 TABLET, FILM COATED ORAL EVERY 8 HOURS PRN
Qty: 10 TABLET | Refills: 0 | Status: SHIPPED | OUTPATIENT
Start: 2024-09-05

## 2024-09-05 RX ORDER — DEXAMETHASONE SODIUM PHOSPHATE 10 MG/ML
INJECTION, SOLUTION INTRAMUSCULAR; INTRAVENOUS PRN
Status: DISCONTINUED | OUTPATIENT
Start: 2024-09-05 | End: 2024-09-05 | Stop reason: SDUPTHER

## 2024-09-05 RX ORDER — MIDAZOLAM HYDROCHLORIDE 2 MG/2ML
2 INJECTION, SOLUTION INTRAMUSCULAR; INTRAVENOUS
Status: COMPLETED | OUTPATIENT
Start: 2024-09-05 | End: 2024-09-05

## 2024-09-05 RX ORDER — SODIUM CHLORIDE 0.9 % (FLUSH) 0.9 %
5-40 SYRINGE (ML) INJECTION PRN
Status: DISCONTINUED | OUTPATIENT
Start: 2024-09-05 | End: 2024-09-05 | Stop reason: HOSPADM

## 2024-09-05 RX ORDER — ROCURONIUM BROMIDE 10 MG/ML
INJECTION, SOLUTION INTRAVENOUS PRN
Status: DISCONTINUED | OUTPATIENT
Start: 2024-09-05 | End: 2024-09-05 | Stop reason: SDUPTHER

## 2024-09-05 RX ORDER — PROCHLORPERAZINE EDISYLATE 5 MG/ML
5 INJECTION INTRAMUSCULAR; INTRAVENOUS
Status: DISCONTINUED | OUTPATIENT
Start: 2024-09-05 | End: 2024-09-05 | Stop reason: HOSPADM

## 2024-09-05 RX ORDER — FENTANYL CITRATE 50 UG/ML
INJECTION, SOLUTION INTRAMUSCULAR; INTRAVENOUS PRN
Status: DISCONTINUED | OUTPATIENT
Start: 2024-09-05 | End: 2024-09-05 | Stop reason: SDUPTHER

## 2024-09-05 RX ORDER — SODIUM CHLORIDE, SODIUM LACTATE, POTASSIUM CHLORIDE, CALCIUM CHLORIDE 600; 310; 30; 20 MG/100ML; MG/100ML; MG/100ML; MG/100ML
INJECTION, SOLUTION INTRAVENOUS CONTINUOUS PRN
Status: DISCONTINUED | OUTPATIENT
Start: 2024-09-05 | End: 2024-09-05 | Stop reason: SDUPTHER

## 2024-09-05 RX ORDER — ONDANSETRON 2 MG/ML
INJECTION INTRAMUSCULAR; INTRAVENOUS PRN
Status: DISCONTINUED | OUTPATIENT
Start: 2024-09-05 | End: 2024-09-05 | Stop reason: SDUPTHER

## 2024-09-05 RX ORDER — LIDOCAINE HYDROCHLORIDE 10 MG/ML
INJECTION, SOLUTION EPIDURAL; INFILTRATION; INTRACAUDAL; PERINEURAL PRN
Status: DISCONTINUED | OUTPATIENT
Start: 2024-09-05 | End: 2024-09-05 | Stop reason: SDUPTHER

## 2024-09-05 RX ORDER — ONDANSETRON 2 MG/ML
4 INJECTION INTRAMUSCULAR; INTRAVENOUS
Status: DISCONTINUED | OUTPATIENT
Start: 2024-09-05 | End: 2024-09-05 | Stop reason: HOSPADM

## 2024-09-05 RX ORDER — CEFAZOLIN SODIUM 1 G/3ML
INJECTION, POWDER, FOR SOLUTION INTRAMUSCULAR; INTRAVENOUS PRN
Status: DISCONTINUED | OUTPATIENT
Start: 2024-09-05 | End: 2024-09-05 | Stop reason: SDUPTHER

## 2024-09-05 RX ORDER — PROPOFOL 10 MG/ML
INJECTION, EMULSION INTRAVENOUS PRN
Status: DISCONTINUED | OUTPATIENT
Start: 2024-09-05 | End: 2024-09-05 | Stop reason: SDUPTHER

## 2024-09-05 RX ADMIN — ONDANSETRON 4 MG: 2 INJECTION INTRAMUSCULAR; INTRAVENOUS at 12:49

## 2024-09-05 RX ADMIN — SUGAMMADEX 200 MG: 100 INJECTION, SOLUTION INTRAVENOUS at 13:05

## 2024-09-05 RX ADMIN — PROPOFOL 150 MG: 10 INJECTION, EMULSION INTRAVENOUS at 11:36

## 2024-09-05 RX ADMIN — EPHEDRINE SULFATE 10 MG: 5 INJECTION INTRAVENOUS at 12:11

## 2024-09-05 RX ADMIN — BUPIVACAINE HYDROCHLORIDE 10 ML: 5 INJECTION, SOLUTION EPIDURAL; INTRACAUDAL; PERINEURAL at 10:46

## 2024-09-05 RX ADMIN — MIDAZOLAM 2 MG: 1 INJECTION INTRAMUSCULAR; INTRAVENOUS at 10:40

## 2024-09-05 RX ADMIN — FENTANYL CITRATE 50 MCG: 0.05 INJECTION, SOLUTION INTRAMUSCULAR; INTRAVENOUS at 11:28

## 2024-09-05 RX ADMIN — EPHEDRINE SULFATE 15 MG: 5 INJECTION INTRAVENOUS at 12:24

## 2024-09-05 RX ADMIN — CEFAZOLIN 2 G: 1 INJECTION, POWDER, FOR SOLUTION INTRAMUSCULAR; INTRAVENOUS at 11:45

## 2024-09-05 RX ADMIN — BUPIVACAINE 10 ML: 13.3 INJECTION, SUSPENSION, LIPOSOMAL INFILTRATION at 10:46

## 2024-09-05 RX ADMIN — LIDOCAINE HYDROCHLORIDE 5 ML: 10 INJECTION, SOLUTION EPIDURAL; INFILTRATION; INTRACAUDAL; PERINEURAL at 11:35

## 2024-09-05 RX ADMIN — SODIUM CHLORIDE, SODIUM LACTATE, POTASSIUM CHLORIDE, AND CALCIUM CHLORIDE: 600; 310; 30; 20 INJECTION, SOLUTION INTRAVENOUS at 11:28

## 2024-09-05 RX ADMIN — DEXAMETHASONE SODIUM PHOSPHATE 10 MG: 10 INJECTION, SOLUTION INTRAMUSCULAR; INTRAVENOUS at 12:49

## 2024-09-05 RX ADMIN — EPHEDRINE SULFATE 10 MG: 5 INJECTION INTRAVENOUS at 11:56

## 2024-09-05 RX ADMIN — SODIUM CHLORIDE, SODIUM LACTATE, POTASSIUM CHLORIDE, AND CALCIUM CHLORIDE: 600; 310; 30; 20 INJECTION, SOLUTION INTRAVENOUS at 12:10

## 2024-09-05 RX ADMIN — PROPOFOL 50 MG: 10 INJECTION, EMULSION INTRAVENOUS at 11:38

## 2024-09-05 RX ADMIN — SODIUM CHLORIDE, SODIUM LACTATE, POTASSIUM CHLORIDE, AND CALCIUM CHLORIDE: 600; 310; 30; 20 INJECTION, SOLUTION INTRAVENOUS at 08:54

## 2024-09-05 RX ADMIN — FAMOTIDINE 20 MG: 10 INJECTION, SOLUTION INTRAVENOUS at 09:16

## 2024-09-05 RX ADMIN — ROCURONIUM BROMIDE 50 MG: 10 INJECTION, SOLUTION INTRAVENOUS at 11:38

## 2024-09-05 RX ADMIN — DEXAMETHASONE SODIUM PHOSPHATE 4 MG: 4 INJECTION INTRA-ARTICULAR; INTRALESIONAL; INTRAMUSCULAR; INTRAVENOUS; SOFT TISSUE at 09:16

## 2024-09-05 RX ADMIN — EPHEDRINE SULFATE 15 MG: 5 INJECTION INTRAVENOUS at 12:08

## 2024-09-05 RX ADMIN — GLYCOPYRROLATE 0.2 MG: 0.2 INJECTION, SOLUTION INTRAMUSCULAR; INTRAVENOUS at 11:55

## 2024-09-05 ASSESSMENT — PAIN - FUNCTIONAL ASSESSMENT
PAIN_FUNCTIONAL_ASSESSMENT: PREVENTS OR INTERFERES SOME ACTIVE ACTIVITIES AND ADLS
PAIN_FUNCTIONAL_ASSESSMENT: 0-10
PAIN_FUNCTIONAL_ASSESSMENT: ACTIVITIES ARE NOT PREVENTED

## 2024-09-05 ASSESSMENT — PAIN DESCRIPTION - DESCRIPTORS
DESCRIPTORS: ACHING
DESCRIPTORS: ACHING

## 2024-09-05 NOTE — ANESTHESIA PROCEDURE NOTES
Peripheral Block    Patient location during procedure: holding area  Reason for block: post-op pain management  Start time: 9/5/2024 10:46 AM  Staffing  Performed: anesthesiologist   Anesthesiologist: Sudheer Pierce DO  Performed by: Sudheer Pierce DO  Authorized by: Sudheer Pierce DO    Preanesthetic Checklist  Completed: patient identified, IV checked, site marked, risks and benefits discussed, surgical/procedural consents, equipment checked, pre-op evaluation, timeout performed, anesthesia consent given, oxygen available and monitors applied/VS acknowledged  Peripheral Block   Patient position: supine  Prep: ChloraPrep  Provider prep: sterile gloves and mask  Patient monitoring: cardiac monitor, continuous pulse ox, frequent blood pressure checks and IV access  Block type: Brachial plexus  Interscalene  Laterality: left  Injection technique: single-shot  Guidance: ultrasound guided    Needle   Needle type: short-bevel   Needle gauge: 22 G  Needle localization: ultrasound guidance  Test dose: negative  Needle length: 5 cm  Assessment   Injection assessment: negative aspiration for heme, no paresthesia on injection and local visualized surrounding nerve on ultrasound  Paresthesia pain: none  Slow fractionated injection: yes  Hemodynamics: stable  Outcomes: uncomplicated and patient tolerated procedure well    Medications Administered  BUPivacaine (MARCAINE) PF injection 0.5% - Perineural, Shoulder Left   10 mL - 9/5/2024 10:46:00 AM  BUPivacaine liposome (EXPAREL) injection 1.3% - Perineural, Shoulder Left   10 mL - 9/5/2024 10:46:00 AM

## 2024-09-05 NOTE — BRIEF OP NOTE
Brief Postoperative Note      Patient: Roque Hudson Jr.  YOB: 1960  MRN: [id number]    Date of Procedure: 9/5/2024    Pre-Op Diagnosis Codes:      * Primary osteoarthritis of left shoulder [M19.012]    Post-Op Diagnosis: Same       Procedure(s):  LEFT REVERSE TOTAL SHOULDER ARTHROPLASTY    Surgeon(s):  Randolph Ackerman MD    Assistant:  Surgical Assistant: Vivi Lynn    Anesthesia: General    Estimated Blood Loss (mL): less than 100     Complications: None    Specimens:   * No specimens in log *    Implants:  Implant Name Type Inv. Item Serial No.  Lot No. LRB No. Used Action   SCREW BNE L16MM DIA5.5MM PERIPH CHERELLE FOR MOD COURTNEY SYS - [id number]  SCREW BNE L16MM DIA5.5MM PERIPH CHERELLE FOR MOD COURTNEY SYS  [employer] [id number] Left 1 Implanted   SPHERE COURTNEY [id number] +4 BASEPLT 28MM SHLDR LAT TAPR MOD - [id number]  SPHERE COURTNEY [id number] +4 BASEPLT 28MM SHLDR LAT TAPR MOD  [employer] [id number] Left 1 Implanted   SCREW BNE L32MM DIA5.5MM PERIPH CHERELLE FOR MOD COURTNEY SYS - [id number]  SCREW BNE L32MM DIA5.5MM PERIPH CHERELLE FOR MOD COURTNEY SYS  [employer] [id number] Left 1 Implanted   SCREW COURTNEY FIX 25MM SHLDR CTRL MOD UNIVERS REVERS - [id number]  SCREW COURTNEY FIX 25MM SHLDR CTRL MOD UNIVERS REVERS  [employer] [id number] Left 1 Implanted   SCREW BNE L36MM DIA5.5MM PERIPH CHERELLE FOR MOD COURTNEY SYS - [id number]  SCREW BNE L36MM DIA5.5MM PERIPH CHERELLE FOR MOD COURTNEY SYS  [employer] [id number] Left 1 Implanted   BASEPLATE COURTNEY 28MM +4 SHLDR LAT MOD UNIVERS REVERS - [id number]  BASEPLATE COURTNEY 28MM +4 SHLDR LAT MOD UNIVERS REVERS  [employer] [id number] Left 1 Implanted   SCREW BNE L16MM DIA5.5MM PERIPH CHERELLE FOR MOD COURTNEY SYS - [id number]  SCREW BNE L16MM DIA5.5MM PERIPH CHERELLE FOR MOD COURTNEY SYS  [employer] [id number] Left 1 Implanted   LINER HUM L [id number] +3MM OFFSET LAT SHLDR UHMWPE [facility] - [id number]  LINER HUM L [id number] +3MM OFFSET LAT SHLDR UHMWPE [facility]  [employer] [id number] Left 1 Implanted   SPACER ALDEN

## 2024-09-05 NOTE — ANESTHESIA POSTPROCEDURE EVALUATION
Department of Anesthesiology  Postprocedure Note    Patient: Roque Hudson Jr.  MRN: [id number]  YOB: 1960  Date of evaluation: 9/5/2024    Procedure Summary       Date: 09/05/24 Room / Location: [address]    Anesthesia Start: 1128 Anesthesia Stop: 1314    Procedure: LEFT REVERSE TOTAL SHOULDER ARTHROPLASTY (Left) Diagnosis:       Primary osteoarthritis of left shoulder      (Primary osteoarthritis of left shoulder [M19.012])    Surgeons: Randolph Ackerman MD Responsible Provider: Keri Haywood APRN - CRNA    Anesthesia Type: general, regional ASA Status: 3            Anesthesia Type: No value filed.    Sunil Phase I: Sunil Score: 7    Sunil Phase II:      Anesthesia Post Evaluation    Patient location during evaluation: PACU  Patient participation: complete - patient participated  Level of consciousness: sleepy but conscious  Pain score: 0  Airway patency: patent  Nausea & Vomiting: no nausea and no vomiting  Cardiovascular status: hemodynamically stable and blood pressure returned to baseline  Respiratory status: acceptable and face mask  Hydration status: stable  Comments: /71   Pulse 68   Temp 98 °F (36.7 °C)   Resp 12   Ht 1.854 m (6' 1\")   Wt 88.9 kg (196 lb)   SpO2 98%   BMI 25.86 kg/m²     Pain management: adequate    No notable events documented.

## 2024-09-05 NOTE — H&P
Pt Name: Roque Hudson Jr.  MRN: [id number]  YOB: 1960  Date of evaluation: 9/5/2024    H&P including current review of systems was updated in the paper chart and/or the document previously scanned into the record.  There have been no significant changes or new problems since the original evaluation.  The patient's problems continue and indications for contemplated procedure have not changed.    Electronically signed by Randolph Ackerman MD on 9/5/2024 at 9:12 AM

## 2024-09-06 RX ORDER — ROSUVASTATIN CALCIUM 10 MG/1
10 TABLET, COATED ORAL DAILY
Qty: 90 TABLET | Refills: 1 | Status: SHIPPED | OUTPATIENT
Start: 2024-09-06

## 2024-09-06 RX ORDER — FLUTICASONE PROPIONATE 50 MCG
1 SPRAY, SUSPENSION (ML) NASAL DAILY
Qty: 48 G | Refills: 1 | Status: SHIPPED | OUTPATIENT
Start: 2024-09-06

## 2024-09-06 NOTE — TELEPHONE ENCOUNTER
Roque Hudson Jr. called to request a refill on his medication.      Last office visit : 6/20/2024   Next office visit : 10/24/2024     Requested Prescriptions     Pending Prescriptions Disp Refills    rosuvastatin (CRESTOR) 10 MG tablet [Pharmacy Med Name: ROSUVASTATIN CALCIUM 10 MG TAB] 90 tablet 1     Sig: TAKE 1 TABLET BY MOUTH EVERY DAY            April Lorelei Crane MA

## 2024-09-24 ENCOUNTER — TELEPHONE (OUTPATIENT)
Dept: INTERNAL MEDICINE | Age: 64
End: 2024-09-24

## 2024-09-26 ENCOUNTER — OFFICE VISIT (OUTPATIENT)
Dept: INTERNAL MEDICINE | Age: 64
End: 2024-09-26
Payer: MEDICARE

## 2024-09-26 ENCOUNTER — HOSPITAL ENCOUNTER (OUTPATIENT)
Dept: GENERAL RADIOLOGY | Age: 64
Discharge: HOME | End: 2024-09-26
Payer: MEDICARE

## 2024-09-26 VITALS
DIASTOLIC BLOOD PRESSURE: 76 MMHG | HEART RATE: 56 BPM | OXYGEN SATURATION: 99 % | BODY MASS INDEX: 24.6 KG/M2 | HEIGHT: 73 IN | SYSTOLIC BLOOD PRESSURE: 122 MMHG | WEIGHT: 185.6 LBS

## 2024-09-26 DIAGNOSIS — R91.8 OPACITY OF LUNG ON IMAGING STUDY: Primary | ICD-10-CM

## 2024-09-26 DIAGNOSIS — Z23 NEED FOR VACCINATION: ICD-10-CM

## 2024-09-26 DIAGNOSIS — Z29.11 NEED FOR RSV IMMUNIZATION: ICD-10-CM

## 2024-09-26 DIAGNOSIS — R91.8 OPACITY OF LUNG ON IMAGING STUDY: ICD-10-CM

## 2024-09-26 PROCEDURE — 1123F ACP DISCUSS/DSCN MKR DOCD: CPT | Performed by: INTERNAL MEDICINE

## 2024-09-26 PROCEDURE — 3078F DIAST BP <80 MM HG: CPT | Performed by: INTERNAL MEDICINE

## 2024-09-26 PROCEDURE — 3074F SYST BP LT 130 MM HG: CPT | Performed by: INTERNAL MEDICINE

## 2024-09-26 PROCEDURE — 99213 OFFICE O/P EST LOW 20 MIN: CPT | Performed by: INTERNAL MEDICINE

## 2024-09-26 PROCEDURE — G8420 CALC BMI NORM PARAMETERS: HCPCS | Performed by: INTERNAL MEDICINE

## 2024-09-26 PROCEDURE — 3017F COLORECTAL CA SCREEN DOC REV: CPT | Performed by: INTERNAL MEDICINE

## 2024-09-26 PROCEDURE — 90653 IIV ADJUVANT VACCINE IM: CPT | Performed by: INTERNAL MEDICINE

## 2024-09-26 PROCEDURE — 71046 X-RAY EXAM CHEST 2 VIEWS: CPT

## 2024-09-26 PROCEDURE — 1036F TOBACCO NON-USER: CPT | Performed by: INTERNAL MEDICINE

## 2024-09-26 PROCEDURE — G0008 ADMIN INFLUENZA VIRUS VAC: HCPCS | Performed by: INTERNAL MEDICINE

## 2024-09-26 PROCEDURE — G8427 DOCREV CUR MEDS BY ELIG CLIN: HCPCS | Performed by: INTERNAL MEDICINE

## 2024-09-26 RX ORDER — RESPIRATORY SYNCYTIAL VIRUS VACCINE 120MCG/0.5
0.5 KIT INTRAMUSCULAR ONCE
Qty: 0.5 ML | Refills: 0 | Status: SHIPPED | OUTPATIENT
Start: 2024-09-26 | End: 2024-09-26

## 2024-09-26 ASSESSMENT — ENCOUNTER SYMPTOMS
ABDOMINAL PAIN: 0
SORE THROAT: 0
WHEEZING: 0
COUGH: 0
CHEST TIGHTNESS: 0
CONSTIPATION: 0

## 2024-10-08 ENCOUNTER — OFFICE VISIT (OUTPATIENT)
Dept: NEUROLOGY | Age: 64
End: 2024-10-08
Payer: MEDICARE

## 2024-10-08 VITALS
HEART RATE: 60 BPM | SYSTOLIC BLOOD PRESSURE: 98 MMHG | BODY MASS INDEX: 24.52 KG/M2 | DIASTOLIC BLOOD PRESSURE: 60 MMHG | WEIGHT: 185 LBS | RESPIRATION RATE: 16 BRPM | HEIGHT: 73 IN

## 2024-10-08 DIAGNOSIS — G47.33 OBSTRUCTIVE SLEEP APNEA: Primary | ICD-10-CM

## 2024-10-08 DIAGNOSIS — R40.0 SOMNOLENCE, DAYTIME: ICD-10-CM

## 2024-10-08 DIAGNOSIS — Z99.89 BIPAP (BIPHASIC POSITIVE AIRWAY PRESSURE) DEPENDENCE: ICD-10-CM

## 2024-10-08 PROCEDURE — 3017F COLORECTAL CA SCREEN DOC REV: CPT | Performed by: PHYSICIAN ASSISTANT

## 2024-10-08 PROCEDURE — G8427 DOCREV CUR MEDS BY ELIG CLIN: HCPCS | Performed by: PHYSICIAN ASSISTANT

## 2024-10-08 PROCEDURE — 3074F SYST BP LT 130 MM HG: CPT | Performed by: PHYSICIAN ASSISTANT

## 2024-10-08 PROCEDURE — G8420 CALC BMI NORM PARAMETERS: HCPCS | Performed by: PHYSICIAN ASSISTANT

## 2024-10-08 PROCEDURE — 99214 OFFICE O/P EST MOD 30 MIN: CPT | Performed by: PHYSICIAN ASSISTANT

## 2024-10-08 PROCEDURE — G8482 FLU IMMUNIZE ORDER/ADMIN: HCPCS | Performed by: PHYSICIAN ASSISTANT

## 2024-10-08 PROCEDURE — 3078F DIAST BP <80 MM HG: CPT | Performed by: PHYSICIAN ASSISTANT

## 2024-10-08 PROCEDURE — 1036F TOBACCO NON-USER: CPT | Performed by: PHYSICIAN ASSISTANT

## 2024-10-08 PROCEDURE — 1123F ACP DISCUSS/DSCN MKR DOCD: CPT | Performed by: PHYSICIAN ASSISTANT

## 2024-10-08 NOTE — PROGRESS NOTES
REVIEW OF SYSTEMS    Constitutional: []Fever []Sweats []Chills [] Recent Injury   [x] Denies all unless marked  HENT:[]Headache  [] Head Injury  [] Sore Throat  [] Ear Pain  [] Dizziness [] Hearing Loss   [x] Denies all unless marked  Musculoskeletal: [] Arthralgia  [] Myalgias [] Muscle cramps  [] Muscle twitches   [x] Denies all unless marked   Spine:  [] Neck pain  [] Back pain  [] Sciatica  [x] Denies all unless marked  Neurological:[] Visual Disturbance [] Double Vision [] Slurred Speech [] Trouble swallowing  [] Vertigo [] Tingling [] Numbness [] Weakness [] Loss of Balance   [] Loss of Consciousness [] Memory Loss [] Seizures  [x] Denies all unless marked  Psychiatric/Behavioral:[] Depression [] Anxiety  [x] Denies all unless marked  Sleep: []  Insomnia [] Sleep Disturbance [] Snoring [] Restless Legs [] Daytime Sleepiness [x] Sleep Apnea  [] Denies all unless marked   
D) 50 MCG (2000 UT) CAPS capsule Take by mouth daily      Bacillus Coagulans-Inulin (PROBIOTIC-PREBIOTIC) 1-250 BILLION-MG CAPS Take by mouth      B Complex Vitamins (B COMPLEX PO) Take 150 mg by mouth      Potassium 99 MG TABS Take 1 tablet by mouth daily      niacin (NIASPAN) 1000 MG extended release tablet Take 1 tablet by mouth 4 times daily      aspirin 81 MG EC tablet Take 1 tablet by mouth daily      Omega-3 Fatty Acids (FISH OIL PO) Take 1,280 mg by mouth daily       Ascorbic Acid (VITAMIN C) 500 MG tablet Take 1 tablet by mouth 2 times daily      vitamin E 400 UNIT capsule Take 1 capsule by mouth daily       No current facility-administered medications for this visit.       Allergies:  Patient has no known allergies.         REVIEW OF SYSTEMS     Constitutional: []Fever []Sweats []Chills [] Recent Injury   [x] Denies all unless marked  HENT:[]Headache  [] Head Injury  [] Sore Throat  [] Ear Pain  [] Dizziness [] Hearing Loss   [x] Denies all unless marked  Musculoskeletal: [] Arthralgia  [] Myalgias [] Muscle cramps  [] Muscle twitches   [x] Denies all unless marked   Spine:  [] Neck pain  [] Back pain  [] Sciatica  [x] Denies all unless marked  Neurological:[] Visual Disturbance [] Double Vision [] Slurred Speech [] Trouble swallowing  [] Vertigo [] Tingling [] Numbness [] Weakness [] Loss of Balance   [] Loss of Consciousness [] Memory Loss [] Seizures  [x] Denies all unless marked  Psychiatric/Behavioral:[] Depression [] Anxiety  [x] Denies all unless marked  Sleep: []  Insomnia [] Sleep Disturbance [] Snoring [] Restless Legs [] Daytime Sleepiness [x] Sleep Apnea  [] Denies all unless marked           The MA has completed the ROS with the patient. I have reviewed it in its' entirety with the patient and agree with the documentation.     PHYSICAL EXAM  BP 98/60   Pulse 60   Resp 16   Ht 1.854 m (6' 1\")   Wt 83.9 kg (185 lb)   BMI 24.41 kg/m²     Constitutional -  Alert in NAD, well developed,

## 2024-10-15 DIAGNOSIS — G89.29 CHRONIC LEFT SHOULDER PAIN: Primary | ICD-10-CM

## 2024-10-15 DIAGNOSIS — M25.512 CHRONIC LEFT SHOULDER PAIN: Primary | ICD-10-CM

## 2024-10-15 DIAGNOSIS — E78.2 MIXED HYPERLIPIDEMIA: ICD-10-CM

## 2024-10-15 DIAGNOSIS — F51.01 PRIMARY INSOMNIA: ICD-10-CM

## 2024-10-15 DIAGNOSIS — I10 ESSENTIAL HYPERTENSION: ICD-10-CM

## 2024-10-15 DIAGNOSIS — D68.69 SECONDARY HYPERCOAGULABLE STATE (HCC): ICD-10-CM

## 2024-10-15 DIAGNOSIS — R73.01 IFG (IMPAIRED FASTING GLUCOSE): ICD-10-CM

## 2024-10-15 DIAGNOSIS — I48.0 PAF (PAROXYSMAL ATRIAL FIBRILLATION) (HCC): ICD-10-CM

## 2024-10-15 DIAGNOSIS — E55.9 VITAMIN D DEFICIENCY: ICD-10-CM

## 2024-10-15 DIAGNOSIS — Z12.5 SCREENING PSA (PROSTATE SPECIFIC ANTIGEN): ICD-10-CM

## 2024-10-15 LAB
25(OH)D3 SERPL-MCNC: 44.9 NG/ML
ALBUMIN SERPL-MCNC: 4.4 G/DL (ref 3.5–5.2)
ALP SERPL-CCNC: 109 U/L (ref 40–129)
ALT SERPL-CCNC: 73 U/L (ref 5–41)
ANION GAP SERPL CALCULATED.3IONS-SCNC: 8 MMOL/L (ref 7–19)
AST SERPL-CCNC: 53 U/L (ref 5–40)
BASOPHILS # BLD: 0 K/UL (ref 0–0.2)
BASOPHILS NFR BLD: 0.5 % (ref 0–1)
BILIRUB SERPL-MCNC: 0.7 MG/DL (ref 0.2–1.2)
BILIRUB UR QL STRIP: NEGATIVE
BUN SERPL-MCNC: 16 MG/DL (ref 8–23)
CALCIUM SERPL-MCNC: 9.5 MG/DL (ref 8.8–10.2)
CHLORIDE SERPL-SCNC: 105 MMOL/L (ref 98–111)
CHOLEST SERPL-MCNC: 138 MG/DL (ref 0–199)
CLARITY UR: CLEAR
CO2 SERPL-SCNC: 29 MMOL/L (ref 22–29)
COLOR UR: YELLOW
CREAT SERPL-MCNC: 0.8 MG/DL (ref 0.7–1.2)
EOSINOPHIL # BLD: 0.2 K/UL (ref 0–0.6)
EOSINOPHIL NFR BLD: 3.6 % (ref 0–5)
ERYTHROCYTE [DISTWIDTH] IN BLOOD BY AUTOMATED COUNT: 14.6 % (ref 11.5–14.5)
GLUCOSE SERPL-MCNC: 95 MG/DL (ref 70–99)
GLUCOSE UR STRIP.AUTO-MCNC: NEGATIVE MG/DL
HBA1C MFR BLD: 5.3 % (ref 4–5.6)
HCT VFR BLD AUTO: 44.1 % (ref 42–52)
HDLC SERPL-MCNC: 63 MG/DL (ref 40–60)
HGB BLD-MCNC: 14.1 G/DL (ref 14–18)
HGB UR STRIP.AUTO-MCNC: NEGATIVE MG/L
IMM GRANULOCYTES # BLD: 0 K/UL
KETONES UR STRIP.AUTO-MCNC: NEGATIVE MG/DL
LDLC SERPL CALC-MCNC: 63 MG/DL
LEUKOCYTE ESTERASE UR QL STRIP.AUTO: NEGATIVE
LYMPHOCYTES # BLD: 1.3 K/UL (ref 1.1–4.5)
LYMPHOCYTES NFR BLD: 22.5 % (ref 20–40)
MCH RBC QN AUTO: 33.4 PG (ref 27–31)
MCHC RBC AUTO-ENTMCNC: 32 G/DL (ref 33–37)
MCV RBC AUTO: 104.5 FL (ref 80–94)
MONOCYTES # BLD: 0.5 K/UL (ref 0–0.9)
MONOCYTES NFR BLD: 9 % (ref 0–10)
NEUTROPHILS # BLD: 3.8 K/UL (ref 1.5–7.5)
NEUTS SEG NFR BLD: 64.2 % (ref 50–65)
NITRITE UR QL STRIP.AUTO: NEGATIVE
PH UR STRIP.AUTO: 7 [PH] (ref 5–8)
PLATELET # BLD AUTO: 176 K/UL (ref 130–400)
PMV BLD AUTO: 10.1 FL (ref 9.4–12.4)
POTASSIUM SERPL-SCNC: 4.5 MMOL/L (ref 3.5–5)
PROT SERPL-MCNC: 6.9 G/DL (ref 6.4–8.3)
PROT UR STRIP.AUTO-MCNC: NEGATIVE MG/DL
PSA SERPL-MCNC: 3.23 NG/ML (ref 0–4)
RBC # BLD AUTO: 4.22 M/UL (ref 4.7–6.1)
SODIUM SERPL-SCNC: 142 MMOL/L (ref 136–145)
SP GR UR STRIP.AUTO: 1.02 (ref 1–1.03)
TRIGL SERPL-MCNC: 58 MG/DL (ref 0–149)
TSH SERPL DL<=0.005 MIU/L-ACNC: 2.9 UIU/ML (ref 0.27–4.2)
UROBILINOGEN UR STRIP.AUTO-MCNC: 0.2 E.U./DL
WBC # BLD AUTO: 5.9 K/UL (ref 4.8–10.8)

## 2024-10-16 ENCOUNTER — OFFICE VISIT (OUTPATIENT)
Age: 64
End: 2024-10-16

## 2024-10-16 VITALS — WEIGHT: 191 LBS | BODY MASS INDEX: 25.31 KG/M2 | HEIGHT: 73 IN

## 2024-10-16 DIAGNOSIS — Z96.612 S/P REVERSE TOTAL SHOULDER ARTHROPLASTY, LEFT: Primary | ICD-10-CM

## 2024-10-16 NOTE — PROGRESS NOTES
Orthopaedic Clinic Note    NAME:  Roque Hudson Jr.   : 1960  MRN: [id number]      10/16/2024     CHIEF COMPLAINT: Status post left Reverse TSA, 2024.    HISTORY OF PRESENT ILLNESS:   Roque returns today for follow up of the left shoulder.  Pain has improved.  He is doing exceptionally well.  He is performing therapy at [facility]. There have been no postoperative complications to date.    Past Medical History:        Diagnosis Date    A-fib (HCC)     Atelectasis of right lung 2017    Back pain, lumbosacral     BiPAP (biphasic positive airway pressure) dependence     12cm to 22cm    Clotting disorder (HCC)     Cyst near coccyx     removed    GERD (gastroesophageal reflux disease)     Heart murmur     Hx of blood clots     Hyperlipidemia     Hypertension     Mild CAD 2018    Obstructive sleep apnea     Osteoarthritis     PAF (paroxysmal atrial fibrillation) (HCC)     Paroxysmal atrial fibrillation with RVR (HCC) 2017    Pneumonia due to organism     Pulmonary embolism (HCC)        Past Surgical History:        Procedure Laterality Date    ANKLE FRACTURE SURGERY Left     ARM SURGERY Left 2018    fracture     CARDIAC CATHETERIZATION  2018    Mild CAD    CHOLECYSTECTOMY, LAPAROSCOPIC N/A 12/15/2017    CHOLECYSTECTOMY LAPAROSCOPIC performed by Carolina Hampton MD at [facility] OR    COLONOSCOPY      COLONOSCOPY N/A 2023    Dr MARIAJOSE Hope-Diverticular disease, 10 yr recall    GALLBLADDER SURGERY      [facility] EGD TRANSORAL BIOPSY SINGLE/MULTIPLE N/A 11/15/2018    Dr MARIAJOSE Hope-Gastropathy    SHOULDER SURGERY Left 2024    LEFT REVERSE TOTAL SHOULDER ARTHROPLASTY performed by Randolph Ackerman MD at [facility] OR    TESTICLE SURGERY      undecended testicle done when 1 years old       Current Medications:   Prior to Admission medications    Medication Sig Start Date End Date Taking? Authorizing Provider   rosuvastatin (CRESTOR) 10 MG tablet TAKE 1 TABLET BY MOUTH EVERY DAY 24  Yes Danika

## 2024-10-17 LAB — APO B100 SERPL-MCNC: 73 MG/DL (ref 66–133)

## 2024-10-24 ENCOUNTER — OFFICE VISIT (OUTPATIENT)
Dept: INTERNAL MEDICINE | Age: 64
End: 2024-10-24

## 2024-10-24 VITALS
OXYGEN SATURATION: 95 % | BODY MASS INDEX: 25.84 KG/M2 | SYSTOLIC BLOOD PRESSURE: 118 MMHG | HEART RATE: 54 BPM | WEIGHT: 195 LBS | HEIGHT: 73 IN | DIASTOLIC BLOOD PRESSURE: 72 MMHG

## 2024-10-24 DIAGNOSIS — E78.2 MIXED HYPERLIPIDEMIA: ICD-10-CM

## 2024-10-24 DIAGNOSIS — R71.8 ELEVATED MCV: ICD-10-CM

## 2024-10-24 DIAGNOSIS — E55.9 VITAMIN D DEFICIENCY: ICD-10-CM

## 2024-10-24 DIAGNOSIS — I10 ESSENTIAL HYPERTENSION: ICD-10-CM

## 2024-10-24 DIAGNOSIS — I48.0 PAF (PAROXYSMAL ATRIAL FIBRILLATION) (HCC): ICD-10-CM

## 2024-10-24 DIAGNOSIS — R73.01 IFG (IMPAIRED FASTING GLUCOSE): ICD-10-CM

## 2024-10-24 DIAGNOSIS — Z00.00 INITIAL MEDICARE ANNUAL WELLNESS VISIT: Primary | ICD-10-CM

## 2024-10-24 DIAGNOSIS — R53.83 OTHER FATIGUE: ICD-10-CM

## 2024-10-24 DIAGNOSIS — R74.01 ELEVATED TRANSAMINASE LEVEL: ICD-10-CM

## 2024-10-24 ASSESSMENT — LIFESTYLE VARIABLES
HOW MANY STANDARD DRINKS CONTAINING ALCOHOL DO YOU HAVE ON A TYPICAL DAY: PATIENT DOES NOT DRINK
HOW OFTEN DO YOU HAVE A DRINK CONTAINING ALCOHOL: NEVER

## 2024-10-24 ASSESSMENT — ENCOUNTER SYMPTOMS
CONSTIPATION: 0
SORE THROAT: 0
COUGH: 0
ABDOMINAL PAIN: 0
WHEEZING: 0
CHEST TIGHTNESS: 0

## 2024-10-24 ASSESSMENT — PATIENT HEALTH QUESTIONNAIRE - PHQ9
SUM OF ALL RESPONSES TO PHQ QUESTIONS 1-9: 0
SUM OF ALL RESPONSES TO PHQ QUESTIONS 1-9: 0
1. LITTLE INTEREST OR PLEASURE IN DOING THINGS: NOT AT ALL
SUM OF ALL RESPONSES TO PHQ QUESTIONS 1-9: 0
SUM OF ALL RESPONSES TO PHQ QUESTIONS 1-9: 0
2. FEELING DOWN, DEPRESSED OR HOPELESS: NOT AT ALL
SUM OF ALL RESPONSES TO PHQ9 QUESTIONS 1 & 2: 0

## 2024-10-24 NOTE — PROGRESS NOTES
carbohydrates and animal-based products that high in saturated fats        Hypertension-   Blood pressure readings now good-has lost 57 pounds of weight  * RX lisinopril 40 mg daily  * RX chlorthalidone 12.5 daily DC  * RX amlodipine decrease to  2.5 QD  He will monitor his blood pressure closely, most likely will be able to start tapering his blood pressure medications amlodipine and chlorthalidone     K+ good  Cont kcl 10      IFG  a1c better 5.3  Diet  Wt loss  Healthy, mostly fiber rich nonstarchy plant-based diet recommended  Recommend to decrease intake of processed foods, simple carbohydrates and animal-based products that high in saturated fats     Diarrhea daily in am- now better with colestid      Obstructive sleep apnea- doing better, sleeping better continue current BiPAP settings     Elevated MCV  Hemoglobin 10/15/2024 14.1    Hematocrit 10/15/2024 44.1      MCV  80.0 - 94.0 fL 104.5 High  99.5 High  100.5 High  102.8 High  102.5 High      H&H nl  prior  Hemoglobin 14.0 - 18.0 g/dL 14.2  15.3  15.2  14.3  14.9  14.5  13.9 Low     Hematocrit 42.0 - 52.0 % 43.7  45.9  46.3  44.2  45.8  44.1  43.0    MCV 80.0 - 94.0 fL 102.8 High   102.5 High   101.3 High   103.5 High   99.6 High           No history of EtOH use  Suggest B complex vitamin  B12 level nl 642        Elevated transaminases  ALT 10/15/2024 73 (H)    AST 10/15/2024 53 (H)    Dc niacin QID  Obtain liver us    Orders Placed This Encounter   Procedures    US ABDOMEN LIMITED    CBC with Auto Differential    Comprehensive Metabolic Panel    Vitamin B12    CBC with Auto Differential    Lipid Panel    Vitamin D 25 Hydroxy    Comprehensive Metabolic Panel     New Prescriptions    No medications on file         No follow-ups on file.     EMR Dragon/transcription disclaimer:Significant part of this  encounter note is electronic transcription/translationof spoken language to printed text. The electronic translation of spoken language may be erroneous, or 
active every day. Try for at least 30 minutes on most days of the week.     Try to quit or cut back on using tobacco and other nicotine products. This includes smoking and vaping. If you need help quitting, talk to your doctor about stop-smoking programs and medicines. These can increase your chances of quitting for good. Quitting is one of the most important things you can do to protect your heart. It is never too late to quit. Try to avoid secondhand smoke too.     Stay at a weight that's healthy for you. Talk to your doctor if you need help losing weight.     Try to get 7 to 9 hours of sleep each night.     Limit alcohol to 2 drinks a day for men and 1 drink a day for women. Too much alcohol can cause health problems.     Manage other health problems such as diabetes, high blood pressure, and high cholesterol. If you think you may have a problem with alcohol or drug use, talk to your doctor.   Medicines    Take your medicines exactly as prescribed. Call your doctor if you think you are having a problem with your medicine.     If your doctor recommends aspirin, take the amount directed each day. Make sure you take aspirin and not another kind of pain reliever, such as acetaminophen (Tylenol).   When should you call for help?   Call 911 if you have symptoms of a heart attack. These may include:    Chest pain or pressure, or a strange feeling in the chest.     Sweating.     Shortness of breath.     Pain, pressure, or a strange feeling in the back, neck, jaw, or upper belly or in one or both shoulders or arms.     Lightheadedness or sudden weakness.     A fast or irregular heartbeat.   After you call 911, the  may tell you to chew 1 adult-strength or 2 to 4 low-dose aspirin. Wait for an ambulance. Do not try to drive yourself.  Watch closely for changes in your health, and be sure to contact your doctor if you have any problems.  Where can you learn more?  Go to https://www.healthwise.net/patientEd and enter

## 2024-11-05 ENCOUNTER — HOSPITAL ENCOUNTER (OUTPATIENT)
Dept: ULTRASOUND IMAGING | Age: 64
Discharge: HOME | End: 2024-11-05
Payer: MEDICARE

## 2024-11-05 DIAGNOSIS — R74.01 ELEVATED TRANSAMINASE LEVEL: ICD-10-CM

## 2024-11-05 PROCEDURE — 76705 ECHO EXAM OF ABDOMEN: CPT

## 2024-12-01 NOTE — PROGRESS NOTES
Orthopaedic Clinic Note    NAME:  Roque Hudson Jr.   : 1960  MRN: [id number]      2024     CHIEF COMPLAINT: Status post left Reverse TSA, 2024.    HISTORY OF PRESENT ILLNESS:   Roque returns today for follow up of the left shoulder.  Pain has improved.  He is doing exceptionally well.  He is performing therapy at [facility]. There have been no postoperative complications to date.    Past Medical History:        Diagnosis Date    A-fib (HCC)     Atelectasis of right lung 2017    Back pain, lumbosacral     BiPAP (biphasic positive airway pressure) dependence     12cm to 22cm    Clotting disorder (HCC)     Cyst near coccyx     removed    GERD (gastroesophageal reflux disease)     Heart murmur     Hx of blood clots     Hyperlipidemia     Hypertension     Mild CAD 2018    Obstructive sleep apnea     Osteoarthritis     PAF (paroxysmal atrial fibrillation) (HCC)     Paroxysmal atrial fibrillation with RVR (HCC) 2017    Pneumonia due to organism     Pulmonary embolism (HCC)        Past Surgical History:        Procedure Laterality Date    ANKLE FRACTURE SURGERY Left     ARM SURGERY Left 2018    fracture     CARDIAC CATHETERIZATION  2018    Mild CAD    CHOLECYSTECTOMY, LAPAROSCOPIC N/A 12/15/2017    CHOLECYSTECTOMY LAPAROSCOPIC performed by Carolina Hampton MD at [facility] OR    COLONOSCOPY      COLONOSCOPY N/A 2023    Dr MARIAJOSE Hope-Diverticular disease, 10 yr recall    GALLBLADDER SURGERY      [place] EGD TRANSORAL BIOPSY SINGLE/MULTIPLE N/A 11/15/2018    Dr MARIAJOSE Hope-Gastropathy    SHOULDER SURGERY Left 2024    LEFT REVERSE TOTAL SHOULDER ARTHROPLASTY performed by Randolph Ackerman MD at [facility] OR    TESTICLE SURGERY      undecended testicle done when 1 years old       Current Medications:   Prior to Admission medications    Medication Sig Start Date End Date Taking? Authorizing Provider   rosuvastatin (CRESTOR) 10 MG tablet TAKE 1 TABLET BY MOUTH EVERY DAY 24  Yes Danika

## 2024-12-02 ENCOUNTER — OFFICE VISIT (OUTPATIENT)
Age: 64
End: 2024-12-02

## 2024-12-02 VITALS — HEIGHT: 73 IN | BODY MASS INDEX: 26.27 KG/M2 | WEIGHT: 198.2 LBS

## 2024-12-02 DIAGNOSIS — Z96.612 S/P REVERSE TOTAL SHOULDER ARTHROPLASTY, LEFT: Primary | ICD-10-CM

## 2024-12-02 DIAGNOSIS — K76.9 LIVER LESION, RIGHT LOBE: Primary | ICD-10-CM

## 2024-12-02 PROCEDURE — 99024 POSTOP FOLLOW-UP VISIT: CPT | Performed by: ORTHOPAEDIC SURGERY

## 2024-12-05 DIAGNOSIS — R71.8 ELEVATED MCV: ICD-10-CM

## 2024-12-05 DIAGNOSIS — R74.01 ELEVATED TRANSAMINASE LEVEL: ICD-10-CM

## 2024-12-05 LAB
ALBUMIN SERPL-MCNC: 4.5 G/DL (ref 3.5–5.2)
ALP SERPL-CCNC: 88 U/L (ref 40–129)
ALT SERPL-CCNC: 94 U/L (ref 5–41)
ANION GAP SERPL CALCULATED.3IONS-SCNC: 9 MMOL/L (ref 7–19)
AST SERPL-CCNC: 61 U/L (ref 5–40)
BASOPHILS # BLD: 0 K/UL (ref 0–0.2)
BASOPHILS NFR BLD: 0.9 % (ref 0–1)
BILIRUB SERPL-MCNC: 0.7 MG/DL (ref 0.2–1.2)
BUN SERPL-MCNC: 24 MG/DL (ref 8–23)
CALCIUM SERPL-MCNC: 9.8 MG/DL (ref 8.8–10.2)
CHLORIDE SERPL-SCNC: 106 MMOL/L (ref 98–111)
CO2 SERPL-SCNC: 29 MMOL/L (ref 22–29)
CREAT SERPL-MCNC: 0.8 MG/DL (ref 0.7–1.2)
EOSINOPHIL # BLD: 0.3 K/UL (ref 0–0.6)
EOSINOPHIL NFR BLD: 6.7 % (ref 0–5)
ERYTHROCYTE [DISTWIDTH] IN BLOOD BY AUTOMATED COUNT: 12.9 % (ref 11.5–14.5)
GLUCOSE SERPL-MCNC: 92 MG/DL (ref 70–99)
HCT VFR BLD AUTO: 40.8 % (ref 42–52)
HGB BLD-MCNC: 13.1 G/DL (ref 14–18)
IMM GRANULOCYTES # BLD: 0 K/UL
LYMPHOCYTES # BLD: 1.4 K/UL (ref 1.1–4.5)
LYMPHOCYTES NFR BLD: 32.4 % (ref 20–40)
MCH RBC QN AUTO: 33.1 PG (ref 27–31)
MCHC RBC AUTO-ENTMCNC: 32.1 G/DL (ref 33–37)
MCV RBC AUTO: 103 FL (ref 80–94)
MONOCYTES # BLD: 0.5 K/UL (ref 0–0.9)
MONOCYTES NFR BLD: 12.4 % (ref 0–10)
NEUTROPHILS # BLD: 2.1 K/UL (ref 1.5–7.5)
NEUTS SEG NFR BLD: 47.4 % (ref 50–65)
PLATELET # BLD AUTO: 190 K/UL (ref 130–400)
PMV BLD AUTO: 9.8 FL (ref 9.4–12.4)
POTASSIUM SERPL-SCNC: 4.6 MMOL/L (ref 3.5–5)
PROT SERPL-MCNC: 7.1 G/DL (ref 6.4–8.3)
RBC # BLD AUTO: 3.96 M/UL (ref 4.7–6.1)
SODIUM SERPL-SCNC: 144 MMOL/L (ref 136–145)
VIT B12 SERPL-MCNC: 1227 PG/ML (ref 232–1245)
WBC # BLD AUTO: 4.4 K/UL (ref 4.8–10.8)

## 2024-12-11 ENCOUNTER — HOSPITAL ENCOUNTER (OUTPATIENT)
Dept: MRI IMAGING | Age: 64
Discharge: HOME | End: 2024-12-11
Attending: INTERNAL MEDICINE
Payer: MEDICARE

## 2024-12-11 DIAGNOSIS — K76.9 LIVER LESION, RIGHT LOBE: ICD-10-CM

## 2024-12-11 PROCEDURE — 6360000004 HC RX CONTRAST MEDICATION: Performed by: INTERNAL MEDICINE

## 2024-12-11 PROCEDURE — 74183 MRI ABD W/O CNTR FLWD CNTR: CPT

## 2024-12-11 PROCEDURE — A9581 GADOXETATE DISODIUM INJ: HCPCS | Performed by: INTERNAL MEDICINE

## 2024-12-11 RX ADMIN — GADOXETATE DISODIUM 9 ML: 181.43 INJECTION, SOLUTION INTRAVENOUS at 08:40

## 2025-01-06 RX ORDER — POTASSIUM CHLORIDE 750 MG/1
10 TABLET, EXTENDED RELEASE ORAL DAILY
Qty: 90 TABLET | Refills: 1 | Status: SHIPPED | OUTPATIENT
Start: 2025-01-06

## 2025-01-06 NOTE — TELEPHONE ENCOUNTER
Roque Hudson Jr. called to request a refill on his medication.      Last office visit : 10/24/2024   Next office visit : 4/24/2025     Requested Prescriptions     Pending Prescriptions Disp Refills    KLOR-CON M10 10 MEQ extended release tablet [Pharmacy Med Name: KLOR-CON M10 TABLET] 90 tablet 1     Sig: TAKE 1 TABLET BY MOUTH EVERY DAY            Itzel Sarabia MA

## 2025-02-03 ENCOUNTER — OFFICE VISIT (OUTPATIENT)
Age: 65
End: 2025-02-03
Payer: MEDICARE

## 2025-02-03 VITALS — WEIGHT: 210.4 LBS | HEIGHT: 73 IN | BODY MASS INDEX: 27.89 KG/M2

## 2025-02-03 DIAGNOSIS — Z96.612 S/P REVERSE TOTAL SHOULDER ARTHROPLASTY, LEFT: Primary | ICD-10-CM

## 2025-02-03 PROCEDURE — 99213 OFFICE O/P EST LOW 20 MIN: CPT | Performed by: ORTHOPAEDIC SURGERY

## 2025-02-03 PROCEDURE — 1036F TOBACCO NON-USER: CPT | Performed by: ORTHOPAEDIC SURGERY

## 2025-02-03 PROCEDURE — 1123F ACP DISCUSS/DSCN MKR DOCD: CPT | Performed by: ORTHOPAEDIC SURGERY

## 2025-02-03 PROCEDURE — G8427 DOCREV CUR MEDS BY ELIG CLIN: HCPCS | Performed by: ORTHOPAEDIC SURGERY

## 2025-02-03 PROCEDURE — 3017F COLORECTAL CA SCREEN DOC REV: CPT | Performed by: ORTHOPAEDIC SURGERY

## 2025-02-03 PROCEDURE — G8417 CALC BMI ABV UP PARAM F/U: HCPCS | Performed by: ORTHOPAEDIC SURGERY

## 2025-02-03 PROCEDURE — 1159F MED LIST DOCD IN RCRD: CPT | Performed by: ORTHOPAEDIC SURGERY

## 2025-02-03 NOTE — PROGRESS NOTES
Orthopaedic Clinic Note    NAME:  Roque Hudson Jr.   : 1960  MRN: [id number]      2/3/2025     CHIEF COMPLAINT: Status post left Reverse TSA, 2024.    HISTORY OF PRESENT ILLNESS:   Roque returns today for follow up of the left shoulder.  Pain has improved.  He is doing exceptionally well.  He is performing therapy at [facility]. There have been no postoperative complications to date.  He continues to strengthen.    Past Medical History:        Diagnosis Date    A-fib (HCC)     Atelectasis of right lung 2017    Back pain, lumbosacral     BiPAP (biphasic positive airway pressure) dependence     12cm to 22cm    Clotting disorder ([facility])     Cyst near coccyx     removed    GERD (gastroesophageal reflux disease)     Heart murmur     Hx of blood clots     Hyperlipidemia     Hypertension     Mild CAD 2018    Obstructive sleep apnea     Osteoarthritis     PAF (paroxysmal atrial fibrillation) ([facility])     Paroxysmal atrial fibrillation with RVR ([facility]) 2017    Pneumonia due to organism     Pulmonary embolism ([facility])        Past Surgical History:        Procedure Laterality Date    ANKLE FRACTURE SURGERY Left     ARM SURGERY Left 2018    fracture     CARDIAC CATHETERIZATION  2018    Mild CAD    CHOLECYSTECTOMY, LAPAROSCOPIC N/A 12/15/2017    CHOLECYSTECTOMY LAPAROSCOPIC performed by Carolina Hampton MD at [facility] OR    COLONOSCOPY      COLONOSCOPY N/A 2023    Dr MARIAJOSE Hope-Diverticular disease, 10 yr recall    GALLBLADDER SURGERY      VA EGD TRANSORAL BIOPSY SINGLE/MULTIPLE N/A 11/15/2018    Dr MARIAJOSE Hope-Gastropathy    SHOULDER SURGERY Left 2024    LEFT REVERSE TOTAL SHOULDER ARTHROPLASTY performed by Randolph Ackerman MD at [facility] OR    TESTICLE SURGERY      undecended testicle done when 1 years old       Current Medications:   Prior to Admission medications    Medication Sig Start Date End Date Taking? Authorizing Provider   KLOR-CON M10 10 MEQ extended release tablet TAKE 1 TABLET BY

## 2025-02-26 RX ORDER — LISINOPRIL 40 MG/1
40 TABLET ORAL DAILY
Qty: 90 TABLET | Refills: 1 | Status: SHIPPED | OUTPATIENT
Start: 2025-02-26

## 2025-02-26 RX ORDER — ROSUVASTATIN CALCIUM 10 MG/1
10 TABLET, COATED ORAL DAILY
Qty: 90 TABLET | Refills: 1 | Status: SHIPPED | OUTPATIENT
Start: 2025-02-26

## 2025-02-26 NOTE — TELEPHONE ENCOUNTER
Roque Hudson Jr. called to request a refill on his medication.      Last office visit : 10/24/2024   Next office visit : 2/26/2025     Requested Prescriptions     Pending Prescriptions Disp Refills    rosuvastatin (CRESTOR) 10 MG tablet 90 tablet 1     Sig: Take 1 tablet by mouth daily            Itzel Sarabia MA

## 2025-02-26 NOTE — TELEPHONE ENCOUNTER
Roque Hudson Jr. called to request a refill on his medication.      Last office visit : 10/24/2024   Next office visit : 4/24/2025     Requested Prescriptions     Pending Prescriptions Disp Refills    lisinopril (PRINIVIL;ZESTRIL) 40 MG tablet 90 tablet 1     Sig: Take 1 tablet by mouth daily            Itzel Sarabia MA

## 2025-03-12 RX ORDER — FLUTICASONE PROPIONATE 50 MCG
SPRAY, SUSPENSION (ML) NASAL
Qty: 3 EACH | Refills: 1 | Status: SHIPPED | OUTPATIENT
Start: 2025-03-12

## 2025-03-12 NOTE — TELEPHONE ENCOUNTER
Roque Hudson Jr. called to request a refill on his medication.      Last office visit : 10/24/2024   Next office visit : 4/24/2025     Requested Prescriptions     Pending Prescriptions Disp Refills    fluticasone (FLONASE) 50 MCG/ACT nasal spray [Pharmacy Med Name: FLUTICASONE PROP 50 MCG SPRAY] 3 each 1     Sig: SPRAY 1 SPRAY BY NASAL ROUTE EVERY DAY            Itzel Sarabia MA

## 2025-03-26 ENCOUNTER — PATIENT MESSAGE (OUTPATIENT)
Dept: INTERNAL MEDICINE | Age: 65
End: 2025-03-26

## 2025-03-26 DIAGNOSIS — E78.2 MIXED HYPERLIPIDEMIA: Primary | ICD-10-CM

## 2025-04-21 DIAGNOSIS — E55.9 VITAMIN D DEFICIENCY: ICD-10-CM

## 2025-04-21 DIAGNOSIS — R71.8 ELEVATED MCV: ICD-10-CM

## 2025-04-21 DIAGNOSIS — I10 ESSENTIAL HYPERTENSION: ICD-10-CM

## 2025-04-21 DIAGNOSIS — R74.01 ELEVATED TRANSAMINASE LEVEL: ICD-10-CM

## 2025-04-21 DIAGNOSIS — R53.83 OTHER FATIGUE: ICD-10-CM

## 2025-04-21 DIAGNOSIS — E78.2 MIXED HYPERLIPIDEMIA: ICD-10-CM

## 2025-04-21 DIAGNOSIS — I48.0 PAF (PAROXYSMAL ATRIAL FIBRILLATION) (HCC): ICD-10-CM

## 2025-04-21 DIAGNOSIS — R73.01 IFG (IMPAIRED FASTING GLUCOSE): ICD-10-CM

## 2025-04-21 LAB
25(OH)D3 SERPL-MCNC: 38.6 NG/ML
ALBUMIN SERPL-MCNC: 4.3 G/DL (ref 3.5–5.2)
ALP SERPL-CCNC: 96 U/L (ref 40–129)
ALT SERPL-CCNC: 53 U/L (ref 10–50)
ANION GAP SERPL CALCULATED.3IONS-SCNC: 8 MMOL/L (ref 8–16)
AST SERPL-CCNC: 44 U/L (ref 10–50)
BASOPHILS # BLD: 0 K/UL (ref 0–0.2)
BASOPHILS NFR BLD: 0.5 % (ref 0–1)
BILIRUB SERPL-MCNC: 0.8 MG/DL (ref 0.2–1.2)
BUN SERPL-MCNC: 15 MG/DL (ref 8–23)
CALCIUM SERPL-MCNC: 9.8 MG/DL (ref 8.8–10.2)
CHLORIDE SERPL-SCNC: 103 MMOL/L (ref 98–107)
CHOLEST SERPL-MCNC: 127 MG/DL (ref 0–199)
CO2 SERPL-SCNC: 29 MMOL/L (ref 22–29)
CREAT SERPL-MCNC: 0.9 MG/DL (ref 0.7–1.2)
EOSINOPHIL # BLD: 0.2 K/UL (ref 0–0.6)
EOSINOPHIL NFR BLD: 5.4 % (ref 0–5)
ERYTHROCYTE [DISTWIDTH] IN BLOOD BY AUTOMATED COUNT: 12.8 % (ref 11.5–14.5)
GLUCOSE SERPL-MCNC: 94 MG/DL (ref 70–99)
HCT VFR BLD AUTO: 42.2 % (ref 42–52)
HDLC SERPL-MCNC: 40 MG/DL (ref 40–60)
HGB BLD-MCNC: 13.6 G/DL (ref 14–18)
IMM GRANULOCYTES # BLD: 0 K/UL
LDLC SERPL CALC-MCNC: 69 MG/DL
LYMPHOCYTES # BLD: 1.4 K/UL (ref 1.1–4.5)
LYMPHOCYTES NFR BLD: 37.5 % (ref 20–40)
MCH RBC QN AUTO: 31.6 PG (ref 27–31)
MCHC RBC AUTO-ENTMCNC: 32.2 G/DL (ref 33–37)
MCV RBC AUTO: 97.9 FL (ref 80–94)
MONOCYTES # BLD: 0.4 K/UL (ref 0–0.9)
MONOCYTES NFR BLD: 9.4 % (ref 0–10)
NEUTROPHILS # BLD: 1.8 K/UL (ref 1.5–7.5)
NEUTS SEG NFR BLD: 47.2 % (ref 50–65)
PLATELET # BLD AUTO: 202 K/UL (ref 130–400)
PMV BLD AUTO: 10.2 FL (ref 9.4–12.4)
POTASSIUM SERPL-SCNC: 5.2 MMOL/L (ref 3.5–5.1)
PROT SERPL-MCNC: 7 G/DL (ref 6.4–8.3)
RBC # BLD AUTO: 4.31 M/UL (ref 4.7–6.1)
SODIUM SERPL-SCNC: 140 MMOL/L (ref 136–145)
TRIGL SERPL-MCNC: 90 MG/DL (ref 0–149)
WBC # BLD AUTO: 3.7 K/UL (ref 4.8–10.8)

## 2025-04-23 LAB — APO B100 SERPL-MCNC: 73 MG/DL (ref 66–133)

## 2025-04-24 ENCOUNTER — OFFICE VISIT (OUTPATIENT)
Dept: INTERNAL MEDICINE | Age: 65
End: 2025-04-24
Payer: MEDICARE

## 2025-04-24 VITALS
HEIGHT: 73 IN | OXYGEN SATURATION: 97 % | WEIGHT: 203.2 LBS | BODY MASS INDEX: 26.93 KG/M2 | HEART RATE: 50 BPM | DIASTOLIC BLOOD PRESSURE: 68 MMHG | SYSTOLIC BLOOD PRESSURE: 110 MMHG

## 2025-04-24 DIAGNOSIS — E29.1 HYPOGONADISM IN MALE: ICD-10-CM

## 2025-04-24 DIAGNOSIS — K40.90 LEFT INGUINAL HERNIA: ICD-10-CM

## 2025-04-24 DIAGNOSIS — R79.89 LOW TESTOSTERONE IN MALE: ICD-10-CM

## 2025-04-24 DIAGNOSIS — R74.01 ELEVATED TRANSAMINASE LEVEL: Primary | ICD-10-CM

## 2025-04-24 DIAGNOSIS — R53.83 OTHER FATIGUE: ICD-10-CM

## 2025-04-24 DIAGNOSIS — R71.8 ELEVATED MCV: ICD-10-CM

## 2025-04-24 DIAGNOSIS — E78.2 MIXED HYPERLIPIDEMIA: ICD-10-CM

## 2025-04-24 DIAGNOSIS — I10 ESSENTIAL HYPERTENSION: ICD-10-CM

## 2025-04-24 DIAGNOSIS — R73.01 IFG (IMPAIRED FASTING GLUCOSE): ICD-10-CM

## 2025-04-24 DIAGNOSIS — Z12.5 SCREENING PSA (PROSTATE SPECIFIC ANTIGEN): ICD-10-CM

## 2025-04-24 DIAGNOSIS — I48.0 PAF (PAROXYSMAL ATRIAL FIBRILLATION) (HCC): ICD-10-CM

## 2025-04-24 DIAGNOSIS — E55.9 VITAMIN D DEFICIENCY: ICD-10-CM

## 2025-04-24 PROCEDURE — 1036F TOBACCO NON-USER: CPT | Performed by: INTERNAL MEDICINE

## 2025-04-24 PROCEDURE — 1159F MED LIST DOCD IN RCRD: CPT | Performed by: INTERNAL MEDICINE

## 2025-04-24 PROCEDURE — 3074F SYST BP LT 130 MM HG: CPT | Performed by: INTERNAL MEDICINE

## 2025-04-24 PROCEDURE — 1123F ACP DISCUSS/DSCN MKR DOCD: CPT | Performed by: INTERNAL MEDICINE

## 2025-04-24 PROCEDURE — 3078F DIAST BP <80 MM HG: CPT | Performed by: INTERNAL MEDICINE

## 2025-04-24 PROCEDURE — G8417 CALC BMI ABV UP PARAM F/U: HCPCS | Performed by: INTERNAL MEDICINE

## 2025-04-24 PROCEDURE — 99214 OFFICE O/P EST MOD 30 MIN: CPT | Performed by: INTERNAL MEDICINE

## 2025-04-24 PROCEDURE — G8427 DOCREV CUR MEDS BY ELIG CLIN: HCPCS | Performed by: INTERNAL MEDICINE

## 2025-04-24 PROCEDURE — 3017F COLORECTAL CA SCREEN DOC REV: CPT | Performed by: INTERNAL MEDICINE

## 2025-04-24 SDOH — ECONOMIC STABILITY: FOOD INSECURITY: WITHIN THE PAST 12 MONTHS, YOU WORRIED THAT YOUR FOOD WOULD RUN OUT BEFORE YOU GOT MONEY TO BUY MORE.: NEVER TRUE

## 2025-04-24 SDOH — ECONOMIC STABILITY: FOOD INSECURITY: WITHIN THE PAST 12 MONTHS, THE FOOD YOU BOUGHT JUST DIDN'T LAST AND YOU DIDN'T HAVE MONEY TO GET MORE.: NEVER TRUE

## 2025-04-24 ASSESSMENT — ENCOUNTER SYMPTOMS
COUGH: 0
SORE THROAT: 0
ABDOMINAL PAIN: 0
CHEST TIGHTNESS: 0
WHEEZING: 0
CONSTIPATION: 0

## 2025-04-24 ASSESSMENT — PATIENT HEALTH QUESTIONNAIRE - PHQ9
SUM OF ALL RESPONSES TO PHQ QUESTIONS 1-9: 0
1. LITTLE INTEREST OR PLEASURE IN DOING THINGS: NOT AT ALL
SUM OF ALL RESPONSES TO PHQ QUESTIONS 1-9: 0
2. FEELING DOWN, DEPRESSED OR HOPELESS: NOT AT ALL

## 2025-04-24 NOTE — PROGRESS NOTES
Chief Complaint   Patient presents with    6 Month Follow-Up     History of presenting illness:  Roque Agreny Cordoba. is a64 y.o. male who presents today for follow up on his chronic medical conditions as noted below.    Patient Active Problem List    Diagnosis Date Noted    S/P reverse total shoulder arthroplasty, left 10/16/2024    Somnolence, daytime 10/08/2024    Primary osteoarthritis of left shoulder 09/04/2024    Pain of right lower extremity 05/24/2024    Chronic left shoulder pain 05/24/2024    Secondary hypercoagulable state 04/18/2024    Other fatigue 06/09/2023    Difficulty with BiPAP use 06/09/2023    Aortic valve stenosis 04/12/2022    Elevated PSA 03/20/2020    Epigastric discomfort 11/14/2018    Chronic nausea 11/14/2018    Mild CAD 11/13/2018    IFG (impaired fasting glucose) 05/11/2018    Primary insomnia 10/26/2017    Chronic GERD 10/26/2017    Degenerative lumbar disc 10/26/2017    PAF (paroxysmal atrial fibrillation) ([facility])     Hx pulmonary embolism 06/26/2017    Benign prostatic hyperplasia with lower urinary tract symptoms 06/26/2017    Mixed hyperlipidemia 06/26/2017    BiPAP (biphasic positive airway pressure) dependence      Overview Note:     12cm to 22cm      Mild mitral regurgitation by prior echocardiogram 02/08/2017    Essential hypertension 02/08/2017    Obstructive sleep apnea      Past Medical History:   Diagnosis Date    A-fib ([facility])     Atelectasis of right lung 06/26/2017    Back pain, lumbosacral     BiPAP (biphasic positive airway pressure) dependence     12cm to 22cm    Clotting disorder     Cyst near coccyx     removed    GERD (gastroesophageal reflux disease)     Heart murmur     Hx of blood clots     Hyperlipidemia     Hypertension     Mild CAD 11/13/2018    Obstructive sleep apnea     Osteoarthritis     PAF (paroxysmal atrial fibrillation) ([facility])     Paroxysmal atrial fibrillation with RVR ([facility]) 06/26/2017    Pneumonia due to organism     Pulmonary embolism ([facility])       Past

## 2025-04-25 ENCOUNTER — RESULTS FOLLOW-UP (OUTPATIENT)
Dept: INTERNAL MEDICINE | Age: 65
End: 2025-04-25

## 2025-04-25 LAB
SHBG SERPL-SCNC: 41.6 PG/ML (ref 47–244)
SHBG SERPL-SCNC: 54 NMOL/L (ref 19–76)
TESTOST SERPL-MCNC: 294 NG/DL (ref 193–740)

## 2025-04-26 LAB — ESTROGEN SERPL-MCNC: 153 PG/ML (ref 56–213)

## 2025-04-27 DIAGNOSIS — I48.0 PAROXYSMAL ATRIAL FIBRILLATION WITH RVR (HCC): ICD-10-CM

## 2025-04-28 ENCOUNTER — OFFICE VISIT (OUTPATIENT)
Dept: SURGERY | Age: 65
End: 2025-04-28
Payer: MEDICARE

## 2025-04-28 ENCOUNTER — PREP FOR PROCEDURE (OUTPATIENT)
Dept: SURGERY | Age: 65
End: 2025-04-28

## 2025-04-28 VITALS
HEIGHT: 73 IN | WEIGHT: 203 LBS | BODY MASS INDEX: 26.9 KG/M2 | HEART RATE: 53 BPM | TEMPERATURE: 97.6 F | OXYGEN SATURATION: 97 %

## 2025-04-28 DIAGNOSIS — K40.90 LEFT INGUINAL HERNIA: ICD-10-CM

## 2025-04-28 DIAGNOSIS — I10 ESSENTIAL HYPERTENSION: ICD-10-CM

## 2025-04-28 DIAGNOSIS — K40.90 NON-RECURRENT UNILATERAL INGUINAL HERNIA WITHOUT OBSTRUCTION OR GANGRENE: Primary | ICD-10-CM

## 2025-04-28 DIAGNOSIS — I48.0 PAF (PAROXYSMAL ATRIAL FIBRILLATION) (HCC): ICD-10-CM

## 2025-04-28 PROCEDURE — 99204 OFFICE O/P NEW MOD 45 MIN: CPT | Performed by: SURGERY

## 2025-04-28 PROCEDURE — 3017F COLORECTAL CA SCREEN DOC REV: CPT | Performed by: SURGERY

## 2025-04-28 PROCEDURE — G8417 CALC BMI ABV UP PARAM F/U: HCPCS | Performed by: SURGERY

## 2025-04-28 PROCEDURE — 1159F MED LIST DOCD IN RCRD: CPT | Performed by: SURGERY

## 2025-04-28 PROCEDURE — 1036F TOBACCO NON-USER: CPT | Performed by: SURGERY

## 2025-04-28 PROCEDURE — 1123F ACP DISCUSS/DSCN MKR DOCD: CPT | Performed by: SURGERY

## 2025-04-28 PROCEDURE — G8427 DOCREV CUR MEDS BY ELIG CLIN: HCPCS | Performed by: SURGERY

## 2025-04-28 RX ORDER — SOTALOL HYDROCHLORIDE 80 MG/1
80 TABLET ORAL 2 TIMES DAILY
Qty: 180 TABLET | Refills: 0 | Status: SHIPPED | OUTPATIENT
Start: 2025-04-28

## 2025-04-29 NOTE — PROGRESS NOTES
Please do letter  
for accuracy, there may be errors in the transcription that are not intended.    Electronically signed by Rajiv Tabor MD on 4/28/2025 at 2:56 PM

## 2025-05-06 ENCOUNTER — TELEPHONE (OUTPATIENT)
Dept: CARDIOLOGY CLINIC | Age: 65
End: 2025-05-06

## 2025-05-06 NOTE — TELEPHONE ENCOUNTER
Received a clearance request from Dr. Tabor for patient to have Left Inguinal Hernial Repair TBD.  Patient has not been seen in over a year and will need an appt prior to giving clearance.  Patient has an appt 5/22

## 2025-05-07 NOTE — TELEPHONE ENCOUNTER
Patient called to advise that he had pre-op EKG yesterday for his upcoming surgery with Dr. Tabor and it showed possible septal infarct. He wanted to know if he should worry about this. I reviewed the EKG from 4/16/2024 and it also showed old anteroseptal infarct, advised it was stable to that EKG. He also had an appt scheduled with Dr. Espinoza 5/6/2025 but it was rescheduled by office and moved out to 5/22/2025. He stated he planned surgery around date he was seeing Dr. Espinoza and asked if he could be seen sooner by either Dr. Espinoza or Jessica. Advised we can move appt up to tomorrow for him to see Jessica. Appt rescheduled and patient voiced understanding.

## 2025-05-08 ENCOUNTER — TELEPHONE (OUTPATIENT)
Dept: CARDIOLOGY CLINIC | Age: 65
End: 2025-05-08

## 2025-05-08 ENCOUNTER — OFFICE VISIT (OUTPATIENT)
Dept: CARDIOLOGY CLINIC | Age: 65
End: 2025-05-08
Payer: MEDICARE

## 2025-05-08 VITALS
SYSTOLIC BLOOD PRESSURE: 122 MMHG | DIASTOLIC BLOOD PRESSURE: 70 MMHG | WEIGHT: 205 LBS | HEIGHT: 73 IN | OXYGEN SATURATION: 95 % | BODY MASS INDEX: 27.17 KG/M2 | HEART RATE: 54 BPM

## 2025-05-08 DIAGNOSIS — Z01.810 PREOPERATIVE CARDIOVASCULAR EXAMINATION: Primary | ICD-10-CM

## 2025-05-08 DIAGNOSIS — R00.1 BRADYCARDIA: ICD-10-CM

## 2025-05-08 DIAGNOSIS — I10 ESSENTIAL HYPERTENSION: ICD-10-CM

## 2025-05-08 DIAGNOSIS — I48.0 PAROXYSMAL ATRIAL FIBRILLATION WITH RVR (HCC): ICD-10-CM

## 2025-05-08 PROCEDURE — 1036F TOBACCO NON-USER: CPT | Performed by: CLINICAL NURSE SPECIALIST

## 2025-05-08 PROCEDURE — 3017F COLORECTAL CA SCREEN DOC REV: CPT | Performed by: CLINICAL NURSE SPECIALIST

## 2025-05-08 PROCEDURE — 3078F DIAST BP <80 MM HG: CPT | Performed by: CLINICAL NURSE SPECIALIST

## 2025-05-08 PROCEDURE — 1159F MED LIST DOCD IN RCRD: CPT | Performed by: CLINICAL NURSE SPECIALIST

## 2025-05-08 PROCEDURE — 3074F SYST BP LT 130 MM HG: CPT | Performed by: CLINICAL NURSE SPECIALIST

## 2025-05-08 PROCEDURE — 99214 OFFICE O/P EST MOD 30 MIN: CPT | Performed by: CLINICAL NURSE SPECIALIST

## 2025-05-08 PROCEDURE — 1123F ACP DISCUSS/DSCN MKR DOCD: CPT | Performed by: CLINICAL NURSE SPECIALIST

## 2025-05-08 PROCEDURE — G8427 DOCREV CUR MEDS BY ELIG CLIN: HCPCS | Performed by: CLINICAL NURSE SPECIALIST

## 2025-05-08 PROCEDURE — G8417 CALC BMI ABV UP PARAM F/U: HCPCS | Performed by: CLINICAL NURSE SPECIALIST

## 2025-05-08 RX ORDER — SOTALOL HYDROCHLORIDE 80 MG/1
40 TABLET ORAL 2 TIMES DAILY
Qty: 90 TABLET | Refills: 1 | Status: SHIPPED | OUTPATIENT
Start: 2025-05-08

## 2025-05-08 ASSESSMENT — ENCOUNTER SYMPTOMS
NAUSEA: 0
SHORTNESS OF BREATH: 0
VOMITING: 0
WHEEZING: 0
COUGH: 0
CHEST TIGHTNESS: 0
FACIAL SWELLING: 0
EYE REDNESS: 0
ABDOMINAL PAIN: 0

## 2025-05-08 NOTE — PATIENT INSTRUCTIONS
Return in about 3 months (around 8/8/2025) for Dr Espinoza.  Use Xarelto for prolonged episode of atrial fib  Decrease Sotalol 40mg twice a day  Letter to Dr Tabor  OK to hold Aspirin 5-7 days prior to surgery, resume thereafter

## 2025-05-08 NOTE — TELEPHONE ENCOUNTER
Patient in today for cardiac risk stratification for upcoming hernia surgery to be done by Dr Tabor on 5/16/2025.    RCRI equals 0  Able to do 4 METS of activity    Please send letter for cardiac risk stratification

## 2025-05-08 NOTE — PROGRESS NOTES
Essential hypertension        4. Bradycardia          Preoperative cardiovascular exam for upcoming hernia surgery  Relative cardiac risk index = 0  He is able to do 4 METS or more of activity  No cardiovascular symptoms  We will send letter for cardiac risk stratification to Dr Tabor  Low risk for major adverse cardiac events    PAF/bradycardia- rare recurrence of atrial fibrillation and patient is very symptomatic when he has episodes.  No regular anticoagulation.  He has Xarelto at home to use only for prolonged episodes of A-fib.    - He may hold his aspirin 5 to 7 days prior to surgery and resume thereafter.  -Due to bradycardia and heart rate of 49 on recent EKG, we will decrease his sotalol to 40 mg twice a day.  He will let us know if he is having more recurrent atrial fibrillation.  Close follow-up in few months with Dr. Espinoza to see how he is doing    Hypertension-stable on current regimen with lisinopril    Stable cardiovascular status.     Plan    No orders of the defined types were placed in this encounter.    Return in about 3 months (around 8/8/2025) for Dr Espinoza.  Use Xarelto for prolonged episode of atrial fib  Decrease Sotalol 40mg twice a day  Letter to Dr Tabor  OK to hold Aspirin 5-7 days prior to surgery, resume thereafter    Call with any questionsor concerns  Follow up with Wendi Garnica MD for non cardiac problems  Report any new problems  Cardiovascular Fitness-Exercise as tolerated.  Strive for 15 minutes of exercise most days of the week.    Cardiac / HealthyDiet  Continue current medications as directed  Continue plan of treatment  It is always recommended that you bring your medicationsbottles with you to each visit - this is for your safety!       DL Pollock

## 2025-05-13 ENCOUNTER — OFFICE VISIT (OUTPATIENT)
Dept: INTERNAL MEDICINE | Age: 65
End: 2025-05-13
Payer: MEDICARE

## 2025-05-13 VITALS
OXYGEN SATURATION: 99 % | WEIGHT: 207.4 LBS | HEIGHT: 73 IN | DIASTOLIC BLOOD PRESSURE: 76 MMHG | BODY MASS INDEX: 27.49 KG/M2 | SYSTOLIC BLOOD PRESSURE: 118 MMHG | HEART RATE: 59 BPM

## 2025-05-13 DIAGNOSIS — I10 ESSENTIAL HYPERTENSION: ICD-10-CM

## 2025-05-13 DIAGNOSIS — E29.1 HYPOGONADISM IN MALE: Primary | ICD-10-CM

## 2025-05-13 DIAGNOSIS — R53.83 OTHER FATIGUE: ICD-10-CM

## 2025-05-13 DIAGNOSIS — R71.8 ELEVATED MCV: ICD-10-CM

## 2025-05-13 PROCEDURE — 1159F MED LIST DOCD IN RCRD: CPT | Performed by: INTERNAL MEDICINE

## 2025-05-13 PROCEDURE — G8427 DOCREV CUR MEDS BY ELIG CLIN: HCPCS | Performed by: INTERNAL MEDICINE

## 2025-05-13 PROCEDURE — 3078F DIAST BP <80 MM HG: CPT | Performed by: INTERNAL MEDICINE

## 2025-05-13 PROCEDURE — 1123F ACP DISCUSS/DSCN MKR DOCD: CPT | Performed by: INTERNAL MEDICINE

## 2025-05-13 PROCEDURE — 3017F COLORECTAL CA SCREEN DOC REV: CPT | Performed by: INTERNAL MEDICINE

## 2025-05-13 PROCEDURE — 3074F SYST BP LT 130 MM HG: CPT | Performed by: INTERNAL MEDICINE

## 2025-05-13 PROCEDURE — 1036F TOBACCO NON-USER: CPT | Performed by: INTERNAL MEDICINE

## 2025-05-13 PROCEDURE — G8417 CALC BMI ABV UP PARAM F/U: HCPCS | Performed by: INTERNAL MEDICINE

## 2025-05-13 PROCEDURE — 99214 OFFICE O/P EST MOD 30 MIN: CPT | Performed by: INTERNAL MEDICINE

## 2025-05-13 RX ORDER — TESTOSTERONE CYPIONATE 200 MG/ML
40 INJECTION, SOLUTION INTRAMUSCULAR
Qty: 10 ML | Refills: 0 | Status: SHIPPED | OUTPATIENT
Start: 2025-05-15 | End: 2025-11-04

## 2025-05-13 RX ORDER — TESTOSTERONE CYPIONATE 1000 MG/10ML
35 INJECTION, SOLUTION INTRAMUSCULAR
Qty: 10 ML | Refills: 0 | Status: CANCELLED | OUTPATIENT
Start: 2025-05-15 | End: 2025-08-26

## 2025-05-13 ASSESSMENT — ENCOUNTER SYMPTOMS
ABDOMINAL PAIN: 0
CHEST TIGHTNESS: 0
CONSTIPATION: 0
COUGH: 0
SORE THROAT: 0
WHEEZING: 0

## 2025-05-13 NOTE — PROGRESS NOTES
Chief Complaint   Patient presents with    Other     Discussion on TRT     History of presenting illness:  Roque Hudson . is a65 y.o. male who presents today for follow up on his chronic medical conditions as noted below.    Patient Active Problem List    Diagnosis Date Noted    Bradycardia 05/08/2025    Non-recurrent unilateral inguinal hernia without obstruction or gangrene 04/28/2025    Left inguinal hernia 04/28/2025    S/P reverse total shoulder arthroplasty, left 10/16/2024    Somnolence, daytime 10/08/2024    Primary osteoarthritis of left shoulder 09/04/2024    Pain of right lower extremity 05/24/2024    Chronic left shoulder pain 05/24/2024    Secondary hypercoagulable state 04/18/2024    Other fatigue 06/09/2023    Difficulty with BiPAP use 06/09/2023    Aortic valve stenosis 04/12/2022    Elevated PSA 03/20/2020    Epigastric discomfort 11/14/2018    Chronic nausea 11/14/2018    Mild CAD 11/13/2018    IFG (impaired fasting glucose) 05/11/2018    Primary insomnia 10/26/2017    Chronic GERD 10/26/2017    Degenerative lumbar disc 10/26/2017    PAF (paroxysmal atrial fibrillation) ([facility])     Hx pulmonary embolism 06/26/2017    Benign prostatic hyperplasia with lower urinary tract symptoms 06/26/2017    Mixed hyperlipidemia 06/26/2017    BiPAP (biphasic positive airway pressure) dependence      Overview Note:     12cm to 22cm      Mild mitral regurgitation by prior echocardiogram 02/08/2017    Essential hypertension 02/08/2017    Obstructive sleep apnea      Past Medical History:   Diagnosis Date    A-fib ([facility])     sees dr goldberg    Atelectasis of right lung 06/26/2017    Back pain, lumbosacral     BiPAP (biphasic positive airway pressure) dependence     12cm to 22cm    Cyst near coccyx     removed    GERD (gastroesophageal reflux disease)     Heart murmur     Hx of blood clots     Hyperlipidemia     Hypertension     Mild CAD 11/13/2018    Obstructive sleep apnea     Osteoarthritis     Paroxysmal

## 2025-05-14 DIAGNOSIS — R53.83 OTHER FATIGUE: ICD-10-CM

## 2025-05-14 DIAGNOSIS — E29.1 HYPOGONADISM IN MALE: Primary | ICD-10-CM

## 2025-05-15 RX ORDER — SODIUM CHLORIDE 0.9 % (FLUSH) 0.9 %
5-40 SYRINGE (ML) INJECTION PRN
Status: CANCELLED | OUTPATIENT
Start: 2025-05-15

## 2025-05-15 RX ORDER — SODIUM CHLORIDE, SODIUM LACTATE, POTASSIUM CHLORIDE, CALCIUM CHLORIDE 600; 310; 30; 20 MG/100ML; MG/100ML; MG/100ML; MG/100ML
INJECTION, SOLUTION INTRAVENOUS CONTINUOUS
Status: CANCELLED | OUTPATIENT
Start: 2025-05-15

## 2025-05-15 RX ORDER — SODIUM CHLORIDE 9 MG/ML
INJECTION, SOLUTION INTRAVENOUS PRN
Status: CANCELLED | OUTPATIENT
Start: 2025-05-15

## 2025-05-15 RX ORDER — SODIUM CHLORIDE 0.9 % (FLUSH) 0.9 %
5-40 SYRINGE (ML) INJECTION EVERY 12 HOURS SCHEDULED
Status: CANCELLED | OUTPATIENT
Start: 2025-05-15

## 2025-05-16 ENCOUNTER — HOSPITAL ENCOUNTER (OUTPATIENT)
Age: 65
Setting detail: OUTPATIENT SURGERY
Discharge: HOME | End: 2025-05-16
Attending: SURGERY | Admitting: SURGERY
Payer: MEDICARE

## 2025-05-16 ENCOUNTER — ANESTHESIA (OUTPATIENT)
Dept: OPERATING ROOM | Age: 65
End: 2025-05-16
Payer: MEDICARE

## 2025-05-16 ENCOUNTER — ANESTHESIA EVENT (OUTPATIENT)
Dept: OPERATING ROOM | Age: 65
End: 2025-05-16
Payer: MEDICARE

## 2025-05-16 VITALS
BODY MASS INDEX: 27.43 KG/M2 | HEIGHT: 73 IN | OXYGEN SATURATION: 100 % | WEIGHT: 207 LBS | HEART RATE: 62 BPM | SYSTOLIC BLOOD PRESSURE: 124 MMHG | DIASTOLIC BLOOD PRESSURE: 80 MMHG | TEMPERATURE: 97.4 F | RESPIRATION RATE: 16 BRPM

## 2025-05-16 DIAGNOSIS — K40.90 LEFT INGUINAL HERNIA: Primary | ICD-10-CM

## 2025-05-16 PROCEDURE — 3600000019 HC SURGERY ROBOT ADDTL 15MIN: Performed by: SURGERY

## 2025-05-16 PROCEDURE — 7100000001 HC PACU RECOVERY - ADDTL 15 MIN: Performed by: SURGERY

## 2025-05-16 PROCEDURE — 2580000003 HC RX 258: Performed by: SURGERY

## 2025-05-16 PROCEDURE — 6360000002 HC RX W HCPCS: Performed by: ANESTHESIOLOGY

## 2025-05-16 PROCEDURE — 3700000000 HC ANESTHESIA ATTENDED CARE: Performed by: SURGERY

## 2025-05-16 PROCEDURE — 7100000011 HC PHASE II RECOVERY - ADDTL 15 MIN: Performed by: SURGERY

## 2025-05-16 PROCEDURE — 2500000003 HC RX 250 WO HCPCS: Performed by: NURSE ANESTHETIST, CERTIFIED REGISTERED

## 2025-05-16 PROCEDURE — 2709999900 HC NON-CHARGEABLE SUPPLY: Performed by: SURGERY

## 2025-05-16 PROCEDURE — 6360000002 HC RX W HCPCS: Performed by: NURSE ANESTHETIST, CERTIFIED REGISTERED

## 2025-05-16 PROCEDURE — 64486 TAP BLOCK UNIL BY INJECTION: CPT | Performed by: NURSE ANESTHETIST, CERTIFIED REGISTERED

## 2025-05-16 PROCEDURE — S2900 ROBOTIC SURGICAL SYSTEM: HCPCS | Performed by: SURGERY

## 2025-05-16 PROCEDURE — 7100000010 HC PHASE II RECOVERY - FIRST 15 MIN: Performed by: SURGERY

## 2025-05-16 PROCEDURE — 3700000001 HC ADD 15 MINUTES (ANESTHESIA): Performed by: SURGERY

## 2025-05-16 PROCEDURE — C1781 MESH (IMPLANTABLE): HCPCS | Performed by: SURGERY

## 2025-05-16 PROCEDURE — 7100000000 HC PACU RECOVERY - FIRST 15 MIN: Performed by: SURGERY

## 2025-05-16 PROCEDURE — 3600000009 HC SURGERY ROBOT BASE: Performed by: SURGERY

## 2025-05-16 PROCEDURE — 6360000002 HC RX W HCPCS: Performed by: SURGERY

## 2025-05-16 PROCEDURE — 49650 LAP ING HERNIA REPAIR INIT: CPT | Performed by: SURGERY

## 2025-05-16 PROCEDURE — 2580000003 HC RX 258: Performed by: ANESTHESIOLOGY

## 2025-05-16 PROCEDURE — 2580000003 HC RX 258: Performed by: NURSE ANESTHETIST, CERTIFIED REGISTERED

## 2025-05-16 PROCEDURE — 2500000003 HC RX 250 WO HCPCS: Performed by: SURGERY

## 2025-05-16 DEVICE — 3DMAX MID ANATOMICAL MESH, 10 CM X 16 CM (4" X 6"), LARGE, LEFT
Type: IMPLANTABLE DEVICE | Site: INGUINAL | Status: FUNCTIONAL
Brand: 3DMAX

## 2025-05-16 RX ORDER — FENTANYL CITRATE 50 UG/ML
25 INJECTION, SOLUTION INTRAMUSCULAR; INTRAVENOUS EVERY 5 MIN PRN
Status: DISCONTINUED | OUTPATIENT
Start: 2025-05-16 | End: 2025-05-16 | Stop reason: HOSPADM

## 2025-05-16 RX ORDER — BUPIVACAINE HYDROCHLORIDE 2.5 MG/ML
INJECTION, SOLUTION EPIDURAL; INFILTRATION; INTRACAUDAL; PERINEURAL
Status: COMPLETED | OUTPATIENT
Start: 2025-05-16 | End: 2025-05-16

## 2025-05-16 RX ORDER — SODIUM CHLORIDE 0.9 % (FLUSH) 0.9 %
5-40 SYRINGE (ML) INJECTION EVERY 12 HOURS SCHEDULED
Status: DISCONTINUED | OUTPATIENT
Start: 2025-05-16 | End: 2025-05-16 | Stop reason: HOSPADM

## 2025-05-16 RX ORDER — KETOROLAC TROMETHAMINE 30 MG/ML
15 INJECTION, SOLUTION INTRAMUSCULAR; INTRAVENOUS ONCE
Status: DISCONTINUED | OUTPATIENT
Start: 2025-05-16 | End: 2025-05-16 | Stop reason: HOSPADM

## 2025-05-16 RX ORDER — GLYCOPYRROLATE 0.2 MG/ML
INJECTION INTRAMUSCULAR; INTRAVENOUS
Status: DISCONTINUED | OUTPATIENT
Start: 2025-05-16 | End: 2025-05-16 | Stop reason: SDUPTHER

## 2025-05-16 RX ORDER — LIDOCAINE HYDROCHLORIDE 10 MG/ML
INJECTION, SOLUTION INFILTRATION; PERINEURAL
Status: DISCONTINUED | OUTPATIENT
Start: 2025-05-16 | End: 2025-05-16 | Stop reason: SDUPTHER

## 2025-05-16 RX ORDER — SODIUM CHLORIDE, SODIUM LACTATE, POTASSIUM CHLORIDE, CALCIUM CHLORIDE 600; 310; 30; 20 MG/100ML; MG/100ML; MG/100ML; MG/100ML
INJECTION, SOLUTION INTRAVENOUS
Status: DISCONTINUED | OUTPATIENT
Start: 2025-05-16 | End: 2025-05-16 | Stop reason: SDUPTHER

## 2025-05-16 RX ORDER — DEXAMETHASONE SODIUM PHOSPHATE 4 MG/ML
4 INJECTION, SOLUTION INTRA-ARTICULAR; INTRALESIONAL; INTRAMUSCULAR; INTRAVENOUS; SOFT TISSUE ONCE
Status: COMPLETED | OUTPATIENT
Start: 2025-05-16 | End: 2025-05-16

## 2025-05-16 RX ORDER — FENTANYL CITRATE 50 UG/ML
50 INJECTION, SOLUTION INTRAMUSCULAR; INTRAVENOUS EVERY 5 MIN PRN
Status: DISCONTINUED | OUTPATIENT
Start: 2025-05-16 | End: 2025-05-16 | Stop reason: HOSPADM

## 2025-05-16 RX ORDER — KETOROLAC TROMETHAMINE 30 MG/ML
INJECTION, SOLUTION INTRAMUSCULAR; INTRAVENOUS
Status: DISCONTINUED | OUTPATIENT
Start: 2025-05-16 | End: 2025-05-16 | Stop reason: SDUPTHER

## 2025-05-16 RX ORDER — SODIUM CHLORIDE 0.9 % (FLUSH) 0.9 %
5-40 SYRINGE (ML) INJECTION PRN
Status: DISCONTINUED | OUTPATIENT
Start: 2025-05-16 | End: 2025-05-16 | Stop reason: HOSPADM

## 2025-05-16 RX ORDER — PROCHLORPERAZINE EDISYLATE 5 MG/ML
5 INJECTION INTRAMUSCULAR; INTRAVENOUS
Status: DISCONTINUED | OUTPATIENT
Start: 2025-05-16 | End: 2025-05-16 | Stop reason: HOSPADM

## 2025-05-16 RX ORDER — BUPIVACAINE HYDROCHLORIDE 2.5 MG/ML
15 INJECTION, SOLUTION EPIDURAL; INFILTRATION; INTRACAUDAL; PERINEURAL ONCE
Status: DISCONTINUED | OUTPATIENT
Start: 2025-05-16 | End: 2025-05-16 | Stop reason: HOSPADM

## 2025-05-16 RX ORDER — EPHEDRINE SULFATE/0.9% NACL/PF 25 MG/5 ML
SYRINGE (ML) INTRAVENOUS
Status: DISCONTINUED | OUTPATIENT
Start: 2025-05-16 | End: 2025-05-16 | Stop reason: SDUPTHER

## 2025-05-16 RX ORDER — ONDANSETRON 2 MG/ML
INJECTION INTRAMUSCULAR; INTRAVENOUS
Status: DISCONTINUED | OUTPATIENT
Start: 2025-05-16 | End: 2025-05-16 | Stop reason: SDUPTHER

## 2025-05-16 RX ORDER — ROPIVACAINE HYDROCHLORIDE 5 MG/ML
20 INJECTION, SOLUTION EPIDURAL; INFILTRATION; PERINEURAL ONCE
Status: DISCONTINUED | OUTPATIENT
Start: 2025-05-16 | End: 2025-05-16

## 2025-05-16 RX ORDER — MIDAZOLAM HYDROCHLORIDE 2 MG/2ML
2 INJECTION, SOLUTION INTRAMUSCULAR; INTRAVENOUS
Status: COMPLETED | OUTPATIENT
Start: 2025-05-16 | End: 2025-05-16

## 2025-05-16 RX ORDER — SODIUM CHLORIDE, SODIUM LACTATE, POTASSIUM CHLORIDE, CALCIUM CHLORIDE 600; 310; 30; 20 MG/100ML; MG/100ML; MG/100ML; MG/100ML
INJECTION, SOLUTION INTRAVENOUS CONTINUOUS
Status: DISCONTINUED | OUTPATIENT
Start: 2025-05-16 | End: 2025-05-16 | Stop reason: HOSPADM

## 2025-05-16 RX ORDER — NALOXONE HYDROCHLORIDE 0.4 MG/ML
INJECTION, SOLUTION INTRAMUSCULAR; INTRAVENOUS; SUBCUTANEOUS PRN
Status: DISCONTINUED | OUTPATIENT
Start: 2025-05-16 | End: 2025-05-16 | Stop reason: HOSPADM

## 2025-05-16 RX ORDER — DIPHENHYDRAMINE HYDROCHLORIDE 50 MG/ML
12.5 INJECTION, SOLUTION INTRAMUSCULAR; INTRAVENOUS
Status: DISCONTINUED | OUTPATIENT
Start: 2025-05-16 | End: 2025-05-16 | Stop reason: HOSPADM

## 2025-05-16 RX ORDER — SODIUM CHLORIDE 9 MG/ML
INJECTION, SOLUTION INTRAVENOUS PRN
Status: DISCONTINUED | OUTPATIENT
Start: 2025-05-16 | End: 2025-05-16 | Stop reason: HOSPADM

## 2025-05-16 RX ORDER — ROCURONIUM BROMIDE 10 MG/ML
INJECTION, SOLUTION INTRAVENOUS
Status: DISCONTINUED | OUTPATIENT
Start: 2025-05-16 | End: 2025-05-16 | Stop reason: SDUPTHER

## 2025-05-16 RX ORDER — PROPOFOL 10 MG/ML
INJECTION, EMULSION INTRAVENOUS
Status: DISCONTINUED | OUTPATIENT
Start: 2025-05-16 | End: 2025-05-16 | Stop reason: SDUPTHER

## 2025-05-16 RX ORDER — FENTANYL CITRATE 50 UG/ML
INJECTION, SOLUTION INTRAMUSCULAR; INTRAVENOUS
Status: DISCONTINUED | OUTPATIENT
Start: 2025-05-16 | End: 2025-05-16 | Stop reason: SDUPTHER

## 2025-05-16 RX ORDER — ONDANSETRON 2 MG/ML
4 INJECTION INTRAMUSCULAR; INTRAVENOUS
Status: DISCONTINUED | OUTPATIENT
Start: 2025-05-16 | End: 2025-05-16 | Stop reason: HOSPADM

## 2025-05-16 RX ORDER — OXYCODONE AND ACETAMINOPHEN 5; 325 MG/1; MG/1
1 TABLET ORAL EVERY 6 HOURS PRN
Qty: 12 TABLET | Refills: 0 | Status: SHIPPED | OUTPATIENT
Start: 2025-05-16 | End: 2025-05-19

## 2025-05-16 RX ORDER — LIDOCAINE HYDROCHLORIDE 10 MG/ML
1 INJECTION, SOLUTION EPIDURAL; INFILTRATION; INTRACAUDAL; PERINEURAL
Status: DISCONTINUED | OUTPATIENT
Start: 2025-05-16 | End: 2025-05-16 | Stop reason: HOSPADM

## 2025-05-16 RX ADMIN — FENTANYL CITRATE 100 MCG: 0.05 INJECTION, SOLUTION INTRAMUSCULAR; INTRAVENOUS at 08:58

## 2025-05-16 RX ADMIN — LIDOCAINE HYDROCHLORIDE 50 MG: 10 INJECTION, SOLUTION INFILTRATION; PERINEURAL at 08:58

## 2025-05-16 RX ADMIN — EPHEDRINE SULFATE 10 MG: 5 INJECTION INTRAVENOUS at 09:49

## 2025-05-16 RX ADMIN — SUGAMMADEX 200 MG: 100 INJECTION, SOLUTION INTRAVENOUS at 09:47

## 2025-05-16 RX ADMIN — SODIUM CHLORIDE, SODIUM LACTATE, POTASSIUM CHLORIDE, AND CALCIUM CHLORIDE: 600; 310; 30; 20 INJECTION, SOLUTION INTRAVENOUS at 09:49

## 2025-05-16 RX ADMIN — ROCURONIUM BROMIDE 50 MG: 10 INJECTION, SOLUTION INTRAVENOUS at 09:00

## 2025-05-16 RX ADMIN — SODIUM CHLORIDE, SODIUM LACTATE, POTASSIUM CHLORIDE, AND CALCIUM CHLORIDE: .6; .31; .03; .02 INJECTION, SOLUTION INTRAVENOUS at 07:23

## 2025-05-16 RX ADMIN — DEXAMETHASONE SODIUM PHOSPHATE 4 MG: 4 INJECTION INTRA-ARTICULAR; INTRALESIONAL; INTRAMUSCULAR; INTRAVENOUS; SOFT TISSUE at 07:53

## 2025-05-16 RX ADMIN — SODIUM CHLORIDE, PRESERVATIVE FREE 20 MG: 5 INJECTION INTRAVENOUS at 07:54

## 2025-05-16 RX ADMIN — PROPOFOL 160 MG: 10 INJECTION, EMULSION INTRAVENOUS at 08:59

## 2025-05-16 RX ADMIN — ROCURONIUM BROMIDE 10 MG: 10 INJECTION, SOLUTION INTRAVENOUS at 09:34

## 2025-05-16 RX ADMIN — BUPIVACAINE 15 ML: 13.3 INJECTION, SUSPENSION, LIPOSOMAL INFILTRATION at 08:08

## 2025-05-16 RX ADMIN — GLYCOPYRROLATE 0.2 MG: 0.2 INJECTION, SOLUTION INTRAMUSCULAR; INTRAVENOUS at 09:20

## 2025-05-16 RX ADMIN — KETOROLAC TROMETHAMINE 15 MG: 30 INJECTION, SOLUTION INTRAMUSCULAR; INTRAVENOUS at 09:45

## 2025-05-16 RX ADMIN — MIDAZOLAM 2 MG: 1 INJECTION INTRAMUSCULAR; INTRAVENOUS at 07:59

## 2025-05-16 RX ADMIN — BUPIVACAINE HYDROCHLORIDE 15 ML: 2.5 INJECTION, SOLUTION EPIDURAL; INFILTRATION; INTRACAUDAL; PERINEURAL at 08:08

## 2025-05-16 RX ADMIN — FENTANYL CITRATE 50 MCG: 0.05 INJECTION, SOLUTION INTRAMUSCULAR; INTRAVENOUS at 09:18

## 2025-05-16 RX ADMIN — CEFAZOLIN 2000 MG: 1 INJECTION, POWDER, FOR SOLUTION INTRAMUSCULAR; INTRAVENOUS at 09:11

## 2025-05-16 RX ADMIN — ONDANSETRON 4 MG: 2 INJECTION INTRAMUSCULAR; INTRAVENOUS at 09:44

## 2025-05-16 RX ADMIN — SODIUM CHLORIDE, SODIUM LACTATE, POTASSIUM CHLORIDE, AND CALCIUM CHLORIDE: 600; 310; 30; 20 INJECTION, SOLUTION INTRAVENOUS at 08:55

## 2025-05-16 ASSESSMENT — PAIN - FUNCTIONAL ASSESSMENT
PAIN_FUNCTIONAL_ASSESSMENT: NONE - DENIES PAIN

## 2025-05-16 NOTE — DISCHARGE INSTRUCTIONS
[facility]  General Surgery/Colorectal Clinic  [phone]    POST HERNIA REPAIR INSTRUCTIONS      1. Liquids for 24 hours then resume regular diet    2. Increase activity daily, consistent with comfort.    3. Motrin/Ibuprofen 800 mg three time for pain not to exceed 7 days. Do not drive if taking narcotic pain medicine    4. Strenuous activity or lifting anything over 15 lbs. should be avoided for up to six to eight weeks.    5. Swelling and bruising in the region of the operation, and sometimes in the  scrotum and genitals, can be expected.    6. Black and blue marks at site is also common.    7. Outer dressings may be removed in 48 hours if there is no glue applied to the incison. Showering is allowed 48 hrs after  surgery. No swimming or bathing for 2 weeks    8. Milk of Magnesia or Miralax can be used if constipation becomes a problem (If no bowel movement in 48 hrs)    9. Ice pack to operative site, on 20 minutes, off 20 minutes, for 24 hours.    ACTIVITY: You will need to rest for the remainder of the surgery day   DO NOT drive or operate machinery for 24 hours after anesthesia   DO NOT sign legal documents for 24 hours after your surgery   You can resume normal activities within 24 - 48 hours..    TEMPERATURE: An elevated temperature above 101.5 associated with chills should  be reported TO YOUR DOCTOR.    INCISION: report any signs of infection around the incision, redness, swelling, drainage.      You may have some abdominal or shoulder pain. This is from the carbon dioxide gas used during the surgery to  inflate your abdomen so your doctor can see through the scopes. Laying flat will help decrease the pain.

## 2025-05-16 NOTE — H&P
Update History & Physical    The patient's History and Physical of April 28, 2025 was reviewed with the patient and I examined the patient. There was no change. The surgical site was confirmed by the patient and me.       Plan: The risks, benefits, expected outcome, and alternative to the recommended procedure have been discussed with the patient. Patient understands and wants to proceed with the procedure.     Electronically signed by Rajiv aTbor MD on 5/16/2025 at 7:40 AM         [place] TYRONE [facility] PHYSICIAN [facility] GENERAL SURGERY  [address]  [facility] [address]  [phone]     Patient: Roque Hudson Jr.   YOB: 1960   Date: 4/28/2025         History of Present Illness  Chief Complaint   Patient presents with    New Patient       This is a 64 y.o. male presents today complaining of left inguinal hernia.    The patient states that he was told 20 years ago that he had a left inguinal hernia.  Recently he was exercising and saw a bulge in the left groin.  The bulge is starting to become more frequent.  He is able to manually reduce it.  No pain.  No nausea or vomiting.  No constipation.    I have reviewed the patient's chart including past visits, office notes, hospital reports, procedures, tests, labs, medications, and other reports.     Past Medical History:   Diagnosis Date    A-fib ([facility])     Atelectasis of right lung 06/26/2017    Back pain, lumbosacral     BiPAP (biphasic positive airway pressure) dependence     12cm to 22cm    Clotting disorder     Cyst near coccyx     removed    GERD (gastroesophageal reflux disease)     Heart murmur     Hx of blood clots     Hyperlipidemia     Hypertension     Mild CAD 11/13/2018    Obstructive sleep apnea     Osteoarthritis     PAF (paroxysmal atrial fibrillation) ([facility])     Paroxysmal atrial fibrillation with RVR ([facility]) 06/26/2017    Pneumonia due to organism     Pulmonary embolism ([facility])       Past Surgical History:   Procedure         Allergies  No Known Allergies       Vitals  Pulse 53   Temp 97.6 °F (36.4 °C)   Ht 1.854 m (6' 1\")   Wt 92.1 kg (203 lb)   SpO2 97%   BMI 26.78 kg/m²      Physical Exam   General: Well-nourished, in no distress    Skin: No rash, no jaundice    Eyes: Extraocular movement intact, no scleral icterus    Cardiac: Regular rate and rhythm, no heave    Respiratory: Clear to auscultation bilaterally, no use of accessory muscles    Abdomen: Soft, nontender, nondistended, normal bowel sounds, left inguinal hernia reducible    Extrem: No clubbing, no cyanosis, no edema    Neuro: Alert and oriented x3, normal gait    Psych: Mood/affect appropriate, not anxious        Labs  Lab Results   Component Value Date    WBC 3.7 (L) 04/21/2025    HGB 13.6 (L) 04/21/2025    HCT 42.2 04/21/2025    MCV 97.9 (H) 04/21/2025     04/21/2025      Lab Results   Component Value Date     04/21/2025    K 5.2 (H) 04/21/2025     04/21/2025    CO2 29 04/21/2025    BUN 15 04/21/2025    CREATININE 0.9 04/21/2025    GLUCOSE 94 04/21/2025    CALCIUM 9.8 04/21/2025    BILITOT 0.8 04/21/2025    ALKPHOS 96 04/21/2025    AST 44 04/21/2025    ALT 53 (H) 04/21/2025    LABGLOM >90 04/21/2025    GFRAA >59 10/10/2022    GLOB 2.6 12/27/2016          Assessment  1. Non-recurrent unilateral inguinal hernia without obstruction or gangrene  2. PAF (paroxysmal atrial fibrillation) (HCC)  -     Ambulatory referral to Cardiology  3. Essential hypertension  -     Ambulatory referral to Cardiology       Plan  The patient was informed of the need for a robotic left inguinal hernia repair with mesh.  They were informed of the risks benefits and alternatives which include but not limited to bleeding, infection, mesh failure, injury to the gonadal vessels/nerves, bowel/bladder injury, hernia recurrence and prolonged pain/disability. Treatment of some of these may require further testing, medical treatment and/or additional surgical intervention. The

## 2025-05-16 NOTE — OP NOTE
[facility] General Surgery    Patient ID: Roque Hudson Jr.  65 y.o.  male  YOB: 1960      Brief Operative Report    Pre-operative Diagnosis: Left inguinal hernia    Post-operative Diagnosis: Same    Procedure: Robotic repair of left inguinal hernia with mesh    Surgeon:  Rajiv Tabor MD    Anesthesia: General    Findings: Findings:  Infection Present At Time Of Surgery (PATOS) (choose all levels that have infection present):  No infection present  Other Findings: Left direct inguinal hernia    Estimated blood loss: 1 ml    Specimens: None    Complications:  none    Condition:  stable        See dictated operative report for full det

## 2025-05-16 NOTE — ANESTHESIA PRE PROCEDURE
Department of Anesthesiology  Preprocedure Note       Name:  Roque Hudson Jr.   Age:  65 y.o.  :  1960                                          MRN:  [id number]         Date:  2025      Surgeon: Surgeon(s):  Rajiv Tabor MD    Procedure: Procedure(s):  ROBOTIC REPAIR OF LEFT INGUINAL HERNIA WITH MESH & TAP BLOCK    Medications prior to admission:   Prior to Admission medications    Medication Sig Start Date End Date Taking? Authorizing Provider   sotalol (BETAPACE) 80 MG tablet Take 0.5 tablets by mouth 2 times daily 25  Yes Claire Whiting APRN   fluticasone (FLONASE) 50 MCG/ACT nasal spray SPRAY 1 SPRAY BY NASAL ROUTE EVERY DAY 3/12/25  Yes Wendi Garnica MD   rosuvastatin (CRESTOR) 10 MG tablet Take 1 tablet by mouth daily 25  Yes Wendi Garnica MD   lisinopril (PRINIVIL;ZESTRIL) 40 MG tablet Take 1 tablet by mouth daily 25  Yes Wendi Garnica MD   NONFORMULARY Take 1 capsule by mouth in the morning and at bedtime Magnesium L-Threonate 72 mg BID   Yes Krzysztof Bonilla MD   NONFORMULARY Take 1 capsule by mouth in the morning and at bedtime Magnesium Glycinate 120 mg - BID   Yes Krzysztof Bonilla MD   Cholecalciferol (VITAMIN D) 50 MCG (2000 UT) CAPS capsule Take 1 capsule by mouth daily   Yes Krzysztof Bonilla MD   Bacillus Coagulans-Inulin (PROBIOTIC-PREBIOTIC) 1-250 BILLION-MG CAPS Take 1 capsule by mouth daily   Yes Krzysztof Bonilla MD   B Complex Vitamins (B COMPLEX PO) Take 150 mg by mouth daily   Yes Krzysztof Bonilla MD   Omega-3 Fatty Acids (FISH OIL PO) Take 1,280 mg by mouth daily    Yes Krzysztof Bonilla MD   Ascorbic Acid (VITAMIN C) 500 MG tablet Take 1 tablet by mouth 2 times daily   Yes Krzysztof Bonilla MD   Needles & Syringes MISC Use to injection Testosterone twice weekly 25   Wendi Garnica MD   testosterone cypionate (DEPOTESTOTERONE CYPIONATE) 200 MG/ML injection Inject 0.2 mLs into the muscle Twice a Week for 50 doses. Max Daily

## 2025-05-16 NOTE — PROGRESS NOTES
CLINICAL PHARMACY NOTE: MEDS TO BEDS    Total # of Prescriptions Filled: 1   The following medications were delivered to the patient:  Discharge Medication List as of 5/16/2025 10:26 AM        START taking these medications    Details   oxyCODONE-acetaminophen (PERCOCET) 5-325 MG per tablet Take 1 tablet by mouth every 6 hours as needed for Pain for up to 3 days. Intended supply: 3 days. Take lowest dose possible to manage pain Max Daily Amount: 4 tablets, Disp-12 tablet, R-0Normal               Additional Documentation:    Patient family picked up RX in pharmacy. Paid with cash.

## 2025-05-16 NOTE — ANESTHESIA POSTPROCEDURE EVALUATION
Alert and oriented. Speech clear. No deficits noted at time. Skin warm and dry. Ecchymosis / redness noted around the left eye. States ongoing the past week. Denying any other addition trauma. Wife States that she is sure if he fell this morning.       Jaspal Saunders RN  12/07/21 1313 Department of Anesthesiology  Postprocedure Note    Patient: Roque Hudson Jr.  MRN: [id number]  YOB: 1960  Date of evaluation: 5/16/2025    Procedure Summary       Date: 05/16/25 Room / Location: [address]    Anesthesia Start: 0855 Anesthesia Stop: 1011    Procedure: ROBOTIC REPAIR OF LEFT INGUINAL HERNIA WITH MESH & TAP BLOCK (Left) Diagnosis:       Left inguinal hernia      (Left inguinal hernia [K40.90])    Surgeons: Rajiv Tabor MD Responsible Provider: Barry Max APRN - CRNA    Anesthesia Type: general, regional ASA Status: 3            Anesthesia Type: No value filed.    Sunil Phase I: Sunil Score: 8    Sunil Phase II:      Anesthesia Post Evaluation    Patient location during evaluation: PACU  Patient participation: complete - patient participated  Level of consciousness: sleepy but conscious  Pain score: 0  Airway patency: patent  Nausea & Vomiting: no nausea and no vomiting  Cardiovascular status: hemodynamically stable and blood pressure returned to baseline  Respiratory status: acceptable, spontaneous ventilation, nonlabored ventilation, room air and oral airway  Hydration status: stable  Comments: /76   Pulse 61   Temp 97.3 °F (36.3 °C) (Tympanic)   Resp 14   Ht 1.854 m (6' 1\")   Wt 93.9 kg (207 lb)   SpO2 98%   BMI 27.31 kg/m²     Pain management: adequate    No notable events documented.

## 2025-05-16 NOTE — ANESTHESIA PROCEDURE NOTES
Peripheral Block    Patient location during procedure: holding area  Reason for block: post-op pain management  Start time: 5/16/2025 8:08 AM  Staffing  Performed: anesthesiologist   Anesthesiologist: Sudheer Pierce DO  Performed by: Sudheer Pierce DO  Authorized by: Sudheer Pierce DO    Preanesthetic Checklist  Completed: patient identified, IV checked, site marked, risks and benefits discussed, surgical/procedural consents, equipment checked, pre-op evaluation, timeout performed, anesthesia consent given, oxygen available, monitors applied/VS acknowledged, fire risk safety assessment completed and verbalized and blood product R/B/A discussed and consented  Peripheral Block   Patient position: supine  Prep: ChloraPrep  Provider prep: sterile gloves  Patient monitoring: cardiac monitor, continuous pulse ox, frequent blood pressure checks, IV access and responsive to questions  Block type: TAP  Laterality: left  Injection technique: single-shot  Guidance: ultrasound guided    Needle   Needle type: insulated echogenic nerve stimulator needle   Needle gauge: 22 G  Needle localization: ultrasound guidance  Needle length: 10 cm  Assessment   Injection assessment: negative aspiration for heme, no paresthesia on injection and no intravascular symptoms  Paresthesia pain: none  Slow fractionated injection: yes  Hemodynamics: stable  Outcomes: patient tolerated procedure well and uncomplicated    Medications Administered  BUPivacaine (MARCAINE) PF injection 0.25% - Perineural   15 mL - 5/16/2025 8:08:00 AM  BUPivacaine liposome (EXPAREL) injection 1.3% - Perineural   15 mL - 5/16/2025 8:08:00 AM

## 2025-05-16 NOTE — OP NOTE
[facility] General Surgery Operative Note    Patient ID: Roque Hudson Jr.  65 y.o.  male  YOB: 1960        NAME OF SURGEON: Rajiv Tabor MD     DATE OF SERVICE: 5/16/2025    PREOPERATIVE DIAGNOSIS  Initial nonobstructing left inguinal hernia    POSTOPERATIVE DIAGNOSIS  Same    PROCEDURE  Robotic repair of left initial nonobstructing inguinal hernia with Bard 3D mid large 10 x 16 cm mesh    SURGEON  Rajiv Tabor MD       SPECIMEN:  None    INDICATIONS  65-year-old man with symptomatic left inguinal hernia    *Findings:  Infection Present At Time Of Surgery (PATOS) (choose all levels that have infection present):  No infection present  Other Findings: Left direct inguinal hernia      PROCEDURE  After informed consent was obtained the patient brought to the operating room placed in the supine position on the operating table.  After adequate anesthesia was achieved a timeout was performed chart we identified the patient's name number and procedure.  His abdomen was sterilely prepped and draped.    Just above the umbilicus a small incision was made and a Veress needle was inserted.  There was free fall over the column of fluid and no withdrawal of succus or blood.  It was then connected to carbon dioxide and we got an opening pressure of 4 mmHg.  We then connected it to carbon monoxide and got a pneumoperitoneum of 15 mmHg.  We then inserted a 8 mm metallic robotic port.  Then in the right and the left midclavicular line approximately 10 cm to the left and to the right lateral of the umbilicus small incisions were made and 8 mm robotic ports were then inserted.  These were done under direct laparoscopic visualization.  We then were able to visualize the left direct inguinal hernia.    We then placed into the abdomen the Bard 3D left large 10 x 16 mesh and a 2-0 Vicryl suture and a 2 -0 STRATAFIX suture.    The robot was then docked from the left side of the abdomen such that arm 2 was left side of

## 2025-05-19 ENCOUNTER — TELEPHONE (OUTPATIENT)
Dept: SURGERY | Age: 65
End: 2025-05-19

## 2025-05-19 NOTE — TELEPHONE ENCOUNTER
5/19/2025 Patient has questions regarding if his symptoms are normal or if he needs to be seen.     Callback # [phone]  shama

## 2025-05-19 NOTE — TELEPHONE ENCOUNTER
Patient called complaining of bulge in upper abdomin started on left side Saturday above where in incisions are and now has spread over to the middle. Patient is wondering if this could be gas and if its concerning.

## 2025-06-02 ENCOUNTER — OFFICE VISIT (OUTPATIENT)
Dept: SURGERY | Age: 65
End: 2025-06-02

## 2025-06-02 VITALS
TEMPERATURE: 98.7 F | HEART RATE: 58 BPM | HEIGHT: 73 IN | WEIGHT: 207 LBS | BODY MASS INDEX: 27.43 KG/M2 | OXYGEN SATURATION: 98 %

## 2025-06-02 DIAGNOSIS — Z09 POSTOP CHECK: Primary | ICD-10-CM

## 2025-06-02 DIAGNOSIS — Z48.89 ENCOUNTER FOR POSTOPERATIVE CARE: ICD-10-CM

## 2025-06-02 PROCEDURE — 99024 POSTOP FOLLOW-UP VISIT: CPT | Performed by: SURGERY

## 2025-06-02 NOTE — PROGRESS NOTES
[facility]  [facility] GENERAL SURGERY  [address]  [facility] [id number] [phone]     Patient: Roque Hudson Jr.   YOB: 1960   Date: 6/2/2025         Subjective  Date of last surgery:  5/16/2025   POD: 17       Roque Hudson Jr.  returns today for a post op visit s/p robotic left inguinal hernia repair.  He is doing well.  There is no pain.  There is slight scrotal sensitivity.  No nausea or vomiting.  No constipation.  No fevers or chills.    Medications  Current Outpatient Medications   Medication Sig Dispense Refill    Needles & Syringes MISC Use to injection Testosterone twice weekly 15 each 1    testosterone cypionate (DEPOTESTOTERONE CYPIONATE) 200 MG/ML injection Inject 0.2 mLs into the muscle Twice a Week for 50 doses. Max Daily Amount: 40 mg 10 mL 0    rivaroxaban (XARELTO) 20 MG TABS tablet Take 1 tablet by mouth as needed      sotalol (BETAPACE) 80 MG tablet Take 0.5 tablets by mouth 2 times daily 90 tablet 1    fluticasone (FLONASE) 50 MCG/ACT nasal spray SPRAY 1 SPRAY BY NASAL ROUTE EVERY DAY 3 each 1    rosuvastatin (CRESTOR) 10 MG tablet Take 1 tablet by mouth daily 90 tablet 1    lisinopril (PRINIVIL;ZESTRIL) 40 MG tablet Take 1 tablet by mouth daily 90 tablet 1    NONFORMULARY Take 1 capsule by mouth in the morning and at bedtime Magnesium L-Threonate 72 mg BID      NONFORMULARY Take 1 capsule by mouth in the morning and at bedtime Magnesium Glycinate 120 mg - BID      Cholecalciferol (VITAMIN D) 50 MCG (2000 UT) CAPS capsule Take 1 capsule by mouth daily      Bacillus Coagulans-Inulin (PROBIOTIC-PREBIOTIC) 1-250 BILLION-MG CAPS Take 1 capsule by mouth daily      B Complex Vitamins (B COMPLEX PO) Take 150 mg by mouth daily      aspirin 81 MG EC tablet Take 1 tablet by mouth daily      Omega-3 Fatty Acids (FISH OIL PO) Take 1,280 mg by mouth daily       Ascorbic Acid (VITAMIN C) 500 MG tablet Take 1 tablet by mouth 2 times daily      vitamin E

## 2025-06-18 DIAGNOSIS — R53.83 OTHER FATIGUE: ICD-10-CM

## 2025-06-18 DIAGNOSIS — E29.1 HYPOGONADISM IN MALE: ICD-10-CM

## 2025-06-18 LAB — ESTRADIOL SERPL-SCNC: 28.4 PG/ML

## 2025-06-20 LAB
SHBG SERPL-SCNC: 156.2 PG/ML (ref 47–244)
SHBG SERPL-SCNC: 46 NMOL/L (ref 19–76)
TESTOST SERPL-MCNC: 832 NG/DL (ref 193–740)

## 2025-06-23 ENCOUNTER — RESULTS FOLLOW-UP (OUTPATIENT)
Dept: INTERNAL MEDICINE | Age: 65
End: 2025-06-23

## 2025-07-29 ENCOUNTER — TELEPHONE (OUTPATIENT)
Dept: CARDIOLOGY CLINIC | Age: 65
End: 2025-07-29

## 2025-07-29 ENCOUNTER — APPOINTMENT (OUTPATIENT)
Dept: GENERAL RADIOLOGY | Age: 65
End: 2025-07-29
Payer: MEDICARE

## 2025-07-29 ENCOUNTER — HOSPITAL ENCOUNTER (EMERGENCY)
Age: 65
Discharge: HOME | End: 2025-07-29
Attending: STUDENT IN AN ORGANIZED HEALTH CARE EDUCATION/TRAINING PROGRAM
Payer: MEDICARE

## 2025-07-29 VITALS
BODY MASS INDEX: 27.71 KG/M2 | TEMPERATURE: 98.2 F | HEART RATE: 71 BPM | OXYGEN SATURATION: 95 % | RESPIRATION RATE: 18 BRPM | SYSTOLIC BLOOD PRESSURE: 117 MMHG | WEIGHT: 210 LBS | DIASTOLIC BLOOD PRESSURE: 80 MMHG

## 2025-07-29 DIAGNOSIS — I48.92 ATRIAL FLUTTER, UNSPECIFIED TYPE (HCC): Primary | ICD-10-CM

## 2025-07-29 DIAGNOSIS — I48.0 PAROXYSMAL ATRIAL FIBRILLATION WITH RVR (HCC): ICD-10-CM

## 2025-07-29 LAB
ALBUMIN SERPL-MCNC: 4 G/DL (ref 3.5–5.2)
ALP SERPL-CCNC: 92 U/L (ref 40–129)
ALT SERPL-CCNC: 33 U/L (ref 10–50)
ANION GAP SERPL CALCULATED.3IONS-SCNC: 9 MMOL/L (ref 8–16)
AST SERPL-CCNC: 30 U/L (ref 10–50)
BASOPHILS # BLD: 0 K/UL (ref 0–0.2)
BASOPHILS NFR BLD: 0.7 % (ref 0–1)
BILIRUB SERPL-MCNC: 0.4 MG/DL (ref 0.2–1.2)
BUN SERPL-MCNC: 27 MG/DL (ref 8–23)
CALCIUM SERPL-MCNC: 9.5 MG/DL (ref 8.8–10.2)
CHLORIDE SERPL-SCNC: 108 MMOL/L (ref 98–107)
CO2 SERPL-SCNC: 26 MMOL/L (ref 22–29)
CREAT SERPL-MCNC: 1.1 MG/DL (ref 0.7–1.2)
EOSINOPHIL # BLD: 0.2 K/UL (ref 0–0.6)
EOSINOPHIL NFR BLD: 3.7 % (ref 0–5)
ERYTHROCYTE [DISTWIDTH] IN BLOOD BY AUTOMATED COUNT: 13.6 % (ref 11.5–14.5)
GLUCOSE SERPL-MCNC: 103 MG/DL (ref 70–99)
HCT VFR BLD AUTO: 41.2 % (ref 42–52)
HGB BLD-MCNC: 13.6 G/DL (ref 14–18)
IMM GRANULOCYTES # BLD: 0 K/UL
LYMPHOCYTES # BLD: 1.7 K/UL (ref 1.1–4.5)
LYMPHOCYTES NFR BLD: 27.7 % (ref 20–40)
MAGNESIUM SERPL-MCNC: 2.1 MG/DL (ref 1.6–2.4)
MCH RBC QN AUTO: 32.1 PG (ref 27–31)
MCHC RBC AUTO-ENTMCNC: 33 G/DL (ref 33–37)
MCV RBC AUTO: 97.2 FL (ref 80–94)
MONOCYTES # BLD: 0.5 K/UL (ref 0–0.9)
MONOCYTES NFR BLD: 9.1 % (ref 0–10)
NEUTROPHILS # BLD: 3.5 K/UL (ref 1.5–7.5)
NEUTS SEG NFR BLD: 58.6 % (ref 50–65)
PLATELET # BLD AUTO: 204 K/UL (ref 130–400)
PMV BLD AUTO: 9.3 FL (ref 9.4–12.4)
POTASSIUM SERPL-SCNC: 3.9 MMOL/L (ref 3.5–5.1)
PROT SERPL-MCNC: 6.1 G/DL (ref 6.4–8.3)
RBC # BLD AUTO: 4.24 M/UL (ref 4.7–6.1)
SODIUM SERPL-SCNC: 143 MMOL/L (ref 136–145)
TROPONIN, HIGH SENSITIVITY: 17 NG/L (ref 0–22)
WBC # BLD AUTO: 6 K/UL (ref 4.8–10.8)

## 2025-07-29 PROCEDURE — 93005 ELECTROCARDIOGRAM TRACING: CPT | Performed by: STUDENT IN AN ORGANIZED HEALTH CARE EDUCATION/TRAINING PROGRAM

## 2025-07-29 PROCEDURE — 6360000002 HC RX W HCPCS: Performed by: STUDENT IN AN ORGANIZED HEALTH CARE EDUCATION/TRAINING PROGRAM

## 2025-07-29 PROCEDURE — 84484 ASSAY OF TROPONIN QUANT: CPT

## 2025-07-29 PROCEDURE — 80053 COMPREHEN METABOLIC PANEL: CPT

## 2025-07-29 PROCEDURE — 85025 COMPLETE CBC W/AUTO DIFF WBC: CPT

## 2025-07-29 PROCEDURE — 71045 X-RAY EXAM CHEST 1 VIEW: CPT

## 2025-07-29 PROCEDURE — 96374 THER/PROPH/DIAG INJ IV PUSH: CPT

## 2025-07-29 PROCEDURE — 83735 ASSAY OF MAGNESIUM: CPT

## 2025-07-29 PROCEDURE — 36415 COLL VENOUS BLD VENIPUNCTURE: CPT

## 2025-07-29 PROCEDURE — 99285 EMERGENCY DEPT VISIT HI MDM: CPT

## 2025-07-29 RX ORDER — SOTALOL HYDROCHLORIDE 80 MG/1
80 TABLET ORAL 2 TIMES DAILY
Qty: 60 TABLET | Refills: 3 | Status: SHIPPED | OUTPATIENT
Start: 2025-07-29

## 2025-07-29 RX ORDER — DILTIAZEM HYDROCHLORIDE 5 MG/ML
20 INJECTION INTRAVENOUS ONCE
Status: COMPLETED | OUTPATIENT
Start: 2025-07-29 | End: 2025-07-29

## 2025-07-29 RX ORDER — SOTALOL HYDROCHLORIDE 80 MG/1
120 TABLET ORAL 2 TIMES DAILY
Qty: 60 TABLET | Refills: 3 | Status: SHIPPED | OUTPATIENT
Start: 2025-07-29 | End: 2025-07-29

## 2025-07-29 RX ADMIN — DILTIAZEM HYDROCHLORIDE 20 MG: 5 INJECTION, SOLUTION INTRAVENOUS at 17:57

## 2025-07-29 NOTE — TELEPHONE ENCOUNTER
Patient left message that his HR has been up today around 100-130, he feels fine. He states HR is usually in the 60's. He wants to know if you advise anything. Med list shows sotalol 80 mg 1 tablet BID and xarelto 20 mg QD.

## 2025-07-29 NOTE — ED PROVIDER NOTES
[facility] EMERGENCY DEPARTMENT  eMERGENCY dEPARTMENT eNCOUnter      Pt Name: Roque Hudson Jr.  MRN: [id number]  Birthdate 1960  Date of evaluation: 7/29/2025  Provider: Gt Blair MD    Chief Complaint:  Chief Complaint   Patient presents with   • Tachycardia     Pt states heart rate was 100-130 at home today     HPI    Roque Hudson Jr. is a 65 y.o. male who presents to the emergency department ***       Review of Systems    Past Medical History:   Diagnosis Date   • A-fib (HCC)     sees dr goldberg   • Atelectasis of right lung 06/26/2017   • Back pain, lumbosacral    • BiPAP (biphasic positive airway pressure) dependence     12cm to 22cm   • Cyst near coccyx     removed   • GERD (gastroesophageal reflux disease)    • Heart murmur    • Hx of blood clots    • Hyperlipidemia    • Hypertension    • Mild CAD 11/13/2018   • Obstructive sleep apnea    • Osteoarthritis    • Paroxysmal atrial fibrillation with RVR ([facility]) 06/26/2017   • Pneumonia due to organism    • Pulmonary embolism ([facility]) 1984       Past Surgical History:   Procedure Laterality Date   • ANKLE FRACTURE SURGERY Left    • ARM SURGERY Left 01/04/2018    fracture    • CARDIAC CATHETERIZATION  06/2018    Mild CAD   • CHOLECYSTECTOMY, LAPAROSCOPIC N/A 12/15/2017    CHOLECYSTECTOMY LAPAROSCOPIC performed by Carolina Hampton MD at [facility] OR   • COLONOSCOPY  2012   • COLONOSCOPY N/A 02/06/2023    Dr MARIAJOSE Hope-Diverticular disease, 10 yr recall   • HERNIA REPAIR Left 5/16/2025    ROBOTIC REPAIR OF LEFT INGUINAL HERNIA WITH MESH & TAP BLOCK performed by Rajiv Tabor MD at [facility] OR   • TN EGD TRANSORAL BIOPSY SINGLE/MULTIPLE N/A 11/15/2018    Dr MARIAJOSE Hope-Gastropathy   • SHOULDER SURGERY Left 09/05/2024    LEFT REVERSE TOTAL SHOULDER ARTHROPLASTY performed by Randolph Ackerman MD at [facility] OR   • TESTICLE SURGERY      undecended testicle done when 1 years old       Previous Medications    ASCORBIC ACID (VITAMIN C) 500 MG TABLET    Take 1 tablet by mouth 2 times  that he needs to call Dr. Espinoza for outpatient follow-up patient understands and agrees to do so.             Final Impression: See below.    Procedures    1. Atrial flutter, unspecified type (HCC)    2. Paroxysmal atrial fibrillation with RVR (HCC)      DISPOSITION Decision To Discharge 07/29/2025 07:03:12 PM   DISPOSITION CONDITION Stable         Wendi Garnica MD  [address] [place] [address] [address]  [facility] [address]  [phone]          Clinton Espinoza MD  [address] [place] [address]  [facility] [address]  [phone]            DISCHARGE MEDICATIONS:  Discharge Medication List as of 7/29/2025  7:40 PM             (Please note that portions of this note were completed with a voice recognition program.  Efforts were made to edit thedictations but occasionally words are mis-transcribed.)    Gt Blair MD (electronically signed)Emergency Physician        Gt Blair MD  08/05/25 0931

## 2025-07-30 LAB
EKG P AXIS: NORMAL DEGREES
EKG P-R INTERVAL: NORMAL MS
EKG Q-T INTERVAL: 316 MS
EKG QRS DURATION: 86 MS
EKG QTC CALCULATION (BAZETT): 386 MS
EKG T AXIS: -5 DEGREES

## 2025-07-30 PROCEDURE — 93010 ELECTROCARDIOGRAM REPORT: CPT | Performed by: INTERNAL MEDICINE

## 2025-07-30 NOTE — TELEPHONE ENCOUNTER
It looks like he is in atrial flutter.  Increase the sotalol to 120 mg twice a day and make an appointment next week to reassess and see if he goes back into rhythm.  If not, we can consider scheduling a cardioversion at that time.  Be sure he is staying on his blood thinner

## 2025-07-30 NOTE — TELEPHONE ENCOUNTER
Per Chart review, patient went to ED last night:    100 BPM  Possible atrial flutter with rapid ventricular response  Inferior ST-T abnormality is nonspecific  Comparison Summary: Significant changes including rhythm  Summary: Abnormal ECG

## 2025-07-30 NOTE — DISCHARGE INSTRUCTIONS
Call Dr. Espinoza tomorrow for follow-up.  Please return to the ER if you have a heart rate will not go below 110 for over an hour, if you develop chest pain, if you become lightheaded or pass out, if you have severe shortness of breath, or for any other emergent concerns. Otherwise please see your primary care doctor as soon as possible for repeat evaluation and consideration of more testing.

## 2025-07-31 NOTE — TELEPHONE ENCOUNTER
Spoke with patient and you decreased him to sotalol 40 mg bid at last visit and that is what he had been doing. ED increased it to 80 mg BID and started him on eliquis. He is feeling better and HR staying between 55-60, this AM 65. He wanted to know if can wait until his follow up on 8.12.25 or if he needs to be seen sooner. He will be out of town next week taking his grandkids out of town. He asked if he should increase sotalol to 120 mg BID, advised to stay on the 80 bid for now until I clarify with you.

## 2025-07-31 NOTE — TELEPHONE ENCOUNTER
Okay to stay on the sotalol 80 twice a day.  Be sure he is taking the blood thinner regularly.  As long as he is feeling okay I do not mind if he delays the appointment

## 2025-08-05 ASSESSMENT — ENCOUNTER SYMPTOMS
NAUSEA: 0
SHORTNESS OF BREATH: 0
VOMITING: 0

## 2025-08-07 LAB
EKG P AXIS: NORMAL DEGREES
EKG P-R INTERVAL: NORMAL MS
EKG Q-T INTERVAL: 380 MS
EKG QRS DURATION: 84 MS
EKG QTC CALCULATION (BAZETT): 397 MS
EKG T AXIS: 41 DEGREES

## 2025-08-12 ENCOUNTER — OFFICE VISIT (OUTPATIENT)
Dept: CARDIOLOGY CLINIC | Age: 65
End: 2025-08-12
Payer: MEDICARE

## 2025-08-12 VITALS
WEIGHT: 215 LBS | SYSTOLIC BLOOD PRESSURE: 126 MMHG | HEIGHT: 73 IN | BODY MASS INDEX: 28.49 KG/M2 | DIASTOLIC BLOOD PRESSURE: 80 MMHG | HEART RATE: 63 BPM

## 2025-08-12 DIAGNOSIS — I48.0 PAROXYSMAL ATRIAL FIBRILLATION WITH RVR (HCC): Primary | ICD-10-CM

## 2025-08-12 PROCEDURE — 99214 OFFICE O/P EST MOD 30 MIN: CPT | Performed by: INTERNAL MEDICINE

## 2025-08-12 PROCEDURE — 3074F SYST BP LT 130 MM HG: CPT | Performed by: INTERNAL MEDICINE

## 2025-08-12 PROCEDURE — G8427 DOCREV CUR MEDS BY ELIG CLIN: HCPCS | Performed by: INTERNAL MEDICINE

## 2025-08-12 PROCEDURE — 93000 ELECTROCARDIOGRAM COMPLETE: CPT | Performed by: INTERNAL MEDICINE

## 2025-08-12 PROCEDURE — 1123F ACP DISCUSS/DSCN MKR DOCD: CPT | Performed by: INTERNAL MEDICINE

## 2025-08-12 PROCEDURE — 1159F MED LIST DOCD IN RCRD: CPT | Performed by: INTERNAL MEDICINE

## 2025-08-12 PROCEDURE — 1036F TOBACCO NON-USER: CPT | Performed by: INTERNAL MEDICINE

## 2025-08-12 PROCEDURE — 3079F DIAST BP 80-89 MM HG: CPT | Performed by: INTERNAL MEDICINE

## 2025-08-12 PROCEDURE — G8417 CALC BMI ABV UP PARAM F/U: HCPCS | Performed by: INTERNAL MEDICINE

## 2025-08-12 PROCEDURE — 3017F COLORECTAL CA SCREEN DOC REV: CPT | Performed by: INTERNAL MEDICINE

## 2025-08-12 RX ORDER — AMPICILLIN TRIHYDRATE 250 MG
CAPSULE ORAL DAILY
COMMUNITY

## 2025-08-18 RX ORDER — ROSUVASTATIN CALCIUM 10 MG/1
10 TABLET, COATED ORAL DAILY
Qty: 90 TABLET | Refills: 1 | Status: SHIPPED | OUTPATIENT
Start: 2025-08-18

## 2025-08-18 RX ORDER — LISINOPRIL 40 MG/1
40 TABLET ORAL DAILY
Qty: 90 TABLET | Refills: 1 | Status: SHIPPED | OUTPATIENT
Start: 2025-08-18

## (undated) DEVICE — PK EXTRM 30

## (undated) DEVICE — SINGLE PORT MANIFOLD: Brand: NEPTUNE 2

## (undated) DEVICE — COLUMN DRAPE

## (undated) DEVICE — STRATAFIX SPRL PDS + 2-0 23CM CT-2

## (undated) DEVICE — SOLUTION IRRIG 3000ML 0.9% SOD CHL USP UROMATIC PLAS CONT

## (undated) DEVICE — TUBE ET 7.5MM NSL ORAL BASIC CUF INTMED MURPHY EYE RADPQ

## (undated) DEVICE — GOWN, ORBIS, XXLARGE, STERILE: Brand: MEDLINE

## (undated) DEVICE — BANDAGE GZ W2XL75IN ST RAYON POLY CNFRM STRTCH LTWT

## (undated) DEVICE — DUAL CUT SAGITTAL BLADE

## (undated) DEVICE — PENCIL BTTN S S CAUT TIP W HOLSTER 25 50

## (undated) DEVICE — GENERAL LAP CDS

## (undated) DEVICE — T-MAX DISPOSABLE FACE MASK 8 PER BOX

## (undated) DEVICE — COVER,TABLE,44X90,STERILE: Brand: MEDLINE

## (undated) DEVICE — GOWN, ORBIS, XLONG/XLARGE, STERILE: Brand: MEDLINE

## (undated) DEVICE — 3M™ IOBAN™ 2 ANTIMICROBIAL INCISE DRAPE 6651EZ: Brand: IOBAN™ 2

## (undated) DEVICE — SUTURE VICRYL + SZ 2-0 L36IN ABSRB UD L36MM CT-1 1/2 CIR VCP945H

## (undated) DEVICE — INSUFFLATION NEEDLE TO ESTABLISH PNEUMOPERITONEUM.: Brand: INSUFFLATION NEEDLE

## (undated) DEVICE — DECANTER VI VENT W/ VLV FOR ASEP TRNSF OF FLD

## (undated) DEVICE — SUTURE VICRYL + SZ 0 L27IN ABSRB UD CT-1 L36MM 1/2 CIR TAPR VCP260H

## (undated) DEVICE — BLANKET WRM W40.2XL55.9IN IORT LO BODY + MISTRAL AIR

## (undated) DEVICE — GLV SURG TRIUMPH GREEN W/ALOE PF LTX 8 STRL

## (undated) DEVICE — SCRW LK VA W STRDRV 2.4X14MM
Type: IMPLANTABLE DEVICE | Status: NON-FUNCTIONAL
Removed: 2018-01-05

## (undated) DEVICE — COVER LT HNDL BLU PLAS

## (undated) DEVICE — COVER,MAYO STAND,STERILE: Brand: MEDLINE

## (undated) DEVICE — DRAPE,SHOULDER,BEACH CHAIR,STERILE: Brand: MEDLINE

## (undated) DEVICE — Device

## (undated) DEVICE — SUTURE N ABSRB MONOFILAMENT 2-0 FSLX 75 CM 36 MM ETHILON

## (undated) DEVICE — LIQUIBAND RAPID ADHESIVE 36/CS 0.8ML: Brand: MEDLINE

## (undated) DEVICE — SHEET,DRAPE,53X77,STERILE: Brand: MEDLINE

## (undated) DEVICE — TWIST DRILL 4.7MM DIA 127.0MM LONG

## (undated) DEVICE — CURAVIEW LED LARYNGOSCOPE BLADE & HANDLE,DISPOSABLE,MAC 3.5: Brand: CURAPLEX

## (undated) DEVICE — UNDERGLOVE SURG SZ 8 FNGR THK0.21MIL GRN LTX BEAD CUF

## (undated) DEVICE — SUTURE MONOCRYL SZ 4-0 L18IN ABSRB UD L19MM PS-2 3/8 CIR PRIM Y496G

## (undated) DEVICE — 3M™ STERI-STRIP™ REINFORCED ADHESIVE SKIN CLOSURES, R1547, 1/2 IN X 4 IN (12 MM X 100 MM), 6 STRIPS/ENVELOPE: Brand: 3M™ STERI-STRIP™

## (undated) DEVICE — SUTURE MCRYL SZ 4-0 L18IN ABSRB UD L19MM PS-2 3/8 CIR PRIM Y496G

## (undated) DEVICE — CVR UNIV C/ARM

## (undated) DEVICE — SOLUTION ANTIFOG VIS SYS CLEARIFY LAPSCP

## (undated) DEVICE — ENDO KIT,LOURDES HOSPITAL: Brand: MEDLINE INDUSTRIES, INC.

## (undated) DEVICE — GOWN,PREVENTION PLUS,XLONG/XLARGE,STRL: Brand: MEDLINE

## (undated) DEVICE — BLADELESS OBTURATOR: Brand: WECK VISTA

## (undated) DEVICE — GLOVE SURG SZ 8 CRM LTX FREE POLYISOPRENE POLYMER BEAD ANTI

## (undated) DEVICE — GLOVE ORTHO 8   MSG9480

## (undated) DEVICE — SUTURE ETHIBOND EXCEL SZ 5 L30IN NONABSORBABLE GRN L40MM V-37 MB66G

## (undated) DEVICE — TRAY SURG PROC CHOLE FLX

## (undated) DEVICE — ANTIBACTERIAL UNDYED BRAIDED (POLYGLACTIN 910), SYNTHETIC ABSORBABLE SUTURE: Brand: COATED VICRYL

## (undated) DEVICE — DISPOSABLE TOURNIQUET CUFF SINGLE BLADDER, SINGLE PORT AND QUICK CONNECT CONNECTOR: Brand: COLOR CUFF

## (undated) DEVICE — MINI ENDOCUT SCISSOR TIP, DISPOSABLE: Brand: RENEW

## (undated) DEVICE — ARM SLING II: Brand: DEROYAL

## (undated) DEVICE — ULTRACLEAN ACCESSORY ELECTRODE 1" (2.54 CM) COATED BLADE: Brand: ULTRACLEAN

## (undated) DEVICE — SHOULDER CDS

## (undated) DEVICE — GLOVE SURG SZ 7 CRM LTX FREE POLYISOPRENE POLYMER BEAD ANTI

## (undated) DEVICE — CVR BRD ARM 13X30

## (undated) DEVICE — CANNULA NSL AD L7FT DIV O2 CO2 W/ M LUERLOCK TRMPT CONN

## (undated) DEVICE — ASTOUND STANDARD SURGICAL GOWN, XXL: Brand: CONVERTORS

## (undated) DEVICE — 40595 XL TRENDELENBURG POSITIONING KIT: Brand: 40595 XL TRENDELENBURG POSITIONING KIT

## (undated) DEVICE — GLV SURG BIOGEL M LTX PF 8

## (undated) DEVICE — SUTURE VICRYL + SZ 3-0 L27IN ABSRB UD L26MM SH 1/2 CIR VCP416H

## (undated) DEVICE — BNDG ESMARK 4IN 9FT LF STRL BLU

## (undated) DEVICE — COTTON UNDERCAST PADDING,REGULAR FINISH: Brand: WEBRIL

## (undated) DEVICE — 3M™ IOBAN™ 2 ANTIMICROBIAL INCISE DRAPE 6650EZ: Brand: IOBAN™ 2

## (undated) DEVICE — SENSOR PLSE OXMTR AD CBL L3FT ADH TRANSMISSIVE

## (undated) DEVICE — BIT DRL QC DIA W/DEPTHMARK 1.8X110MM

## (undated) DEVICE — BIPOLAR SEALER 23-112-1 AQM 6.0: Brand: AQUAMANTYS ®

## (undated) DEVICE — ADHESIVE SKIN CLSR 0.7ML TOP DERMBND ADV

## (undated) DEVICE — GLOVE SURG SZ 8 L12IN FNGR THK79MIL GRN LTX FREE

## (undated) DEVICE — SUTURE VCRL SZ 3-0 L27IN ABSRB UD L26MM SH 1/2 CIR J416H

## (undated) DEVICE — TIBURON DRAPE TOWELS: Brand: CONVERTORS

## (undated) DEVICE — HANDPIECE SET WITH COAXIAL MULTI-ORIFICE TIP AND SUCTION TUBE: Brand: INTERPULSE

## (undated) DEVICE — CATHETER IV 14 GAX2 IN WNG INTROCAN SAFETY

## (undated) DEVICE — BIT DRILL 3.0

## (undated) DEVICE — TISSUE RETRIEVAL SYSTEM: Brand: INZII RETRIEVAL SYSTEM

## (undated) DEVICE — SUT ETHLN 3/0 FS1 30IN 669H

## (undated) DEVICE — TOWEL,OR,DSP,ST,BLUE,STD,4/PK,20PK/CS: Brand: MEDLINE

## (undated) DEVICE — SEAL

## (undated) DEVICE — PAD,ARMBOARD,CONV,FOAM,2X8X20",12PR/CS: Brand: MEDLINE

## (undated) DEVICE — SUTURE SZ 0 27IN 5/8 CIR UR-6  TAPER PT VIOLET ABSRB VICRYL J603H

## (undated) DEVICE — TIP COVER ACCESSORY

## (undated) DEVICE — ARM DRAPE

## (undated) DEVICE — DRESSING BORDERED ADH GZ UNIV GEN USE 8INX4IN AND 6INX2IN

## (undated) DEVICE — GLOVE SURG SZ 75 CRM LTX FREE POLYISOPRENE POLYMER BEAD ANTI

## (undated) DEVICE — SET PIN MOD GLEN SYS
Type: IMPLANTABLE DEVICE | Status: NON-FUNCTIONAL
Removed: 2024-09-05

## (undated) DEVICE — PK TURNOVER RM ADV

## (undated) DEVICE — SHOULDER STABILIZATION KIT,                                    DISPOSABLE 12 PER BOX